# Patient Record
Sex: FEMALE | Race: WHITE | Employment: OTHER | URBAN - METROPOLITAN AREA
[De-identification: names, ages, dates, MRNs, and addresses within clinical notes are randomized per-mention and may not be internally consistent; named-entity substitution may affect disease eponyms.]

---

## 2017-02-18 ENCOUNTER — APPOINTMENT (OUTPATIENT)
Dept: GENERAL RADIOLOGY | Age: 67
DRG: 377 | End: 2017-02-18
Attending: EMERGENCY MEDICINE
Payer: MEDICARE

## 2017-02-18 ENCOUNTER — HOSPITAL ENCOUNTER (INPATIENT)
Age: 67
LOS: 11 days | Discharge: HOME HEALTH | DRG: 377 | End: 2017-03-01
Attending: EMERGENCY MEDICINE | Admitting: HOSPITALIST
Payer: MEDICARE

## 2017-02-18 DIAGNOSIS — R04.2 HEMOPTYSIS: ICD-10-CM

## 2017-02-18 DIAGNOSIS — K25.4 GASTROINTESTINAL HEMORRHAGE ASSOCIATED WITH GASTRIC ULCER: Primary | ICD-10-CM

## 2017-02-18 PROBLEM — K92.2 GI (GASTROINTESTINAL BLEED): Status: ACTIVE | Noted: 2017-02-18

## 2017-02-18 PROBLEM — Z79.01 CHRONIC ANTICOAGULATION: Status: ACTIVE | Noted: 2017-02-18

## 2017-02-18 PROBLEM — K06.8 GINGIVAL BLEEDING: Status: ACTIVE | Noted: 2017-02-18

## 2017-02-18 LAB
ALBUMIN SERPL BCP-MCNC: 2.8 G/DL (ref 3.4–5)
ALBUMIN/GLOB SERPL: 0.8 {RATIO} (ref 0.8–1.7)
ALP SERPL-CCNC: 131 U/L (ref 45–117)
ALT SERPL-CCNC: 33 U/L (ref 13–56)
ANION GAP BLD CALC-SCNC: 10 MMOL/L (ref 3–18)
APPEARANCE UR: CLEAR
AST SERPL W P-5'-P-CCNC: 32 U/L (ref 15–37)
BACTERIA URNS QL MICRO: ABNORMAL /HPF
BASOPHILS # BLD AUTO: 0 K/UL (ref 0–0.06)
BASOPHILS # BLD: 0 % (ref 0–2)
BILIRUB SERPL-MCNC: 0.2 MG/DL (ref 0.2–1)
BILIRUB UR QL: NEGATIVE
BUN SERPL-MCNC: 26 MG/DL (ref 7–18)
BUN/CREAT SERPL: 51 (ref 12–20)
CALCIUM SERPL-MCNC: 8.2 MG/DL (ref 8.5–10.1)
CHLORIDE SERPL-SCNC: 95 MMOL/L (ref 100–108)
CO2 SERPL-SCNC: 27 MMOL/L (ref 21–32)
COLOR UR: YELLOW
CREAT SERPL-MCNC: 0.51 MG/DL (ref 0.6–1.3)
DIFFERENTIAL METHOD BLD: ABNORMAL
EOSINOPHIL # BLD: 0.1 K/UL (ref 0–0.4)
EOSINOPHIL NFR BLD: 1 % (ref 0–5)
EPITH CASTS URNS QL MICRO: ABNORMAL /LPF (ref 0–5)
ERYTHROCYTE [DISTWIDTH] IN BLOOD BY AUTOMATED COUNT: 17.3 % (ref 11.6–14.5)
GLOBULIN SER CALC-MCNC: 3.6 G/DL (ref 2–4)
GLUCOSE SERPL-MCNC: 128 MG/DL (ref 74–99)
GLUCOSE UR STRIP.AUTO-MCNC: NEGATIVE MG/DL
HCT VFR BLD AUTO: 27.5 % (ref 35–45)
HGB BLD-MCNC: 8.6 G/DL (ref 12–16)
HGB UR QL STRIP: NEGATIVE
INR PPP: 0.9 (ref 0.8–1.2)
KETONES UR QL STRIP.AUTO: NEGATIVE MG/DL
LACTATE SERPL-SCNC: 2.5 MMOL/L (ref 0.4–2)
LEUKOCYTE ESTERASE UR QL STRIP.AUTO: ABNORMAL
LIPASE SERPL-CCNC: 119 U/L (ref 73–393)
LYMPHOCYTES # BLD AUTO: 15 % (ref 21–52)
LYMPHOCYTES # BLD: 1.8 K/UL (ref 0.9–3.6)
MAGNESIUM SERPL-MCNC: 2 MG/DL (ref 1.8–2.4)
MCH RBC QN AUTO: 25 PG (ref 24–34)
MCHC RBC AUTO-ENTMCNC: 31.3 G/DL (ref 31–37)
MCV RBC AUTO: 79.9 FL (ref 74–97)
MONOCYTES # BLD: 0.6 K/UL (ref 0.05–1.2)
MONOCYTES NFR BLD AUTO: 5 % (ref 3–10)
NEUTS SEG # BLD: 9.8 K/UL (ref 1.8–8)
NEUTS SEG NFR BLD AUTO: 79 % (ref 40–73)
NITRITE UR QL STRIP.AUTO: POSITIVE
PH UR STRIP: 6.5 [PH] (ref 5–8)
PLATELET # BLD AUTO: 454 K/UL (ref 135–420)
PMV BLD AUTO: 9.4 FL (ref 9.2–11.8)
POTASSIUM SERPL-SCNC: 5.6 MMOL/L (ref 3.5–5.5)
PROT SERPL-MCNC: 6.4 G/DL (ref 6.4–8.2)
PROT UR STRIP-MCNC: NEGATIVE MG/DL
PROTHROMBIN TIME: 12.3 SEC (ref 11.5–15.2)
RBC # BLD AUTO: 3.44 M/UL (ref 4.2–5.3)
RBC #/AREA URNS HPF: NEGATIVE /HPF (ref 0–5)
SODIUM SERPL-SCNC: 132 MMOL/L (ref 136–145)
SP GR UR REFRACTOMETRY: 1.02 (ref 1–1.03)
UROBILINOGEN UR QL STRIP.AUTO: 0.2 EU/DL (ref 0.2–1)
WBC # BLD AUTO: 12.4 K/UL (ref 4.6–13.2)
WBC URNS QL MICRO: ABNORMAL /HPF (ref 0–5)

## 2017-02-18 PROCEDURE — 83735 ASSAY OF MAGNESIUM: CPT | Performed by: EMERGENCY MEDICINE

## 2017-02-18 PROCEDURE — 81001 URINALYSIS AUTO W/SCOPE: CPT | Performed by: EMERGENCY MEDICINE

## 2017-02-18 PROCEDURE — 36415 COLL VENOUS BLD VENIPUNCTURE: CPT | Performed by: HOSPITALIST

## 2017-02-18 PROCEDURE — 86920 COMPATIBILITY TEST SPIN: CPT | Performed by: EMERGENCY MEDICINE

## 2017-02-18 PROCEDURE — 74011000258 HC RX REV CODE- 258: Performed by: EMERGENCY MEDICINE

## 2017-02-18 PROCEDURE — 83605 ASSAY OF LACTIC ACID: CPT | Performed by: EMERGENCY MEDICINE

## 2017-02-18 PROCEDURE — 86900 BLOOD TYPING SEROLOGIC ABO: CPT | Performed by: EMERGENCY MEDICINE

## 2017-02-18 PROCEDURE — 85018 HEMOGLOBIN: CPT | Performed by: HOSPITALIST

## 2017-02-18 PROCEDURE — 74011250636 HC RX REV CODE- 250/636: Performed by: EMERGENCY MEDICINE

## 2017-02-18 PROCEDURE — 80053 COMPREHEN METABOLIC PANEL: CPT | Performed by: EMERGENCY MEDICINE

## 2017-02-18 PROCEDURE — 65660000000 HC RM CCU STEPDOWN

## 2017-02-18 PROCEDURE — 83690 ASSAY OF LIPASE: CPT | Performed by: EMERGENCY MEDICINE

## 2017-02-18 PROCEDURE — 87077 CULTURE AEROBIC IDENTIFY: CPT | Performed by: HOSPITALIST

## 2017-02-18 PROCEDURE — C9113 INJ PANTOPRAZOLE SODIUM, VIA: HCPCS | Performed by: EMERGENCY MEDICINE

## 2017-02-18 PROCEDURE — 87186 SC STD MICRODIL/AGAR DIL: CPT | Performed by: HOSPITALIST

## 2017-02-18 PROCEDURE — 85025 COMPLETE CBC W/AUTO DIFF WBC: CPT | Performed by: EMERGENCY MEDICINE

## 2017-02-18 PROCEDURE — 87086 URINE CULTURE/COLONY COUNT: CPT | Performed by: HOSPITALIST

## 2017-02-18 PROCEDURE — 96361 HYDRATE IV INFUSION ADD-ON: CPT

## 2017-02-18 PROCEDURE — 99285 EMERGENCY DEPT VISIT HI MDM: CPT

## 2017-02-18 PROCEDURE — 85610 PROTHROMBIN TIME: CPT | Performed by: EMERGENCY MEDICINE

## 2017-02-18 PROCEDURE — 83605 ASSAY OF LACTIC ACID: CPT | Performed by: HOSPITALIST

## 2017-02-18 PROCEDURE — 96374 THER/PROPH/DIAG INJ IV PUSH: CPT

## 2017-02-18 PROCEDURE — 87184 SC STD DISK METHOD PER PLATE: CPT | Performed by: HOSPITALIST

## 2017-02-18 PROCEDURE — 71010 XR CHEST PORT: CPT

## 2017-02-18 RX ORDER — PANTOPRAZOLE SODIUM 40 MG/10ML
80 INJECTION, POWDER, LYOPHILIZED, FOR SOLUTION INTRAVENOUS
Status: COMPLETED | OUTPATIENT
Start: 2017-02-18 | End: 2017-02-18

## 2017-02-18 RX ORDER — SODIUM CHLORIDE 0.9 % (FLUSH) 0.9 %
5-10 SYRINGE (ML) INJECTION AS NEEDED
Status: DISCONTINUED | OUTPATIENT
Start: 2017-02-18 | End: 2017-03-01 | Stop reason: HOSPADM

## 2017-02-18 RX ORDER — SODIUM CHLORIDE 9 MG/ML
250 INJECTION, SOLUTION INTRAVENOUS AS NEEDED
Status: DISCONTINUED | OUTPATIENT
Start: 2017-02-18 | End: 2017-02-18

## 2017-02-18 RX ORDER — SODIUM CHLORIDE 0.9 % (FLUSH) 0.9 %
5-10 SYRINGE (ML) INJECTION EVERY 8 HOURS
Status: DISCONTINUED | OUTPATIENT
Start: 2017-02-18 | End: 2017-03-01 | Stop reason: HOSPADM

## 2017-02-18 RX ADMIN — SODIUM CHLORIDE 1000 ML: 900 INJECTION, SOLUTION INTRAVENOUS at 19:26

## 2017-02-18 RX ADMIN — SODIUM CHLORIDE 8 MG/HR: 900 INJECTION INTRAVENOUS at 23:35

## 2017-02-18 RX ADMIN — PANTOPRAZOLE SODIUM 80 MG: 40 INJECTION, POWDER, FOR SOLUTION INTRAVENOUS at 21:48

## 2017-02-18 RX ADMIN — Medication 10 ML: at 23:36

## 2017-02-18 NOTE — ED TRIAGE NOTES
Pt transported from via EMS. Pt stated she started having bloody stool and vomiting blood. Pt stated she is taking coumadin and lovenox.

## 2017-02-18 NOTE — IP AVS SNAPSHOT
[address] [address] 
[phone] Patient: Peri Thapa MRN: [id number] WSL:[birth year] You are allergic to the following Allergen Reactions Demerol (Meperidine) Unknown (comments) Seafood (Shellfish Containing Products) Unknown (comments) Recent Documentation Height  
  
  
  
  
  
 1.676 m Unresulted Labs Order Current Status ALDOSTERONE In process Emergency Contacts Name Discharge Info Relation Home Work Mobile Tulio Rose DISCHARGE CAREGIVER [3] Spouse [3] [phone] About your hospitalization You were admitted on:  February 18, 2017 You last received care in the:  [address] [place] [address] You were discharged on:  March 1, 2017 Unit phone number:  [phone] Why you were hospitalized Your primary diagnosis was:  Gi (Gastrointestinal Bleed) Your diagnoses also included:  Gingival Bleeding, Chronic Anticoagulation, Epistaxis, Uti (Urinary Tract Infection) Providers Seen During Your Hospitalizations Provider Role Specialty Primary office phone Cristal Santos MD Attending Provider Emergency Medicine [phone] Janneth Landis MD Attending Provider [facility] [phone] Your Primary Care Physician (PCP) Primary Care Physician Office Phone Office Fax Richie Roy [phone] Follow-up Information Follow up With Details Comments Contact Info Rika Brock MD On 3/8/2017 Follow up appointment scheduled for March 8, 2017 at 3:15 p.m. [facility] [address] [address] 
[phone] [facility] [phone] Jana Beckett MD On 3/14/2017 Follow up appointment scheduled for March 14, 2017 at 9:45 a.m. with JACKI Abdullahi (first appointment available). 1 [facility] [id number] [address] [address] 
[phone] Current Discharge Medication List  
  
START taking these medications Dose & Instructions Dispensing Information Comments Morning Noon Evening Bedtime  
 doxycycline 100 mg capsule Commonly known as:  Catrina Span Your next dose is: Today, Tomorrow Other:  _________ Dose:  100 mg Take 1 Cap by mouth two (2) times a day for 5 days. Quantity:  10 Cap Refills:  0  
     
   
   
   
  
 fluticasone 50 mcg/actuation nasal spray Commonly known as:  Neville Merles Your next dose is: Today, Tomorrow Other:  _________ Dose:  2 Spray 2 Sprays by Both Nostrils route daily. Quantity:  1 Bottle Refills:  1  
     
   
   
   
  
 lubiPROStone 8 mcg capsule Commonly known as:  Aundra Castleford Your next dose is: Today, Tomorrow Other:  _________ Dose:  8 mcg Take 1 Cap by mouth two (2) times daily (with meals). Quantity:  60 Cap Refills:  0  
     
   
   
   
  
 polyethylene glycol 17 gram packet Commonly known as:  Tonna Dieudonne Your next dose is: Today, Tomorrow Other:  _________ Dose:  17 g Take 1 Packet by mouth two (2) times a day. Quantity:  60 Packet Refills:  0 CONTINUE these medications which have CHANGED Dose & Instructions Dispensing Information Comments Morning Noon Evening Bedtime DULoxetine 30 mg capsule Commonly known as:  CYMBALTA What changed:  how much to take Your next dose is: Today, Tomorrow Other:  _________ Dose:  60 mg Take 2 Caps by mouth two (2) times a day. Quantity:  60 Cap Refills:  1  
     
   
   
   
  
 levothyroxine 100 mcg tablet Commonly known as:  SYNTHROID What changed:  how much to take Your next dose is: Today, Tomorrow Other:  _________ Dose:  125 mcg Take 1.5 Tabs by mouth Daily (before breakfast). Quantity:  30 Tab Refills:  0  
     
   
   
   
  
 potassium chloride SR 10 mEq tablet Commonly known as:  KLOR-CON 10 What changed:  how much to take Your next dose is: Today, Tomorrow Other:  _________ Dose:  20 mEq Take 2 Tabs by mouth two (2) times a day. Quantity:  10 Tab Refills:  0 CONTINUE these medications which have NOT CHANGED Dose & Instructions Dispensing Information Comments Morning Noon Evening Bedtime Elías Klein 860-96-30 mg-mg-million Tab Your next dose is: Today, Tomorrow Other:  _________ Dose:  85994 mg Take 25,000 mg by mouth two (2) times a day. Indications: Takes at 1200 and 2000 Refills:  0  
     
   
   
   
  
 CARAFATE 100 mg/mL suspension Generic drug:  sucralfate Your next dose is: Today, Tomorrow Other:  _________ Dose:  1 g Take 1 g by mouth Before breakfast, lunch, dinner and at bedtime. Refills:  0 CLARITIN-D 24 HOUR  mg per tablet Generic drug:  loratadine-pseudoephedrine Your next dose is: Today, Tomorrow Other:  _________ Dose:  1 Tab Take 1 Tab by mouth daily. Refills:  0  
     
   
   
   
  
 codeine-butalbital-acetaminophen-caffeine -56-30 mg per capsule Commonly known as:  FIORICET WITH CODEINE Your next dose is: Today, Tomorrow Other:  _________ Dose:  1 Cap Take 1 Cap by mouth every four (4) hours as needed for Headache. Refills:  0  
     
   
   
   
  
 cyclobenzaprine 10 mg tablet Commonly known as:  FLEXERIL Your next dose is: Today, Tomorrow Other:  _________ Dose:  10 mg Take 10 mg by mouth nightly. Indications: Takes at 2000 Refills:  0  
     
   
   
   
  
 ferrous sulfate 324 mg (65 mg iron) tablet Your next dose is: Today, Tomorrow Other:  _________  Dose:  324 mg  
 Take 324 mg by mouth Daily (before breakfast). Indications: 65 mg iron Refills:  0  
     
   
   
   
  
 gabapentin 300 mg capsule Commonly known as:  NEURONTIN Your next dose is: Today, Tomorrow Other:  _________ Dose:  900 mg Take 900 mg by mouth three (3) times daily. Refills:  0 Glucosamine &Chondroit-MV-Min3 450-889-71-0.5 mg Tab Your next dose is: Today, Tomorrow Other:  _________ Dose:  1500 mg Take 1,500 mg by mouth three (3) times daily. Refills:  0  
     
   
   
   
  
 lacosamide 200 mg Tab tablet Commonly known as:  VIMPAT Your next dose is: Today, Tomorrow Other:  _________ Dose:  100 mg Take 100 mg by mouth two (2) times a day. Indications: PARTIAL EPILEPSY TREATMENT ADJUNCT Refills:  0  
     
   
   
   
  
 magnesium oxide 500 mg Tab Your next dose is: Today, Tomorrow Other:  _________ Dose:  500 mg Take 500 mg by mouth daily (with lunch). Refills:  0  
     
   
   
   
  
 morphine IR 15 mg tablet Commonly known as:  MS IR Your next dose is: Today, Tomorrow Other:  _________ Dose:  15 mg Take 15 mg by mouth every eight (8) hours as needed for Pain. Refills:  0 MYRBETRIQ 50 mg ER tablet Generic drug:  mirabegron ER Your next dose is: Today, Tomorrow Other:  _________ Dose:  50 mg Take 50 mg by mouth nightly. Takes at [phone] Refills:  0  
     
   
   
   
  
 MIGUEL PO Your next dose is: Today, Tomorrow Other:  _________ Dose:  625 mg Take 625 mg by mouth two (2) times a day. Indications: Takes at 1200 and 2000 Refills:  0  
     
   
   
   
  
 OTHER(NON-FORMULARY) Your next dose is: Today, Tomorrow Other:  _________ Dose:  1 Cap Take 1 Cap by mouth daily (with lunch). Indications: Bariatric Multi Formula Refills:  0  
     
   
   
   
  
 predniSONE 5 mg tablet Commonly known as:  Rachel Greek Your next dose is: Today, Tomorrow Other:  _________ Dose:  5 mg Take 5 mg by mouth daily. Refills:  0 PROTONIX 20 mg tablet Generic drug:  pantoprazole Your next dose is: Today, Tomorrow Other:  _________ Dose:  20 mg Take 20 mg by mouth two (2) times a day. Refills:  0  
     
   
   
   
  
 * REQUIP 0.25 mg tablet Generic drug:  rOPINIRole Your next dose is: Today, Tomorrow Other:  _________ Dose:  0.25 mg Take 0.25 mg by mouth daily. Takes at [employer] Refills:  0  
     
   
   
   
  
 * REQUIP 0.25 mg tablet Generic drug:  rOPINIRole Your next dose is: Today, Tomorrow Other:  _________ Dose:  0.75 mg Take 0.75 mg by mouth nightly. Takes at [phone] Refills:  0  
     
   
   
   
  
 VITAMIN C 500 mg tablet Generic drug:  ascorbic acid (vitamin C) Your next dose is: Today, Tomorrow Other:  _________ Dose:  500 mg Take 500 mg by mouth daily. Refills:  0 ZyPREXA 7.5 mg tablet Generic drug:  OLANZapine Your next dose is: Today, Tomorrow Other:  _________ Dose:  7.5 mg Take 7.5 mg by mouth nightly. Refills:  0  
     
   
   
   
  
 * Notice: This list has 2 medication(s) that are the same as other medications prescribed for you. Read the directions carefully, and ask your doctor or other care provider to review them with you. STOP taking these medications   
 warfarin 3 mg tablet Commonly known as:  COUMADIN Where to Get Your Medications Information on where to get these meds will be given to you by the nurse or doctor. ! Ask your nurse or doctor about these medications  
  doxycycline 100 mg capsule  
 fluticasone 50 mcg/actuation nasal spray lubiPROStone 8 mcg capsule  
 polyethylene glycol 17 gram packet Discharge Instructions DISCHARGE SUMMARY from Nurse The following personal items are in your possession at time of discharge:  
 
 
 
 
PATIENT INSTRUCTIONS: 
 
After general anesthesia or intravenous sedation, for 24 hours or while taking prescription Narcotics: · Limit your activities · Do not drive and operate hazardous machinery · Do not make important personal or business decisions · Do  not drink alcoholic beverages · If you have not urinated within 8 hours after discharge, please contact your surgeon on call. Report the following to your surgeon: 
· Excessive pain, swelling, redness or odor of or around the surgical area · Temperature over 100.5 · Nausea and vomiting lasting longer than 4 hours or if unable to take medications · Any signs of decreased circulation or nerve impairment to extremity: change in color, persistent  numbness, tingling, coldness or increase pain · Any questions What to do at Home: *  Please give a list of your current medications to your Primary Care Provider. *  Please update this list whenever your medications are discontinued, doses are 
    changed, or new medications (including over-the-counter products) are added. *  Please carry medication information at all times in case of emergency situations. These are general instructions for a healthy lifestyle: No smoking/ No tobacco products/ Avoid exposure to second hand smoke Surgeon General's Warning:  Quitting smoking now greatly reduces serious risk to your health. Obesity, smoking, and sedentary lifestyle greatly increases your risk for illness A healthy diet, regular physical exercise & weight monitoring are important for maintaining a healthy lifestyle You may be retaining fluid if you have a history of heart failure or if you experience any of the following symptoms:  Weight gain of 3 pounds or more overnight or 5 pounds in a week, increased swelling in our hands or feet or shortness of breath while lying flat in bed. Please call your doctor as soon as you notice any of these symptoms; do not wait until your next office visit. Recognize signs and symptoms of STROKE: 
 
F-face looks uneven A-arms unable to move or move unevenly S-speech slurred or non-existent T-time-call 911 as soon as signs and symptoms begin-DO NOT go Back to bed or wait to see if you get better-TIME IS BRAIN. Warning Signs of HEART ATTACK Call 911 if you have these symptoms: 
? Chest discomfort. Most heart attacks involve discomfort in the center of the chest that lasts more than a few minutes, or that goes away and comes back. It can feel like uncomfortable pressure, squeezing, fullness, or pain. ? Discomfort in other areas of the upper body. Symptoms can include pain or discomfort in one or both arms, the back, neck, jaw, or stomach. ? Shortness of breath with or without chest discomfort. ? Other signs may include breaking out in a cold sweat, nausea, or lightheadedness. Don't wait more than five minutes to call [address]! Fast action can save your life. Calling 911 is almost always the fastest way to get lifesaving treatment. Emergency Medical Services staff can begin treatment when they arrive  up to an hour sooner than if someone gets to the hospital by car. The discharge information has been reviewed with the patient. The patient verbalized understanding. Discharge medications reviewed with the patient and appropriate educational materials and side effects teaching were provided. Patient armband removed and shredded Discharge Orders None Introducing \[facility]\"" & HEALTH SERVICES!    
 Salvador Betts introduces [employer] patient portal. Now you can access parts of your medical record, email your doctor's office, and request medication refills online. 1. In your internet browser, go to [email or web address] 2. Click on the First Time User? Click Here link in the Sign In box. You will see the New Member Sign Up page. 3. Enter your Photoblog Access Code exactly as it appears below. You will not need to use this code after youve completed the sign-up process. If you do not sign up before the expiration date, you must request a new code. · Photoblog Access Code: [email or web address] Expires: 5/19/2017  6:54 PM 
 
4. Enter the last four digits of your Social Security Number (xxxx) and Date of Birth (mm/dd/yyyy) as indicated and click Submit. You will be taken to the next sign-up page. 5. Create a Photoblog ID. This will be your Photoblog login ID and cannot be changed, so think of one that is secure and easy to remember. 6. Create a Photoblog password. You can change your password at any time. 7. Enter your Password Reset Question and Answer. This can be used at a later time if you forget your password. 8. Enter your e-mail address. You will receive e-mail notification when new information is available in [address] 9. Click Sign Up. You can now view and download portions of your medical record. 10. Click the Download Summary menu link to download a portable copy of your medical information. If you have questions, please visit the Frequently Asked Questions section of the Photoblog website. Remember, Photoblog is NOT to be used for urgent needs. For medical emergencies, dial 911. Now available from your iPhone and Android! General Information Please provide this summary of care documentation to your next provider. Patient Signature:  ____________________________________________________________ Date:  ____________________________________________________________  
  
Kaur Sindi  Provider Signature: ____________________________________________________________ Date:  ____________________________________________________________

## 2017-02-18 NOTE — IP AVS SNAPSHOT
Current Discharge Medication List  
  
Take these medications at their scheduled times Dose & Instructions Dispensing Information Comments Morning Noon Evening Bedtime Xiomara Caro 529-39-36 mg-mg-million Tab Your next dose is: Today, Tomorrow Other:  ____________ Dose:  56694 mg Take 25,000 mg by mouth two (2) times a day. Indications: Takes at 1200 and 2000 Refills:  0  
     
   
   
   
  
 CARAFATE 100 mg/mL suspension Generic drug:  sucralfate Your next dose is: Today, Tomorrow Other:  ____________ Dose:  1 g Take 1 g by mouth Before breakfast, lunch, dinner and at bedtime. Refills:  0 CLARITIN-D 24 HOUR  mg per tablet Generic drug:  loratadine-pseudoephedrine Your next dose is: Today, Tomorrow Other:  ____________ Dose:  1 Tab Take 1 Tab by mouth daily. Refills:  0  
     
   
   
   
  
 cyclobenzaprine 10 mg tablet Commonly known as:  FLEXERIL Your next dose is: Today, Tomorrow Other:  ____________ Dose:  10 mg Take 10 mg by mouth nightly. Indications: Takes at 2000 Refills:  0  
     
   
   
   
  
 doxycycline 100 mg capsule Commonly known as:  Lella Mike Your next dose is: Today, Tomorrow Other:  ____________ Dose:  100 mg Take 1 Cap by mouth two (2) times a day for 5 days. Quantity:  10 Cap Refills:  0 DULoxetine 30 mg capsule Commonly known as:  CYMBALTA Your next dose is: Today, Tomorrow Other:  ____________ Dose:  60 mg Take 2 Caps by mouth two (2) times a day. Quantity:  60 Cap Refills:  1  
     
   
   
   
  
 ferrous sulfate 324 mg (65 mg iron) tablet Your next dose is: Today, Tomorrow Other:  ____________ Dose:  324 mg Take 324 mg by mouth Daily (before breakfast). Indications: 65 mg iron Refills:  0 fluticasone 50 mcg/actuation nasal spray Commonly known as:  Wendy Guild Your next dose is: Today, Tomorrow Other:  ____________ Dose:  2 Spray 2 Sprays by Both Nostrils route daily. Quantity:  1 Bottle Refills:  1  
     
   
   
   
  
 gabapentin 300 mg capsule Commonly known as:  NEURONTIN Your next dose is: Today, Tomorrow Other:  ____________ Dose:  900 mg Take 900 mg by mouth three (3) times daily. Refills:  0 Glucosamine &Chondroit-MV-Min3 363-718-83-0.5 mg Tab Your next dose is: Today, Tomorrow Other:  ____________ Dose:  1500 mg Take 1,500 mg by mouth three (3) times daily. Refills:  0  
     
   
   
   
  
 lacosamide 200 mg Tab tablet Commonly known as:  VIMPAT Your next dose is: Today, Tomorrow Other:  ____________ Dose:  100 mg Take 100 mg by mouth two (2) times a day. Indications: PARTIAL EPILEPSY TREATMENT ADJUNCT Refills:  0  
     
   
   
   
  
 levothyroxine 100 mcg tablet Commonly known as:  SYNTHROID Your next dose is: Today, Tomorrow Other:  ____________ Dose:  125 mcg Take 1.5 Tabs by mouth Daily (before breakfast). Quantity:  30 Tab Refills:  0  
     
   
   
   
  
 lubiPROStone 8 mcg capsule Commonly known as:  Winda Roes Your next dose is: Today, Tomorrow Other:  ____________ Dose:  8 mcg Take 1 Cap by mouth two (2) times daily (with meals). Quantity:  60 Cap Refills:  0  
     
   
   
   
  
 magnesium oxide 500 mg Tab Your next dose is: Today, Tomorrow Other:  ____________ Dose:  500 mg Take 500 mg by mouth daily (with lunch). Refills:  0 MYRBETRIQ 50 mg ER tablet Generic drug:  mirabegron ER Your next dose is: Today, Tomorrow Other:  ____________  Dose:  50 mg  
 Take 50 mg by mouth nightly. Takes at [phone] Refills:  0  
     
   
   
   
  
 MIGUEL PO Your next dose is: Today, Tomorrow Other:  ____________ Dose:  625 mg Take 625 mg by mouth two (2) times a day. Indications: Takes at 1200 and 2000 Refills:  0  
     
   
   
   
  
 OTHER(NON-FORMULARY) Your next dose is: Today, Tomorrow Other:  ____________ Dose:  1 Cap Take 1 Cap by mouth daily (with lunch). Indications: Bariatric Multi Formula Refills:  0  
     
   
   
   
  
 polyethylene glycol 17 gram packet Commonly known as:  Alexsandra Clos Your next dose is: Today, Tomorrow Other:  ____________ Dose:  17 g Take 1 Packet by mouth two (2) times a day. Quantity:  60 Packet Refills:  0  
     
   
   
   
  
 potassium chloride SR 10 mEq tablet Commonly known as:  KLOR-CON 10 Your next dose is: Today, Tomorrow Other:  ____________ Dose:  20 mEq Take 2 Tabs by mouth two (2) times a day. Quantity:  10 Tab Refills:  0  
     
   
   
   
  
 predniSONE 5 mg tablet Commonly known as:  Joel Robles Your next dose is: Today, Tomorrow Other:  ____________ Dose:  5 mg Take 5 mg by mouth daily. Refills:  0 PROTONIX 20 mg tablet Generic drug:  pantoprazole Your next dose is: Today, Tomorrow Other:  ____________ Dose:  20 mg Take 20 mg by mouth two (2) times a day. Refills:  0  
     
   
   
   
  
 * REQUIP 0.25 mg tablet Generic drug:  rOPINIRole Your next dose is: Today, Tomorrow Other:  ____________ Dose:  0.25 mg Take 0.25 mg by mouth daily. Takes at [employer] Refills:  0  
     
   
   
   
  
 * REQUIP 0.25 mg tablet Generic drug:  rOPINIRole Your next dose is: Today, Tomorrow Other:  ____________ Dose:  0.75 mg Take 0.75 mg by mouth nightly. Takes at [phone] Refills:  0 VITAMIN C 500 mg tablet Generic drug:  ascorbic acid (vitamin C) Your next dose is: Today, Tomorrow Other:  ____________ Dose:  500 mg Take 500 mg by mouth daily. Refills:  0 ZyPREXA 7.5 mg tablet Generic drug:  OLANZapine Your next dose is: Today, Tomorrow Other:  ____________ Dose:  7.5 mg Take 7.5 mg by mouth nightly. Refills:  0  
     
   
   
   
  
 * Notice: This list has 2 medication(s) that are the same as other medications prescribed for you. Read the directions carefully, and ask your doctor or other care provider to review them with you. Take these medications as needed Dose & Instructions Dispensing Information Comments Morning Noon Evening Bedtime  
 codeine-butalbital-acetaminophen-caffeine -51-30 mg per capsule Commonly known as:  FIORICET WITH CODEINE Your next dose is: Today, Tomorrow Other:  ____________ Dose:  1 Cap Take 1 Cap by mouth every four (4) hours as needed for Headache. Refills:  0  
     
   
   
   
  
 morphine IR 15 mg tablet Commonly known as:  MS IR Your next dose is: Today, Tomorrow Other:  ____________ Dose:  15 mg Take 15 mg by mouth every eight (8) hours as needed for Pain. Refills:  0 Where to Get Your Medications Information about where to get these medications is not yet available ! Ask your nurse or doctor about these medications  
  doxycycline 100 mg capsule  
 fluticasone 50 mcg/actuation nasal spray  
 lubiPROStone 8 mcg capsule  
 polyethylene glycol 17 gram packet

## 2017-02-19 ENCOUNTER — APPOINTMENT (OUTPATIENT)
Dept: GENERAL RADIOLOGY | Age: 67
DRG: 377 | End: 2017-02-19
Attending: HOSPITALIST
Payer: MEDICARE

## 2017-02-19 PROBLEM — R04.0 EPISTAXIS: Status: ACTIVE | Noted: 2017-02-19

## 2017-02-19 LAB
ANION GAP BLD CALC-SCNC: 7 MMOL/L (ref 3–18)
BUN SERPL-MCNC: 33 MG/DL (ref 7–18)
BUN/CREAT SERPL: 106 (ref 12–20)
CALCIUM SERPL-MCNC: 7.4 MG/DL (ref 8.5–10.1)
CHLORIDE SERPL-SCNC: 102 MMOL/L (ref 100–108)
CO2 SERPL-SCNC: 27 MMOL/L (ref 21–32)
CREAT SERPL-MCNC: 0.31 MG/DL (ref 0.6–1.3)
ERYTHROCYTE [DISTWIDTH] IN BLOOD BY AUTOMATED COUNT: 17.1 % (ref 11.6–14.5)
GLUCOSE SERPL-MCNC: 101 MG/DL (ref 74–99)
HCT VFR BLD AUTO: 20 % (ref 35–45)
HCT VFR BLD AUTO: 23.3 % (ref 35–45)
HCT VFR BLD AUTO: 26.3 % (ref 35–45)
HEMOCCULT STL QL: POSITIVE
HGB BLD-MCNC: 6.3 G/DL (ref 12–16)
HGB BLD-MCNC: 7.3 G/DL (ref 12–16)
HGB BLD-MCNC: 8.8 G/DL (ref 12–16)
LACTATE SERPL-SCNC: 1.3 MMOL/L (ref 0.4–2)
MCH RBC QN AUTO: 24.9 PG (ref 24–34)
MCHC RBC AUTO-ENTMCNC: 31.5 G/DL (ref 31–37)
MCV RBC AUTO: 79.1 FL (ref 74–97)
PLATELET # BLD AUTO: 327 K/UL (ref 135–420)
PMV BLD AUTO: 9.5 FL (ref 9.2–11.8)
POTASSIUM SERPL-SCNC: 4.6 MMOL/L (ref 3.5–5.5)
RBC # BLD AUTO: 2.53 M/UL (ref 4.2–5.3)
SODIUM SERPL-SCNC: 136 MMOL/L (ref 136–145)
WBC # BLD AUTO: 10.5 K/UL (ref 4.6–13.2)

## 2017-02-19 PROCEDURE — 87040 BLOOD CULTURE FOR BACTERIA: CPT | Performed by: HOSPITALIST

## 2017-02-19 PROCEDURE — 65660000000 HC RM CCU STEPDOWN

## 2017-02-19 PROCEDURE — 74011250636 HC RX REV CODE- 250/636: Performed by: HOSPITALIST

## 2017-02-19 PROCEDURE — 36415 COLL VENOUS BLD VENIPUNCTURE: CPT | Performed by: HOSPITALIST

## 2017-02-19 PROCEDURE — 74011636637 HC RX REV CODE- 636/637: Performed by: INTERNAL MEDICINE

## 2017-02-19 PROCEDURE — 74000 XR ABD (KUB): CPT

## 2017-02-19 PROCEDURE — C9113 INJ PANTOPRAZOLE SODIUM, VIA: HCPCS | Performed by: HOSPITALIST

## 2017-02-19 PROCEDURE — 74011250637 HC RX REV CODE- 250/637: Performed by: INTERNAL MEDICINE

## 2017-02-19 PROCEDURE — 74011636637 HC RX REV CODE- 636/637: Performed by: HOSPITALIST

## 2017-02-19 PROCEDURE — 74011250637 HC RX REV CODE- 250/637: Performed by: HOSPITALIST

## 2017-02-19 PROCEDURE — C9113 INJ PANTOPRAZOLE SODIUM, VIA: HCPCS | Performed by: EMERGENCY MEDICINE

## 2017-02-19 PROCEDURE — 80048 BASIC METABOLIC PNL TOTAL CA: CPT | Performed by: HOSPITALIST

## 2017-02-19 PROCEDURE — 74011000258 HC RX REV CODE- 258: Performed by: EMERGENCY MEDICINE

## 2017-02-19 PROCEDURE — 82272 OCCULT BLD FECES 1-3 TESTS: CPT | Performed by: HOSPITALIST

## 2017-02-19 PROCEDURE — 30233N1 TRANSFUSION OF NONAUTOLOGOUS RED BLOOD CELLS INTO PERIPHERAL VEIN, PERCUTANEOUS APPROACH: ICD-10-PCS | Performed by: HOSPITALIST

## 2017-02-19 PROCEDURE — 74011250636 HC RX REV CODE- 250/636: Performed by: EMERGENCY MEDICINE

## 2017-02-19 PROCEDURE — P9016 RBC LEUKOCYTES REDUCED: HCPCS | Performed by: EMERGENCY MEDICINE

## 2017-02-19 PROCEDURE — 85027 COMPLETE CBC AUTOMATED: CPT | Performed by: HOSPITALIST

## 2017-02-19 PROCEDURE — 74011000258 HC RX REV CODE- 258: Performed by: HOSPITALIST

## 2017-02-19 PROCEDURE — 74011000250 HC RX REV CODE- 250: Performed by: HOSPITALIST

## 2017-02-19 PROCEDURE — 36430 TRANSFUSION BLD/BLD COMPNT: CPT

## 2017-02-19 RX ORDER — LACOSAMIDE 100 MG/1
100 TABLET ORAL 2 TIMES DAILY
Status: DISCONTINUED | OUTPATIENT
Start: 2017-02-19 | End: 2017-03-01 | Stop reason: HOSPADM

## 2017-02-19 RX ORDER — EPINEPHRINE 0.1 MG/ML
1 INJECTION INTRACARDIAC; INTRAVENOUS
Status: CANCELLED | OUTPATIENT
Start: 2017-02-19 | End: 2017-02-19

## 2017-02-19 RX ORDER — LORATADINE AND PSEUDOEPHEDRINE 10; 240 MG/1; MG/1
1 TABLET, EXTENDED RELEASE ORAL DAILY
COMMUNITY

## 2017-02-19 RX ORDER — OLANZAPINE 2.5 MG/1
7.5 TABLET ORAL
Status: DISCONTINUED | OUTPATIENT
Start: 2017-02-19 | End: 2017-03-01 | Stop reason: HOSPADM

## 2017-02-19 RX ORDER — FENTANYL CITRATE 50 UG/ML
100 INJECTION, SOLUTION INTRAMUSCULAR; INTRAVENOUS
Status: CANCELLED | OUTPATIENT
Start: 2017-02-19 | End: 2017-02-19

## 2017-02-19 RX ORDER — MORPHINE SULFATE 15 MG/1
15 TABLET ORAL
Status: DISCONTINUED | OUTPATIENT
Start: 2017-02-19 | End: 2017-03-01 | Stop reason: HOSPADM

## 2017-02-19 RX ORDER — ROPINIROLE 0.25 MG/1
0.75 TABLET, FILM COATED ORAL
Status: DISCONTINUED | OUTPATIENT
Start: 2017-02-19 | End: 2017-03-01 | Stop reason: HOSPADM

## 2017-02-19 RX ORDER — FACIAL-BODY WIPES
10 EACH TOPICAL DAILY PRN
Status: DISCONTINUED | OUTPATIENT
Start: 2017-02-19 | End: 2017-03-01 | Stop reason: HOSPADM

## 2017-02-19 RX ORDER — SUCRALFATE 1 G/10ML
1 SUSPENSION ORAL
COMMUNITY
End: 2017-08-27

## 2017-02-19 RX ORDER — POLYETHYLENE GLYCOL 3350 17 G/17G
17 POWDER, FOR SOLUTION ORAL 2 TIMES DAILY
Status: DISCONTINUED | OUTPATIENT
Start: 2017-02-19 | End: 2017-03-01 | Stop reason: HOSPADM

## 2017-02-19 RX ORDER — GABAPENTIN 300 MG/1
900 CAPSULE ORAL 3 TIMES DAILY
Status: DISCONTINUED | OUTPATIENT
Start: 2017-02-19 | End: 2017-03-01 | Stop reason: HOSPADM

## 2017-02-19 RX ORDER — LANOLIN ALCOHOL/MO/W.PET/CERES
1 CREAM (GRAM) TOPICAL
Status: DISCONTINUED | OUTPATIENT
Start: 2017-02-19 | End: 2017-03-01 | Stop reason: HOSPADM

## 2017-02-19 RX ORDER — SODIUM CHLORIDE 9 MG/ML
50 INJECTION, SOLUTION INTRAVENOUS CONTINUOUS
Status: DISCONTINUED | OUTPATIENT
Start: 2017-02-19 | End: 2017-02-22

## 2017-02-19 RX ORDER — MORPHINE SULFATE 15 MG/1
15 TABLET ORAL
COMMUNITY
End: 2017-08-30

## 2017-02-19 RX ORDER — DULOXETIN HYDROCHLORIDE 30 MG/1
30 CAPSULE, DELAYED RELEASE ORAL 2 TIMES DAILY
Status: DISCONTINUED | OUTPATIENT
Start: 2017-02-19 | End: 2017-03-01 | Stop reason: HOSPADM

## 2017-02-19 RX ORDER — BACLOFEN 20 MG
500 TABLET ORAL
COMMUNITY

## 2017-02-19 RX ORDER — LEVOTHYROXINE SODIUM 75 UG/1
75 TABLET ORAL
Status: DISCONTINUED | OUTPATIENT
Start: 2017-02-19 | End: 2017-03-01 | Stop reason: HOSPADM

## 2017-02-19 RX ORDER — PREDNISONE 5 MG/1
5 TABLET ORAL DAILY
Status: DISCONTINUED | OUTPATIENT
Start: 2017-02-19 | End: 2017-03-01 | Stop reason: HOSPADM

## 2017-02-19 RX ORDER — DEXTROMETHORPHAN/PSEUDOEPHED 2.5-7.5/.8
1.2 DROPS ORAL
Status: CANCELLED | OUTPATIENT
Start: 2017-02-19

## 2017-02-19 RX ORDER — CYCLOBENZAPRINE HCL 10 MG
10 TABLET ORAL
COMMUNITY
End: 2017-08-27

## 2017-02-19 RX ORDER — CYCLOBENZAPRINE HCL 10 MG
10 TABLET ORAL
Status: DISCONTINUED | OUTPATIENT
Start: 2017-02-19 | End: 2017-03-01 | Stop reason: HOSPADM

## 2017-02-19 RX ORDER — SODIUM CHLORIDE 9 MG/ML
100 INJECTION, SOLUTION INTRAVENOUS CONTINUOUS
Status: CANCELLED | OUTPATIENT
Start: 2017-02-19 | End: 2017-02-19

## 2017-02-19 RX ORDER — FERROUS SULFATE 324(65)MG
324 TABLET, DELAYED RELEASE (ENTERIC COATED) ORAL
COMMUNITY
End: 2017-08-27

## 2017-02-19 RX ORDER — GABAPENTIN 300 MG/1
300 CAPSULE ORAL 3 TIMES DAILY
COMMUNITY

## 2017-02-19 RX ORDER — SODIUM CHLORIDE 0.9 % (FLUSH) 0.9 %
5-10 SYRINGE (ML) INJECTION AS NEEDED
Status: DISCONTINUED | OUTPATIENT
Start: 2017-02-19 | End: 2017-03-01 | Stop reason: HOSPADM

## 2017-02-19 RX ORDER — SODIUM CHLORIDE 9 MG/ML
250 INJECTION, SOLUTION INTRAVENOUS ONCE
Status: COMPLETED | OUTPATIENT
Start: 2017-02-19 | End: 2017-02-19

## 2017-02-19 RX ORDER — FLUMAZENIL 0.1 MG/ML
0.2 INJECTION INTRAVENOUS
Status: CANCELLED | OUTPATIENT
Start: 2017-02-19 | End: 2017-02-19

## 2017-02-19 RX ORDER — ROPINIROLE 0.25 MG/1
0.25 TABLET, FILM COATED ORAL DAILY
Status: DISCONTINUED | OUTPATIENT
Start: 2017-02-19 | End: 2017-03-01 | Stop reason: HOSPADM

## 2017-02-19 RX ORDER — PANTOPRAZOLE SODIUM 20 MG/1
40 TABLET, DELAYED RELEASE ORAL 2 TIMES DAILY
COMMUNITY
End: 2017-11-22

## 2017-02-19 RX ORDER — PREDNISONE 5 MG/1
5 TABLET ORAL DAILY
COMMUNITY

## 2017-02-19 RX ORDER — ATROPINE SULFATE 0.1 MG/ML
0.5 INJECTION INTRAVENOUS
Status: CANCELLED | OUTPATIENT
Start: 2017-02-19 | End: 2017-02-19

## 2017-02-19 RX ORDER — LUBIPROSTONE 8 UG/1
24 CAPSULE, GELATIN COATED ORAL 2 TIMES DAILY WITH MEALS
Status: DISCONTINUED | OUTPATIENT
Start: 2017-02-19 | End: 2017-02-21

## 2017-02-19 RX ORDER — MIDAZOLAM HYDROCHLORIDE 1 MG/ML
.5-5 INJECTION, SOLUTION INTRAMUSCULAR; INTRAVENOUS
Status: CANCELLED | OUTPATIENT
Start: 2017-02-19 | End: 2017-02-19

## 2017-02-19 RX ORDER — LANOLIN ALCOHOL/MO/W.PET/CERES
324 CREAM (GRAM) TOPICAL
Status: DISCONTINUED | OUTPATIENT
Start: 2017-02-19 | End: 2017-02-19 | Stop reason: SDUPTHER

## 2017-02-19 RX ORDER — SODIUM CHLORIDE 9 MG/ML
250 INJECTION, SOLUTION INTRAVENOUS AS NEEDED
Status: DISCONTINUED | OUTPATIENT
Start: 2017-02-19 | End: 2017-03-01 | Stop reason: HOSPADM

## 2017-02-19 RX ORDER — SODIUM CHLORIDE 0.9 % (FLUSH) 0.9 %
5-10 SYRINGE (ML) INJECTION EVERY 8 HOURS
Status: DISCONTINUED | OUTPATIENT
Start: 2017-02-19 | End: 2017-02-19 | Stop reason: SDUPTHER

## 2017-02-19 RX ORDER — BUTALBITAL, ACETAMINOPHEN, CAFFEINE AND CODEINE PHOSPHATE 50; 325; 40; 30 MG/1; MG/1; MG/1; MG/1
1 CAPSULE ORAL
COMMUNITY
End: 2017-08-30

## 2017-02-19 RX ORDER — ROPINIROLE 0.25 MG/1
0.25 TABLET, FILM COATED ORAL DAILY
COMMUNITY
End: 2017-08-27

## 2017-02-19 RX ORDER — SODIUM CHLORIDE 9 MG/ML
500 INJECTION, SOLUTION INTRAVENOUS ONCE
Status: COMPLETED | OUTPATIENT
Start: 2017-02-19 | End: 2017-02-25

## 2017-02-19 RX ORDER — ROPINIROLE 0.25 MG/1
0.75 TABLET, FILM COATED ORAL
COMMUNITY

## 2017-02-19 RX ORDER — SODIUM CHLORIDE 9 MG/ML
1000 INJECTION, SOLUTION INTRAVENOUS CONTINUOUS
Status: CANCELLED | OUTPATIENT
Start: 2017-02-19

## 2017-02-19 RX ORDER — NALOXONE HYDROCHLORIDE 0.4 MG/ML
0.4 INJECTION, SOLUTION INTRAMUSCULAR; INTRAVENOUS; SUBCUTANEOUS
Status: CANCELLED | OUTPATIENT
Start: 2017-02-19 | End: 2017-02-19

## 2017-02-19 RX ORDER — ASCORBIC ACID 500 MG
500 TABLET ORAL DAILY
COMMUNITY

## 2017-02-19 RX ORDER — SUCRALFATE 1 G/10ML
1 SUSPENSION ORAL
Status: DISCONTINUED | OUTPATIENT
Start: 2017-02-19 | End: 2017-03-01 | Stop reason: HOSPADM

## 2017-02-19 RX ADMIN — CYCLOBENZAPRINE HYDROCHLORIDE 10 MG: 10 TABLET, FILM COATED ORAL at 21:43

## 2017-02-19 RX ADMIN — LACOSAMIDE 100 MG: 100 TABLET, FILM COATED ORAL at 09:48

## 2017-02-19 RX ADMIN — SUCRALFATE 1 G: 1 SUSPENSION ORAL at 13:40

## 2017-02-19 RX ADMIN — DULOXETINE 30 MG: 30 CAPSULE, DELAYED RELEASE ORAL at 09:47

## 2017-02-19 RX ADMIN — MIRABEGRON 50 MG: 25 TABLET, FILM COATED, EXTENDED RELEASE ORAL at 21:44

## 2017-02-19 RX ADMIN — SODIUM CHLORIDE 250 ML: 900 INJECTION, SOLUTION INTRAVENOUS at 00:13

## 2017-02-19 RX ADMIN — MYCOPHENOLATE MOFETIL 900 MG: 500 TABLET ORAL at 15:50

## 2017-02-19 RX ADMIN — DULOXETINE 30 MG: 30 CAPSULE, DELAYED RELEASE ORAL at 21:44

## 2017-02-19 RX ADMIN — SODIUM CHLORIDE 40 MG: 9 INJECTION INTRAMUSCULAR; INTRAVENOUS; SUBCUTANEOUS at 21:47

## 2017-02-19 RX ADMIN — MYCOPHENOLATE MOFETIL 900 MG: 500 TABLET ORAL at 09:47

## 2017-02-19 RX ADMIN — SUCRALFATE 1 G: 1 SUSPENSION ORAL at 18:11

## 2017-02-19 RX ADMIN — MIRABEGRON 50 MG: 25 TABLET, FILM COATED, EXTENDED RELEASE ORAL at 03:17

## 2017-02-19 RX ADMIN — SUCRALFATE 1 G: 1 SUSPENSION ORAL at 06:54

## 2017-02-19 RX ADMIN — MORPHINE SULFATE 15 MG: 15 TABLET ORAL at 18:11

## 2017-02-19 RX ADMIN — OLANZAPINE 7.5 MG: 2.5 TABLET, FILM COATED ORAL at 03:17

## 2017-02-19 RX ADMIN — METHYLNALTREXONE BROMIDE 12 MG: 12 INJECTION, SOLUTION SUBCUTANEOUS at 18:11

## 2017-02-19 RX ADMIN — ROPINIROLE HYDROCHLORIDE 0.25 MG: 0.25 TABLET, FILM COATED ORAL at 09:47

## 2017-02-19 RX ADMIN — MYCOPHENOLATE MOFETIL 900 MG: 500 TABLET ORAL at 21:43

## 2017-02-19 RX ADMIN — FERROUS SULFATE TAB 325 MG (65 MG ELEMENTAL FE) 325 MG: 325 (65 FE) TAB at 15:50

## 2017-02-19 RX ADMIN — ROPINIROLE HYDROCHLORIDE 0.75 MG: 0.25 TABLET, FILM COATED ORAL at 21:43

## 2017-02-19 RX ADMIN — LACOSAMIDE 100 MG: 100 TABLET, FILM COATED ORAL at 21:44

## 2017-02-19 RX ADMIN — SODIUM CHLORIDE 100 ML/HR: 900 INJECTION, SOLUTION INTRAVENOUS at 02:19

## 2017-02-19 RX ADMIN — SODIUM CHLORIDE 8 MG/HR: 900 INJECTION INTRAVENOUS at 00:35

## 2017-02-19 RX ADMIN — POLYETHYLENE GLYCOL 3350 17 G: 17 POWDER, FOR SOLUTION ORAL at 21:44

## 2017-02-19 RX ADMIN — SODIUM CHLORIDE 500 ML: 900 INJECTION, SOLUTION INTRAVENOUS at 03:55

## 2017-02-19 RX ADMIN — CEFTRIAXONE 1 G: 1 INJECTION, POWDER, FOR SOLUTION INTRAMUSCULAR; INTRAVENOUS at 03:19

## 2017-02-19 RX ADMIN — LEVOTHYROXINE SODIUM 75 MCG: 75 TABLET ORAL at 06:54

## 2017-02-19 RX ADMIN — Medication 10 ML: at 06:01

## 2017-02-19 RX ADMIN — Medication 10 ML: at 21:55

## 2017-02-19 RX ADMIN — PREDNISONE 5 MG: 5 TABLET ORAL at 09:48

## 2017-02-19 RX ADMIN — ROPINIROLE HYDROCHLORIDE 0.75 MG: 0.25 TABLET, FILM COATED ORAL at 03:16

## 2017-02-19 RX ADMIN — SUCRALFATE 1 G: 1 SUSPENSION ORAL at 07:57

## 2017-02-19 RX ADMIN — LACOSAMIDE 100 MG: 100 TABLET, FILM COATED ORAL at 03:17

## 2017-02-19 RX ADMIN — SODIUM CHLORIDE 40 MG: 9 INJECTION INTRAMUSCULAR; INTRAVENOUS; SUBCUTANEOUS at 09:47

## 2017-02-19 RX ADMIN — CYCLOBENZAPRINE HYDROCHLORIDE 10 MG: 10 TABLET, FILM COATED ORAL at 03:17

## 2017-02-19 RX ADMIN — SUCRALFATE 1 G: 1 SUSPENSION ORAL at 23:09

## 2017-02-19 RX ADMIN — SODIUM CHLORIDE 8 MG/HR: 900 INJECTION INTRAVENOUS at 06:17

## 2017-02-19 RX ADMIN — LUBIPROSTONE 24 MCG: 8 CAPSULE, GELATIN COATED ORAL at 18:11

## 2017-02-19 RX ADMIN — BISACODYL 10 MG: 10 SUPPOSITORY RECTAL at 03:16

## 2017-02-19 RX ADMIN — Medication 10 ML: at 15:51

## 2017-02-19 RX ADMIN — OLANZAPINE 7.5 MG: 2.5 TABLET, FILM COATED ORAL at 21:43

## 2017-02-19 NOTE — ROUTINE PROCESS
0720: {Bedside and Verbal shift change report given to Miguelina Tapia RN (oncoming nurse) by Mayte Barcenas RN   (offgoing nurse). Report included the following information SBAR, Kardex, Intake/Output, MAR, Recent Results and Cardiac Rhythm sinus tach.

## 2017-02-19 NOTE — PROGRESS NOTES
0010:  Discussed patient's continued nose bleed with Dr. Tanner Jean, as well as patient's blood pressures, and patient's statement that she felt like she may be about to have another \"episode\" referring to when she came in to the ER with diaphoresis, hypotension, and tachycardia. Ordered to give 250cc NS bolus and continue to monitor. 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 46:  Called and spoke with Dr. Tanner Jean regarding patient. Discussed sepsis flag based on vitals, WBC, and source of infection in UTI. Orders received for blood cultures. Patient requesting resumption of vimpat for seizures and a suppository as she feels like she needs to have a BM but can't. Orders received to resume vimpat and to give suppository. Also reviewed drop in H/H from 8.6/27.5 to 7.3/23. 3. Labs already ordered for this AM, and will transfuse if patient drops below 7 Hgb.    0315:  Lab called and notified of need for blood cultures and AM labs. Lab to draw. 0340:  Blood cultures drawn by lab. Unable to obtain enough blood for AM labs. Per phlebotomist, will be back later to draw labs. 0340:  Patient diaphoretic. Nose has resumed bleeding, with slow trickle. Patient declining to sit HOB up, so unsure how much blood may be draining down back of throat. BP checked due to diaphoresis, 66/40 and 81/33 on recheck. Lab previously at bedside for [facility] and Am labs including H/H, however, unable to obtain enough blood via peripheral stick for AM labs. 6020:  Reviewed above concerns with Dr. Tanner Jean. Ordered for 500cc bolus. Also discussed abdominal pain. Ordered for KUB in AM.  No nasal packing at this time. 0430: Additional IV attempted by PCT as patient with only one IV and protonix drip. Unsuccessful. 6144:  Rounded with Dr. Tanner Jean. Noted H/H 6.3/20.0. Ordered to transfuse 2 units PRBCs.   Also noted patient only with one IV and ordered for protonix drip, unable to be given with blood as both should have dedicated line. Ordered to discontinue protonix drip and give 40mg protonix bolus BID. Alla Gilbert RN notified of new orders.

## 2017-02-19 NOTE — ROUTINE PROCESS
2230: TRANSFER - IN REPORT:    Verbal report received from Jenniffer Warner RN  on Deshawn Singh  being received from Emergency Department (unit) for routine progression of care      Report consisted of patients Situation, Background, Assessment and   Recommendations(SBAR). Information from the following report(s) SBAR, Kardex, ED Summary, Intake/Output, MAR, Recent Results and Cardiac Rhythm sinus tach was reviewed with the receiving nurse. Opportunity for questions and clarification was provided.       Assessment completed upon patients arrival to unit and care a

## 2017-02-19 NOTE — ED PROVIDER NOTES
HPI Comments: 7:02 PM   Trish Guzman is a 78 y.o. female h/o anemia presenting to the ED via EMS C/O hemoptysis, onset 12 hours ago after her Lovenox shot. Pt states this occurred before last month and [facility] Symptoms include blood in the stool and worsened abdominal pain. Pt has not seen and ENT doctor. Pt had her Coumadin cut in half after the first onset, but did not take her dose today. Pt also takes Lovenox. Pt is on both medications chronically due to blood clots. Pt's last transfusion was 2 months ago. Last BM was this afternoon. 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 Pt denies fever and any other Sx or complaints. Written by Collette Berg, ED Scribe, as dictated by Ewa Ashby MD.      Patient is a 78 y.o. female presenting with hemoptysis. The history is provided by the patient. No  was used. Hemoptysis    This is a recurrent problem. The current episode started 12 to 24 hours ago (12 hours ago). There has been no fever. Associated symptoms include abdominal pain. Pertinent negatives include no chills, no fever, no diarrhea, no headaches, no arthralgias, no myalgias, no cough and no headaches.         Past Medical History:   Diagnosis Date    Anemia NEC      history of blood transfusion    Angina     Anxiety      panic attacks    Arthritis     Bipolar 1 disorder (HCC)     Chronic back pain     Depression     Diabetes mellitus     Elevated diaphragm 5/4/2012    Fatigue      along with weakness    GERD (gastroesophageal reflux disease)     Headache     Hypertension     Other ill-defined conditions swallowing issues, decubitus    Psychiatric disorder     Seizures ([facility] [address]        Past Surgical History:   Procedure Laterality Date    Hx cholecystectomy  11/6/84    Hx appendectomy  11/6/84    Hx tubal ligation  1991    Hx back surgery  11/24/98     severe back problems    Endoscopy, colon, diagnostic       over 20 years ago    Endoscopy, colon, diagnostic  9/24/09    Hx gastric bypass  1995     Patricio    Hx coronary stent placement       ivc filter         Family History:   Problem Relation Age of Onset    Heart Disease Mother     Arthritis-rheumatoid Mother     Stroke Father     Hypertension Father     Arthritis-rheumatoid Father        Social History     Social History    Marital status:      Spouse name: N/A    Number of children: N/A    Years of education: N/A     Occupational History    Not on file. Social History Main Topics    Smoking status: Never Smoker    Smokeless tobacco: Not on file    Alcohol use No    Drug use: No    Sexual activity: Not on file     Other Topics Concern    Not on file     Social History Narrative    No narrative on file         ALLERGIES: Demerol [meperidine] and Seafood [shellfish containing products]    Review of Systems   Constitutional: Negative for appetite change, chills, fever and unexpected weight change. HENT: Negative for congestion, sore throat and trouble swallowing. Eyes: Negative for pain and visual disturbance. Respiratory: Positive for hemoptysis. Negative for cough and shortness of breath. Cardiovascular: Negative for chest pain and leg swelling. Gastrointestinal: Positive for abdominal pain and blood in stool. Negative for diarrhea, nausea and vomiting.        + Hemoptysis   Endocrine: Negative for polyuria. Genitourinary: Negative for difficulty urinating, dysuria and urgency. Musculoskeletal: Negative for arthralgias, myalgias and neck stiffness. Skin: Negative for rash. Allergic/Immunologic: Negative for immunocompromised state. Neurological: Negative for dizziness, syncope, weakness and headaches. Hematological: Does not bruise/bleed easily. Psychiatric/Behavioral: Negative for decreased concentration. All other systems reviewed and are negative. There were no vitals filed for this visit.          Physical Exam   Nursing note and vitals reviewed. GEN:  Chronically ill appearing; Alert and Oriented; Appears well hydrated. SKIN:  Cool and moist, no rashes. No petechia. Good skin turgor. Pale. HEAD:  Normocephalic and Atraumatic;    ENT: Oral mucosa moist, pharynx clear; Evidence of red blood in the area of soft palate and lounge. No active bleeding. No nasal bleeding. NECK:  Supple. Trachea is Midline; No adenopathy. HEART:  Regular rate and rhythm No murmur. No rubs. LUNGS:  Clear bilaterally; Normal respiratory effort,  ABD:  Normoactive bowel sounds. Soft, tender diffusely but chronic is nature. No organomegaly. No hernias. Flat. BACK: No obvious bony spinal defects or changes; Non tender paraspinous muscles  : Normal inspection; no rash; NO CVAT. Minimal stool in the vault brown and heme positive. EXT:  No obvious soft tissue tenderness or sites of bony tenderness; Joints- good ROM  NEURO: CN- intact; No focal motor deficits; Cerebellar function- intact; DTR's - 2+. Pt is able to move all 4 extremities but weak. MDM  Number of Diagnoses or Management Options  Gastrointestinal hemorrhage associated with gastric ulcer:   Hemoptysis:   Diagnosis management comments: INITIAL CLINICAL IMPRESSION and PLANS:  The patient presents with the primary complaint(s) of: hemoptysis. The presentation, to include historical aspects and clinical findings appear to be consistent with the DX of excessive Coumadin. However, other possible DX's to consider as primary, associated with, or exacerbated by include:    1.  pulmonary hemorrhage   2.   GI bleed   3.  symptomatic anemia     Considering the above, my initial management plan to evaluate and therapeutic interventions include: Obtain Lab Studies,, Obtain Radiologic studies,, Initiate Medications and or IV Fluids, and Obtain additional historical data from other sources  As well as those noted in the orders:    Onur Verma MD        Amount and/or Complexity of Data Reviewed  Clinical lab tests: ordered and reviewed  Tests in the radiology section of CPT®: ordered and reviewed (CXR)  Review and summarize past medical records: yes  Discuss the patient with other providers: yes Andre Vernon MD (Internal Medicine))  Independent visualization of images, tracings, or specimens: yes (CXR)    Critical Care  Total time providing critical care: 30-74 minutes (35 minutes)    ED Course     PROGRESS NOTE:   7:45 PM  Pt has been re-examined by Sivan Stokes MD. Immediately following exam, pt became diaphoretic and dizzy with a decrease in BP. Symptoms suggest a vagal response to the rectal exam. IV fluids were initiated. Labs drawn. Consideration to be given to immediate blood transfusion. Written by SARITA Su, as dictated by Sivan Stokes MD.     PROGRESS NOTE:   8:54 PM  Pt has been re-examined by Sivan Stokes MD. Pt is feeling better. She reports a h/o having recurrent similar spells of becoming pale and diaphoretic and hypotensive. This has been evaluated several time without conclusion as to the etiology. She currently is fine and BP is much improved. She has a known h/o GI bleeding the last of which was December 2016. An endoscopy confirmed ulcer was found at her gastric bypass site. She had been doing fine until today when she noticed the melanotic stool. Written by SARITA Su, as dictated by Sivan Stokes MD.    CONSULT NOTE:   9:58 PM  Sivan Stokes MD spoke with Andre Vernon MD   Specialty: Internal Medicine   Discussed pt's hx, disposition, and available diagnostic and imaging results over the telephone. Reviewed care plans. Consulting physician agrees with plans as outlined. Agrees to admit pt to telemetry. Written by SARITA Su, as dictated by Siavn Stokes MD.     Procedures    PROCEDURE NOTE - RECTAL EXAM:   10:33 PM  Performed by: Sivan Stokes MD  Rectal exam performed.  Minimal stool in the vault. Brown stool was collected. Stool was Hemoccult tested, and found to be heme Positive. The procedure took 1-15 minutes, and pt tolerated well. Written by Franki Christiansen ED Scribe, as dictated by Lashaun Taylor MD.     ED CLINICAL SUMMARY - ADMIT   9:59 PM   ED CLINICAL COURSE:       Intervention)s) while in ED:  ACTIONS / APPROACH: Based on the presenting RECURRENT history of oral bleeding and GI bleeding My initial focus was to Determine the cause and extent of the problem and Initiate Treatment as Appropriate . Details of actions taken are noted below. SPECIFICS REGARDING APPROACH:     1. DIAGNOSTIC RESULTS:    PULSE OXIMETRY NOTE:  9:55 PM   Pulse-ox is 100% on room air  Interpretation: Normal  Intervention: None   Written by Franki Christiansen ED Scribe, as dictated by Lashaun Taylor MD    CARDIAC MONITOR NOTE:  9:55 PM   Cardiac Rhythm: Sinus rhythm   Rate: 98 bpm  Intervention: None   Written by Franki Christiansen ED Scribe, as dictated by Lashaun Taylor MD.      9:55 PM  RADIOLOGY FINDINGS  Chest X-ray shows elevation of right diaphragm, chronic changes. Pending review by Radiologist  Recorded by Franki Christiansen ED Scribe, as dictated by Lashaun Taylor MD     XR CHEST PORT    (Results Pending)         Labs Reviewed   CBC WITH AUTOMATED DIFF - Abnormal; Notable for the following:        Result Value    RBC 3.44 (*)     HGB 8.6 (*)     HCT 27.5 (*)     RDW 17.3 (*)     PLATELET 705 (*)     NEUTROPHILS 79 (*)     LYMPHOCYTES 15 (*)     ABS. NEUTROPHILS 9.8 (*)     All other components within normal limits   METABOLIC PANEL, COMPREHENSIVE - Abnormal; Notable for the following:     Sodium 132 (*)     Potassium 5.6 (*)     Chloride 95 (*)     Glucose 128 (*)     BUN 26 (*)     Creatinine 0.51 (*)     BUN/Creatinine ratio 51 (*)     Calcium 8.2 (*)     Alk.  phosphatase 131 (*)     Albumin 2.8 (*)     All other components within normal limits   URINALYSIS W/ RFLX MICROSCOPIC - Abnormal; Notable for the following:     Nitrites POSITIVE (*)     Leukocyte Esterase MODERATE (*)     All other components within normal limits   LACTIC ACID, PLASMA - Abnormal; Notable for the following:     Lactic acid 2.5 (*)     All other components within normal limits   URINE MICROSCOPIC ONLY - Abnormal; Notable for the following:     Bacteria 3+ (*)     All other components within normal limits   MAGNESIUM   PROTHROMBIN TIME + INR   LIPASE   TYPE & CROSSMATCH        Recent Results (from the past 12 hour(s))   CBC WITH AUTOMATED DIFF    Collection Time: 02/18/17  7:19 PM   Result Value Ref Range    WBC 12.4 4.6 - 13.2 K/uL    RBC 3.44 (L) 4.20 - 5.30 M/uL    HGB 8.6 (L) 12.0 - 16.0 g/dL    HCT 27.5 (L) 35.0 - 45.0 %    MCV 79.9 74.0 - 97.0 FL    MCH 25.0 24.0 - 34.0 PG    MCHC 31.3 31.0 - 37.0 g/dL    RDW 17.3 (H) 11.6 - 14.5 %    PLATELET 452 (H) 383 - 420 K/uL    MPV 9.4 9.2 - 11.8 FL    NEUTROPHILS 79 (H) 40 - 73 %    LYMPHOCYTES 15 (L) 21 - 52 %    MONOCYTES 5 3 - 10 %    EOSINOPHILS 1 0 - 5 %    BASOPHILS 0 0 - 2 %    ABS. NEUTROPHILS 9.8 (H) 1.8 - 8.0 K/UL    ABS. LYMPHOCYTES 1.8 0.9 - 3.6 K/UL    ABS. MONOCYTES 0.6 0.05 - 1.2 K/UL    ABS. EOSINOPHILS 0.1 0.0 - 0.4 K/UL    ABS. BASOPHILS 0.0 0.0 - 0.06 K/UL    DF AUTOMATED     METABOLIC PANEL, COMPREHENSIVE    Collection Time: 02/18/17  7:19 PM   Result Value Ref Range    Sodium 132 (L) 136 - 145 mmol/L    Potassium 5.6 (H) 3.5 - 5.5 mmol/L    Chloride 95 (L) 100 - 108 mmol/L    CO2 27 21 - 32 mmol/L    Anion gap 10 3.0 - 18 mmol/L    Glucose 128 (H) 74 - 99 mg/dL    BUN 26 (H) 7.0 - 18 MG/DL    Creatinine 0.51 (L) 0.6 - 1.3 MG/DL    BUN/Creatinine ratio 51 (H) 12 - 20      GFR est AA >60 >60 ml/min/1.73m2    GFR est non-AA >60 >60 ml/min/1.73m2    Calcium 8.2 (L) 8.5 - 10.1 MG/DL    Bilirubin, total 0.2 0.2 - 1.0 MG/DL    ALT (SGPT) 33 13 - 56 U/L    AST (SGOT) 32 15 - 37 U/L    Alk.  phosphatase 131 (H) 45 - 117 U/L    Protein, total 6.4 6.4 - 8.2 g/dL Albumin 2.8 (L) 3.4 - 5.0 g/dL    Globulin 3.6 2.0 - 4.0 g/dL    A-G Ratio 0.8 0.8 - 1.7     MAGNESIUM    Collection Time: 02/18/17  7:19 PM   Result Value Ref Range    Magnesium 2.0 1.8 - 2.4 mg/dL   PROTHROMBIN TIME + INR    Collection Time: 02/18/17  7:19 PM   Result Value Ref Range    Prothrombin time 12.3 11.5 - 15.2 sec    INR 0.9 0.8 - 1.2     URINALYSIS W/ RFLX MICROSCOPIC    Collection Time: 02/18/17  7:19 PM   Result Value Ref Range    Color YELLOW      Appearance CLEAR      Specific gravity 1.017 1.005 - 1.030      pH (UA) 6.5 5.0 - 8.0      Protein NEGATIVE  NEG mg/dL    Glucose NEGATIVE  NEG mg/dL    Ketone NEGATIVE  NEG mg/dL    Bilirubin NEGATIVE  NEG      Blood NEGATIVE  NEG      Urobilinogen 0.2 0.2 - 1.0 EU/dL    Nitrites POSITIVE (A) NEG      Leukocyte Esterase MODERATE (A) NEG     LIPASE    Collection Time: 02/18/17  7:19 PM   Result Value Ref Range    Lipase 119 73 - 393 U/L   LACTIC ACID, PLASMA    Collection Time: 02/18/17  7:19 PM   Result Value Ref Range    Lactic acid 2.5 (HH) 0.4 - 2.0 MMOL/L   URINE MICROSCOPIC ONLY    Collection Time: 02/18/17  7:19 PM   Result Value Ref Range    WBC 11 to 20 0 - 5 /hpf    RBC NEGATIVE  0 - 5 /hpf    Epithelial cells 1+ 0 - 5 /lpf    Bacteria 3+ (A) NEG /hpf   TYPE & CROSSMATCH    Collection Time: 02/18/17  7:30 PM   Result Value Ref Range    Crossmatch Expiration 02/21/2017     ABO/Rh(D) A POSITIVE     Antibody screen NEG     CALLED TO:       2 UNITS READY NOTIFIED [address] [facility] ED AT 2052 ON 02/18    Unit number [id number]     Blood component type RC LR AS1     Unit division 00     Status of unit ALLOCATED     Crossmatch result Compatible     Unit number [id number]     Blood component type RC LR AS1     Unit division 00     Status of unit ALLOCATED     Crossmatch result Compatible        2.  MEDICATIONS GIVEN:   Medications   sodium chloride (NS) flush 5-10 mL (not administered)   sodium chloride (NS) flush 5-10 mL (not administered)   0.9% sodium chloride infusion 250 mL (not administered)   pantoprazole (PROTONIX) 40 mg in 0.9% sodium chloride (MBP/ADV) 50 mL MBP (not administered)   sodium chloride 0.9 % bolus infusion 1,000 mL (0 mL IntraVENous Stopped 2/18/17 2152)   pantoprazole (PROTONIX) injection 80 mg (80 mg IntraVENous Given 2/18/17 2148)           Response to Intervention(s):   IMPROVED       Unanticipated Developments: NONE     ED COURSE - General Comment:  During the ED course I had re-evaluated the patient, answered their and /or their family's questions regarding my clinical impression, the patient's condition and plans for therapeutic interventions. The patient's ED course was uneventful and remained stable throughout. CLINICAL IMPRESSION AND DISCUSSION:     I reviewed our electronic medical record system for any past medical records that were available that may contribute to the patients current condition, the nursing notes and vital signs from today's visit. Based on the clinical presentation, findings and results of diagnostic studies, as well as developments while in the ED,  I suspect the following: For the presentation as noted above    The most likely cause or diagnosis is: GI bleed while on anticoagulants. DISCUSSION REGARDING DX:    At this time there is no clinical evidence to support other pertinent diagnostic considerations such as:  N/A    Finally, other diagnoses  during this visit are noted below in Clinical Impression. CONCERNS / CONSIDERATIONS / JUSTIFICATION: pt, who is on anticoagulants, will need to be monitored with serial CBC to determine the extent of the GI bleed. DISPOSITION DECISION:     ADMIT:  Based the my repeated evaluations to assess the patient's clinical response, the existence of underlying and / or new clinical conditions,and / or specific logistic considerations, I feel that hospitalization is warranted to continue ongoing monitoring and patient care.   I have reviewed this information and my rationale to the patient and/or their family. The patient expresses agreement and understanding for the need to be admitted and of the admission diagnosis. Consultation will be made now with the inpatient physician for hospitalization. Patient's condition upon admission from the ED: CONDITION STABILIZED    CONDITIONS ON ADMISSION:  Deep Vein Thrombosis is not present at the time of admission. Thrombosis is not present at the time of admission. Urinary Tract Infection is not present at the time of admission. Pneumonia is not present at the time of admission. MRSA is not present at the time of admission. Wound infection is not present at the time of admission. Pressure Ulcer is not present at the time of admission. Small sacral deque is present at the time of admission. CLINICAL IMPRESSION  1. Gastrointestinal hemorrhage associated with gastric ulcer    2. Hemoptysis        PLAN:  1. ADMIT TO:  Telemetry         Maribel Marie MD      ATTESTATIONS:  This note is prepared by Enzo Beyer, acting as Scribe for Maribel Marie MD.    Maribel Marie MD: The scribe's documentation has been prepared under my direction and personally reviewed by me in its entirety. I confirm that the note above accurately reflects all work, treatment, procedures, and medical decision making performed by me. 10:40 PM  I have spent 35 minutes of critical care time involved in lab review, consultations with specialist, family decision-making, and documentation. During this entire length of time I was immediately available to the patient. Critical Care: The reason for providing this level of medical care for this critically ill patient was due a critical illness that impaired one or more vital organ systems such that there was a high probability of imminent or life threatening deterioration in the patients condition.  This care involved high complexity decision making to assess, manipulate, and support vital system functions, to treat this degreee vital organ system failure and to prevent further life threatening deterioration of the patients condition.

## 2017-02-19 NOTE — PROGRESS NOTES
Assumed care of pt. Pt is alert no sign of distress. Call light within reach. Bed in lowest position. Pt receiving blood tolerating transfusion well.

## 2017-02-19 NOTE — H&P
History & Physical    Patient: Satish Romo MRN: [id number]  CSN: [id number]    YOB: 1950  Age: 78 y.o. Sex: female      DOA: 2/18/2017  Primary Care Provider:  Hanh Liu MD      Assessment/Plan     Patient Active Problem List   Diagnosis Code    Bipolar 1 disorder ([facility] F31.9    Decubitus ulcer of sacral area L89.159    Anemia D64.9    Severe protein-calorie malnutrition ([facility] E43    [facility] G40.909    Depression F32.9    Hypotension, unspecified I95.9    Personal history of DVT (deep vein thrombosis) Z86.718    Hypomagnesemia E83.42    Unspecified constipation K59.00    Gingival bleeding K06.8    GI (gastrointestinal bleed) K92.2    Chronic anticoagulation Z79.01       Admit to telemetry    GI bleed vs hemoptysis - Clear liquid. consult to GI, start on protonix. Stop any anticoagulation/antiplatelets. Monitor H/H.    Epistaxis - pressure, if does not stop then nasal packing. Anemia - secondary to acute blood loss, monitor H/h and transfuse as needed. UTI - in a patient with chronic indwelling catheter. on ceftriaxone, follow urine cultures. Decubitus ulcer sacral region stage 4 - local wound care. Seizure disorder - on vimpat. History of DVT, s/p IVC filter, on chronic anticoagulation, on coumadin and lovenox. Now on hold. Chronic pain - continue home meds. debility    [facility]            CC: hemoptysis       HPI:     Satish Romo is a 78 y.o. female who has past history of DVT, IVC filter, seizures, anemia, presents to ER with concerns of hemoptysis. Patient reports that she has been coughing blood since this morning. She is on anticoagulation and is on lovenox with bridge to coumadin. She also noticed blood from her nose. She had her lovenox yesterday. She did not took her coumadin yesterday. She had this problem before and was suppose to see ENT but has not seen yet.  She had BM yesterday, denies dark stools. She has history of blood transfusion in the past. She was admitted to the [facility] last month for abdominal pain, SBO. In ER her H/H 8.6/27.5 admission is requested for further management. Past Medical History   Diagnosis Date    Anemia NEC      history of blood transfusion    Angina     Anxiety      panic attacks    Arthritis     Bipolar 1 disorder (HCC)     Chronic back pain     Depression     Diabetes mellitus     Elevated diaphragm 5/4/2012    Fatigue      along with weakness    GERD (gastroesophageal reflux disease)     Headache     Hypertension     Other ill-defined conditions swallowing issues, decubitus    Psychiatric disorder     Seizures ([facility]        Past Surgical History   Procedure Laterality Date    Hx cholecystectomy  11/6/84    Hx appendectomy  11/6/84    Hx tubal ligation  1991    Hx back surgery  11/24/98     severe back problems    Endoscopy, colon, diagnostic       over 20 years ago    Endoscopy, colon, diagnostic  9/24/09    Hx gastric bypass  1995     Patricio    Hx coronary stent placement       ivc filter       Family History   Problem Relation Age of Onset    Heart Disease Mother     Arthritis-rheumatoid Mother     Stroke Father     Hypertension Father     Arthritis-rheumatoid Father        Social History     Social History    Marital status:      Spouse name: N/A    Number of children: N/A    Years of education: N/A     Social History Main Topics    Smoking status: Never Smoker    Smokeless tobacco: Not on file    Alcohol use No    Drug use: No    Sexual activity: Not on file     Other Topics Concern    Not on file     Social History Narrative    No narrative on file       Prior to Admission medications    Medication Sig Start Date End Date Taking? Authorizing Provider   loratadine-pseudoephedrine (CLARITIN-D 24 HOUR)  mg per tablet Take 1 Tab by mouth daily.    Yes Historical Provider pantoprazole (PROTONIX) 20 mg tablet Take 20 mg by mouth two (2) times a day. Yes Historical Provider   Glucosamine &Chondroit-MV-Min3 902-070-46-0.5 mg tab Take 1,500 mg by mouth three (3) times daily. Yes Historical Provider   MORINDA CITRIFOLIA FRUIT (MIGUEL PO) Take 625 mg by mouth two (2) times a day. Indications: Takes at 1200 and 2000   Yes Historical Provider   ferrous sulfate 324 mg (65 mg iron) tablet Take 324 mg by mouth Daily (before breakfast). Indications: 65 mg iron   Yes Historical Provider   Milderarden Martinez 862-14-16 mg-mg-million tab Take 25,000 mg by mouth two (2) times a day. Indications: Takes at 1200 and 2000   Yes Historical Provider   OTHER,NON-FORMULARY, Take 1 Cap by mouth daily (with lunch). Indications: Bariatric Multi Formula   Yes Historical Provider   magnesium oxide 500 mg tab Take 500 mg by mouth daily (with lunch). Yes Historical Provider   ascorbic acid, vitamin C, (VITAMIN C) 500 mg tablet Take 500 mg by mouth daily. Yes Historical Provider   cyclobenzaprine (FLEXERIL) 10 mg tablet Take 10 mg by mouth nightly. Indications: Takes at 2000   Yes Historical Provider   rOPINIRole (REQUIP) 0.25 mg tablet Take 0.25 mg by mouth daily. Takes at 2000   Yes Historical Provider   mirabegron ER (MYRBETRIQ) 50 mg ER tablet Take 50 mg by mouth nightly. Takes at 2300   Yes Historical Provider   rOPINIRole (REQUIP) 0.25 mg tablet Take 0.75 mg by mouth nightly. Takes at 2300   Yes Historical Provider   sucralfate (CARAFATE) 100 mg/mL suspension Take 1 g by mouth Before breakfast, lunch, dinner and at bedtime. Yes Historical Provider   predniSONE (DELTASONE) 5 mg tablet Take 5 mg by mouth daily. Yes Historical Provider   codeine-butalbital-acetaminophen-caffeine (FIORICET WITH CODEINE) -29-30 mg per capsule Take 1 Cap by mouth every four (4) hours as needed for Headache. Yes Historical Provider   gabapentin (NEURONTIN) 300 mg capsule Take 900 mg by mouth three (3) times daily. Yes Historical Provider   morphine IR (MS IR) 15 mg tablet Take 15 mg by mouth every eight (8) hours as needed for Pain. Yes Historical Provider   levothyroxine (SYNTHROID) 100 mcg tablet Take 1.5 Tabs by mouth Daily (before breakfast). Patient taking differently: Take 75 mcg by mouth Daily (before breakfast). 9/20/12  Yes Juvenal Robbins MD   DULoxetine (CYMBALTA) 30 mg capsule Take 2 Caps by mouth two (2) times a day. Patient taking differently: Take 30 mg by mouth two (2) times a day. 7/13/12  Yes Juvenal Robbins MD   potassium chloride SR (KLOR-CON 10) 10 mEq tablet Take 2 Tabs by mouth two (2) times a day. Patient taking differently: Take 10 mEq by mouth two (2) times a day. 6/11/12  Yes Juvenal Robbins MD   lacosamide (VIMPAT) 200 mg Tab tablet Take 100 mg by mouth two (2) times a day. Indications: PARTIAL EPILEPSY TREATMENT ADJUNCT   Yes Historical Provider   warfarin (COUMADIN) 3 mg tablet Take 0.5 Tabs by mouth daily. 6/11/12   Juvenal Robbins MD   OLANZapine (ZYPREXA) 7.5 mg tablet Take 7.5 mg by mouth nightly. Historical Provider       Allergies   Allergen Reactions    Demerol [Meperidine] Unknown (comments)    Seafood [Shellfish Containing Products] Unknown (comments)       Review of Systems  Gen: No fever, chills, malaise, weight loss/gain. Heent: No headache, rhinorrhea, ear pain, hearing loss, sinus pain, neck pain/stiffness, sore throat. Heart: No chest pain, palpitations, VIRK, pnd, or orthopnea. Resp: No cough, wheezing and shortness of breath. See above  GI: No nausea, vomiting, diarrhea, constipation, melena or hematochezia. : No urinary obstruction, dysuria or hematuria. Derm: see above. Musc/skeletal: no bone or joint complains. Vasc: see above. Endo: No heat/cold intolerance, no polyuria,polydipsia or polyphagia. Neuro: No unilateral weakness, numbness, tingling. No seizures. Heme: No easy bruising or bleeding.           Physical Exam:     Physical Exam:  Visit Vitals  BP 98/46    Pulse 100    Temp 98.1 °F (36.7 °C)    Resp 13    Ht 5' 6\" (1.676 m)    Wt 66.7 kg (147 lb)    SpO2 99%    BMI 23.73 kg/m2      O2 Device: Room air    Temp (24hrs), Av °F (36.7 °C), Min:97.9 °F (36.6 °C), Max:98.1 °F (36.7 °C)    1901 -  0700  In: 10 [I.V.:10]  Out: 850 [Urine:850]        General:  Awake, cooperative, no distress. Head:  Normocephalic, without obvious abnormality, atraumatic. Eyes:  Conjunctivae/corneas clear, sclera anicteric, PERRL, EOMs intact. Nose: Nares, bleeding from left nostril. Throat: Lips, mucosa, and tongue normal.    Neck: Supple, symmetrical, trachea midline, no adenopathy. Lungs:   Clear to auscultation bilaterally. Heart:  Regular rate and rhythm, S1, S2 normal, no murmur, click, rub or gallop. Abdomen: Soft, non-tender. Bowel sounds normal. No masses,  No organomegaly. Extremities: Extremities normal, atraumatic, no cyanosis or edema. Capillary refill normal.   Pulses: 2+ and symmetric all extremities. Skin: Skin color pink/pale/mottled/flushed, turgor normal. No rashes or lesions   Neurologic: CNII-XII intact.         Labs Reviewed:    CMP:   Lab Results   Component Value Date/Time     (L) 2017 07:19 PM    K 5.6 (H) 2017 07:19 PM    CL 95 (L) 2017 07:19 PM    CO2 27 2017 07:19 PM    AGAP 10 2017 07:19 PM     (H) 2017 07:19 PM    BUN 26 (H) 2017 07:19 PM    CREA 0.51 (L) 2017 07:19 PM    GFRAA >60 2017 07:19 PM    GFRNA >60 2017 07:19 PM    CA 8.2 (L) 2017 07:19 PM    MG 2.0 2017 07:19 PM    ALB 2.8 (L) 2017 07:19 PM    TP 6.4 2017 07:19 PM    GLOB 3.6 2017 07:19 PM    AGRAT 0.8 2017 07:19 PM    SGOT 32 2017 07:19 PM    ALT 33 2017 07:19 PM     CBC:   Lab Results   Component Value Date/Time    WBC 12.4 2017 07:19 PM    HGB 7.3 (L) 2017 11:50 PM    HCT 23.3 (L) 2017 11:50 PM    PLT 454 (H) 02/18/2017 07:19 PM     All Cardiac Markers in the last 24 hours: No results found for: CPK, CKMMB, CKMB, RCK3, CKMBT, CKNDX, CKND1, PAOLA, TROPT, TROIQ, GIBSON, TROPT, TNIPOC, BNP, BNPP      Procedures/imaging: see electronic medical records for all procedures/Xrays and details which were not copied into this note but were reviewed prior to creation of Plan        CC: Razia Andrea MD

## 2017-02-20 LAB
ANION GAP BLD CALC-SCNC: 8 MMOL/L (ref 3–18)
BUN SERPL-MCNC: 15 MG/DL (ref 7–18)
BUN/CREAT SERPL: 50 (ref 12–20)
CALCIUM SERPL-MCNC: 7.7 MG/DL (ref 8.5–10.1)
CHLORIDE SERPL-SCNC: 104 MMOL/L (ref 100–108)
CO2 SERPL-SCNC: 24 MMOL/L (ref 21–32)
CREAT SERPL-MCNC: 0.3 MG/DL (ref 0.6–1.3)
ERYTHROCYTE [DISTWIDTH] IN BLOOD BY AUTOMATED COUNT: 16.2 % (ref 11.6–14.5)
GLUCOSE SERPL-MCNC: 104 MG/DL (ref 74–99)
HCT VFR BLD AUTO: 24.8 % (ref 35–45)
HGB BLD-MCNC: 8.2 G/DL (ref 12–16)
MCH RBC QN AUTO: 26.9 PG (ref 24–34)
MCHC RBC AUTO-ENTMCNC: 33.1 G/DL (ref 31–37)
MCV RBC AUTO: 81.3 FL (ref 74–97)
PLATELET # BLD AUTO: 255 K/UL (ref 135–420)
PMV BLD AUTO: 9.1 FL (ref 9.2–11.8)
POTASSIUM SERPL-SCNC: 4.1 MMOL/L (ref 3.5–5.5)
RBC # BLD AUTO: 3.05 M/UL (ref 4.2–5.3)
SODIUM SERPL-SCNC: 136 MMOL/L (ref 136–145)
WBC # BLD AUTO: 8.4 K/UL (ref 4.6–13.2)

## 2017-02-20 PROCEDURE — 80048 BASIC METABOLIC PNL TOTAL CA: CPT | Performed by: HOSPITALIST

## 2017-02-20 PROCEDURE — 74011636637 HC RX REV CODE- 636/637: Performed by: HOSPITALIST

## 2017-02-20 PROCEDURE — 65660000000 HC RM CCU STEPDOWN

## 2017-02-20 PROCEDURE — 74011000258 HC RX REV CODE- 258: Performed by: HOSPITALIST

## 2017-02-20 PROCEDURE — 74011250637 HC RX REV CODE- 250/637: Performed by: INTERNAL MEDICINE

## 2017-02-20 PROCEDURE — C9113 INJ PANTOPRAZOLE SODIUM, VIA: HCPCS | Performed by: HOSPITALIST

## 2017-02-20 PROCEDURE — 85027 COMPLETE CBC AUTOMATED: CPT | Performed by: HOSPITALIST

## 2017-02-20 PROCEDURE — 74011250637 HC RX REV CODE- 250/637: Performed by: HOSPITALIST

## 2017-02-20 PROCEDURE — 36415 COLL VENOUS BLD VENIPUNCTURE: CPT | Performed by: HOSPITALIST

## 2017-02-20 PROCEDURE — 74011000250 HC RX REV CODE- 250: Performed by: INTERNAL MEDICINE

## 2017-02-20 PROCEDURE — 74011250636 HC RX REV CODE- 250/636: Performed by: HOSPITALIST

## 2017-02-20 PROCEDURE — 74011000250 HC RX REV CODE- 250: Performed by: HOSPITALIST

## 2017-02-20 RX ORDER — ALPRAZOLAM 0.5 MG/1
0.5 TABLET ORAL
Status: DISCONTINUED | OUTPATIENT
Start: 2017-02-20 | End: 2017-03-01 | Stop reason: HOSPADM

## 2017-02-20 RX ADMIN — SODIUM CHLORIDE 100 ML/HR: 900 INJECTION, SOLUTION INTRAVENOUS at 02:04

## 2017-02-20 RX ADMIN — MORPHINE SULFATE 15 MG: 15 TABLET ORAL at 03:20

## 2017-02-20 RX ADMIN — SODIUM CHLORIDE 40 MG: 9 INJECTION INTRAMUSCULAR; INTRAVENOUS; SUBCUTANEOUS at 09:01

## 2017-02-20 RX ADMIN — ROPINIROLE HYDROCHLORIDE 0.75 MG: 0.25 TABLET, FILM COATED ORAL at 21:09

## 2017-02-20 RX ADMIN — MIRABEGRON 50 MG: 25 TABLET, FILM COATED, EXTENDED RELEASE ORAL at 21:20

## 2017-02-20 RX ADMIN — LACOSAMIDE 100 MG: 100 TABLET, FILM COATED ORAL at 21:10

## 2017-02-20 RX ADMIN — SUCRALFATE 1 G: 1 SUSPENSION ORAL at 12:28

## 2017-02-20 RX ADMIN — POLYETHYLENE GLYCOL 3350, SODIUM CHLORIDE, POTASSIUM CHLORIDE, SODIUM BICARBONATE, AND SODIUM SULFATE 4000 ML: 240; 5.84; 2.98; 6.72; 22.72 POWDER, FOR SOLUTION ORAL at 16:11

## 2017-02-20 RX ADMIN — MYCOPHENOLATE MOFETIL 900 MG: 500 TABLET ORAL at 18:24

## 2017-02-20 RX ADMIN — MYCOPHENOLATE MOFETIL 900 MG: 500 TABLET ORAL at 21:11

## 2017-02-20 RX ADMIN — PREDNISONE 5 MG: 5 TABLET ORAL at 09:01

## 2017-02-20 RX ADMIN — FERROUS SULFATE TAB 325 MG (65 MG ELEMENTAL FE) 325 MG: 325 (65 FE) TAB at 12:27

## 2017-02-20 RX ADMIN — DULOXETINE 30 MG: 30 CAPSULE, DELAYED RELEASE ORAL at 09:01

## 2017-02-20 RX ADMIN — DULOXETINE 30 MG: 30 CAPSULE, DELAYED RELEASE ORAL at 21:10

## 2017-02-20 RX ADMIN — Medication 10 ML: at 16:13

## 2017-02-20 RX ADMIN — LACOSAMIDE 100 MG: 100 TABLET, FILM COATED ORAL at 09:01

## 2017-02-20 RX ADMIN — ALPRAZOLAM 0.5 MG: 0.5 TABLET ORAL at 19:43

## 2017-02-20 RX ADMIN — ALPRAZOLAM 0.5 MG: 0.5 TABLET ORAL at 12:27

## 2017-02-20 RX ADMIN — MYCOPHENOLATE MOFETIL 900 MG: 500 TABLET ORAL at 09:00

## 2017-02-20 RX ADMIN — Medication 10 ML: at 21:12

## 2017-02-20 RX ADMIN — SUCRALFATE 1 G: 1 SUSPENSION ORAL at 08:15

## 2017-02-20 RX ADMIN — SUCRALFATE 1 G: 1 SUSPENSION ORAL at 18:24

## 2017-02-20 RX ADMIN — LEVOTHYROXINE SODIUM 75 MCG: 75 TABLET ORAL at 06:56

## 2017-02-20 RX ADMIN — LUBIPROSTONE 24 MCG: 8 CAPSULE, GELATIN COATED ORAL at 09:00

## 2017-02-20 RX ADMIN — MORPHINE SULFATE 15 MG: 15 TABLET ORAL at 19:43

## 2017-02-20 RX ADMIN — LUBIPROSTONE 24 MCG: 8 CAPSULE, GELATIN COATED ORAL at 18:24

## 2017-02-20 RX ADMIN — ROPINIROLE HYDROCHLORIDE 0.25 MG: 0.25 TABLET, FILM COATED ORAL at 09:01

## 2017-02-20 RX ADMIN — OLANZAPINE 7.5 MG: 2.5 TABLET, FILM COATED ORAL at 21:10

## 2017-02-20 RX ADMIN — Medication 10 ML: at 06:57

## 2017-02-20 RX ADMIN — SODIUM CHLORIDE 40 MG: 9 INJECTION INTRAMUSCULAR; INTRAVENOUS; SUBCUTANEOUS at 21:07

## 2017-02-20 RX ADMIN — POLYETHYLENE GLYCOL 3350 17 G: 17 POWDER, FOR SOLUTION ORAL at 09:00

## 2017-02-20 RX ADMIN — CEFTRIAXONE 1 G: 1 INJECTION, POWDER, FOR SOLUTION INTRAMUSCULAR; INTRAVENOUS at 02:04

## 2017-02-20 RX ADMIN — SUCRALFATE 1 G: 1 SUSPENSION ORAL at 23:31

## 2017-02-20 RX ADMIN — CYCLOBENZAPRINE HYDROCHLORIDE 10 MG: 10 TABLET, FILM COATED ORAL at 21:10

## 2017-02-20 NOTE — PROGRESS NOTES
conducted an initial consultation and Spiritual Assessment for Trish Guzman, who is a 78 y.o.,female. Patients Primary Language is: [place] Delikatesser. According to the patients EMR Caodaism Affiliation is: Temple. The reason the Patient came to the hospital is:   Patient Active Problem List    Diagnosis Date Noted    Epistaxis 02/19/2017    Gingival bleeding 02/18/2017    GI (gastrointestinal bleed) 02/18/2017    Chronic anticoagulation 02/18/2017    Hypomagnesemia 09/12/2012    Unspecified constipation 09/12/2012    Personal history of DVT (deep vein thrombosis) 07/07/2012    Hypotension, unspecified 06/26/2012    Seizure disorder ([facility] 06/24/2012    Depression 06/24/2012    UTI (urinary tract infection) 06/01/2012    Severe protein-calorie malnutrition ([facility] 06/01/2012    Anemia 05/04/2012    Bipolar 1 disorder ([facility] 05/03/2012    Decubitus ulcer of sacral area 05/03/2012        The  provided the following Interventions:  Initiated a relationship of care and support. Explored issues of nevaeh, belief, spirituality and Jehovah's witness/ritual needs while hospitalized. Listened empathically. Provided chaplaincy education. Provided information about Spiritual Care Services. Offered prayer and assurance of continued prayers on patient's behalf. Chart reviewed. The following outcomes were achieved:  Patient shared limited information about both their medical narrative and spiritual journey/beliefs. Patient processed feeling about current hospitalization. Patient expressed gratitude for the 's visit. Assessment:  Patient does not have any Jehovah's witness/cultural needs that will affect patients preferences in health care. Patient did not indicate any spiritual or Jehovah's witness issues which require Spiritual Care Services interventions at this time. Plan:  Chaplains will continue to follow and will provide pastoral care on an as needed/requested basis.    recommends bedside caregivers page  on duty if patient shows signs of acute spiritual or emotional distress. INDIRA Blake. [employer]  [phone]

## 2017-02-20 NOTE — ROUTINE PROCESS
Bedside shift change report given to [address] [place] [address] (oncoming nurse) by Sam Waggoner RN (offgoing nurse). Report included the following information SBAR, Kardex, Intake/Output, MAR and Med Rec Status.

## 2017-02-20 NOTE — PROGRESS NOTES
Patient was visited by AVIVA. Volunteer followed up with patient and/or family and reported no needs to this . Chaplains will continue to follow and will provide pastoral care as needed or requested. [address], M.Div.   Board Certified   [phone] - Office

## 2017-02-20 NOTE — PROGRESS NOTES
0800 Assumed pt care. Pt in bed alert and oriented. On the phone regarding spouse situation. 1100 Interdisciplinary team rounds were held 2/20/2017 with the following team members:Care Management, Nursing, James Frias RN and JOSÉ LUIS Farmer and Physician Dr Shane Harmon and the patient. Plan of care discussed. See clinical pathway and/or care plan for interventions and desired outcomes. 1227 Xanax adm for anxiety as per MAR    1330 Pt resting well and much calmer. 1610 Golytely initiated and education given about and pt voiced understanding. 1943 Pt called and requested pain med and anxiety med.  Xanax and as per [facility] and Morphine 15 mg for generalized pain 10/10.  2000 pt left in bed resting

## 2017-02-20 NOTE — ROUTINE PROCESS
1910: Bedside and Verbal shift change report received from Radha Patel RN (offgoing nurse) given to Sherren Mor, RN (oncoming nurse). Report included the following information SBAR, Kardex, Intake/Output, MAR, Recent Results and Cardiac Rhythm NSR.

## 2017-02-20 NOTE — PROGRESS NOTES
Patient seen by wound care, sacral area stage II noted, measures 1 x 0.7 x 0.1, bacitracin and a mepilex border used for dressing, patient was turned and repositioned, wound care will continue to monitor during this hospitalization.

## 2017-02-20 NOTE — PROGRESS NOTES
Hospitalist Progress Note    Patient: Kevan Celaya MRN: [id number]  CSN: [id number]    YOB: 1950  Age: 78 y.o. Sex: female    DOA: 2/18/2017 LOS:  LOS: 2 days          Chief Complaint: Ac blood loss anemia        Assessment/Plan     GI bleed vs hemoptysis - suspect nose bleed primarily-Clear liquid. consult to GI, start on protonix. Holding blood thinners-daily INR     Epistaxis - now resolved     Anemia - secondary to acute blood loss, s/p transfusion, appears stable     UTI - in a patient with chronic indwelling catheter. on ceftriaxone, follow urine cultures. Pending thus far     Decubitus ulcer sacral region stage 4 - local wound care. Consult wound care     Seizure disorder - on vimpat. 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 Now on hold due to bleeding     Chronic pain - continue home meds.  Anxiety-resume xanax PRN     Debility as above     Recent admission to [facility] for SBO, abdominal pain    meds reviewed  Daily INR  Follow for further bleeding  Resume coumadin in next 24-48 hrs if no bleeding noted  Follow urine cx results  Keep on amitiza       Patient Active Problem List   Diagnosis Code    Bipolar 1 disorder ([facility] F31.9    Decubitus ulcer of sacral area L89.159    Anemia D64.9    UTI (urinary tract infection) N39.0    Severe protein-calorie malnutrition (HCC) E43    Seizure disorder (HCC) G40.909    Depression F32.9    Hypotension, unspecified I95.9    Personal history of DVT (deep vein thrombosis) Z86.718    Hypomagnesemia E83.42    Unspecified constipation K59.00    Gingival bleeding K06.8    GI (gastrointestinal bleed) K92.2    Chronic anticoagulation Z79.01    Epistaxis R04.0       Subjective:    i need something for my nerves, I need xanax    Review of systems:  No further bleeding  Constitutional: denies fevers, chills, myalgias  Respiratory: denies SOB, cough  Cardiovascular: denies chest pain, palpitations  Gastrointestinal: denies nausea, vomiting, diarrhea      Vital signs/Intake and Output:  Visit Vitals    /59 (BP 1 Location: Left arm, BP Patient Position: At rest)    Pulse 86    Temp 98.3 °F (36.8 °C)    Resp 16    Ht 5' 6\" (1.676 m)    Wt 66.7 kg (147 lb)    SpO2 98%    BMI 23.73 kg/m2     Current Shift:  02/20 0701 - 02/20 1900  In: -   Out: 2350 [Urine:2350]  Last three shifts:  02/18 1901 - 02/20 0700  In: 3915.2 [P.O.:1520;  I.V.:2395.2]  Out: 2750 [Urine:2750]    Exam:    General: debilitated WF alert, NAD, OX3  CVS:Regular rate and rhythm, no M/R/G, S1/S2 heard, no thrill  Lungs:Clear to auscultation bilaterally, no wheezes, rhonchi, or rales  Abdomen: Soft, Nontender, No distention, Normal Bowel sounds, No hepatomegaly  Extremities: No C/C/E, pulses palpable 2+  Skin:normal texture and turgor, no rashes, no lesions  Neuro:grossly normal , follows commands  Psych:appropriate  Gotti catheter                Labs: Results:       Chemistry Recent Labs      02/20/17 0304 02/19/17 0730 02/18/17 1919   GLU  104*  101*  128*   NA  136  136  132*   K  4.1  4.6  5.6*   CL  104  102  95*   CO2  24  27  27   BUN  15  33*  26*   CREA  0.30*  0.31*  0.51*   CA  7.7*  7.4*  8.2*   AGAP  8  7  10   BUCR  50*  106*  51*   AP   --    --   131*   TP   --    --   6.4   ALB   --    --   2.8*   GLOB   --    --   3.6   AGRAT   --    --   0.8      CBC w/Diff Recent Labs      02/20/17   0304 02/19/17 2010 02/19/17   0730 02/18/17 1919   WBC  8.4   --   10.5   --   12.4   RBC  3.05*   --   2.53*   --   3.44*   HGB  8.2*  8.8*  6.3*   < >  8.6*   HCT  24.8*  26.3*  20.0*   < >  27.5*   PLT  255   --   327   --   454*   GRANS   --    --    --    --   79*   LYMPH   --    --    --    --   15*   EOS   --    --    --    --   1    < > = values in this interval not displayed. Cardiac Enzymes No results for input(s): CPK, CKND1, PAOLA in the last 72 hours. No lab exists for component: CKRMB, TROIP   Coagulation Recent Labs      02/18/17 1919   PTP  12.3   INR  0.9       Lipid Panel No results found for: CHOL, CHOLPOCT, CHOLX, CHLST, CHOLV, [id number], HDL, LDL, NLDLCT, DLDL, LDLC, DLDLP, [id number], VLDLC, VLDL, TGL, TGLX, TRIGL, [id number], TRIGP, TGLPOCT, [id number], CHHD, CHHDX   BNP No results for input(s): BNPP in the last 72 hours.    Liver Enzymes Recent Labs      02/18/17 1919   TP  6.4   ALB  2.8*   AP  131*   SGOT  32      Thyroid Studies Lab Results   Component Value Date/Time    TSH 12.81 09/13/2012 04:00 AM        Procedures/imaging: see electronic medical records for all procedures/Xrays and details which were not copied into this note but were reviewed prior to creation of Danny Lincoln MD

## 2017-02-20 NOTE — PROGRESS NOTES
0700:Shift summary: Patient had an uneventful shift,  slight nose bleeding noted, resolved. No evidence of hypotension. Patient left resting in bed no acute distress,bed in lowest position, call light with in reach.

## 2017-02-20 NOTE — INTERDISCIPLINARY ROUNDS
IDR/SLIDR Summary          Patient: Itz Delvalle MRN: [id number]    Age: 78 y.o. YOB: 1950 Room/Bed: [facility]   Admit Diagnosis: GI (gastrointestinal bleed)  Gingival bleeding  Chronic anticoagulation  Principal Diagnosis: GI (gastrointestinal bleed)   Goals: colonoscopy for tomorrow, monitor H&Hs   Readmission: NO  Quality Measure: Not applicable  VTE Prophylaxis: Mechanical  Refuses   Influenza Vaccine screening completed? YES  Mobility needs: No   Nutrition plan:No  Consults:Case Management    Financial concerns:No  Escalated to CM? NO  RRAT Score: 17   Testing due for pt today? NO  LOS: 2 days Expected length of stay 0 days  Discharge plan: Home    PCP: Annabella Chan MD  Transportation needs: No    Days before discharge:two or more days before discharge   Discharge disposition: Home  Present for rounds:  Coby Harrington RN, JOSÉ LUIS Hess, ANGY, Litzy Mcmullen CM and Dr. Liberty Mallory    Signed:     Eleazar Tarango RN  2/20/2017  11:51 AM

## 2017-02-20 NOTE — CONSULTS
[address] [place] [address] Rd    Name:  Garland Salinas  MR#:  [id number]  :  1950  Account #:  [de-identified]  Date of Adm:  2017  Date of Consultation:  2017      HISTORY: This is a 71-year-old female who was admitted here  yesterday because of hematochezia and rectal bleeding. This patient is  already on Coumadin and was trying to be bridged with Lovenox. She  presented with hematochezia and rectal bleeding. She claimed that the  hematochezia started yesterday morning and she will cough large  amount of fresh blood. She also has some rectal bleeding that was  hard to characterize. She started to have epistaxis from 10 o'clock until  4 o'clock this morning where she passed large amount of blood. She  did have some black stools and some fresh blood mixed with her  stools. According to the nurse, her stools were brown this morning with  some fresh and darker blood. The patient claimed that she is  chronically constipated because she is on narcotic medications. When  she arrived, her hemoglobin was 8.6 and then dropped to 6.3, BUN 33. The patient claimed that she had a history of bleeding anastomotic  ulcer post-gastric bypass that she had in . She was hospitalized in  [facility] from  to 2016 for upper GI bleed where  endoscopy showed that she has anastomotic ulcer. She was put on  Carafate 4 times a day and Protonix. She was admitted a second time  in [place] from the  through  because  of abdominal pain that was caused by severe constipation. She was  put on Amitiza and at that time, her Protonix was increased to 40 mg  twice a day. The patient also has been taking the iron sulfate. She had  apparently a negative colonoscopy at one of the doctor's office in   that was negative. She has no family history of any kind of cancer. Her  parents  relatively younger from smoking related illness.     The patient has been on Coumadin for many years. 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 She has an inferior vena cava filter. She also has seizure  disorder. 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 After this she woke up, but has been completely confined to bed  and bladder catheter. She has bipolar disorder, chronic GERD, severe headache severe  back pain. She has elevated left diaphragmatic hypothyroidism. She  has appendectomy, cholecystectomy, gastric bypass surgery in 1995,  back surgery. She denies having diabetes or coronary artery disease. She is chronically constipated. SOCIAL HISTORY: She is living with her . She never had any  pregnancies or deliveries. She has been taken care of at home by her  . She is completely bed bound. ALLERGIES: SHE IS ALLERGIC TO:  1. DEMEROL. 2. SEAFOOD. MEDICATIONS:  1. Claritin. 2. Protonix 20 mg b.i.d.  3. Glucosamine. 4. Ferrous sulfate 325 mg.  5. Cranberry extract. 6. Magnesium oxide 500 mg once a day. 7. Multivitamins. 8. Flexeril 10 mg at night because of restless leg and leg cramps. 9. Requip 0.25.  10. Myrbetriq 50 mg at night. 11. Requip equal every 0.25 mg. 12. Carafate 1 gram q.i.d. 13. Prednisone 5 mg daily. 14. Fioricet as needed for headache. 15. Neurontin 300 mg t.i.d.  16. Morphine, MS 50 mg tablets every 8 hours. 17. Synthroid 100 mcg daily. 18. Cymbalta 30 mg 2 tablets b.i.d.  19. Potassium chloride 20 mEq b.i.d.  20. Lacosamide 200 mg tablets twice a day. 21. Coumadin 3 mg half tablet daily. 22. Zyprexa 7.5 mg at bedtime. She denies having chest pain, shortness of breath. She did not have a  recent cold, but did have that dry cough. She does not drink or smoke. She has intermittent UTI and she has an indwelling Gotti catheter  since her coma in 2009. PHYSICAL EXAMINATION  GENERAL: We have a 60-year-old  female who appears to  be in no distress.  She is resting comfortably. VITAL SIGNS: Weight 147 pounds. Temperature 98.6, pulse 98, blood  pressure 102/66, breathing 16, saturation 100%. SKIN: Normal. No evidence of any rash. HEENT: Eyes are remarkable for pale conjunctivae. The sclerae are  anicteric. The pupils are equal and reactive to light. Oropharyngeal  cavity, she has dry and pale mucous membrane. She has her own  teeth. NECK: Supple. No palpable mass, no enlarged thyroids. LUNGS: Clear to auscultation. CARDIAC: Rhythm is regular. S1, S2, but there is an S3 at the left  parasternal border and also there is a 2/6 systolic ejection murmur at  the left parasternal border, not at the aortic notch. ABDOMEN: Not distended, soft, nontender. No mass or  organomegaly. There are multiple vertical surgical scar with significant  scarring. EXTREMITIES: Weak. She has deformity of her right foot more than  the left with some contraction. She does move all her extremities, but  weak in the lower one. She has a Gotti catheter draining clear urine. NEUROLOGICAL: She is alert and oriented. LABORATORY DATA/BLOOD TESTS: We have a hemoglobin  presently of 6.3, normal MCV and MCH. WBC 10.5, platelets 712. She  has 79% neutrophils. Electrolytes normal, BUN 33, creatinine 0.31,  they were 26 and 0.51 yesterday. Normal LFT and lipase. Lactic acid  was 2.5 and today 1.3. Urinalysis showed positive for nitrite and  moderate leukocyte esterase and 11-20 WBCs. A lot bacteria. INR was  0.9. Abdominal KUB showed normal gas pattern. CT scan of the abdomen  and pelvis in September 2012 showed constipation. Inferior vena cava  filter. Degenerative changes L4-L5 and L5-S1. Previous  cholecystectomy, bypass surgery and large amount of stool throughout  the colon. CONCLUSION: This is a 79-year-old female who is on Coumadin  because of factor V iatrogenous mutation. She has been also on  Lovenox for some kind of transition to catch up with the  low Coumadin.  She presented with multiple bleeding in the form of  hematochezia, epistaxis and also rectal bleeding. From my side, I can  take care of the rectal bleeding by doing a colonoscopy. She already  had a negative one 5 years ago, but the patient is also chronically  constipated and we need to have an excellent bowel preparation in  order to do this. Because of her significant past medical history with seizure disorder  and chronic pain management where she has been on narcotic  medication for years, I need to do her under MAC sedation with the  help of the anesthesia. Her hemoglobin dropped to 6.3, this of course is multifactorial. The  black stool that she has or even rectal bleeding could be also coming  from swallowed blood from her nose or from her lungs. Her chest x-ray  done yesterday did not show any abnormality, she may need a  bronchoscopy for that. From my side, I told her that we will do it under MAC sedation and I  told her about the risks involved which include, but not limited to  missing a lesion, reaction to medication, rectal bleeding after any kind  of procedure or even perforation and in this case she may require to  have even surgery. Her other health problems, she has a bleeding, anastomotic ulcer. She  is already on a high-dose PPI and Carafate. There is a small possibility  that she still has a residual ulcer. If there was nothing in the colon, then  I may elect to do an EGD, but I have the feeling that part of her  bleeding is coming mostly from the epistaxis that she swallowed. She has chronic severe headache, chronic back pain, depression,  anxiety, bipolar disorder. She has been completely bed confined for  the past 8 years after a prolonged coma from grand mal seizure. Further note will follow. MD ANITRA Covarrubias / Roberto Stock  D:  02/19/2017   13:56  T:  02/19/2017   17:02  Job #:  [id number]    Dr. Shabnam Travis

## 2017-02-21 ENCOUNTER — ANESTHESIA (OUTPATIENT)
Dept: ENDOSCOPY | Age: 67
DRG: 377 | End: 2017-02-21
Payer: MEDICARE

## 2017-02-21 ENCOUNTER — ANESTHESIA EVENT (OUTPATIENT)
Dept: ENDOSCOPY | Age: 67
DRG: 377 | End: 2017-02-21
Payer: MEDICARE

## 2017-02-21 LAB
ABO + RH BLD: NORMAL
ANION GAP BLD CALC-SCNC: 5 MMOL/L (ref 3–18)
BLD PROD TYP BPU: NORMAL
BLD PROD TYP BPU: NORMAL
BLOOD GROUP ANTIBODIES SERPL: NORMAL
BPU ID: NORMAL
BPU ID: NORMAL
BUN SERPL-MCNC: 5 MG/DL (ref 7–18)
BUN/CREAT SERPL: 13 (ref 12–20)
CALCIUM SERPL-MCNC: 7.6 MG/DL (ref 8.5–10.1)
CALLED TO:,BCALL1: NORMAL
CHLORIDE SERPL-SCNC: 105 MMOL/L (ref 100–108)
CO2 SERPL-SCNC: 30 MMOL/L (ref 21–32)
CREAT SERPL-MCNC: 0.38 MG/DL (ref 0.6–1.3)
CROSSMATCH RESULT,%XM: NORMAL
CROSSMATCH RESULT,%XM: NORMAL
ERYTHROCYTE [DISTWIDTH] IN BLOOD BY AUTOMATED COUNT: 16.7 % (ref 11.6–14.5)
GLUCOSE SERPL-MCNC: 98 MG/DL (ref 74–99)
HCT VFR BLD AUTO: 23.3 % (ref 35–45)
HGB BLD-MCNC: 7.7 G/DL (ref 12–16)
INR PPP: 1 (ref 0.8–1.2)
MCH RBC QN AUTO: 27.3 PG (ref 24–34)
MCHC RBC AUTO-ENTMCNC: 33 G/DL (ref 31–37)
MCV RBC AUTO: 82.6 FL (ref 74–97)
PLATELET # BLD AUTO: 267 K/UL (ref 135–420)
PMV BLD AUTO: 9.1 FL (ref 9.2–11.8)
POTASSIUM SERPL-SCNC: 3.7 MMOL/L (ref 3.5–5.5)
PROTHROMBIN TIME: 13.2 SEC (ref 11.5–15.2)
RBC # BLD AUTO: 2.82 M/UL (ref 4.2–5.3)
SODIUM SERPL-SCNC: 140 MMOL/L (ref 136–145)
SPECIMEN EXP DATE BLD: NORMAL
STATUS OF UNIT,%ST: NORMAL
STATUS OF UNIT,%ST: NORMAL
UNIT DIVISION, %UDIV: 0
UNIT DIVISION, %UDIV: 0
WBC # BLD AUTO: 6.3 K/UL (ref 4.6–13.2)

## 2017-02-21 PROCEDURE — 74011250637 HC RX REV CODE- 250/637: Performed by: FAMILY MEDICINE

## 2017-02-21 PROCEDURE — C9113 INJ PANTOPRAZOLE SODIUM, VIA: HCPCS | Performed by: HOSPITALIST

## 2017-02-21 PROCEDURE — 0DJD8ZZ INSPECTION OF LOWER INTESTINAL TRACT, VIA NATURAL OR ARTIFICIAL OPENING ENDOSCOPIC: ICD-10-PCS | Performed by: INTERNAL MEDICINE

## 2017-02-21 PROCEDURE — 0DJ08ZZ INSPECTION OF UPPER INTESTINAL TRACT, VIA NATURAL OR ARTIFICIAL OPENING ENDOSCOPIC: ICD-10-PCS | Performed by: INTERNAL MEDICINE

## 2017-02-21 PROCEDURE — 77030020256 HC SOL INJ NACL 0.9%  500ML: Performed by: INTERNAL MEDICINE

## 2017-02-21 PROCEDURE — 74011250637 HC RX REV CODE- 250/637: Performed by: HOSPITALIST

## 2017-02-21 PROCEDURE — 85027 COMPLETE CBC AUTOMATED: CPT | Performed by: HOSPITALIST

## 2017-02-21 PROCEDURE — 65660000000 HC RM CCU STEPDOWN

## 2017-02-21 PROCEDURE — 74011250636 HC RX REV CODE- 250/636: Performed by: HOSPITALIST

## 2017-02-21 PROCEDURE — 74011250637 HC RX REV CODE- 250/637: Performed by: INTERNAL MEDICINE

## 2017-02-21 PROCEDURE — 74011000258 HC RX REV CODE- 258: Performed by: HOSPITALIST

## 2017-02-21 PROCEDURE — 80048 BASIC METABOLIC PNL TOTAL CA: CPT | Performed by: HOSPITALIST

## 2017-02-21 PROCEDURE — 74011636637 HC RX REV CODE- 636/637: Performed by: HOSPITALIST

## 2017-02-21 PROCEDURE — 36415 COLL VENOUS BLD VENIPUNCTURE: CPT | Performed by: HOSPITALIST

## 2017-02-21 PROCEDURE — 76060000032 HC ANESTHESIA 0.5 TO 1 HR: Performed by: INTERNAL MEDICINE

## 2017-02-21 PROCEDURE — 74011000250 HC RX REV CODE- 250: Performed by: INTERNAL MEDICINE

## 2017-02-21 PROCEDURE — 74011250636 HC RX REV CODE- 250/636

## 2017-02-21 PROCEDURE — 74011000250 HC RX REV CODE- 250: Performed by: HOSPITALIST

## 2017-02-21 PROCEDURE — 76040000007: Performed by: INTERNAL MEDICINE

## 2017-02-21 PROCEDURE — 77030010545

## 2017-02-21 PROCEDURE — 85610 PROTHROMBIN TIME: CPT | Performed by: HOSPITALIST

## 2017-02-21 RX ORDER — ATROPINE SULFATE 0.1 MG/ML
0.5 INJECTION INTRAVENOUS
Status: CANCELLED | OUTPATIENT
Start: 2017-02-21 | End: 2017-02-21

## 2017-02-21 RX ORDER — BUTALBITAL, ACETAMINOPHEN AND CAFFEINE 50; 325; 40 MG/1; MG/1; MG/1
1 TABLET ORAL
Status: DISCONTINUED | OUTPATIENT
Start: 2017-02-21 | End: 2017-03-01 | Stop reason: HOSPADM

## 2017-02-21 RX ORDER — SODIUM CHLORIDE 9 MG/ML
100 INJECTION, SOLUTION INTRAVENOUS CONTINUOUS
Status: CANCELLED | OUTPATIENT
Start: 2017-02-21 | End: 2017-02-21

## 2017-02-21 RX ORDER — DEXTROMETHORPHAN/PSEUDOEPHED 2.5-7.5/.8
1.2 DROPS ORAL
Status: CANCELLED | OUTPATIENT
Start: 2017-02-21

## 2017-02-21 RX ORDER — CYCLOSPORINE 0.5 MG/ML
1 EMULSION OPHTHALMIC EVERY 12 HOURS
Status: DISCONTINUED | OUTPATIENT
Start: 2017-02-21 | End: 2017-03-01 | Stop reason: HOSPADM

## 2017-02-21 RX ORDER — MIDAZOLAM HYDROCHLORIDE 1 MG/ML
INJECTION, SOLUTION INTRAMUSCULAR; INTRAVENOUS AS NEEDED
Status: DISCONTINUED | OUTPATIENT
Start: 2017-02-21 | End: 2017-02-21 | Stop reason: HOSPADM

## 2017-02-21 RX ORDER — FLUMAZENIL 0.1 MG/ML
0.2 INJECTION INTRAVENOUS
Status: CANCELLED | OUTPATIENT
Start: 2017-02-21 | End: 2017-02-21

## 2017-02-21 RX ORDER — MIDAZOLAM HYDROCHLORIDE 1 MG/ML
.5-5 INJECTION, SOLUTION INTRAMUSCULAR; INTRAVENOUS
Status: CANCELLED | OUTPATIENT
Start: 2017-02-21 | End: 2017-02-21

## 2017-02-21 RX ORDER — EPINEPHRINE 0.1 MG/ML
1 INJECTION INTRACARDIAC; INTRAVENOUS
Status: CANCELLED | OUTPATIENT
Start: 2017-02-21 | End: 2017-02-21

## 2017-02-21 RX ORDER — PROPOFOL 10 MG/ML
INJECTION, EMULSION INTRAVENOUS AS NEEDED
Status: DISCONTINUED | OUTPATIENT
Start: 2017-02-21 | End: 2017-02-21 | Stop reason: HOSPADM

## 2017-02-21 RX ORDER — FENTANYL CITRATE 50 UG/ML
100 INJECTION, SOLUTION INTRAMUSCULAR; INTRAVENOUS
Status: CANCELLED | OUTPATIENT
Start: 2017-02-21 | End: 2017-02-21

## 2017-02-21 RX ORDER — GUAIFENESIN 100 MG/5ML
100 SOLUTION ORAL
Status: DISCONTINUED | OUTPATIENT
Start: 2017-02-21 | End: 2017-03-01 | Stop reason: HOSPADM

## 2017-02-21 RX ORDER — LIDOCAINE HYDROCHLORIDE 20 MG/ML
INJECTION, SOLUTION EPIDURAL; INFILTRATION; INTRACAUDAL; PERINEURAL AS NEEDED
Status: DISCONTINUED | OUTPATIENT
Start: 2017-02-21 | End: 2017-02-21 | Stop reason: HOSPADM

## 2017-02-21 RX ORDER — NALOXONE HYDROCHLORIDE 0.4 MG/ML
0.4 INJECTION, SOLUTION INTRAMUSCULAR; INTRAVENOUS; SUBCUTANEOUS
Status: CANCELLED | OUTPATIENT
Start: 2017-02-21 | End: 2017-02-21

## 2017-02-21 RX ADMIN — LEVOTHYROXINE SODIUM 75 MCG: 75 TABLET ORAL at 07:22

## 2017-02-21 RX ADMIN — Medication 10 ML: at 17:49

## 2017-02-21 RX ADMIN — Medication 10 ML: at 22:57

## 2017-02-21 RX ADMIN — LUBIPROSTONE 24 MCG: 8 CAPSULE, GELATIN COATED ORAL at 10:40

## 2017-02-21 RX ADMIN — CEFTRIAXONE 1 G: 1 INJECTION, POWDER, FOR SOLUTION INTRAMUSCULAR; INTRAVENOUS at 01:02

## 2017-02-21 RX ADMIN — SUCRALFATE 1 G: 1 SUSPENSION ORAL at 10:40

## 2017-02-21 RX ADMIN — PROPOFOL 20 MG: 10 INJECTION, EMULSION INTRAVENOUS at 15:21

## 2017-02-21 RX ADMIN — SUCRALFATE 1 G: 1 SUSPENSION ORAL at 17:48

## 2017-02-21 RX ADMIN — Medication 10 ML: at 07:23

## 2017-02-21 RX ADMIN — SODIUM CHLORIDE 50 ML/HR: 900 INJECTION, SOLUTION INTRAVENOUS at 10:37

## 2017-02-21 RX ADMIN — ROPINIROLE HYDROCHLORIDE 0.25 MG: 0.25 TABLET, FILM COATED ORAL at 10:40

## 2017-02-21 RX ADMIN — MYCOPHENOLATE MOFETIL 900 MG: 500 TABLET ORAL at 17:48

## 2017-02-21 RX ADMIN — PROPOFOL 20 MG: 10 INJECTION, EMULSION INTRAVENOUS at 15:29

## 2017-02-21 RX ADMIN — DULOXETINE 30 MG: 30 CAPSULE, DELAYED RELEASE ORAL at 20:42

## 2017-02-21 RX ADMIN — PROPOFOL 20 MG: 10 INJECTION, EMULSION INTRAVENOUS at 15:09

## 2017-02-21 RX ADMIN — CYCLOSPORINE 1 DROP: 0.5 EMULSION OPHTHALMIC at 20:43

## 2017-02-21 RX ADMIN — BUTALBITAL, ACETAMINOPHEN AND CAFFEINE 1 TABLET: 50; 325; 40 TABLET ORAL at 17:51

## 2017-02-21 RX ADMIN — PROPOFOL 30 MG: 10 INJECTION, EMULSION INTRAVENOUS at 15:40

## 2017-02-21 RX ADMIN — GUAIFENESIN 100 MG: 100 SOLUTION ORAL at 22:50

## 2017-02-21 RX ADMIN — PROPOFOL 20 MG: 10 INJECTION, EMULSION INTRAVENOUS at 15:38

## 2017-02-21 RX ADMIN — PROPOFOL 20 MG: 10 INJECTION, EMULSION INTRAVENOUS at 15:17

## 2017-02-21 RX ADMIN — MYCOPHENOLATE MOFETIL 900 MG: 500 TABLET ORAL at 10:40

## 2017-02-21 RX ADMIN — SODIUM CHLORIDE 40 MG: 9 INJECTION INTRAMUSCULAR; INTRAVENOUS; SUBCUTANEOUS at 22:48

## 2017-02-21 RX ADMIN — PROPOFOL 20 MG: 10 INJECTION, EMULSION INTRAVENOUS at 15:13

## 2017-02-21 RX ADMIN — LIDOCAINE HYDROCHLORIDE 20 MG: 20 INJECTION, SOLUTION EPIDURAL; INFILTRATION; INTRACAUDAL; PERINEURAL at 15:09

## 2017-02-21 RX ADMIN — LACOSAMIDE 100 MG: 100 TABLET, FILM COATED ORAL at 20:42

## 2017-02-21 RX ADMIN — OLANZAPINE 7.5 MG: 2.5 TABLET, FILM COATED ORAL at 22:50

## 2017-02-21 RX ADMIN — MYCOPHENOLATE MOFETIL 900 MG: 500 TABLET ORAL at 22:50

## 2017-02-21 RX ADMIN — SODIUM CHLORIDE 40 MG: 9 INJECTION INTRAMUSCULAR; INTRAVENOUS; SUBCUTANEOUS at 10:39

## 2017-02-21 RX ADMIN — LACOSAMIDE 100 MG: 100 TABLET, FILM COATED ORAL at 10:40

## 2017-02-21 RX ADMIN — CYCLOBENZAPRINE HYDROCHLORIDE 10 MG: 10 TABLET, FILM COATED ORAL at 22:50

## 2017-02-21 RX ADMIN — BUTALBITAL, ACETAMINOPHEN AND CAFFEINE 1 TABLET: 50; 325; 40 TABLET ORAL at 22:54

## 2017-02-21 RX ADMIN — MIRABEGRON 50 MG: 25 TABLET, FILM COATED, EXTENDED RELEASE ORAL at 22:56

## 2017-02-21 RX ADMIN — POLYETHYLENE GLYCOL 3350, SODIUM CHLORIDE, POTASSIUM CHLORIDE, SODIUM BICARBONATE, AND SODIUM SULFATE 4000 ML: 240; 5.84; 2.98; 6.72; 22.72 POWDER, FOR SOLUTION ORAL at 18:17

## 2017-02-21 RX ADMIN — PROPOFOL 20 MG: 10 INJECTION, EMULSION INTRAVENOUS at 15:32

## 2017-02-21 RX ADMIN — PROPOFOL 20 MG: 10 INJECTION, EMULSION INTRAVENOUS at 15:25

## 2017-02-21 RX ADMIN — MIDAZOLAM HYDROCHLORIDE 2 MG: 1 INJECTION, SOLUTION INTRAMUSCULAR; INTRAVENOUS at 15:02

## 2017-02-21 RX ADMIN — PROPOFOL 20 MG: 10 INJECTION, EMULSION INTRAVENOUS at 15:35

## 2017-02-21 RX ADMIN — MORPHINE SULFATE 15 MG: 15 TABLET ORAL at 07:22

## 2017-02-21 RX ADMIN — MORPHINE SULFATE 15 MG: 15 TABLET ORAL at 20:42

## 2017-02-21 RX ADMIN — DULOXETINE 30 MG: 30 CAPSULE, DELAYED RELEASE ORAL at 10:40

## 2017-02-21 RX ADMIN — PREDNISONE 5 MG: 5 TABLET ORAL at 10:40

## 2017-02-21 RX ADMIN — ROPINIROLE HYDROCHLORIDE 0.75 MG: 0.25 TABLET, FILM COATED ORAL at 22:50

## 2017-02-21 RX ADMIN — CYCLOSPORINE 1 DROP: 0.5 EMULSION OPHTHALMIC at 13:38

## 2017-02-21 NOTE — PROCEDURES
[facility], located within [facility]  Colonoscopy Procedure Report  _______________________________________________________  Patient: London Ford                                         Attending Physician: Sammy Monk MD    Patient ID: [id number]                                      Referring Physician: Chris Stevenson MD    Exam Date: February 21, 2017 _______________________________________________________      Introduction: A  78 y.o. female patient, presents for inpatient Colonoscopy    Indications: 77 yo female sp gastric bypass 12 years ago bed bound x 2009 with chronic severe constipation presented with multifocal bleeding in the form of rectal bleeding, melena also hemoptysis, epistaxis with acute blood loss anemia to 6.2 BUN 33 while on therapeutic dose of Coumadin. Consent: The benefits, risks, and alternatives to the procedure were discussed and informed consent was obtained from the patient. Preparation: EKG, pulse, pulse oximetry and blood pressure were monitored throughout the procedure. ASA Classification: Class 2 - . The heart is an S1-S2 and regular heart rate and rhythm. Lungs are clear to auscultation and percussion. Abdomen is soft, nondistended, and nontender long mid abdominal surgical scar. Mental Status: awake, alert, and oriented to person, place, and time    Medications:  · Fentanyl 100 mcg IV before procedure. · Versed 4 mg IV throughout the procedure. Rectal Exam: Normal Rectal Exam. No Blood. Pathology Specimens: No specimens removed. Procedure: The colonoscope was passed with great difficulty through the anus under direct visualization and advanced to the hepatic flexure? ? The patient required positioning on the back and counter pressure to aid in the passage of the scope. The scope was withdrawn and the mucosa was carefully examined. The quality of the preparation was poor. The views were poor. The patient's toleration of the procedure was excellent.  After a total time of 30 minutes with vigorous cleaning and suctioning there was still huge and endless amountt of liquid dark stools throughout wally dilated and redundant colon, I decided to quit give another bowel prep and reatempt tomorrow but also with an EGD to check on the anastomotic ulcer. Findings:  Very long dilated and redundant colon full of liquid dark stools  Rectum:   Normal  Sigmoid:   huge amountt of liquid dark stools throughout wally dilated and redundant colon,  Descending Colon:   Normal  Transverse Colon:   Normal  Ascending Colon:   Not seen  Cecum:   Not seen  Terminal Ileum:   Not seen      Unplanned Events: There were no unplanned events. Estimated Blood Loss: None  Impressions:  Incomplete colonoscopy to the hepatic flexure? huge and endless amountt of liquid dark stools throughout wally dilated and redundant megacolon, I decided to quit give another bowel prep and reatempt tomorrow . Normal Mucosa. No blood, diverticula or polyps found. Complications: None; patient tolerated the procedure well. Recommendations:  · Return to room. · Resume clear liquid diet. · Colonoscopy and EGD with more bowel prep.     Procedure Codes:    · COLONOSCOPY [[id number] (Type: Custom)]    Endoscope Information:  Model Number(s)    [id number]         Assistant: None      Signed By: Rios Addison MD Date: February 21, 2017

## 2017-02-21 NOTE — PROGRESS NOTES
Hospitalist Progress Note    Patient: Matthew Moeller MRN: [id number]  CSN: [id number]    YOB: 1950  Age: 78 y.o. Sex: female    DOA: 2/18/2017 LOS:  LOS: 3 days          Chief Complaint:    Anemia, GI bleed      Assessment/Plan     GI bleed -Clear liquid. Off coumadin for now-colonoscopy today      Epistaxis - now resolved      Anemia - secondary to acute blood loss, s/p transfusion, appears stable-Hgb slightly down this am      UTI - in a patient with chronic indwelling catheter. on ceftriaxone, follow urine cultures. Pending thus far      Decubitus ulcer sacral region stage 4 - local wound care. Consult wound care      Seizure disorder - on vimpat. Chronic opioid induced constipation-resolved for now, hold amitiza     Hx of \"coma\" X 4 years for seizures, possible other illness-she has been immobile since awakening per her report      History of DVT, s/p IVC filter, on chronic anticoagulation, on coumadin and lovenox. Now on hold due to bleeding      Chronic pain - continue home meds.  Anxiety-resume xanax PRN      Debility as above      Recent admission to [facility] for SBO, abdominal pain    D/c planning  Change león  Treat UTI  Await culture reports on sensitivities  Colonoscopy today  Rounded with pt and on group IDR    Patient Active Problem List   Diagnosis Code    Bipolar 1 disorder ([facility] [place] [address] F31.9    Decubitus ulcer of sacral area L89.159    Anemia D64.9    UTI (urinary tract infection) N39.0    Severe protein-calorie malnutrition ([facility] [place] [address] E43    Seizure disorder ([facility] [place] [address] G40.909    Depression F32.9    Hypotension, unspecified I95.9    Personal history of DVT (deep vein thrombosis) Z86.718    Hypomagnesemia E83.42    Unspecified constipation K59.00    Gingival bleeding K06.8    GI (gastrointestinal bleed) K92.2    Chronic anticoagulation Z79.01    Epistaxis R04.0       Subjective:    Prepped for colonoscopy  No further bleeding    Review of systems:    Constitutional: denies fevers, chills, myalgias  Respiratory: denies SOB, cough  Cardiovascular: denies chest pain, palpitations  Gastrointestinal: denies nausea, vomiting, diarrhea, c/o lower abd pain, tenderness      Vital signs/Intake and Output:  Visit Vitals    /48 (BP 1 Location: Right arm, BP Patient Position: At rest)    Pulse 81    Temp 97.9 °F (36.6 °C)    Resp 18    Ht 5' 6\" (1.676 m)    Wt 66.7 kg (147 lb)    SpO2 100%    BMI 23.73 kg/m2     Current Shift:     Last three shifts:  02/19 1901 - 02/21 0700  In: 5405.8 [P.O.:3720;  I.V.:1685.8]  Out: 9600 [Urine:9600]    Exam:    General: Well developed, alert, NAD, OX3  CVS:Regular rate and rhythm, no M/R/G, S1/S2 heard, no thrill  Lungs:Clear to auscultation bilaterally, no wheezes, rhonchi, or rales  Abdomen: Soft, Nontender, No distention, Normal Bowel sounds, No hepatomegaly  Extremities: No C/C/E, pulses palpable 2+  Neuro:grossly normal , follows commands  Psych:appropriate                Labs: Results:       Chemistry Recent Labs      02/21/17 0402 02/20/17 0304 02/19/17 0730 02/18/17 1919   GLU  98  104*  101*  128*   NA  140  136  136  132*   K  3.7  4.1  4.6  5.6*   CL  105  104  102  95*   CO2  30  24  27  27   BUN  5*  15  33*  26*   CREA  0.38*  0.30*  0.31*  0.51*   CA  7.6*  7.7*  7.4*  8.2*   AGAP  5  8  7  10   BUCR  13  50*  106*  51*   AP   --    --    --   131*   TP   --    --    --   6.4   ALB   --    --    --   2.8*   GLOB   --    --    --   3.6   AGRAT   --    --    --   0.8      CBC w/Diff Recent Labs      02/21/17 0402 02/20/17   0304 02/19/17 2010 02/19/17 0730 02/18/17 1919   WBC  6.3  8.4   --   10.5   --   12.4   RBC  2.82*  3.05*   --   2.53*   --   3.44*   HGB  7.7*  8.2*  8.8*  6.3*   < >  8.6*   HCT  23.3*  24.8*  26.3*  20.0*   < >  27.5*   PLT  267  255   --   327   --   454*   GRANS   --    --    --    --    --   79*   LYMPH   --    --    --    --    --   15*   EOS   --    --    --    --    --   1 < > = values in this interval not displayed. Cardiac Enzymes No results for input(s): CPK, CKND1, PAOLA in the last 72 hours. No lab exists for component: CKRMB, TROIP   Coagulation Recent Labs      02/21/17   0402  02/18/17 1919   PTP  13.2  12.3   INR  1.0  0.9       Lipid Panel No results found for: CHOL, CHOLPOCT, CHOLX, CHLST, CHOLV, [id number], HDL, LDL, NLDLCT, DLDL, LDLC, DLDLP, 140813, VLDLC, VLDL, TGL, TGLX, TRIGL, [id number], TRIGP, TGLPOCT, [id number], CHHD, CHHDX   BNP No results for input(s): BNPP in the last 72 hours.    Liver Enzymes Recent Labs      02/18/17 1919   TP  6.4   ALB  2.8*   AP  131*   SGOT  32      Thyroid Studies Lab Results   Component Value Date/Time    TSH 12.81 09/13/2012 04:00 AM        Procedures/imaging: see electronic medical records for all procedures/Xrays and details which were not copied into this note but were reviewed prior to creation of Camron Laurent MD

## 2017-02-21 NOTE — PROGRESS NOTES
0800 Pt alert and oriented. Pt has been passing loose BM from Golytely throughout night shift and still passing clear bms    1100 Interdisciplinary team rounds were held 2/21/2017 with the following team members:Care Management, Nursing Kim Brown RN and JOSÉ LUIS Garcia Child and Physician Dr Lefty Christiansen, and the patient. Plan of care discussed. See clinical pathway and/or care plan for interventions and desired outcomes. Consent witnessed for colonoscopy and no questions asked. 1345 Pre op check list done, pt has been passing clear bms. Kept NPO and awaiting . 26 pt escorted to pre op via bed  For Colonoscopy via monitor in stable condition. 1640 Back to unit via bed, procedure was not completed due to incomplete clearance. 1830 Shift summary: Pt had an incomplete colonoscopy. Will be back tomorrow for EGD. Continue on clear liquid and to be NPO tonight. Pt restarted on Golytely this pm in preparation again. No acute distress.

## 2017-02-21 NOTE — ROUTINE PROCESS
1925-Bedside and verbal report received from offgoing nurse Roslyn Russell . Patient resting in bed with HOB elevated, watching tv, no signs of distress. Call bell within reach. Whiteboard updated, bed locked and in lowest position. Alarm parameters reviewed and opportunities for questions provided. Education provided on how to call for assistance, patient demonstrated and verbalized understanding. Pt is on room air and NS @50mls/hr. Consent for procedure not obtained by Roslyn Russell who verbalized she will obtain consent in AM.     2055- Shift assessment performed and documented. Pt reports chronic pain 10/10, given pain medication @1943, Pt appears to be in no distress and is talkative. Pt asking when she can receive next dose of pain medication. Pt able to take PO medications with no difficulties. Pt refused miralax due to taking gilbert at this time. Pt able to drink 2L of gilbert with no difficulties. 2218- Incontinence care performed and león care wipes used. Pt talkative, resting quietly in bed and watching tv.     0050- Reassessment performed and documented. Pt resting soundly in bed. FLACC=0. No other needs at this time. 0221- Pt resting quietly in bed, no obvious signs of discomfort. 8358- Reassessment performed and documented. Pt resting quietly in bed, pain at a tolerable level. León bag leaking, changed bag - intact and draining. 3438- Incontinence care performed. Pt requesting morphine and xanax. Spoke to Dr Robert Leo- okayed morphine but held xanax due to pending procedure.

## 2017-02-21 NOTE — PERIOP NOTES
Patient taken into the PACU area and cleaned up. Patient león care also done. Report called to [facility] RN on telemetry, Versed 2 mg and Propofol 210 mg given per CRNA. VSS.  Patient transported back to her room via bed

## 2017-02-21 NOTE — ROUTINE PROCESS
Bedside and Verbal shift change report given to Lili Guerra (oncoming nurse) by Enzo Tolbert (offgoing nurse). Report included the following information SBAR, Kardex, OR Summary, Procedure Summary, Intake/Output, MAR, Accordion, Recent Results and Med Rec Status.

## 2017-02-21 NOTE — ANESTHESIA PREPROCEDURE EVALUATION
Anesthetic History   No history of anesthetic complications            Review of Systems / Medical History  Patient summary reviewed, nursing notes reviewed and pertinent labs reviewed    Pulmonary  Within defined limits                 Neuro/Psych     seizures: well controlled    Psychiatric history  Pertinent negatives: No TIA and CVA   Cardiovascular                Pertinent negatives: No hypertension, past MI, angina and CHF  Exercise tolerance: <4 METS     GI/Hepatic/Renal     GERD: well controlled      PUD  Pertinent negatives: No hepatitis, liver disease and renal disease  Comments: GI bleed Endo/Other        Arthritis and anemia  Pertinent negatives: No diabetes, hypothyroidism, hyperthyroidism and obesity   Other Findings              Physical Exam    Airway  Mallampati: III  TM Distance: 4 - 6 cm  Neck ROM: decreased range of motion   Mouth opening: Normal     Cardiovascular  Regular rate and rhythm,  S1 and S2 normal,  no murmur, click, rub, or gallop             Dental      Comments: Filling missing in lower left molar   Pulmonary  Breath sounds clear to auscultation               Abdominal  GI exam deferred       Other Findings            Anesthetic Plan    ASA: 3  Anesthesia type: MAC          Induction: Intravenous  Anesthetic plan and risks discussed with: Patient

## 2017-02-21 NOTE — INTERDISCIPLINARY ROUNDS
IDR/SLIDR Summary          Patient: Nithin Gregg MRN: [id number]    Age: 78 y.o. YOB: 1950 Room/Bed: [facility]/   Admit Diagnosis: GI (gastrointestinal bleed)  Gingival bleeding  Chronic anticoagulation  RECTAL BLEEDING  Principal Diagnosis: GI (gastrointestinal bleed)   Goals: colonoscopy for tomorrow, monitor H&Hs   Readmission: NO  Quality Measure: Not applicable  VTE Prophylaxis: Mechanical  Refuses   Influenza Vaccine screening completed? YES  Mobility needs: No   Nutrition plan:No  Consults:Case Management    Financial concerns:No  Escalated to CM? NO  RRAT Score: 17   Testing due for pt today? NO  LOS: 3 days Expected length of stay 0 days  Discharge plan: Home    PCP: Spencer Oh MD  Transportation needs: No    Days before discharge:two or more days before discharge   Discharge disposition: Home  Present for rounds:  Jc Rivers RN, JOSÉ LUIS Urias RN, Owen Iverson CM and Dr. Andrei Medeiros    Signed:     Adolfo Negrete RN  2/21/2017  11:51 AM

## 2017-02-21 NOTE — ROUTINE PROCESS
Bedside and Verbal shift change report given to Rod Lozada  (oncoming nurse) by Kiran Pryor RN  (offgoing nurse). Report included the following information SBAR, Kardex, Intake/Output, Recent Results, Med Rec Status, Cardiac Rhythm SR and Alarm Parameters .

## 2017-02-21 NOTE — ROUTINE PROCESS
TRANSFER - OUT REPORT:    Verbal report given to AUSTIN Chandler RN(name) on Kishore Garcia  being transferred to Pre op (unit) for ordered procedure       Report consisted of patients Situation, Background, Assessment and   Recommendations(SBAR). Information from the following report(s) SBAR, Kardex, Procedure Summary, Intake/Output, MAR, Recent Results and Med Rec Status was reviewed with the receiving nurse. Lines:   Peripheral IV 02/18/17 Left Antecubital (Active)   Site Assessment Clean, dry, & intact 2/21/2017 10:27 AM   Phlebitis Assessment 0 2/21/2017 10:27 AM   Infiltration Assessment 0 2/21/2017 10:27 AM   Dressing Status Clean, dry, & intact 2/21/2017 10:27 AM   Dressing Type Tape;Transparent 2/21/2017 10:27 AM   Hub Color/Line Status Blue; Infusing 2/21/2017 10:27 AM   Action Taken Open ports on tubing capped 2/21/2017  4:50 AM   Alcohol Cap Used Yes 2/21/2017 10:27 AM        Opportunity for questions and clarification was provided.       Patient transported with:   Monitor

## 2017-02-22 ENCOUNTER — ANESTHESIA EVENT (OUTPATIENT)
Dept: ENDOSCOPY | Age: 67
DRG: 377 | End: 2017-02-22
Payer: MEDICARE

## 2017-02-22 ENCOUNTER — ANESTHESIA (OUTPATIENT)
Dept: ENDOSCOPY | Age: 67
DRG: 377 | End: 2017-02-22
Payer: MEDICARE

## 2017-02-22 LAB
ANION GAP BLD CALC-SCNC: 10 MMOL/L (ref 3–18)
BUN SERPL-MCNC: 2 MG/DL (ref 7–18)
BUN/CREAT SERPL: 5 (ref 12–20)
CALCIUM SERPL-MCNC: 7.8 MG/DL (ref 8.5–10.1)
CHLORIDE SERPL-SCNC: 102 MMOL/L (ref 100–108)
CO2 SERPL-SCNC: 27 MMOL/L (ref 21–32)
CREAT SERPL-MCNC: 0.37 MG/DL (ref 0.6–1.3)
ERYTHROCYTE [DISTWIDTH] IN BLOOD BY AUTOMATED COUNT: 17 % (ref 11.6–14.5)
GLUCOSE BLD STRIP.AUTO-MCNC: 103 MG/DL (ref 70–110)
GLUCOSE BLD STRIP.AUTO-MCNC: 98 MG/DL (ref 70–110)
GLUCOSE SERPL-MCNC: 94 MG/DL (ref 74–99)
HCT VFR BLD AUTO: 25.5 % (ref 35–45)
HGB BLD-MCNC: 8.3 G/DL (ref 12–16)
INR PPP: 1 (ref 0.8–1.2)
MCH RBC QN AUTO: 27.2 PG (ref 24–34)
MCHC RBC AUTO-ENTMCNC: 32.5 G/DL (ref 31–37)
MCV RBC AUTO: 83.6 FL (ref 74–97)
PLATELET # BLD AUTO: 288 K/UL (ref 135–420)
PMV BLD AUTO: 9.1 FL (ref 9.2–11.8)
POTASSIUM SERPL-SCNC: 3.4 MMOL/L (ref 3.5–5.5)
PROTHROMBIN TIME: 13.1 SEC (ref 11.5–15.2)
RBC # BLD AUTO: 3.05 M/UL (ref 4.2–5.3)
SODIUM SERPL-SCNC: 139 MMOL/L (ref 136–145)
WBC # BLD AUTO: 6.4 K/UL (ref 4.6–13.2)

## 2017-02-22 PROCEDURE — 36415 COLL VENOUS BLD VENIPUNCTURE: CPT | Performed by: HOSPITALIST

## 2017-02-22 PROCEDURE — 74011250637 HC RX REV CODE- 250/637: Performed by: FAMILY MEDICINE

## 2017-02-22 PROCEDURE — 84300 ASSAY OF URINE SODIUM: CPT | Performed by: HOSPITALIST

## 2017-02-22 PROCEDURE — 74011250636 HC RX REV CODE- 250/636

## 2017-02-22 PROCEDURE — 74011250636 HC RX REV CODE- 250/636: Performed by: HOSPITALIST

## 2017-02-22 PROCEDURE — 85610 PROTHROMBIN TIME: CPT | Performed by: HOSPITALIST

## 2017-02-22 PROCEDURE — 80048 BASIC METABOLIC PNL TOTAL CA: CPT | Performed by: HOSPITALIST

## 2017-02-22 PROCEDURE — 74011250637 HC RX REV CODE- 250/637: Performed by: HOSPITALIST

## 2017-02-22 PROCEDURE — 65270000029 HC RM PRIVATE

## 2017-02-22 PROCEDURE — 74011000250 HC RX REV CODE- 250: Performed by: INTERNAL MEDICINE

## 2017-02-22 PROCEDURE — 74011636637 HC RX REV CODE- 636/637: Performed by: HOSPITALIST

## 2017-02-22 PROCEDURE — 74011250636 HC RX REV CODE- 250/636: Performed by: ANESTHESIOLOGY

## 2017-02-22 PROCEDURE — 77030011640 HC PAD GRND REM COVD -A: Performed by: INTERNAL MEDICINE

## 2017-02-22 PROCEDURE — C9113 INJ PANTOPRAZOLE SODIUM, VIA: HCPCS | Performed by: HOSPITALIST

## 2017-02-22 PROCEDURE — 74011000250 HC RX REV CODE- 250: Performed by: HOSPITALIST

## 2017-02-22 PROCEDURE — 76040000009: Performed by: INTERNAL MEDICINE

## 2017-02-22 PROCEDURE — 82962 GLUCOSE BLOOD TEST: CPT

## 2017-02-22 PROCEDURE — 74011250637 HC RX REV CODE- 250/637: Performed by: INTERNAL MEDICINE

## 2017-02-22 PROCEDURE — 74011250636 HC RX REV CODE- 250/636: Performed by: FAMILY MEDICINE

## 2017-02-22 PROCEDURE — 76060000034 HC ANESTHESIA 1.5 TO 2 HR: Performed by: INTERNAL MEDICINE

## 2017-02-22 PROCEDURE — 85027 COMPLETE CBC AUTOMATED: CPT | Performed by: HOSPITALIST

## 2017-02-22 PROCEDURE — 77030020256 HC SOL INJ NACL 0.9%  500ML: Performed by: INTERNAL MEDICINE

## 2017-02-22 PROCEDURE — 74011000258 HC RX REV CODE- 258: Performed by: HOSPITALIST

## 2017-02-22 RX ORDER — FLUTICASONE PROPIONATE 50 MCG
2 SPRAY, SUSPENSION (ML) NASAL DAILY
Status: DISCONTINUED | OUTPATIENT
Start: 2017-02-23 | End: 2017-03-01 | Stop reason: HOSPADM

## 2017-02-22 RX ORDER — MIDAZOLAM HYDROCHLORIDE 1 MG/ML
INJECTION, SOLUTION INTRAMUSCULAR; INTRAVENOUS AS NEEDED
Status: DISCONTINUED | OUTPATIENT
Start: 2017-02-22 | End: 2017-02-22 | Stop reason: HOSPADM

## 2017-02-22 RX ORDER — PROPOFOL 10 MG/ML
INJECTION, EMULSION INTRAVENOUS AS NEEDED
Status: DISCONTINUED | OUTPATIENT
Start: 2017-02-22 | End: 2017-02-22 | Stop reason: HOSPADM

## 2017-02-22 RX ORDER — SODIUM CHLORIDE 0.9 % (FLUSH) 0.9 %
5-10 SYRINGE (ML) INJECTION AS NEEDED
Status: CANCELLED | OUTPATIENT
Start: 2017-02-22

## 2017-02-22 RX ORDER — POTASSIUM CHLORIDE 7.45 MG/ML
10 INJECTION INTRAVENOUS ONCE
Status: COMPLETED | OUTPATIENT
Start: 2017-02-22 | End: 2017-02-25

## 2017-02-22 RX ORDER — FENTANYL CITRATE 50 UG/ML
INJECTION, SOLUTION INTRAMUSCULAR; INTRAVENOUS AS NEEDED
Status: DISCONTINUED | OUTPATIENT
Start: 2017-02-22 | End: 2017-02-22 | Stop reason: HOSPADM

## 2017-02-22 RX ORDER — SODIUM CHLORIDE AND POTASSIUM CHLORIDE .9; .15 G/100ML; G/100ML
SOLUTION INTRAVENOUS CONTINUOUS
Status: DISCONTINUED | OUTPATIENT
Start: 2017-02-22 | End: 2017-02-23

## 2017-02-22 RX ORDER — DEXTROSE 50 % IN WATER (D50W) INTRAVENOUS SYRINGE
25-50 AS NEEDED
Status: CANCELLED | OUTPATIENT
Start: 2017-02-22

## 2017-02-22 RX ORDER — SODIUM CHLORIDE, SODIUM LACTATE, POTASSIUM CHLORIDE, CALCIUM CHLORIDE 600; 310; 30; 20 MG/100ML; MG/100ML; MG/100ML; MG/100ML
INJECTION, SOLUTION INTRAVENOUS
Status: DISCONTINUED | OUTPATIENT
Start: 2017-02-22 | End: 2017-02-22 | Stop reason: HOSPADM

## 2017-02-22 RX ORDER — POLYETHYLENE GLYCOL 3350 17 G/17G
51 POWDER, FOR SOLUTION ORAL ONCE
Status: COMPLETED | OUTPATIENT
Start: 2017-02-22 | End: 2017-02-22

## 2017-02-22 RX ORDER — POTASSIUM CHLORIDE 7.45 MG/ML
10 INJECTION INTRAVENOUS
Status: COMPLETED | OUTPATIENT
Start: 2017-02-22 | End: 2017-02-25

## 2017-02-22 RX ORDER — FLUMAZENIL 0.1 MG/ML
0.2 INJECTION INTRAVENOUS
Status: CANCELLED | OUTPATIENT
Start: 2017-02-22

## 2017-02-22 RX ORDER — FENTANYL CITRATE 50 UG/ML
50 INJECTION, SOLUTION INTRAMUSCULAR; INTRAVENOUS AS NEEDED
Status: CANCELLED | OUTPATIENT
Start: 2017-02-22 | End: 2017-02-26

## 2017-02-22 RX ORDER — MAGNESIUM SULFATE 100 %
4 CRYSTALS MISCELLANEOUS AS NEEDED
Status: CANCELLED | OUTPATIENT
Start: 2017-02-22

## 2017-02-22 RX ORDER — NALOXONE HYDROCHLORIDE 0.4 MG/ML
0.1 INJECTION, SOLUTION INTRAMUSCULAR; INTRAVENOUS; SUBCUTANEOUS AS NEEDED
Status: CANCELLED | OUTPATIENT
Start: 2017-02-22

## 2017-02-22 RX ORDER — POLYETHYLENE GLYCOL 3350 17 G/17G
51 POWDER, FOR SOLUTION ORAL DAILY
Status: DISCONTINUED | OUTPATIENT
Start: 2017-02-23 | End: 2017-02-22 | Stop reason: DRUGHIGH

## 2017-02-22 RX ORDER — SODIUM CHLORIDE 9 MG/ML
1000 INJECTION, SOLUTION INTRAVENOUS CONTINUOUS
Status: DISCONTINUED | OUTPATIENT
Start: 2017-02-22 | End: 2017-02-28

## 2017-02-22 RX ORDER — HYDROMORPHONE HYDROCHLORIDE 2 MG/ML
0.5 INJECTION, SOLUTION INTRAMUSCULAR; INTRAVENOUS; SUBCUTANEOUS
Status: CANCELLED | OUTPATIENT
Start: 2017-02-22

## 2017-02-22 RX ADMIN — DULOXETINE 30 MG: 30 CAPSULE, DELAYED RELEASE ORAL at 21:00

## 2017-02-22 RX ADMIN — DULOXETINE 30 MG: 30 CAPSULE, DELAYED RELEASE ORAL at 11:18

## 2017-02-22 RX ADMIN — CYCLOSPORINE 1 DROP: 0.5 EMULSION OPHTHALMIC at 23:20

## 2017-02-22 RX ADMIN — SODIUM CHLORIDE 50 ML/HR: 900 INJECTION, SOLUTION INTRAVENOUS at 01:01

## 2017-02-22 RX ADMIN — LACOSAMIDE 100 MG: 100 TABLET, FILM COATED ORAL at 21:00

## 2017-02-22 RX ADMIN — SODIUM CHLORIDE 1000 ML: 900 INJECTION, SOLUTION INTRAVENOUS at 14:59

## 2017-02-22 RX ADMIN — POLYETHYLENE GLYCOL 3350 51 G: 17 POWDER, FOR SOLUTION ORAL at 13:00

## 2017-02-22 RX ADMIN — SODIUM CHLORIDE 40 MG: 9 INJECTION INTRAMUSCULAR; INTRAVENOUS; SUBCUTANEOUS at 23:19

## 2017-02-22 RX ADMIN — MYCOPHENOLATE MOFETIL 900 MG: 500 TABLET ORAL at 22:00

## 2017-02-22 RX ADMIN — POLYETHYLENE GLYCOL 3350 17 G: 17 POWDER, FOR SOLUTION ORAL at 21:00

## 2017-02-22 RX ADMIN — ROPINIROLE HYDROCHLORIDE 0.75 MG: 0.25 TABLET, FILM COATED ORAL at 23:47

## 2017-02-22 RX ADMIN — SODIUM CHLORIDE, SODIUM LACTATE, POTASSIUM CHLORIDE, CALCIUM CHLORIDE: 600; 310; 30; 20 INJECTION, SOLUTION INTRAVENOUS at 15:17

## 2017-02-22 RX ADMIN — POTASSIUM CHLORIDE 10 MEQ: 10 INJECTION, SOLUTION INTRAVENOUS at 11:18

## 2017-02-22 RX ADMIN — SUCRALFATE 1 G: 1 SUSPENSION ORAL at 13:00

## 2017-02-22 RX ADMIN — SODIUM CHLORIDE, SODIUM LACTATE, POTASSIUM CHLORIDE, CALCIUM CHLORIDE: 600; 310; 30; 20 INJECTION, SOLUTION INTRAVENOUS at 16:27

## 2017-02-22 RX ADMIN — ROPINIROLE HYDROCHLORIDE 0.25 MG: 0.25 TABLET, FILM COATED ORAL at 11:18

## 2017-02-22 RX ADMIN — PROPOFOL 10 MG: 10 INJECTION, EMULSION INTRAVENOUS at 15:36

## 2017-02-22 RX ADMIN — PROPOFOL 20 MG: 10 INJECTION, EMULSION INTRAVENOUS at 16:00

## 2017-02-22 RX ADMIN — PROPOFOL 10 MG: 10 INJECTION, EMULSION INTRAVENOUS at 15:39

## 2017-02-22 RX ADMIN — SUCRALFATE 1 G: 1 SUSPENSION ORAL at 06:21

## 2017-02-22 RX ADMIN — CYCLOBENZAPRINE HYDROCHLORIDE 10 MG: 10 TABLET, FILM COATED ORAL at 22:00

## 2017-02-22 RX ADMIN — MYCOPHENOLATE MOFETIL 900 MG: 500 TABLET ORAL at 11:18

## 2017-02-22 RX ADMIN — PROPOFOL 20 MG: 10 INJECTION, EMULSION INTRAVENOUS at 15:49

## 2017-02-22 RX ADMIN — SUCRALFATE 1 G: 1 SUSPENSION ORAL at 22:00

## 2017-02-22 RX ADMIN — SODIUM CHLORIDE AND POTASSIUM CHLORIDE 100 ML/HR: 9; 1.49 INJECTION, SOLUTION INTRAVENOUS at 11:41

## 2017-02-22 RX ADMIN — PROPOFOL 50 MG: 10 INJECTION, EMULSION INTRAVENOUS at 16:06

## 2017-02-22 RX ADMIN — PROPOFOL 30 MG: 10 INJECTION, EMULSION INTRAVENOUS at 16:19

## 2017-02-22 RX ADMIN — MYCOPHENOLATE MOFETIL 900 MG: 500 TABLET ORAL at 17:50

## 2017-02-22 RX ADMIN — FENTANYL CITRATE 50 MCG: 50 INJECTION, SOLUTION INTRAMUSCULAR; INTRAVENOUS at 15:16

## 2017-02-22 RX ADMIN — CEFTRIAXONE 1 G: 1 INJECTION, POWDER, FOR SOLUTION INTRAMUSCULAR; INTRAVENOUS at 01:01

## 2017-02-22 RX ADMIN — PROPOFOL 20 MG: 10 INJECTION, EMULSION INTRAVENOUS at 15:54

## 2017-02-22 RX ADMIN — PROPOFOL 10 MG: 10 INJECTION, EMULSION INTRAVENOUS at 15:23

## 2017-02-22 RX ADMIN — SODIUM CHLORIDE 40 MG: 9 INJECTION INTRAMUSCULAR; INTRAVENOUS; SUBCUTANEOUS at 11:18

## 2017-02-22 RX ADMIN — POTASSIUM CHLORIDE 10 MEQ: 10 INJECTION, SOLUTION INTRAVENOUS at 06:20

## 2017-02-22 RX ADMIN — PROPOFOL 20 MG: 10 INJECTION, EMULSION INTRAVENOUS at 16:11

## 2017-02-22 RX ADMIN — PREDNISONE 5 MG: 5 TABLET ORAL at 11:18

## 2017-02-22 RX ADMIN — Medication 10 ML: at 23:20

## 2017-02-22 RX ADMIN — FERROUS SULFATE TAB 325 MG (65 MG ELEMENTAL FE) 325 MG: 325 (65 FE) TAB at 11:18

## 2017-02-22 RX ADMIN — PROPOFOL 30 MG: 10 INJECTION, EMULSION INTRAVENOUS at 16:15

## 2017-02-22 RX ADMIN — PROPOFOL 20 MG: 10 INJECTION, EMULSION INTRAVENOUS at 15:43

## 2017-02-22 RX ADMIN — MORPHINE SULFATE 15 MG: 15 TABLET ORAL at 07:45

## 2017-02-22 RX ADMIN — BUTALBITAL, ACETAMINOPHEN AND CAFFEINE 1 TABLET: 50; 325; 40 TABLET ORAL at 21:09

## 2017-02-22 RX ADMIN — PROPOFOL 10 MG: 10 INJECTION, EMULSION INTRAVENOUS at 15:28

## 2017-02-22 RX ADMIN — PROPOFOL 20 MG: 10 INJECTION, EMULSION INTRAVENOUS at 16:03

## 2017-02-22 RX ADMIN — Medication 10 ML: at 14:00

## 2017-02-22 RX ADMIN — SUCRALFATE 1 G: 1 SUSPENSION ORAL at 18:38

## 2017-02-22 RX ADMIN — OLANZAPINE 7.5 MG: 2.5 TABLET, FILM COATED ORAL at 22:00

## 2017-02-22 RX ADMIN — PROPOFOL 20 MG: 10 INJECTION, EMULSION INTRAVENOUS at 15:57

## 2017-02-22 RX ADMIN — PROPOFOL 10 MG: 10 INJECTION, EMULSION INTRAVENOUS at 15:32

## 2017-02-22 RX ADMIN — LACOSAMIDE 100 MG: 100 TABLET, FILM COATED ORAL at 11:18

## 2017-02-22 RX ADMIN — Medication 10 ML: at 06:21

## 2017-02-22 RX ADMIN — CYCLOSPORINE 1 DROP: 0.5 EMULSION OPHTHALMIC at 18:37

## 2017-02-22 RX ADMIN — MIRABEGRON 50 MG: 25 TABLET, FILM COATED, EXTENDED RELEASE ORAL at 23:47

## 2017-02-22 RX ADMIN — LEVOTHYROXINE SODIUM 75 MCG: 75 TABLET ORAL at 07:44

## 2017-02-22 RX ADMIN — FENTANYL CITRATE 50 MCG: 50 INJECTION, SOLUTION INTRAMUSCULAR; INTRAVENOUS at 15:19

## 2017-02-22 RX ADMIN — SUCRALFATE 1 G: 1 SUSPENSION ORAL at 01:00

## 2017-02-22 RX ADMIN — MIDAZOLAM HYDROCHLORIDE 2 MG: 1 INJECTION, SOLUTION INTRAMUSCULAR; INTRAVENOUS at 15:21

## 2017-02-22 RX ADMIN — GUAIFENESIN 100 MG: 100 SOLUTION ORAL at 07:45

## 2017-02-22 NOTE — ROUTINE PROCESS
Bedside shift change report given to Nina Bright (oncoming nurse) by Justyn Durbin (offgoing nurse). Report included the following information SBAR, Kardex, MAR, Accordion, Recent Results, Med Rec Status and Cardiac Rhythm ST/NSR.

## 2017-02-22 NOTE — PROGRESS NOTES
Attempted OT evaluation, however pt off floor for procedures. OT to follow up with patient tomorrow.     Rafael Landon MS OTR/L

## 2017-02-22 NOTE — PROGRESS NOTES
Order for hospital bed for home use received faxed information to [employer],which may take a few days,patient will need 02 test as mentioned in cm previous note for home 02 if needed.

## 2017-02-22 NOTE — PROCEDURES
[facility]  Colonoscopy Procedure Report  _______________________________________________________  Patient: Oliver Cunha                                         Attending Physician: Fabiana Garcia MD    Patient ID: [id number]                                      Referring Physician: Zain Grady MD    Exam Date: February 22, 2017 _______________________________________________________      Introduction: A  78 y.o. female patient, presents for inpatient Colonoscopy after getting her second bowel prep because of poor bowel prep yesterday    Indications: acute on chronic iron deficiency anemia down to 6.3 with rectal bleeding and melena    Consent: The benefits, risks, and alternatives to the procedure were discussed and informed consent was obtained from the patient. Preparation: EKG, pulse, pulse oximetry and blood pressure were monitored throughout the procedure. ASA Classification: Class 2 - . The heart is an S1-S2 and regular heart rate and rhythm. Lungs are clear to auscultation and percussion. Abdomen is soft, nondistended, and nontender. Mental Status: awake, alert, and oriented to person, place, and time    Medications:  · MAC sedation per anesthesia. Rectal Exam: Normal Rectal Exam. No Blood. Pathology Specimens: No specimens removed. Procedure: The colonoscope was passed with great difficulty through the anus under direct visualization and advanced to the cecum and 15 cm inside the terminal ileum. The patient required positioning on the back and external counter pressure to aid in the passage of the scope. The scope was withdrawn and the mucosa was carefully examined. The quality of the preparation was good. The views were good. The patient's toleration of the procedure was excellent. Total time is 45 minutes.     Findings:    Rectum:   Small internal hemorrhoids  Sigmoid:   Very long large and redudant  Descending Colon:   Very long large and redudant  Transverse Colon: Very long and redudant  Ascending Colon:   Not seen  Cecum:   Not seen  Terminal Ileum:   Not seen    Unplanned Events: There were no unplanned events. Estimated Blood Loss: None  Impressions:    Very long dilated and redundant colon full of mostly clear liquid stools. Unable to reach beyond the hepatic flexure. Normal Mucosa. No blood diverticula or polyps found. Complications: None; patient tolerated the procedure well. Recommendations:  · Discharge home when standard parameters are met. · Resume a high fiber diet. · Colonoscopy recommendation in 5 years?  But for now she would need virtual colonoscopy to examine the remaining of her colon    Procedure Codes:    · COLONOSCOPY [EBW1013 (Type: Custom)]    Endoscope Information:  Model Number(s)    [id number]     Assistant: None    Signed By: Juve Ramos MD Date: February 22, 2017

## 2017-02-22 NOTE — PROGRESS NOTES
Hospitalist Progress Note    Patient: Trish Guzman MRN: [id number]  CSN: [id number]    YOB: 1950  Age: 78 y.o. Sex: female    DOA: 2/18/2017 LOS:  LOS: 4 days          Chief Complaint:    GI bleeding      Assessment/Plan     GI bleed -repeating colonoscopy today due to poor prep and tortuous colon  Epistaxis - now resolved  Depression/bipolar  Anemia - secondary to acute blood loss, s/p transfusion, appears stable  UTI - in a patient with chronic indwelling catheter. on ceftriaxone, 4 organisms, primarily sens to rocephin, I suspect mostly colonizers as having no clinical sx including fevers, leukocytosis  Decubitus ulcer sacral region stage 4 - local wound care. Consult wound care  Seizure disorder - on vimpat. 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  Now on hold due to bleeding  Chronic pain and anxiety-intermittent diaphoretic events where vitals are stable-has reji to [facility] and [employer] [facility] for this and was told there is not a diagnosis   bipolar dosorder, consider psychogenic polydipsia-limit fluids PO-can check urine Na and aldosterone, but her lytes look for the most part ok    Repeat colonioscopy  K with IVF as slightly low this am  Continue rocephin  Gotti change when stools are not soiling everywhere  Add flonase spray for complaints of sinus congestion and nose running         Patient Active Problem List   Diagnosis Code    Bipolar 1 disorder ([address] F31.9    Decubitus ulcer of sacral area L89.159    Anemia D64.9    UTI (urinary tract infection) N39.0    Severe protein-calorie malnutrition ([address] E43    Seizure disorder ([address] G40.909    Depression F32.9    Hypotension, unspecified I95.9    Personal history of DVT (deep vein thrombosis) Z86.718    Hypomagnesemia E83.42    Unspecified constipation K59.00    Gingival bleeding K06.8    GI (gastrointestinal bleed) K92.2    Chronic anticoagulation Z79.01    Epistaxis R04.0       Subjective:  Having abd cramps  Doing more  Prep  Drinks a lot of water per nursing      Review of systems:    Constitutional: denies fevers, chills, myalgias  Respiratory: denies SOB, cough  Cardiovascular: denies chest pain, palpitations  Gastrointestinal: denies nausea, vomiting      Vital signs/Intake and Output:  Visit Vitals    /56    Pulse 73    Temp 97.6 °F (36.4 °C)    Resp 18    Ht 5' 6\" (1.676 m)    Wt 68.7 kg (151 lb 7.3 oz)    SpO2 98%    BMI 24.45 kg/m2     Current Shift:  02/22 0701 - 02/22 1900  In: 0   Out: 700 [Urine:700]  Last three shifts:  02/20 1901 - 02/22 0700  In: 6662.5 [P.O.:5400; I.V.:1262.5]  Out: 26393 [Urine:72262]    Exam:    General: anxious WF, alert, NAD, OX3  Head/Neck: NCAT, supple, No masses, No lymphadenopathy  CVS:Regular rate and rhythm, no M/R/G, S1/S2 heard, no thrill  Lungs:Clear to auscultation bilaterally, no wheezes, rhonchi, or rales  Abdomen: Soft, Nontender, No distention, Normal Bowel sounds, No hepatomegaly  Extremities: No C/C/E, pulses palpable 2+  Skin:normal texture and turgor, no rashes, no lesions  Neuro:does not move legs  Psych:appropriate                Labs: Results:       Chemistry Recent Labs      02/22/17   0258  02/21/17   0402  02/20/17   0304   GLU  94  98  104*   NA  139  140  136   K  3.4*  3.7  4.1   CL  102  105  104   CO2  27  30  24   BUN  2*  5*  15   CREA  0.37*  0.38*  0.30*   CA  7.8*  7.6*  7.7*   AGAP  10  5  8   BUCR  5*  13  50*      CBC w/Diff Recent Labs      02/22/17   0258  02/21/17   0402  02/20/17   0304   WBC  6.4  6.3  8.4   RBC  3.05*  2.82*  3.05*   HGB  8.3*  7.7*  8.2*   HCT  25.5*  23.3*  24.8*   PLT  288  267  255      Cardiac Enzymes No results for input(s): CPK, CKND1, PAOLA in the last 72 hours.     No lab exists for component: CKRMB, TROIP   Coagulation Recent Labs      02/22/17   0258  02/21/17   0402   PTP  13.1  13.2   INR  1.0  1.0       Lipid Panel No results found for: CHOL, CHOLPOCT, CHOLX, CHLST, CHOLV, [id number], HDL, LDL, NLDLCT, DLDL, LDLC, DLDLP, 895156, VLDLC, VLDL, TGL, TGLX, TRIGL, [id number], TRIGP, TGLPOCT, [id number], CHHD, CHHDX   BNP No results for input(s): BNPP in the last 72 hours. Liver Enzymes No results for input(s): TP, ALB, TBIL, AP, SGOT, GPT in the last 72 hours.     No lab exists for component: DBIL   Thyroid Studies Lab Results   Component Value Date/Time    TSH 12.81 09/13/2012 04:00 AM        Procedures/imaging: see electronic medical records for all procedures/Xrays and details which were not copied into this note but were reviewed prior to creation of Saurav Tong MD

## 2017-02-22 NOTE — ANESTHESIA PREPROCEDURE EVALUATION
Anesthetic History   No history of anesthetic complications            Review of Systems / Medical History  Patient summary reviewed, nursing notes reviewed and pertinent labs reviewed    Pulmonary  Within defined limits                 Neuro/Psych     seizures: well controlled    Psychiatric history     Cardiovascular    Hypertension: well controlled              Exercise tolerance: <4 METS     GI/Hepatic/Renal     GERD           Endo/Other      Hyperthyroidism: well controlled  Arthritis     Other Findings              Physical Exam    Airway  Mallampati: II  TM Distance: 4 - 6 cm  Neck ROM: normal range of motion   Mouth opening: Normal     Cardiovascular    Rhythm: regular  Rate: normal         Dental  No notable dental hx       Pulmonary  Breath sounds clear to auscultation               Abdominal  GI exam deferred       Other Findings            Anesthetic Plan    ASA: 3  Anesthesia type: MAC            Anesthetic plan and risks discussed with: Patient

## 2017-02-22 NOTE — INTERDISCIPLINARY ROUNDS
The Interdisciplinary Team attempted to meeting with the patient in her room to discuss her plan of care but she declined.

## 2017-02-22 NOTE — ROUTINE PROCESS
1930 Bedside and Verbal shift change report given to [facility] (oncoming nurse) by Kaelyn Ceron RN  (offgoing nurse). Report included the following information SBAR, Kardex, Cardiac Rhythm Sr and Alarm Parameters .

## 2017-02-22 NOTE — PROGRESS NOTES
[address]  care of pt from Clarisse Mac RN. Pt resting quietly in bed , no signs of distress, call bell within reach. 1040 Rounded with Dr.Innes- campos to hold león change d/t frequency, and loose stool. [address] with ELOINA Shankar RN and per Yo Zarate, pt is to receive 3 packets of Miralax in addition to 3 cups of water, prior to the EGD and colonoscopy. 1315 1320 - Pt drank all of Miralax    1416 Transport here for pt, to go to OR, gave report to Elizabeth Ibarra RN in [place] [address]. REPORT:    Verbal report given to Harry Hogan RN(name) on Oliver Nail  being transferred to (unit) for routine progression of care       Report consisted of patients Situation, Background, Assessment and   Recommendations(SBAR). Information from the following report(s) SBAR, Kardex, ED Summary, Procedure Summary, Intake/Output, MAR, Accordion, Recent Results and Med Rec Status was reviewed with the receiving nurse. Lines:   Peripheral IV 02/21/17 Right Forearm (Active)   Site Assessment Clean, dry, & intact 2/22/2017  9:30 AM   Phlebitis Assessment 0 2/22/2017  9:30 AM   Infiltration Assessment 0 2/22/2017  9:30 AM   Dressing Status Clean, dry, & intact 2/22/2017  9:30 AM   Dressing Type Tape;Transparent 2/22/2017  9:30 AM   Hub Color/Line Status Blue; Infusing 2/22/2017  9:30 AM   Action Taken Open ports on tubing capped 2/22/2017  9:30 AM   Alcohol Cap Used Yes 2/22/2017  9:30 AM        Opportunity for questions and clarification was provided.       Patient transported with:   Registered Nurse     2011 Spoke with Ms. Jennifer Dominguez, RN let her know that CT wants her NPO starting now for test tomorrow

## 2017-02-22 NOTE — PROCEDURES
[facility]  Esophagogastroduodenoscopy Procedure Report  _______________________________________________________  Patient: Letty Gan                                         Attending Physician: Lillian Meadows MD    Patient ID: [id number]                                      Referring Physician: Bib Harrington MD    Exam Date: February 22, 2017 _______________________________________________________      Introduction: A  78 y.o. female patient, presents for inpatient Esophagogastroduodenoscopy Procedure. Indication: history of bleeding anastomotic ulcer 2 months ago put on high dose PPI and Carafate. She has been on Coumadin for DVT Presents with hemoptysis, epistaxis, hematuria and dark stools and acute on chronic blood loss anemia at 6.3    : Lillian Meadows MD    Sedation:    topical Hurricaine spray, MAC Propofol     Procedure Details:  After infomed consent was obtained for the procedure, with all risks and benefits of procedure explained the patient was taken to the endoscopy suite and placed in the left lateral decubitus position. Following sequential administration of sedation as per above, the endoscope was inserted into the mouth and advanced under direct vision to proximal jejunum and to the second more distal jejun-jejunal anastomosis. A careful inspection was made as the gastroscope was withdrawn, findings and interventions are described below. Findings:   HYPOPHARYNX AND LARYNX: Normal  Esophagus: Normal proximal, middle and distal esophagus. 2 cm Hiatal hernia. Diaphragmatic pinch located at 40 cm. Stomach: No food or liquid retention. Sp gastric bypass. Gastro jejunal anastomosis located at 45 cm.  Jejunum: No afferent loop, Just after the anastomosis into the efferent loop there is a deep 12 mm crater without stigmata. The distal jejun-jejunal anastomosis reached.     Therapies:    none    Specimen:   none      Complications:   None    EBL: None  IMPRESSION: Large 12 mm post anastomotic deep non bleeding ulcer without any stigmata. S/P gastric bypass          Recommendations: -Continue acid suppression  with a proton pump inhibitor. , -Follow clinical symptoms and laboratory studies for evidence of rebleeding. , -bariatric diet, -No NSAIDS, -consult with Dr Thi Lindquist  Assistant: none    Scott Alegre MD  2/22/2017  3:11 PM

## 2017-02-22 NOTE — ANESTHESIA POSTPROCEDURE EVALUATION
Post-Anesthesia Evaluation & Assessment    Visit Vitals    /56 (BP 1 Location: Right arm, BP Patient Position: At rest)    Pulse 77    Temp 36.7 °C (98.1 °F)    Resp 18    Ht 5' 6\" (1.676 m)    Wt 68.7 kg (151 lb 7.3 oz)    SpO2 97%    BMI 24.45 kg/m2       Nausea/Vomiting: no nausea    Post-operative hydration adequate. Pain score (VAS): 2    Mental status & Level of consciousness: alert and oriented x 3    Neurological status: moves all extremities, sensation grossly intact    Pulmonary status: airway patent, no supplemental oxygen required    Complications related to anesthesia: none    Patient has met all discharge requirements.     Additional comments:        Daksha Pierson MD

## 2017-02-22 NOTE — INTERDISCIPLINARY ROUNDS
IDR/SLIDR Summary          Patient: Tamica George MRN: [id number]    Age: 78 y.o. YOB: 1950 Room/Bed: [facility]   Admit Diagnosis: GI (gastrointestinal bleed)  Gingival bleeding  Chronic anticoagulation  RECTAL BLEEDING  NA  Principal Diagnosis: GI (gastrointestinal bleed)   Goals: colonoscopy for tomorrow, monitor H&Hs   Readmission: NO  Quality Measure: Not applicable  VTE Prophylaxis: Mechanical  Refuses   Influenza Vaccine screening completed? YES  Mobility needs: No   Nutrition plan:No  Consults:Case Management    Financial concerns:No  Escalated to CM? NO  RRAT Score: 17   Testing due for pt today? NO  LOS: 4 days Expected length of stay 0 days  Discharge plan: Home    PCP: Razia Andrea MD  Transportation needs: No    Days before discharge:two or more days before discharge   Discharge disposition: Home  Present for rounds:  Jessi Chaudhary RN, JOSÉ LUIS Hernández RN, Amy Mares CM and Dr. Aby Royal    Signed:     Bret Rock RN  2/22/2017  11:51 AM

## 2017-02-22 NOTE — ROUTINE PROCESS
1925-Bedside and verbal report received from offgoing nurse Teodoro Peters . Patient resting in bed with HOB elevated, watching tv, no signs of distress. Call bell within reach. Whiteboard updated, bed locked and in lowest position. Alarm parameters reviewed and opportunities for questions provided. Education provided on how to call for assistance, patient demonstrated and verbalized understanding. Pt is on room air and NS @50mls/hr. Pt remains on contact precautions.      2015- Shift assessment performed and documented. Pt reports chronic pain 10/10. Pt appears to be in no distress and is talkative. Pt asking when she can receive next dose of pain medication. Pt able to take PO medications with no difficulties. Pt able to drink 2L of gilbert with no difficulties. Incontinence care performed and documented. Pt requesting cough medicine for cough. Paged Dr Gricelda Freeman to obtain orders for robitussin. 2254- Incontinence care performed and documented. 3036- Reassessment performed and documented. Pt resting quietly in bed. Pt requesting nasal spray. Pt educated that nasal sprays could cause irritation and contribute to reoccurrence of epistaxis. 0934- Incontinence care performed and documented. 0345- Incontinence care performed and documented. Pt requesting multiple water refills from PCT, Pt told that will closely monitor further water consumption for the rest of night. 46- Reassessment performed and documented. Pt resting quietly in bed, pain at a tolerable level. Pt is clean and dry at this time. 5731- Paged Dr Gricelda Freeman to obtain orders for potassium replacement- see MAR.     4698- Have not changed león catheter overnight (as passed on in report) due to multiple episodes of fecal incontinence. 1545- Pt complaining of pain, gave pain meds- see MAR. Pt given the start of rest of golytely. Pt tolerating well. Pt currently clean and dry.

## 2017-02-22 NOTE — PROGRESS NOTES
PT    PT orders received and chart reviewed. Attempted PT evaluation at this time. Pt is off the floor.     Ana Lieberman PT, DPT

## 2017-02-23 ENCOUNTER — APPOINTMENT (OUTPATIENT)
Dept: CT IMAGING | Age: 67
DRG: 377 | End: 2017-02-23
Attending: INTERNAL MEDICINE
Payer: MEDICARE

## 2017-02-23 LAB
ANION GAP BLD CALC-SCNC: 6 MMOL/L (ref 3–18)
BACTERIA SPEC CULT: ABNORMAL
BUN SERPL-MCNC: 1 MG/DL (ref 7–18)
BUN/CREAT SERPL: 3 (ref 12–20)
CALCIUM SERPL-MCNC: 7.8 MG/DL (ref 8.5–10.1)
CHLORIDE SERPL-SCNC: 105 MMOL/L (ref 100–108)
CO2 SERPL-SCNC: 30 MMOL/L (ref 21–32)
CREAT SERPL-MCNC: 0.36 MG/DL (ref 0.6–1.3)
ERYTHROCYTE [DISTWIDTH] IN BLOOD BY AUTOMATED COUNT: 17.8 % (ref 11.6–14.5)
GLUCOSE SERPL-MCNC: 109 MG/DL (ref 74–99)
HCT VFR BLD AUTO: 25.7 % (ref 35–45)
HGB BLD-MCNC: 8.5 G/DL (ref 12–16)
INR PPP: 1 (ref 0.8–1.2)
MCH RBC QN AUTO: 28 PG (ref 24–34)
MCHC RBC AUTO-ENTMCNC: 33.1 G/DL (ref 31–37)
MCV RBC AUTO: 84.5 FL (ref 74–97)
PLATELET # BLD AUTO: 274 K/UL (ref 135–420)
PMV BLD AUTO: 8.7 FL (ref 9.2–11.8)
POTASSIUM SERPL-SCNC: 3.1 MMOL/L (ref 3.5–5.5)
PROTHROMBIN TIME: 12.6 SEC (ref 11.5–15.2)
RBC # BLD AUTO: 3.04 M/UL (ref 4.2–5.3)
SERVICE CMNT-IMP: ABNORMAL
SODIUM SERPL-SCNC: 141 MMOL/L (ref 136–145)
SODIUM UR-SCNC: 187 MMOL/L (ref 20–110)
TSH SERPL DL<=0.05 MIU/L-ACNC: 2.73 UIU/ML (ref 0.36–3.74)
WBC # BLD AUTO: 6 K/UL (ref 4.6–13.2)

## 2017-02-23 PROCEDURE — 74011000250 HC RX REV CODE- 250: Performed by: HOSPITALIST

## 2017-02-23 PROCEDURE — 74011250637 HC RX REV CODE- 250/637: Performed by: HOSPITALIST

## 2017-02-23 PROCEDURE — 97530 THERAPEUTIC ACTIVITIES: CPT

## 2017-02-23 PROCEDURE — 74011000250 HC RX REV CODE- 250: Performed by: INTERNAL MEDICINE

## 2017-02-23 PROCEDURE — 74011000258 HC RX REV CODE- 258: Performed by: HOSPITALIST

## 2017-02-23 PROCEDURE — 80048 BASIC METABOLIC PNL TOTAL CA: CPT | Performed by: HOSPITALIST

## 2017-02-23 PROCEDURE — 97166 OT EVAL MOD COMPLEX 45 MIN: CPT

## 2017-02-23 PROCEDURE — C9113 INJ PANTOPRAZOLE SODIUM, VIA: HCPCS | Performed by: HOSPITALIST

## 2017-02-23 PROCEDURE — 36415 COLL VENOUS BLD VENIPUNCTURE: CPT | Performed by: HOSPITALIST

## 2017-02-23 PROCEDURE — 85610 PROTHROMBIN TIME: CPT | Performed by: HOSPITALIST

## 2017-02-23 PROCEDURE — 65270000029 HC RM PRIVATE

## 2017-02-23 PROCEDURE — 74011250637 HC RX REV CODE- 250/637: Performed by: INTERNAL MEDICINE

## 2017-02-23 PROCEDURE — 82088 ASSAY OF ALDOSTERONE: CPT | Performed by: HOSPITALIST

## 2017-02-23 PROCEDURE — 74011250636 HC RX REV CODE- 250/636: Performed by: HOSPITALIST

## 2017-02-23 PROCEDURE — 74011636637 HC RX REV CODE- 636/637: Performed by: HOSPITALIST

## 2017-02-23 PROCEDURE — 84443 ASSAY THYROID STIM HORMONE: CPT | Performed by: HOSPITALIST

## 2017-02-23 PROCEDURE — 97162 PT EVAL MOD COMPLEX 30 MIN: CPT

## 2017-02-23 PROCEDURE — 97161 PT EVAL LOW COMPLEX 20 MIN: CPT

## 2017-02-23 PROCEDURE — 85027 COMPLETE CBC AUTOMATED: CPT | Performed by: HOSPITALIST

## 2017-02-23 PROCEDURE — 77030011256 HC DRSG MEPILEX <16IN NO BORD MOLN -A

## 2017-02-23 RX ORDER — POTASSIUM CHLORIDE 20 MEQ/1
40 TABLET, EXTENDED RELEASE ORAL
Status: COMPLETED | OUTPATIENT
Start: 2017-02-23 | End: 2017-02-23

## 2017-02-23 RX ORDER — BISACODYL 5 MG
15 TABLET, DELAYED RELEASE (ENTERIC COATED) ORAL
Status: COMPLETED | OUTPATIENT
Start: 2017-02-23 | End: 2017-02-23

## 2017-02-23 RX ORDER — POTASSIUM CHLORIDE AND SODIUM CHLORIDE 900; 300 MG/100ML; MG/100ML
INJECTION, SOLUTION INTRAVENOUS CONTINUOUS
Status: DISCONTINUED | OUTPATIENT
Start: 2017-02-23 | End: 2017-02-28

## 2017-02-23 RX ADMIN — POLYETHYLENE GLYCOL 3350 17 G: 17 POWDER, FOR SOLUTION ORAL at 09:48

## 2017-02-23 RX ADMIN — BUTALBITAL, ACETAMINOPHEN AND CAFFEINE 1 TABLET: 50; 325; 40 TABLET ORAL at 18:11

## 2017-02-23 RX ADMIN — DULOXETINE 30 MG: 30 CAPSULE, DELAYED RELEASE ORAL at 21:39

## 2017-02-23 RX ADMIN — CYCLOBENZAPRINE HYDROCHLORIDE 10 MG: 10 TABLET, FILM COATED ORAL at 22:16

## 2017-02-23 RX ADMIN — ROPINIROLE HYDROCHLORIDE 0.75 MG: 0.25 TABLET, FILM COATED ORAL at 21:38

## 2017-02-23 RX ADMIN — CEFTRIAXONE 1 G: 1 INJECTION, POWDER, FOR SOLUTION INTRAMUSCULAR; INTRAVENOUS at 01:11

## 2017-02-23 RX ADMIN — POLYETHYLENE GLYCOL 3350 17 G: 17 POWDER, FOR SOLUTION ORAL at 22:12

## 2017-02-23 RX ADMIN — LACOSAMIDE 100 MG: 100 TABLET, FILM COATED ORAL at 21:38

## 2017-02-23 RX ADMIN — POTASSIUM CHLORIDE 40 MEQ: 20 TABLET, EXTENDED RELEASE ORAL at 09:47

## 2017-02-23 RX ADMIN — PREDNISONE 5 MG: 5 TABLET ORAL at 09:47

## 2017-02-23 RX ADMIN — MEROPENEM 1 G: 1 INJECTION, POWDER, FOR SOLUTION INTRAVENOUS at 16:51

## 2017-02-23 RX ADMIN — ROPINIROLE HYDROCHLORIDE 0.25 MG: 0.25 TABLET, FILM COATED ORAL at 09:47

## 2017-02-23 RX ADMIN — CYCLOSPORINE 1 DROP: 0.5 EMULSION OPHTHALMIC at 22:12

## 2017-02-23 RX ADMIN — MORPHINE SULFATE 15 MG: 15 TABLET ORAL at 23:23

## 2017-02-23 RX ADMIN — OLANZAPINE 7.5 MG: 2.5 TABLET, FILM COATED ORAL at 21:39

## 2017-02-23 RX ADMIN — LACOSAMIDE 100 MG: 100 TABLET, FILM COATED ORAL at 09:47

## 2017-02-23 RX ADMIN — SODIUM CHLORIDE 40 MG: 9 INJECTION INTRAMUSCULAR; INTRAVENOUS; SUBCUTANEOUS at 09:47

## 2017-02-23 RX ADMIN — MIRABEGRON 50 MG: 25 TABLET, FILM COATED, EXTENDED RELEASE ORAL at 22:12

## 2017-02-23 RX ADMIN — MYCOPHENOLATE MOFETIL 900 MG: 500 TABLET ORAL at 16:51

## 2017-02-23 RX ADMIN — SUCRALFATE 1 G: 1 SUSPENSION ORAL at 11:16

## 2017-02-23 RX ADMIN — CYCLOSPORINE 1 DROP: 0.5 EMULSION OPHTHALMIC at 09:00

## 2017-02-23 RX ADMIN — MORPHINE SULFATE 15 MG: 15 TABLET ORAL at 09:47

## 2017-02-23 RX ADMIN — MORPHINE SULFATE 15 MG: 15 TABLET ORAL at 16:51

## 2017-02-23 RX ADMIN — BUTALBITAL, ACETAMINOPHEN AND CAFFEINE 1 TABLET: 50; 325; 40 TABLET ORAL at 11:23

## 2017-02-23 RX ADMIN — SODIUM CHLORIDE AND POTASSIUM CHLORIDE: 9; 2.98 INJECTION, SOLUTION INTRAVENOUS at 13:00

## 2017-02-23 RX ADMIN — MYCOPHENOLATE MOFETIL 900 MG: 500 TABLET ORAL at 09:47

## 2017-02-23 RX ADMIN — MYCOPHENOLATE MOFETIL 900 MG: 500 TABLET ORAL at 21:39

## 2017-02-23 RX ADMIN — FERROUS SULFATE TAB 325 MG (65 MG ELEMENTAL FE) 325 MG: 325 (65 FE) TAB at 11:15

## 2017-02-23 RX ADMIN — SUCRALFATE 1 G: 1 SUSPENSION ORAL at 16:51

## 2017-02-23 RX ADMIN — POLYETHYLENE GLYCOL 3350, SODIUM CHLORIDE, POTASSIUM CHLORIDE, SODIUM BICARBONATE, AND SODIUM SULFATE 4000 ML: 240; 5.84; 2.98; 6.72; 22.72 POWDER, FOR SOLUTION ORAL at 11:16

## 2017-02-23 RX ADMIN — SUCRALFATE 1 G: 1 SUSPENSION ORAL at 21:39

## 2017-02-23 RX ADMIN — DULOXETINE 30 MG: 30 CAPSULE, DELAYED RELEASE ORAL at 09:47

## 2017-02-23 RX ADMIN — BISACODYL 15 MG: 5 TABLET, COATED ORAL at 11:15

## 2017-02-23 NOTE — WOUND CARE
Pt seen by wound care. Chanelle dressing applied to sacral pressure injury. No other pressure injuries noted at this time. Wound care will continue to follow pt per protocol.

## 2017-02-23 NOTE — PROGRESS NOTES
Discussed case with nurse and Dr. Sabrina Rogel during bedside rounds; pt not ready for discharge today. CM Dept. Seeking SNF bed for pt.

## 2017-02-23 NOTE — PROGRESS NOTES
Shift Summary :  Slept for periods between requests. Medications helped her sleep. No stool reported during shift. Gotti draining and complains of being wet. Also complained of increase right hip and leg pain but did not want to use  SCD's. NPO for virtual CT Scan . Sacral area covered by mepiplex dressing.

## 2017-02-23 NOTE — ROUTINE PROCESS
TRANSFER - IN REPORT:    Verbal report received from Niya Todd RN(name) on Korina Gallegos  being received from TELE(unit) for routine progression of care      Report consisted of patients Situation, Background, Assessment and   Recommendations(SBAR). Information from the following report(s) SBAR, Kardex, Intake/Output and MAR was reviewed with the receiving nurse. Opportunity for questions and clarification was provided. Assessment completed upon patients arrival to unit and care assumed.

## 2017-02-23 NOTE — PROGRESS NOTES
PT    PT orders received and chart reviewed. Attempted PT evaluation at this time but pt was transported off the floor during interview process. Pt stated she received HHPT and HHOT 2 days a week for each discipline. Pt was motivated to participate with acute care PT. Will attempt PT eval at later time if pt's schedule allows.     Benjamin Lieberman PT, DPT

## 2017-02-23 NOTE — PROGRESS NOTES
OT orders received and chart reviewed. Attempted OT evaluation at this time but pt was transported off the floor during interview process. Pt stated she received HHPT and HHOT 2 days a week for each discipline. Pt was motivated to participate with acute care therapy.  Will attempt OT eval at later time if pt's schedule allows.     Tadeo Mallory MS OTR/L

## 2017-02-23 NOTE — ROUTINE PROCESS
Bedside and Verbal shift change report given to JOSÉ LUIS Salinas RN  (oncoming nurse) by  DORY Giles RN  (offgoing nurse). Report included the following information SBAR, Kardex, Recent Results and Med Rec Status.

## 2017-02-23 NOTE — PROGRESS NOTES
Problem: Mobility Impaired (Adult and Pediatric)  Goal: *Acute Goals and Plan of Care (Insert Text)  Physical Therapy Goals  Initiated 2/23/2017 and to be accomplished within 7 day(s)  1. Patient will move from supine to sit and sit to supine in bed with supervision/set-up. 2. Patient will transfer from bed to chair and chair to bed with maximal assistance using the least restrictive device. Outcome: Progressing Towards Goal  PHYSICAL THERAPY EVALUATION     Patient: Siddhartha Kaminski (76 y.o. female)  Date: 2/23/2017  Primary Diagnosis: GI (gastrointestinal bleed)  Gingival bleeding  Chronic anticoagulation  RECTAL BLEEDING  NA  Procedure(s) (LRB):  EGD (N/A)  COLONOSCOPY (N/A) 1 Day Post-Op   Precautions:  Fall      ASSESSMENT :  Based on the objective data described below, Patient present to PT with functional mobility impairments with regard to bed mobility, transfers, gait, stair negotiation, balance, weakness, and overall tolerance for activity. Per pt she has been bedridden for 7+ years utilizing a vanessa lift/Max A for bed-chair transfers. Pt indicated she has been receiving HHPT and [address] [place] [address] which is working on bed mobility, and transfers from bed-chair. At this time pt presents with decreased strength, coordination, sensation and tone of B LEs with IT of L LE, ER of R LE, and PF of ankles which pt attributed to positioning in bed during her \"coma\". Pt demonstrated WNL strength and AROM of UEs. Pt was able to complete rolling with supervision level assist utilizing bedrails, and able to transfer from supine-sit with Gabriela. Pt required [address] for sit-supine transfer with LE management required. Pt tolerated sitting up at the EOB x 2 trials for ~3 minutes on initial trial and ~4 minutes on second trial; pt's dizziness limiting her ability to sit up for longer period of time. Pt did require the use of her UEs for support while seated.   Recommend rehab vs. Continuation of HHPT at time of discharge; pt is very motivated to participate with therapy services to gain as much independence as possible to be able to transfer to a  in order to make it to her medical appointments. Patient will benefit from skilled intervention to address the above impairments. Patients rehabilitation potential is considered to be Fair  Factors which may influence rehabilitation potential include:   [ ]         None noted  [ ]         Mental ability/status  [X]         Medical condition  [ ]         Home/family situation and support systems  [ ]         Safety awareness  [ ]         Pain tolerance/management  [ ]         Other:        PLAN :  Recommendations and Planned Interventions:  [X]           Bed Mobility Training             [X]    Neuromuscular Re-Education  [X]           Transfer Training                   [ ]    Orthotic/Prosthetic Training  [X]           Gait Training                          [ ]    Modalities  [X]           Therapeutic Exercises          [ ]    Edema Management/Control  [X]           Therapeutic Activities            [X]    Patient and Family Training/Education  [ ]           Other (comment):     Frequency/Duration: Patient will be followed by physical therapy daily to address goals. Discharge Recommendations: Rehab vs. HHPT   Further Equipment Recommendations for Discharge: hospital bed       SUBJECTIVE:   Patient stated I am so excited you came to get me moving.       OBJECTIVE DATA SUMMARY:       Past Medical History:   Diagnosis Date    Anemia NEC       history of blood transfusion    Anxiety       panic attacks    Arthritis      Bipolar 1 disorder (HCC)      Chronic back pain      Depression      Elevated diaphragm 5/4/2012    Fatigue       along with weakness    GERD (gastroesophageal reflux disease)      Headache(784.0)      Hypertension       pt denies    Other ill-defined conditions(799.89) swallowing issues, decubitus    Psychiatric disorder      Seizures (HCC)       Past Surgical History:   Procedure Laterality Date    COLONOSCOPY N/A 2/21/2017     COLONOSCOPY [facility] ANESTHESIA, PATIENT IS IN ROOM 358 performed by Scott Alegre MD at [address] N/A 2/22/2017     COLONOSCOPY performed by Scott Alegre MD at [address], DIAGNOSTIC         over 20 years ago    ENDOSCOPY, COLON, DIAGNOSTIC   9/24/09    HX APPENDECTOMY   11/6/84    HX BACK SURGERY   11/24/98     severe back problems    HX CHOLECYSTECTOMY   11/6/84    HX CORONARY STENT PLACEMENT         ivc filter    HX GASTRIC BYPASS   1995     Patricio    HX TUBAL LIGATION   1991     Barriers to Learning/Limitations: yes;  physical  Compensate with: visual, verbal, tactile, kinesthetic cues/model  Prior Level of Function/Home Situation: Pt was receiving HHPT and HHOT during the week. She was able to sit up at the EOB for 5 minutes. Pt required Max A/total assist for bed-chair transfers. Home Situation  Home Environment: Private residence  One/Two Story Residence: One story  Living Alone: No  Support Systems: Spouse/Significant Other/Partner  Patient Expects to be Discharged to[de-identified] Private residence  Current DME Used/Available at Home: None  Critical Behavior:  Neurologic State: Alert; Appropriate for age  Orientation Level: Appropriate for age;Oriented X4  Cognition: Appropriate decision making; Appropriate for age attention/concentration; Appropriate safety awareness; Follows commands   Psychosocial  Patient Behaviors: Calm; Cooperative; Talkative   Strength:    Strength: Generally decreased, functional (B LEs grossly decreased UEs generally decreased)   Tone & Sensation:   Tone: Abnormal   Sensation: Impaired   Range Of Motion:  AROM: Generally decreased, functional (B LEs grossly decreased UEs generally decreased)   PROM: Generally decreased, functional (B LEs grossly decreased UEs generally decreased)   Functional Mobility:  Bed Mobility:  Rolling: Supervision  Supine to Sit: Minimum assistance; Additional time  Sit to Supine: Moderate assistance; Additional time  Scooting: Maximum assistance  Balance:   Sitting: Impaired  Sitting - Static: Poor (constant support)  Sitting - Dynamic: Poor (constant support)  Pain:  Pain Scale 1: Numeric (0 - 10)  Pain Intensity 1: 0   Activity Tolerance:   Fair  Please refer to the flowsheet for vital signs taken during this treatment. After treatment:   [ ]         Patient left in no apparent distress sitting up in chair  [X]         Patient left in no apparent distress in bed  [X]         Call bell left within reach  [X]         Nursing notified  [ ]         Caregiver present  [ ]         Bed alarm activated      COMMUNICATION/EDUCATION:   [X]         Fall prevention education was provided and the patient/caregiver indicated understanding. [X]         Patient/family have participated as able in goal setting and plan of care. [X]         Patient/family agree to work toward stated goals and plan of care. [ ]         Patient understands intent and goals of therapy, but is neutral about his/her participation. [ ]         Patient is unable to participate in goal setting and plan of care. Thank you for this referral.  Jayson Westbrook DPT      Time Calculation: 17 mins    Eval Complexity: History: HIGH Complexity :3+ comorbidities / personal factors will impact the outcome/ POC Exam:MEDIUM Complexity : 3 Standardized tests and measures addressing body structure, function, activity limitation and / or participation in recreation  Presentation: MEDIUM Complexity : Evolving with changing characteristics  Clinical Decision Making:Medium Complexity Pt nonambulatory, only could complete bed mobility.  Overall Complexity:MEDIUM

## 2017-02-23 NOTE — CDMP QUERY
On current problem list in 2/23  progress notes-    Severe protein-calorie malnutrition ([address] E43    Can you please clarify if     =>Pt currently being treated for severe protein-calorie malnutrition   =>Degree of protein-calorie malnutrition has changed, please document current degree  =>Protein-calorie malnutrition resolved  =>Other Explanation of clinical findings  =>Unable to Determine (no explanation of clinical findings)    Please clarify and document your clinical opinion in the progress notes and discharge summary    Thank you,    Bobbetta Merlin, RN  [address] [place] [address], [address] [place] [address]

## 2017-02-23 NOTE — PROGRESS NOTES
Hospitalist Progress Note    Patient: Matthew Moeller MRN: [id number]  CSN: [id number]    YOB: 1950  Age: 78 y.o. Sex: female    DOA: 2/18/2017 LOS:  LOS: 5 days          Chief Complaint:      GI bleed    Assessment/Plan     GI bleed -repeating her third prep due to backed up stool and try at virtual colonoscopy this am unsuccessful, hopefully ca get done tomorrow-colonoscopy was not able to be completed due to tortuopus colon-bariatrics consult for hx gastric bypass and ulcer  Epistaxis - now resolved  Depression/bipolar  Anemia - secondary to acute blood loss, s/p transfusion, appears stable  UTI - in a patient with chronic indwelling catheter. 4 organisms, likely most colonization-change to merrem for pseudomonas coverage-stop IV abx on d/c   Decubitus ulcer sacral region stage 4 - local wound care. Consult wound care  Seizure disorder - on vimpat. 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  Now on hold due to bleeding-resume once clear by GI  Chronic pain and anxiety-intermittent diaphoretic events where vitals are stable-has reji to [address] and [employer] for this and was told there is not a diagnosis   bipolar dosorder, consider psychogenic polydipsia-limit fluids PO-can check urine Na and aldosterone, but her lytes look for the most part ok    repelte K PO and with IVF  Bowel prep again  Maintenance IVF  Discussed plan with pateint    Patient Active Problem List   Diagnosis Code    Bipolar 1 disorder ([facility] F31.9    Decubitus ulcer of sacral area L89.159    Anemia D64.9    UTI (urinary tract infection) N39.0    Severe protein-calorie malnutrition ([address] E43    Seizure disorder ([facility] G40.909    Depression F32.9    Hypotension, unspecified I95.9    Personal history of DVT (deep vein thrombosis) Z86.718    Hypomagnesemia E83.42    Unspecified constipation K59.00    Gingival bleeding K06.8    GI (gastrointestinal bleed) K92.2    Chronic anticoagulation Z79.01    Epistaxis R04.0       Subjective:    Good spirits  Joking with staff  No acute changes  Virtual colon was unsuccessful-still had stool in colon    Review of systems:    Constitutional: denies fevers, chills, myalgias  Respiratory: denies SOB, cough  Cardiovascular: denies chest pain, palpitations  Gastrointestinal: denies nausea, vomiting, diarrhea      Vital signs/Intake and Output:  Visit Vitals    /52    Pulse 83    Temp 97.4 °F (36.3 °C)    Resp 16    Ht 5' 6\" (1.676 m)    Wt 68.7 kg (151 lb 7.3 oz)    SpO2 99%    BMI 24.45 kg/m2     Current Shift:     Last three shifts:  02/21 1901 - 02/23 0700  In: 5135.4 [P.O.:3400;  I.V.:1735.4]  Out: 91702 [[facility]:[id number]]    Exam:    General: Well developed, alert, NAD, OX3  CVS:Regular rate and rhythm, no M/R/G, S1/S2 heard, no thrill  Lungs:Clear to auscultation bilaterally, no wheezes, rhonchi, or rales  Abdomen: Soft, Nontender, No distention, Normal Bowel sounds, No hepatomegaly  Extremities: No C/C/E, pulses palpable 2+  Skin:normal texture and turgor, no rashes, no lesions  Neuro:grossly normal , follows commands  Psych:appropriate                Labs: Results:       Chemistry Recent Labs      02/23/17 0218 02/22/17   0258  02/21/17   0402   GLU  109*  94  98   NA  141  139  140   K  3.1*  3.4*  3.7   CL  105  102  105   CO2  30  27  30   BUN  1*  2*  5*   CREA  0.36*  0.37*  0.38*   CA  7.8*  7.8*  7.6*   AGAP  6  10  5   BUCR  3*  5*  13      CBC w/Diff Recent Labs      02/23/17 0218 02/22/17   0258  02/21/17   0402   WBC  6.0  6.4  6.3   RBC  3.04*  3.05*  2.82*   HGB  8.5*  8.3*  7.7*   HCT  25.7*  25.5*  23.3*   PLT  274  288  267      Cardiac Enzymes No results for input(s): CPK, CKND1, PAOLA in the last 72 hours.    No lab exists for component: CKRMB, TROIP   Coagulation Recent Labs      02/23/17   0218  02/22/17   0258   PTP  12.6  13.1   INR  1.0  1.0       Lipid Panel No results found for: CHOL, CHOLPOCT, CHOLX, CHLST, CHOLV, [id number], HDL, LDL, NLDLCT, DLDL, LDLC, DLDLP, 762315, VLDLC, VLDL, TGL, TGLX, TRIGL, [id number], TRIGP, TGLPOCT, [id number], CHHD, CHHDX   BNP No results for input(s): BNPP in the last 72 hours. Liver Enzymes No results for input(s): TP, ALB, TBIL, AP, SGOT, GPT in the last 72 hours.     No lab exists for component: DBIL   Thyroid Studies Lab Results   Component Value Date/Time    TSH 2.73 02/23/2017 02:18 AM        Procedures/imaging: see electronic medical records for all procedures/Xrays and details which were not copied into this note but were reviewed prior to creation of Quyen Abdul MD

## 2017-02-23 NOTE — ROUTINE PROCESS
Bedside and Verbal shift change report given to DORY Giles RN (oncoming nurse) by Ira Duke. Rosalina Merrill RN (offgoing nurse). Report included the following information SBAR, Kardex, Intake/Output and MAR.    Visit Vitals    /58 (BP 1 Location: Left arm, BP Patient Position: At rest)    Pulse 93    Temp 98.2 °F (36.8 °C)    Resp 16    Ht 5' 6\" (1.676 m)    Wt 68.7 kg (151 lb 7.3 oz)    SpO2 100%    BMI 24.45 kg/m2   \

## 2017-02-23 NOTE — ROUTINE PROCESS
Received patient from tele staff. Patient was resting in the bed no distress was noted at this time. Patient was informed on how to use the call system. No needs were voiced at this time.

## 2017-02-23 NOTE — PROGRESS NOTES
Problem: Self Care Deficits Care Plan (Adult)  Goal: *Acute Goals and Plan of Care (Insert Text)  Occupational Therapy Goals  Initiated 2/23/2017 within 7 day(s). 1. Patient will perform grooming with supervision/set-up 2. Patient will perform upper body dressing with supervision/set-up. 3. Patient will perform functional activity while seated EOB for 3-5 minutes with supervision/set-up. 4. Patient will participate in upper extremity therapeutic exercise/activities with supervision/set-up for 10 minutes. 5. Patient will utilize energy conservation techniques during functional activities with verbal cues. Outcome: Progressing Towards Goal  OCCUPATIONAL THERAPY EVALUATION     Patient: Raul Napoles (12 y.o. female)  Date: 2/23/2017  Primary Diagnosis: GI (gastrointestinal bleed)  Gingival bleeding  Chronic anticoagulation  RECTAL BLEEDING  NA  Procedure(s) (LRB):  EGD (N/A)  COLONOSCOPY (N/A) 1 Day Post-Op   Precautions:   Fall      ASSESSMENT :  Based on the objective data described below, the patient presents with decreased functional strength, decreased functional balance, decreased overall activity tolerance limiting independence with ADLs. Pt supine in bed upon entering, agreeable to therapy. Pt required min A to perform supine to sit transfer. With UE support, pt able to maintain sitting balance with SBA. Pt with c/o dizziness upon sitting EOB. Pt returned to supine in bed with mod A. Pt very motivated to gain independence and participate in therapy. Pt would benefit from continued OT services to improve pt's ability to participate in ADL tasks/transfers. Education: Role of OT in acute care, plan of care     Patient will benefit from skilled intervention to address the above impairments.   Patients rehabilitation potential is considered to be Good  Factors which may influence rehabilitation potential include:   [ ]             None noted  [ ]             Mental ability/status  [X]             Medical condition  [ ]             Home/family situation and support systems  [ ]             Safety awareness  [ ]             Pain tolerance/management  [ ]             Other:        PLAN :  Recommendations and Planned Interventions:  [X]               Self Care Training                  [X]        Therapeutic Activities  [X]               Functional Mobility Training    [ ]        Cognitive Retraining  [X]               Therapeutic Exercises           [X]        Endurance Activities  [X]               Balance Training                   [X]        Neuromuscular Re-Education  [ ]               Visual/Perceptual Training     [X]   Home Safety Training  [X]               Patient Education                 [X]        Family Training/Education  [ ]               Other (comment):     Frequency/Duration: Patient will be followed by occupational therapy 3-5 times a week to address goals.   Discharge Recommendations: Rehab vs Home Health  Further Equipment Recommendations for Discharge: TBD       SUBJECTIVE:   Patient stated .      OBJECTIVE DATA SUMMARY:       Past Medical History:   Diagnosis Date    Anemia NEC       history of blood transfusion    Anxiety       panic attacks    Arthritis      B[facility] 1 disorder ([facility]      Chronic back pain      Depression      Elevated diaphragm 5/4/2012    Fatigue       along with weakness    GERD (gastroesophageal reflux disease)      Headache(784.0)      Hypertension       pt denies    Other ill-defined conditions(799.89) swallowing issues, decubitus    Psychiatric disorder      Seizures ([facility]       Past Surgical History:   Procedure Laterality Date    COLONOSCOPY N/A 2/21/2017     COLONOSCOPY [facility] ANESTHESIA, PATIENT IS IN ROOM 358 performed by Scott Alegre MD at [address] COLONOSCOPY N/A 2/22/2017     COLONOSCOPY performed by Scott Alegre MD [address]         over 20 years ago    ENDOSCOPY, COLON, DIAGNOSTIC 9/24/09    HX APPENDECTOMY   11/6/84    HX BACK SURGERY   11/24/98     severe back problems    HX CHOLECYSTECTOMY   11/6/84    HX CORONARY STENT PLACEMENT         ivc filter    HX GASTRIC BYPASS   1995     Patricio    HX TUBAL LIGATION   1991     Barriers to Learning/Limitations: yes;  physical  Compensate with: visual, verbal, tactile, kinesthetic cues/model  GCODES (GO)n/a  Prior Level of Function/Home Situation: Pt mostly bed bound, however was receiving home health PT/OT services prior. Pt reports completing supine to sit transfers with OT, transferring to wheelchair with vanessa/total A with PT. Pt receives assist with ADLs as needed   Home Situation  Home Environment: Private residence  One/Two Story Residence: One story  Living Alone: No  Support Systems: Spouse/Significant Other/Partner, Home care staff  Patient Expects to be Discharged to[de-identified] Rehabilitation facility  Current DME Used/Available at Home: [address] bed     Cognitive/Behavioral Status:  Neurologic State: Alert  Orientation Level: Oriented X4  Cognition: Follows commands  Safety/Judgement: Fall prevention        Coordination:  Coordination: Generally decreased, functional (B LEs grossly decreased UEs generally decreased)      Balance:  Sitting: Impaired  Sitting - Static: Poor (constant support)  Sitting - Dynamic: Poor (constant support)  Strength:  Strength: Generally decreased, functional (B LEs grossly decreased UEs generally decreased)     Tone & Sensation:  Tone: Abnormal  Sensation: Impaired     Range of Motion:  AROM: Generally decreased, functional (B LEs grossly decreased UEs generally decreased)  PROM: Generally decreased, functional (B LEs grossly decreased UEs generally decreased)     Functional Mobility and Transfers for ADLs:  Bed Mobility:  Rolling: Supervision  Supine to Sit: Minimum assistance; Additional time  Sit to Supine: Moderate assistance; Additional time  Scooting: Maximum assistance     ADL Intervention:  Cognitive Retraining  Safety/Judgement: Fall prevention     Pain:  Pre-treatment: 0  Post-treatment: 0  Activity Tolerance:   fair  Please refer to the flowsheet for vital signs taken during this treatment. After treatment:   [ ] Patient left in no apparent distress sitting up in chair  [X] Patient left in no apparent distress in bed  [X] Call bell left within reach  [ ] Nursing notified  [ ] Caregiver present  [ ] Bed alarm activated      COMMUNICATION/EDUCATION:   [ ] Home safety education was provided and the patient/caregiver indicated understanding. [X] Patient/family have participated as able in goal setting and plan of care. [X] Patient/family agree to work toward stated goals and plan of care. [ ] Patient understands intent and goals of therapy, but is neutral about his/her participation. [ ] Patient is unable to participate in goal setting and plan of care.      Thank you for this referral.  Cate Tavera MS OTR/L  Time Calculation: 27 mins

## 2017-02-24 ENCOUNTER — APPOINTMENT (OUTPATIENT)
Dept: CT IMAGING | Age: 67
DRG: 377 | End: 2017-02-24
Attending: INTERNAL MEDICINE
Payer: MEDICARE

## 2017-02-24 ENCOUNTER — APPOINTMENT (OUTPATIENT)
Dept: GENERAL RADIOLOGY | Age: 67
DRG: 377 | End: 2017-02-24
Attending: INTERNAL MEDICINE
Payer: MEDICARE

## 2017-02-24 LAB
ANION GAP BLD CALC-SCNC: 8 MMOL/L (ref 3–18)
BUN SERPL-MCNC: 2 MG/DL (ref 7–18)
BUN/CREAT SERPL: 5 (ref 12–20)
CALCIUM SERPL-MCNC: 7.8 MG/DL (ref 8.5–10.1)
CHLORIDE SERPL-SCNC: 105 MMOL/L (ref 100–108)
CO2 SERPL-SCNC: 29 MMOL/L (ref 21–32)
CREAT SERPL-MCNC: 0.41 MG/DL (ref 0.6–1.3)
GLUCOSE SERPL-MCNC: 91 MG/DL (ref 74–99)
INR PPP: 1.1 (ref 0.8–1.2)
POTASSIUM SERPL-SCNC: 4.1 MMOL/L (ref 3.5–5.5)
PROTHROMBIN TIME: 13.9 SEC (ref 11.5–15.2)
SODIUM SERPL-SCNC: 142 MMOL/L (ref 136–145)

## 2017-02-24 PROCEDURE — C9113 INJ PANTOPRAZOLE SODIUM, VIA: HCPCS | Performed by: HOSPITALIST

## 2017-02-24 PROCEDURE — 77030011256 HC DRSG MEPILEX <16IN NO BORD MOLN -A

## 2017-02-24 PROCEDURE — 74011000250 HC RX REV CODE- 250: Performed by: HOSPITALIST

## 2017-02-24 PROCEDURE — 74020 XR ABD (AP AND ERECT OR DECUB): CPT

## 2017-02-24 PROCEDURE — 74011636637 HC RX REV CODE- 636/637: Performed by: HOSPITALIST

## 2017-02-24 PROCEDURE — 80048 BASIC METABOLIC PNL TOTAL CA: CPT | Performed by: INTERNAL MEDICINE

## 2017-02-24 PROCEDURE — 65270000029 HC RM PRIVATE

## 2017-02-24 PROCEDURE — 97530 THERAPEUTIC ACTIVITIES: CPT

## 2017-02-24 PROCEDURE — 74011250637 HC RX REV CODE- 250/637: Performed by: HOSPITALIST

## 2017-02-24 PROCEDURE — 85610 PROTHROMBIN TIME: CPT | Performed by: HOSPITALIST

## 2017-02-24 PROCEDURE — 74011250637 HC RX REV CODE- 250/637: Performed by: INTERNAL MEDICINE

## 2017-02-24 PROCEDURE — 36415 COLL VENOUS BLD VENIPUNCTURE: CPT | Performed by: HOSPITALIST

## 2017-02-24 PROCEDURE — 74011250636 HC RX REV CODE- 250/636: Performed by: HOSPITALIST

## 2017-02-24 PROCEDURE — 74011000258 HC RX REV CODE- 258: Performed by: HOSPITALIST

## 2017-02-24 PROCEDURE — 97535 SELF CARE MNGMENT TRAINING: CPT

## 2017-02-24 PROCEDURE — L4396 STATIC OR DYNAMI AFO PRE CST: HCPCS

## 2017-02-24 RX ADMIN — MYCOPHENOLATE MOFETIL 900 MG: 500 TABLET ORAL at 21:40

## 2017-02-24 RX ADMIN — MEROPENEM 1 G: 1 INJECTION, POWDER, FOR SOLUTION INTRAVENOUS at 19:24

## 2017-02-24 RX ADMIN — POLYETHYLENE GLYCOL 3350 17 G: 17 POWDER, FOR SOLUTION ORAL at 21:36

## 2017-02-24 RX ADMIN — MORPHINE SULFATE 15 MG: 15 TABLET ORAL at 05:44

## 2017-02-24 RX ADMIN — FLUTICASONE PROPIONATE 2 SPRAY: 50 SPRAY, METERED NASAL at 13:02

## 2017-02-24 RX ADMIN — FERROUS SULFATE TAB 325 MG (65 MG ELEMENTAL FE) 325 MG: 325 (65 FE) TAB at 12:36

## 2017-02-24 RX ADMIN — DULOXETINE 30 MG: 30 CAPSULE, DELAYED RELEASE ORAL at 21:38

## 2017-02-24 RX ADMIN — POLYETHYLENE GLYCOL 3350 17 G: 17 POWDER, FOR SOLUTION ORAL at 12:36

## 2017-02-24 RX ADMIN — OLANZAPINE 7.5 MG: 2.5 TABLET, FILM COATED ORAL at 21:40

## 2017-02-24 RX ADMIN — ROPINIROLE HYDROCHLORIDE 0.25 MG: 0.25 TABLET, FILM COATED ORAL at 13:03

## 2017-02-24 RX ADMIN — SODIUM CHLORIDE 40 MG: 9 INJECTION INTRAMUSCULAR; INTRAVENOUS; SUBCUTANEOUS at 12:37

## 2017-02-24 RX ADMIN — CYCLOBENZAPRINE HYDROCHLORIDE 10 MG: 10 TABLET, FILM COATED ORAL at 21:38

## 2017-02-24 RX ADMIN — MYCOPHENOLATE MOFETIL 900 MG: 500 TABLET ORAL at 12:37

## 2017-02-24 RX ADMIN — Medication 10 ML: at 13:03

## 2017-02-24 RX ADMIN — DULOXETINE 30 MG: 30 CAPSULE, DELAYED RELEASE ORAL at 12:36

## 2017-02-24 RX ADMIN — BUTALBITAL, ACETAMINOPHEN AND CAFFEINE 1 TABLET: 50; 325; 40 TABLET ORAL at 06:49

## 2017-02-24 RX ADMIN — SUCRALFATE 1 G: 1 SUSPENSION ORAL at 12:36

## 2017-02-24 RX ADMIN — MEROPENEM 1 G: 1 INJECTION, POWDER, FOR SOLUTION INTRAVENOUS at 00:15

## 2017-02-24 RX ADMIN — PREDNISONE 5 MG: 5 TABLET ORAL at 12:37

## 2017-02-24 RX ADMIN — ROPINIROLE HYDROCHLORIDE 0.75 MG: 0.25 TABLET, FILM COATED ORAL at 21:41

## 2017-02-24 RX ADMIN — MIRABEGRON 50 MG: 25 TABLET, FILM COATED, EXTENDED RELEASE ORAL at 21:53

## 2017-02-24 RX ADMIN — LACOSAMIDE 100 MG: 100 TABLET, FILM COATED ORAL at 21:37

## 2017-02-24 RX ADMIN — SODIUM CHLORIDE AND POTASSIUM CHLORIDE: 9; 2.98 INJECTION, SOLUTION INTRAVENOUS at 06:50

## 2017-02-24 RX ADMIN — CYCLOSPORINE 1 DROP: 0.5 EMULSION OPHTHALMIC at 13:02

## 2017-02-24 RX ADMIN — SUCRALFATE 1 G: 1 SUSPENSION ORAL at 21:44

## 2017-02-24 RX ADMIN — MORPHINE SULFATE 15 MG: 15 TABLET ORAL at 21:39

## 2017-02-24 RX ADMIN — LEVOTHYROXINE SODIUM 75 MCG: 75 TABLET ORAL at 06:50

## 2017-02-24 RX ADMIN — BUTALBITAL, ACETAMINOPHEN AND CAFFEINE 1 TABLET: 50; 325; 40 TABLET ORAL at 00:15

## 2017-02-24 RX ADMIN — LACOSAMIDE 100 MG: 100 TABLET, FILM COATED ORAL at 12:36

## 2017-02-24 RX ADMIN — BUTALBITAL, ACETAMINOPHEN AND CAFFEINE 1 TABLET: 50; 325; 40 TABLET ORAL at 21:39

## 2017-02-24 NOTE — PROGRESS NOTES
..    Patient in CT suite for attempt at [place] [address], unfortunately there was a moderate amount of stool noted on the patient, which is indicative retained stool and incomplete colon prep. Therefore, the procedure was not perfomred today. Recommend re-prepping with Tagetol stool tagging agent and reschduling exam, which can be performed on an outpatient basis. I discussed this with the patients, Hospitalist, Dr. Dena Stone on 02/24/17 and placed a call to Dr. Jennifer Ziegler.     Melisa Kelly MD  Vascular & Interventional Radiology  [address] [place] [address],[facility]  2/24/2017

## 2017-02-24 NOTE — PROGRESS NOTES
Hospitalist Progress Note    Patient: Chon Gibson MRN: [id number]  CSN: [id number]    YOB: 1950  Age: 78 y.o. Sex: female    DOA: 2/18/2017 LOS:  LOS: 6 days          Chief Complaint:  GI bleeding          Assessment/Plan       Patient Active Problem List   Diagnosis Code    Bipolar 1 disorder ([facility] F31.9    Decubitus ulcer of sacral area L89.159    Anemia D64.9    UTI (urinary tract infection) N39.0    Severe protein-calorie malnutrition ([facility] E43    Seizure disorder ([facility] G40.909    Depression F32.9    Hypotension, unspecified I95.9    Personal history of DVT (deep vein thrombosis) Z86.718    Hypomagnesemia E83.42    Unspecified constipation K59.00    Gingival bleeding K06.8    GI (gastrointestinal bleed) K92.2    Chronic anticoagulation Z79.01    Epistaxis R04.0       GI bleed -persistent backed up stool on virtual colonoscopy this am unsuccessful, hopefully ca get done on Monday-colonoscopy was not able to be completed due to tortuopus colon-bariatrics consult for hx gastric bypass and ulcer. Radiology will not re-accomplish over the weekend. Patient insists that testing be done as an inpatient. Clear liquids until Monday to assure adequate prep. Epistaxis - now resolved; holding anticoagulants  Depression/bipolar  Anemia - secondary to acute blood loss, s/p transfusion, appears stable  UTI - in a patient with chronic indwelling catheter. 4 organisms, likely most colonization-continue merrem for pseudomonas coverage-stop IV abx on d/c   Decubitus ulcer sacral region stage 4 - local wound care. Consult wound care  Seizure disorder - on vimpat. 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  Now on hold due to bleeding-resume once clear by GI  Chronic pain and anxiety-intermittent diaphoretic events where vitals are stable-has reji to [facility] for this and was told there is not a diagnosis   bipolar dosorder, consider psychogenic polydipsia-limit fluids PO     Check repeat BMP this AM to follow-up lytes; replete prn  Clears until Sunday; bowel prep again beginning on Sunday night  Maintenance IVF  Discussed plan with pateint      Subjective:    No events over night. Patient doing well this AM.  No new complaints. Review of systems:    Constitutional: denies fevers, chills, myalgias  Respiratory: denies SOB, cough  Cardiovascular: denies chest pain, palpitations  Gastrointestinal: denies nausea, vomiting, diarrhea      Vital signs/Intake and Output:  Visit Vitals    /57 (BP 1 Location: Right arm, BP Patient Position: At rest)    Pulse 83    Temp 98.2 °F (36.8 °C)    Resp 20    Ht 5' 6\" (1.676 m)    Wt 69.3 kg (152 lb 12.8 oz)    SpO2 96%    BMI 24.66 kg/m2     Current Shift:     Last three shifts:  02/22 1901 - 02/24 0700  In: 1455.4 [P.O.:720; I.V.:735.4]  Out: 7120 [Urine:7120]    Exam:    General: Well developed, alert, NAD, OX3  Head/Neck: NCAT, supple, No masses, No lymphadenopathy  CVS:Regular rate and rhythm, no M/R/G, S1/S2 heard, no thrill  Lungs:Clear to auscultation bilaterally, no wheezes, rhonchi, or rales  Abdomen: Soft, Nontender, No distention, Normal Bowel sounds, No hepatomegaly  Extremities: Functional paraplegia.   No C/C/E, pulses palpable 2+  Skin:normal texture and turgor, no rashes, no lesions  Neuro:known hemiparesis otherwise grossly normal , follows commands  Psych:appropriate                Labs: Results:       Chemistry Recent Labs      02/23/17   0218  02/22/17   0258   GLU  109*  94   NA  141  139   K  3.1*  3.4*   CL  105  102   CO2  30  27   BUN  1*  2*   CREA  0.36*  0.37*   CA  7.8*  7.8*   AGAP  6  10   BUCR  3*  5*      CBC w/Diff Recent Labs      02/23/17   0218  02/22/17 0258   WBC  6.0  6.4   RBC  3.04*  3.05*   HGB  8.5*  8.3*   HCT  25.7*  25.5*   PLT  274  288      Cardiac Enzymes No results for input(s): CPK, CKND1, PAOLA in the last 72 hours. No lab exists for component: CKRMB, TROIP   Coagulation Recent Labs      02/24/17   0223  02/23/17   0218   PTP  13.9  12.6   INR  1.1  1.0       Lipid Panel No results found for: CHOL, CHOLPOCT, CHOLX, CHLST, CHOLV, [id number], HDL, LDL, NLDLCT, DLDL, LDLC, DLDLP, [id number], VLDLC, VLDL, TGL, TGLX, TRIGL, [id number], TRIGP, TGLPOCT, [id number], CHHD, CHHDX   BNP No results for input(s): BNPP in the last 72 hours. Liver Enzymes No results for input(s): TP, ALB, TBIL, AP, SGOT, GPT in the last 72 hours.     No lab exists for component: DBIL   Thyroid Studies Lab Results   Component Value Date/Time    TSH 2.73 02/23/2017 02:18 AM        Procedures/imaging: see electronic medical records for all procedures/Xrays and details which were not copied into this note but were reviewed prior to creation of Ap Cormier MD

## 2017-02-24 NOTE — PROGRESS NOTES
Shift summary: pt pleasant and cooperative with care. Pt rec'd pain medication multiple times this shift, semi effective as patient has chronic pain. Bowel prep started and pt is tolerating well. Pt tolerating clear liquid diet. Turned and repositioned throughout shift. Pt's family visited this evening.

## 2017-02-24 NOTE — PROGRESS NOTES
*  Continue all medications at the same doses.  Contact your usual pharmacy if you need refills.     *  Colonoscopy next due 2021      VIAGRA (SILDENAFIL):     *  Trial of Sildenafil (Viagra) for help with erections.      *  Brand name Viagra is Sildenafil that comes in a 25, 50, or 100 mg size tablet that is manufactured and marketed by the drug company [employer].    *  Sildenafil is also available as a generic medication that comes in a 20 or 40 mg tablet.  It is not technically called Viagra, but is the same medication.      *  Visit the Viagra web site (http://www.[employer]/) for more information and also for possible discount cards.      *  Viagra and generic Sildenafil are potentially available at much cheaper prices from some [facility], which can be very important if you are paying for the cost of this medication.  One example is www.[employer]    *  Depending on which version of Sildenafil you have, Take a 20 mg, 40 mg or 50 mg dose (1/2 of 100 mg tablet) 30-60 minutes before sexual intercourse.  If the first time lower dose does not work, then retry the same dose the next time.  If that dose dose not work, then try 100 mg dose.    The medication rarely works the very first time you try it since you are likely to be nervous about the medication itself, but there has to be a first time.      *  Beware of possible side effects including dizziness, headaches, colosr vision, upset stomach, nausea, passing out, problems with urination and sustained erections.  Never take the medication with nitroglycerin containing medications.  If you experience any severe side effects stop the medication and do not take it again until you speak with our clinic.     *  There may be an increased rate of side effects from Sildenafil/Viagra when alcohol is used too close to viagra    *  I will prescribe either Sildenafil or Viagra as long as it helps improve erectile dysfunction and does not cause side effects.   2300: Patient refused to be turned or have the head of the bed elevated no more than 20-25 degrees    0015: Patient experienced periodic nose bleeds treated with cool wash clothes and pressure, slight decrease in bleeding noted. 0030: Patient refuses to have head of bed elevated and continues to have constant nose bleed, advised to keep constant pressure and do not peek to see if the bleeding has stopped. 0405: Patients nose has stopped bleeding. Patient laying in bed asleep, no acute distress, call light with in reach, and bed in lowest position. 1740: Shift summary: Patient experienced periodic nose bleeds treated cool wash cloth and pressure, slight decrease in bleeding noted. With constant pressure bleeding ceased. Blood pressures low but stable over night, see Chicho Hoang RN progress note. Left resting in bed no acute distress, call light with in reach. "Contact me after you finish the first prescription.  If it works well with no major side effects, then I will give you further prescriptions.  If Sildenafil does not work as desired, then we may consider another similar medication (Levitra or Cialis)    *  Contact me after you use the first prescripton and let me know if it works and that there are no side effects.   If it works and there are no side effects, then I will continue it.  Also let me know what form of Sildenafil you would like to continue and where you would like to get the prescriptions.          5 GOALS TO PREVENT VASCULAR DISEASE:     1.  Aggressive blood pressure control, under 130/80 ideally.  Using medications if needed.    Your blood pressure is under good control    BP Readings from Last 4 Encounters:   02/24/17 118/72   06/09/16 140/76   02/23/16 134/80   02/03/16 (!) 154/91       2.  Aggressive LDL cholesterol (\"bad cholesterol\") lowering as indicated.    Your goal is an LDL under 130 for sure, preferably under 100.  (If you have diabetes or previous vascular disease, the the LDL goals would be under 100 for sure, preferably under 70.)    New guidelines identify four high-risk groups who could benefit from statins:   *people with pre-existing heart disease, such as those who have had a heart attack;   *people ages 40 to 75 who have diabetes of any type  *patients ages 40 to 75 with at least a 7.5% risk of developing cardiovascular disease over the next decade, according to a formula described in the guidelines  *patients with the sort of super-high cholesterol that sometimes runs in families, as evidenced by an LDL of 190 milligrams per deciliter or higher    Your cholesterol levels are well controlled.    Recent Labs   Lab Test  02/23/15   0831  01/22/13   1035   CHOL  184  208*   HDL  66  78   LDL  105  116   TRIG  65  70   CHOLHDLRATIO  2.8  2.7       3.  Aggressive diabetic prevention, screening and/or management.      You do not have " "diabetes as of the most recent blood tests.     4.  No smoking    5.  Consider taking low dose aspirin (81 mg) tablet once per day over the age of 50, every day unless there is a specific reason that you cannot take aspirin (such as side effect, allergy, or you are on another \"blood thinner\").        --Based on your current cardiac risk factors, you should take Aspirin 81 mg once per day if you are over 50 years of age.           Preventive Health Recommendations  Male Ages 50 - 64    Yearly exam:             See your health care provider every year in order to  o   Review health changes.   o   Discuss preventive care.    o   Review your medicines if your doctor has prescribed any.     Have a cholesterol test every 5 years, or more frequently if you are at risk for high cholesterol/heart disease.     Have a diabetes test (fasting glucose) every three years. If you are at risk for diabetes, you should have this test more often.     Have a colonoscopy at age 50, or have a yearly FIT test (stool test). These exams will check for colon cancer.      Talk with your health care provider about whether or not a prostate cancer screening test (PSA) is right for you.    You should be tested each year for STDs (sexually transmitted diseases), if you re at risk.     Shots: Get a flu shot each year. Get a tetanus shot every 10 years.     Nutrition:    Eat at least 5 servings of fruits and vegetables daily.     Eat whole-grain bread, whole-wheat pasta and brown rice instead of white grains and rice.     Talk to your provider about Calcium and Vitamin D.        --Good Grains:  Oats, brown rice, Quinoa (these do not raise the blood sugar as much)     --Bad grains:  Anything made from wheat or white rice     (because these raise the blood sugars significantly, and the possible gluten issue from wheat for some people).      --Proteins:  Aim for \"lean proteins\" including chicken, fish, seafood, pork, turkey, and eggs (in moderation); " Eat red meat only occasionally          Lifestyle    Exercise for at least 150 minutes a week (30 minutes a day, 5 days a week). This will help you control your weight and prevent disease.     Limit alcohol to one drink per day.     No smoking.     Wear sunscreen to prevent skin cancer.     See your dentist every six months for an exam and cleaning.     See your eye doctor every 1 to 2 years.

## 2017-02-24 NOTE — PROGRESS NOTES
INITIAL NUTRITION ASSESSMENT     RECOMMENDATIONS/PLAN:   - Add Ensure Clear TID for protein source while on clear liquid diet  - After procedures advance diet to Regular and encourage adequate intake of protein for wound healing, fiber for chronic constipation  - May need softer consistency, consider SLP evaluation as pt has required dental soft textures in the past    REASON FOR ASSESSMENT:   [x] LOS    NUTRITION ASSESSMENT:   Client History: 78 yrs old Female admitted with GI bleed, acute blood loss anemia, UTI with chronic indwelling catheter, stage IV pressure ulcer, chronic opioid induced constipation. On & off full liquid and clear liquid diet since adm, had colonoscopy however not successful due to poor bowel prep and redundant/tortuous colon. Plan is for pt to remain on clear liquids and begin bowel prep Sunday for another colonoscopy. Will add Ensure Clear TID with meals.      PMHx: HTN, GERD, DM, seizures, james-en-y gastric bypass (1995), chronic back pain, anxiety, bipolar disorder   Cultural/[place] Food Preferences: None Identified    FOOD/NUTRITION HISTORY  Diet History: poor nutrition intake PTA, noted h/o poor nutrition in 2012 at [address] [place]:  [] NKFA     [x] Yes (seafood)     NUTRITION INTAKE   Diet Order:  Clear Liquids      Average PO Intake:        % Diet Eaten   02/24/17 1308 50 %   02/23/17 1441 100 %   02/22/17 0930 100 %   02/21/17 1905 75 %   02/20/17 1826 100 %   Pertinent Medications:  [x] Reviewed; NS/KCl, fioricet, cymbalta, FeSO4, neurontin, synthroid, abx, morphine, zyprexa, miralax, ppi, prednisone, carafate  Insulin:  [] SSI  [] Pre-meal   []  Basal   [] Drip  [x] None  Pt expected to meet estimated nutrient needs through next review:          []  Yes     [x] No; clear liquid diet is nutritionally inadequate ANTHROPOMETRICS  Height: 5' 6\" (167.6 cm)       Weight: 69.3 kg (152 lb 12.8 oz)    BMI: 24.7 kg/m^2  -  normal weight (18.5%-24.9% BMI)        Weight change: lost 20# x1 year per MST                                  Comparison to Reference Standards:  IBW: 130 lbs      %IBW: 118 %      AdjBW: N/A    NUTRITION-FOCUSED PHYSICAL ASSESSMENT  Skin: stage IV sacral pressure ulcer. Generalized 3+ edema. GI: last BM 2/24- watery. +GI bleed, no N/V    BIOCHEMICAL DATA & MEDICAL TESTS  Pertinent Labs:  [x] Reviewed; Hgb 8.5, Hct 25.7     NUTRITION PRESCRIPTION  Calories: 5545-6341 kcal/day based on 30-33 kcal/kg  Protein:  g/day based on 1.2-1.5 g/kg  CHO: 259 g/day based on 50% of total energy  Fluid: 2750-4834 ml/day based on 1 kcal/ml      NUTRITION DIAGNOSES:   1- At risk of inadequate oral intake related to GI bleed as evidenced by clear liquid diet is calorically inadequate    2- Increased protein needs related to wound healing as evidenced by stage IV pressure ulcer present    NUTRITION INTERVENTIONS:   INTERVENTIONS:        GOALS:  1. Ensure Clear TID, diet advancement 1. >50% PO intake of supplements, diet advanced beyond clears by next review 1-3 days     LEARNING NEEDS (Diet, Supplementation, Food/Nutrient-Drug Interaction):   [x] None Identified  [] Education provided/documented (refer to Education section of EMR)  [] Identified and patient:  [] Declined     [] Was not appropriate/indicated  NUTRITION MONITORING /EVALUATION:   Follow PO intake  Monitor wt  Monitor for additional supplement needs    [] Participated in Interdisciplinary Rounds  [x] [address] [place] [address] Reviewed/Documented  [x] Discharge Nutrition Plan: high prot high fiber    NUTRITION RISK:     [x]  At risk                     []  Not currently at risk     Will follow-up per policy.   Williams Cartagena RD  PAGER:  [phone]

## 2017-02-24 NOTE — PROGRESS NOTES
Problem: Mobility Impaired (Adult and Pediatric)  Goal: *Acute Goals and Plan of Care (Insert Text)  Physical Therapy Goals  Initiated 2/23/2017 and to be accomplished within 7 day(s)  1. Patient will move from supine to sit and sit to supine in bed with supervision/set-up. 2. Patient will transfer from bed to chair and chair to bed with maximal assistance using the least restrictive device.    Outcome: Not Progressing Towards Goal  Pt not seen by PT due to:     [ ]  Nausea/vomiting  [ ]  Eating  [ ]  Pain  [ ]  Pt lethargic  [X]  Off Unit for virtual colonoscopy     Conor Jensen PT, DPT

## 2017-02-24 NOTE — ROUTINE PROCESS
Bedside shift change report given to DORY Ann RN (oncoming nurse) by FAUSTO Tristan RN (offgoing nurse). Report included the following information SBAR, Kardex and MAR.

## 2017-02-24 NOTE — PROGRESS NOTES
Reviewed chart and discussed case with Dr. Margarita Madrigal, who stated pt was not ready for discharge at this time. CM met with this pt who stated she would only consider discharge plan to SNF if she was accepted at [facility], Pending sale to [facility] or [facility], and CM informed her she has been declined for all three. Pt then agreed for CM to send her case for review to [facility] in [place], and they are currently reviewing her case. CM informed pt that [facility] has accepted her and she shouted that she would not go there. Pt stated if [facility] does not take her, she would prefer to go home with [facility], as she has used them in the past. Cm notified CMS, and updates have been sent to [facility]. Pt resides with her 76yo . Pt's PCP listed as Guy Silva. Pt has Medicare A and B, and [employer].

## 2017-02-24 NOTE — ROUTINE PROCESS
Bedside shift change report given to Bairon Benz RN (oncoming nurse) by Lidia Sandhu RN   (offgoing nurse). Report included the following information SBAR, Kardex, Procedure Summary, Intake/Output, MAR and Recent Results.

## 2017-02-24 NOTE — PROGRESS NOTES
Problem: Mobility Impaired (Adult and Pediatric)  Goal: *Acute Goals and Plan of Care (Insert Text)  Physical Therapy Goals  Initiated 2/23/2017 and to be accomplished within 7 day(s)  1. Patient will move from supine to sit and sit to supine in bed with supervision/set-up. 2. Patient will transfer from bed to chair and chair to bed with maximal assistance using the least restrictive device. Outcome: Progressing Towards Goal  PHYSICAL THERAPY TREATMENT     Patient: Jean Mckeon (29 y.o. female)  Date: 2/24/2017  Diagnosis: GI (gastrointestinal bleed)  Gingival bleeding  Chronic anticoagulation  RECTAL BLEEDING  NA GI (gastrointestinal bleed)  Procedure(s) (LRB):  EGD (N/A)  COLONOSCOPY (N/A) 2 Days Post-Op  Precautions: Fall   Chart, physical therapy assessment, plan of care and goals were reviewed. ASSESSMENT:  Pt having very loose BM upon entry into room and nursing assisted with pt pericare and linen changes. Pt was able to complete bed mobility with rolling x 10 reps with supervision level assist.  Left pt in bed with all needs met, heels elevated and family in the room. Recommend rehab vs. HHPT at time of discharge. Progression toward goals:  [ ]      Improving appropriately and progressing toward goals  [X]      Improving slowly and progressing toward goals  [ ]      Not making progress toward goals and plan of care will be adjusted       PLAN:  Patient continues to benefit from skilled intervention to address the above impairments. Continue treatment per established plan of care. Discharge Recommendations:  Rehab and Home Health  Further Equipment Recommendations for Discharge:  TBD       SUBJECTIVE:   Patient stated I just can't stop going now.       OBJECTIVE DATA SUMMARY:   Critical Behavior:  Neurologic State: Alert, Appropriate for age  Orientation Level: Appropriate for age, Oriented X4  Cognition: Appropriate decision making, Appropriate for age attention/concentration, Appropriate safety awareness, Follows commands  Safety/Judgement: Awareness of environment, Fall prevention, Good awareness of safety precautions, Insight into deficits  Functional Mobility Training:  Bed Mobility:  Rolling: Supervision  Pain:  Pain Scale 1: Numeric (0 - 10)  Pain Intensity 1: 0  Pain Location 1: Head;Buttocks; Abdomen   Pain Intervention(s) 1: Medication (see MAR)  Activity Tolerance:   Fair  Please refer to the flowsheet for vital signs taken during this treatment.   After treatment:   [ ] Patient left in no apparent distress sitting up in chair  [X] Patient left in no apparent distress in bed  [X] Call bell left within reach  [X] Nursing notified  [X] Caregiver present  [ ] Bed alarm activated     Karon Mohs Rosier PT, DPT      Time Calculation: 47 mins

## 2017-02-24 NOTE — PROGRESS NOTES
Problem: Self Care Deficits Care Plan (Adult)  Goal: *Acute Goals and Plan of Care (Insert Text)  Occupational Therapy Goals  Initiated 2/23/2017 within 7 day(s). 1. Patient will perform grooming with supervision/set-up 2. Patient will perform upper body dressing with supervision/set-up. 3. Patient will perform functional activity while seated EOB for 3-5 minutes with supervision/set-up. 4. Patient will participate in upper extremity therapeutic exercise/activities with supervision/set-up for 10 minutes. 5. Patient will utilize energy conservation techniques during functional activities with verbal cues. Outcome: Progressing Towards Goal  OCCUPATIONAL THERAPY TREATMENT     Patient: Satish Romo (89 y.o. female)  Date: 2/24/2017  Diagnosis: GI (gastrointestinal bleed)  Gingival bleeding  Chronic anticoagulation  RECTAL BLEEDING  NA GI (gastrointestinal bleed)  Procedure(s) (LRB):  EGD (N/A)  COLONOSCOPY (N/A) 2 Days Post-Op  Precautions: Fall  Chart, occupational therapy assessment, plan of care, and goals were reviewed. ASSESSMENT:  Pt supine in bed upon entering, stating she was soiled. Pt continues to be motivated to participate in therapy. Pt demonstrated ability to perform anterior jhony-care with supervision while supine. Pt able to perform rolling in bed with supervision. Pt required total A for bowel hygiene. Pt donned gown with set up assistance. Pt left supine in bed with needs in reach. Progression toward goals:  [ ]          Improving appropriately and progressing toward goals  [X]          Improving slowly and progressing toward goals  [ ]          Not making progress toward goals and plan of care will be adjusted       PLAN:  Patient continues to benefit from skilled intervention to address the above impairments. Continue treatment per established plan of care.   Discharge Recommendations:  Rehab vs home health  Further Equipment Recommendations for Discharge:  TBD by next level of care SUBJECTIVE:   Patient stated I need to be cleaned up first.      OBJECTIVE DATA SUMMARY:      G CODES:      Cognitive/Behavioral Status:  Neurologic State: Alert, Appropriate for age  Orientation Level: Appropriate for age, Oriented X4  Cognition: Appropriate decision making, Appropriate for age attention/concentration, Appropriate safety awareness, Follows commands  Safety/Judgement: Awareness of environment, Fall prevention, Good awareness of safety precautions, Insight into deficits  Functional Mobility and Transfers for ADLs:              Bed Mobility:  Rolling: Supervision  ADL Intervention:  Upper Body [address] Gown: Supervision/ set-up     Toileting  Bladder Hygiene: Supervision/set-up  Bowel Hygiene: Total assistance (dependent)     Cognitive Retraining  Safety/Judgement: Awareness of environment; Fall prevention;Good awareness of safety precautions; Insight into deficits     Pain:  Pre Treatment:  Post Treatment:  Pain Scale 1: Numeric (0 - 10)  Pain Intensity 1: 0  Pain Location 1: Head;Buttocks; Abdomen     Activity Tolerance:    good  Please refer to the flowsheet for vital signs taken during this treatment.   After treatment:   [ ]  Patient left in no apparent distress sitting up in chair  [X]  Patient left in no apparent distress in bed  [X]  Call bell left within reach  [X]  Nursing notified  [ ]  Caregiver present  [ ]  Bed alarm activated     Feng Gallo MS OTR/L  Time Calculation: 38 mins

## 2017-02-24 NOTE — PROGRESS NOTES
Shift assessment completed. Pt in bed with TV on. A/Ox4. Pain scale 10/10. Pt denies any SOB or difficulty breathing. Pt denies any chest pain or discomfort. Bowel sounds hypoactive. Palpable radial and pedal pulses. Pt denies any numbness or tingling to upper extremities. Tremors to hands.  Ana María in

## 2017-02-25 LAB
BACTERIA SPEC CULT: NORMAL
INR PPP: 1 (ref 0.8–1.2)
PROTHROMBIN TIME: 13.2 SEC (ref 11.5–15.2)
SERVICE CMNT-IMP: NORMAL

## 2017-02-25 PROCEDURE — 74011000250 HC RX REV CODE- 250: Performed by: HOSPITALIST

## 2017-02-25 PROCEDURE — 74011000258 HC RX REV CODE- 258: Performed by: HOSPITALIST

## 2017-02-25 PROCEDURE — 74011250636 HC RX REV CODE- 250/636: Performed by: HOSPITALIST

## 2017-02-25 PROCEDURE — 65270000029 HC RM PRIVATE

## 2017-02-25 PROCEDURE — 85610 PROTHROMBIN TIME: CPT | Performed by: HOSPITALIST

## 2017-02-25 PROCEDURE — 74011636637 HC RX REV CODE- 636/637: Performed by: HOSPITALIST

## 2017-02-25 PROCEDURE — 97530 THERAPEUTIC ACTIVITIES: CPT

## 2017-02-25 PROCEDURE — 74011250637 HC RX REV CODE- 250/637: Performed by: HOSPITALIST

## 2017-02-25 PROCEDURE — 74011250637 HC RX REV CODE- 250/637: Performed by: INTERNAL MEDICINE

## 2017-02-25 PROCEDURE — C9113 INJ PANTOPRAZOLE SODIUM, VIA: HCPCS | Performed by: HOSPITALIST

## 2017-02-25 PROCEDURE — 74011250637 HC RX REV CODE- 250/637: Performed by: FAMILY MEDICINE

## 2017-02-25 PROCEDURE — 36415 COLL VENOUS BLD VENIPUNCTURE: CPT | Performed by: HOSPITALIST

## 2017-02-25 RX ADMIN — MYCOPHENOLATE MOFETIL 900 MG: 500 TABLET ORAL at 17:24

## 2017-02-25 RX ADMIN — MEROPENEM 1 G: 1 INJECTION, POWDER, FOR SOLUTION INTRAVENOUS at 06:30

## 2017-02-25 RX ADMIN — ROPINIROLE HYDROCHLORIDE 0.75 MG: 0.25 TABLET, FILM COATED ORAL at 21:32

## 2017-02-25 RX ADMIN — SUCRALFATE 1 G: 1 SUSPENSION ORAL at 06:31

## 2017-02-25 RX ADMIN — FERROUS SULFATE TAB 325 MG (65 MG ELEMENTAL FE) 325 MG: 325 (65 FE) TAB at 12:47

## 2017-02-25 RX ADMIN — LACOSAMIDE 100 MG: 100 TABLET, FILM COATED ORAL at 21:32

## 2017-02-25 RX ADMIN — BUTALBITAL, ACETAMINOPHEN AND CAFFEINE 1 TABLET: 50; 325; 40 TABLET ORAL at 21:31

## 2017-02-25 RX ADMIN — SUCRALFATE 1 G: 1 SUSPENSION ORAL at 17:24

## 2017-02-25 RX ADMIN — LACOSAMIDE 100 MG: 100 TABLET, FILM COATED ORAL at 08:55

## 2017-02-25 RX ADMIN — MEROPENEM 1 G: 1 INJECTION, POWDER, FOR SOLUTION INTRAVENOUS at 18:52

## 2017-02-25 RX ADMIN — MYCOPHENOLATE MOFETIL 900 MG: 500 TABLET ORAL at 21:32

## 2017-02-25 RX ADMIN — LEVOTHYROXINE SODIUM 75 MCG: 75 TABLET ORAL at 06:31

## 2017-02-25 RX ADMIN — FLUTICASONE PROPIONATE 2 SPRAY: 50 SPRAY, METERED NASAL at 08:56

## 2017-02-25 RX ADMIN — OLANZAPINE 7.5 MG: 2.5 TABLET, FILM COATED ORAL at 21:31

## 2017-02-25 RX ADMIN — BUTALBITAL, ACETAMINOPHEN AND CAFFEINE 1 TABLET: 50; 325; 40 TABLET ORAL at 09:00

## 2017-02-25 RX ADMIN — POLYETHYLENE GLYCOL 3350 17 G: 17 POWDER, FOR SOLUTION ORAL at 08:53

## 2017-02-25 RX ADMIN — SODIUM CHLORIDE AND POTASSIUM CHLORIDE: 9; 2.98 INJECTION, SOLUTION INTRAVENOUS at 05:24

## 2017-02-25 RX ADMIN — MORPHINE SULFATE 15 MG: 15 TABLET ORAL at 21:31

## 2017-02-25 RX ADMIN — GUAIFENESIN 100 MG: 100 SOLUTION ORAL at 04:54

## 2017-02-25 RX ADMIN — PREDNISONE 5 MG: 5 TABLET ORAL at 08:55

## 2017-02-25 RX ADMIN — SODIUM CHLORIDE AND POTASSIUM CHLORIDE: 9; 2.98 INJECTION, SOLUTION INTRAVENOUS at 15:22

## 2017-02-25 RX ADMIN — DULOXETINE 30 MG: 30 CAPSULE, DELAYED RELEASE ORAL at 21:31

## 2017-02-25 RX ADMIN — MIRABEGRON 50 MG: 25 TABLET, FILM COATED, EXTENDED RELEASE ORAL at 21:53

## 2017-02-25 RX ADMIN — MYCOPHENOLATE MOFETIL 900 MG: 500 TABLET ORAL at 08:55

## 2017-02-25 RX ADMIN — CYCLOSPORINE 1 DROP: 0.5 EMULSION OPHTHALMIC at 21:52

## 2017-02-25 RX ADMIN — CYCLOBENZAPRINE HYDROCHLORIDE 10 MG: 10 TABLET, FILM COATED ORAL at 21:31

## 2017-02-25 RX ADMIN — SUCRALFATE 1 G: 1 SUSPENSION ORAL at 21:32

## 2017-02-25 RX ADMIN — MORPHINE SULFATE 15 MG: 15 TABLET ORAL at 15:26

## 2017-02-25 RX ADMIN — BUTALBITAL, ACETAMINOPHEN AND CAFFEINE 1 TABLET: 50; 325; 40 TABLET ORAL at 15:26

## 2017-02-25 RX ADMIN — MORPHINE SULFATE 15 MG: 15 TABLET ORAL at 09:00

## 2017-02-25 RX ADMIN — SODIUM CHLORIDE 40 MG: 9 INJECTION INTRAMUSCULAR; INTRAVENOUS; SUBCUTANEOUS at 08:55

## 2017-02-25 RX ADMIN — SUCRALFATE 1 G: 1 SUSPENSION ORAL at 12:47

## 2017-02-25 RX ADMIN — DULOXETINE 30 MG: 30 CAPSULE, DELAYED RELEASE ORAL at 08:54

## 2017-02-25 RX ADMIN — POLYETHYLENE GLYCOL 3350 17 G: 17 POWDER, FOR SOLUTION ORAL at 21:30

## 2017-02-25 RX ADMIN — ROPINIROLE HYDROCHLORIDE 0.25 MG: 0.25 TABLET, FILM COATED ORAL at 08:55

## 2017-02-25 NOTE — ROUTINE PROCESS
Bedside and Verbal shift change report given to Maria D Rojas (oncoming nurse) by Denae Beltran RN   (offgoing nurse). Report included the following information SBAR, Kardex, MAR and Recent Results.

## 2017-02-25 NOTE — PROGRESS NOTES
Shift summary: pt pleasant and cooperative with care. Pt had multiple watery green stools with some solid stool present this shift. No c/o of abdominal cramping. Pt tolerating full liquid diet well. Pt taking prn pain medication as needed for chronic pain.

## 2017-02-25 NOTE — PROGRESS NOTES
1222: Shift assessment complete, see doc flowsheet. Patient is alert and oriented x4. Patient is a paraplegic, does not walk but does have feeling bilaterally. Patients heels are soft and red, patients heels are floated in boots. Patient has a sacrum ulcer which is being treated and she reports it is getting better. Mepilex over sacrum. 1300: Patient having loose bowl movement green in color, liquid all over bed. Cleaned sheets, bed pad and patient. 1540: Patient had another bowel movement. Sheets and bed changed. 1900: Patient had a bowel movement, cleaned bed and patient. 1930: Shift summary: Patient had uneventful shift. Patient did not ambulate. Patient attempted to have a colonoscopy today but was not able to because of stool. Patient requested enema but did not have one because of loose stool all afternoon. No issues/concerns at this time.

## 2017-02-25 NOTE — PROGRESS NOTES
Shift Progress Note:  Assumed care of patient in bed awake and alert VSS uneventful night. Continues to stool liquid green contents. Remains on clear liquids, call bell within reqach.   Patient Vitals for the past 12 hrs:   Temp Pulse Resp BP SpO2   02/25/17 0437 97.4 °F (36.3 °C) 77 20 132/65 98 %   02/24/17 2357 97.2 °F (36.2 °C) 73 20 119/62 98 %   02/24/17 2052 98.7 °F (37.1 °C) 71 20 123/72 99 %

## 2017-02-25 NOTE — PROGRESS NOTES
Problem: Mobility Impaired (Adult and Pediatric)  Goal: *Acute Goals and Plan of Care (Insert Text)  Physical Therapy Goals  Initiated 2/23/2017 and to be accomplished within 7 day(s)  1. Patient will move from supine to sit and sit to supine in bed with supervision/set-up. 2. Patient will transfer from bed to chair and chair to bed with maximal assistance using the least restrictive device. Outcome: Progressing Towards Goal  PHYSICAL THERAPY TREATMENT     Patient: Frederick Yost (22 y.o. female)  Date: 2/25/2017  Diagnosis: GI (gastrointestinal bleed)  Gingival bleeding  Chronic anticoagulation  RECTAL BLEEDING  NA GI (gastrointestinal bleed)  Procedure(s) (LRB):  EGD (N/A)  COLONOSCOPY (N/A) 3 Days Post-Op  Precautions: Fall   Chart, physical therapy assessment, plan of care and goals were reviewed. ASSESSMENT:  Patient found supine in bed willing to work with PT. Pt sat EOB with min A and able to stay up for a total of 8 minutes . During sitting, pt loses support from left UE while this LPTA is staying SBA, pt quickly leaning to left while this LPTA grabs hold of pt. Pt's head just barely making contact with foot board. [facility] Areas [facility]) Pt returned to sitting and reports some slight headache but states \"don't worry about it. \" Pt then returned supine and requires max A x2 to scoot up in bed. Pt demonstrated her ability to roll with and encouraged to perform on own throughout day for pressure relief. Spoke with pt at length about equipment and potential goals for PT in the future. Pt reports having trouble finding equipment she can afford. Pt provided with information for [facility] which may be able to assist with equipment. Also reviewed slide board transfers, pt reports hearing about a [employer] which she is interested in using for transfers in the future. Once pt's sitting balance is increased she will be a good candidate for slide board transfers.  Provided pt access to this LPTA's cell phone to call her  as the land line in her room is currently not functioning correctly. Pt left supine with needs in reach and Prevlon pressure relief boots applied. Pt is very talkative and requires some VCs to stay on task. Education: therex, bed mobility, PT goals. Progression toward goals:  [ ]      Improving appropriately and progressing toward goals  [X]      Improving slowly and progressing toward goals  [ ]      Not making progress toward goals and plan of care will be adjusted       PLAN:  Patient continues to benefit from skilled intervention to address the above impairments. Continue treatment per established plan of care. Discharge Recommendations:  Rehab / SNF  Further Equipment Recommendations for Discharge:  Slide board       SUBJECTIVE:   Patient stated Nathaniel Byrne had a neighbor who helped me work my hands back to what I have now.       OBJECTIVE DATA SUMMARY:   Critical Behavior:  Neurologic State: Alert  Orientation Level: Oriented X4  Cognition: Appropriate decision making, Appropriate for age attention/concentration, Appropriate safety awareness, Follows commands  Safety/Judgement: Awareness of environment, Fall prevention, Good awareness of safety precautions, Insight into deficits  Functional Mobility Training:  Bed Mobility:  Rolling: Supervision  Supine to Sit: Minimum assistance; Additional time  Sit to Supine: Moderate assistance; Additional time  Scooting: Maximum assistance;Assist x2  Balance:  Sitting: Impaired  Sitting - Static: Poor (constant support)  Sitting - Dynamic: Poor (constant support)  Pain:  \"A little\" headache. Activity Tolerance:   Fair  Please refer to the flowsheet for vital signs taken during this treatment.   After treatment:   [ ] Patient left in no apparent distress sitting up in chair  [X] Patient left in no apparent distress in bed  [X] Call bell left within reach  [ ] Nursing notified  [ ] Caregiver present  [ ] Bed alarm activated      [place], PTA   Time Calculation: 69 mins

## 2017-02-25 NOTE — PROGRESS NOTES
Bedside and Verbal shift change report given to JOSÉ LUIS Lim RN (oncoming nurse) by Carlton Zavaleta RN (offgoing nurse). Report included the following information SBAR and Kardex.      Patient Vitals for the past 12 hrs:   Temp Pulse Resp BP SpO2   02/25/17 0822 98.7 °F (37.1 °C) 76 20 115/60 94 %   02/25/17 0437 97.4 °F (36.3 °C) 77 20 132/65 98 %   02/24/17 2357 97.2 °F (36.2 °C) 73 20 119/62 98 %

## 2017-02-25 NOTE — PROGRESS NOTES
Hospitalist Progress Note    Patient: Jose Hopper MRN: [id number]  CSN: [id number]    YOB: 1950  Age: 78 y.o. Sex: female    DOA: 2/18/2017 LOS:  LOS: 7 days          Chief Complaint:          Assessment/Plan     GI bleed. Anemia acute blood loss. UTI. Decub ulcer poa. Seizure disorder. Chronicopiod constipation. Hx DVT/filter. Chronic pain. Resting comfortably. Stable. Awaiting cscope and egd Monday.l    Wants to resume amitiza. Will ask GI if this is ok. Patient Active Problem List   Diagnosis Code    Bipolar 1 disorder ([facility] [address] F31.9    Decubitus ulcer of sacral area L89.159    Anemia D64.9    UTI (urinary tract infection) N39.0    Severe protein-calorie malnutrition ([facility] [address] E43    Seizure disorder ([facility] [address] G40.909    Depression F32.9    Hypotension, unspecified I95.9    Personal history of DVT (deep vein thrombosis) Z86.718    Hypomagnesemia E83.42    Unspecified constipation K59.00    Gingival bleeding K06.8    GI (gastrointestinal bleed) K92.2    Chronic anticoagulation Z79.01    Epistaxis R04.0       Subjective:    No complaints. Feeling well. Awaitign cscoep and egd Monday,. Review of systems:    Constitutional: denies fevers, chills, myalgias  Respiratory: denies SOB, cough  Cardiovascular: denies chest pain, palpitations  Gastrointestinal: denies nausea, vomiting, diarrhea      Vital signs/Intake and Output:  Visit Vitals    /60 (BP 1 Location: Right arm, BP Patient Position: At rest)    Pulse 76    Temp 98.7 °F (37.1 °C)    Resp 20    Ht 5' 6\" (1.676 m)    Wt 69.3 kg (152 lb 12.8 oz)    SpO2 94%    BMI 24.66 kg/m2     Current Shift:  02/25 0701 - 02/25 1900  In: 360 [P.O.:360]  Out: 500 [Urine:500]  Last three shifts:  02/23 1901 - 02/25 0700  In: 2148.3 [P.O.:950;  I.V.:1198.3]  Out: 5550 [Urine:5550]    Exam:    General: Well developed, alert, NAD, OX3  Head/Neck: NCAT, supple, No masses, No lymphadenopathy  CVS:Regular rate and rhythm, no M/R/G, S1/S2 heard, no thrill  Lungs:Clear to auscultation bilaterally, no wheezes, rhonchi, or rales  Abdomen: Soft,bs+                Labs: Results:       Chemistry Recent Labs      02/24/17 0223 02/23/17 0218   GLU  91  109*   NA  142  141   K  4.1  3.1*   CL  105  105   CO2  29  30   BUN  2*  1*   CREA  0.41*  0.36*   CA  7.8*  7.8*   AGAP  8  6   BUCR  5*  3*      CBC w/Diff Recent Labs      02/23/17 0218   WBC  6.0   RBC  3.04*   HGB  8.5*   HCT  25.7*   PLT  274      Cardiac Enzymes No results for input(s): CPK, CKND1, PAOLA in the last 72 hours. No lab exists for component: CKRMB, TROIP   Coagulation Recent Labs      02/25/17 0138 02/24/17 0223   PTP  13.2  13.9   INR  1.0  1.1       Lipid Panel No results found for: CHOL, CHOLPOCT, CHOLX, CHLST, CHOLV, [id number], HDL, LDL, NLDLCT, DLDL, LDLC, DLDLP, [id number], VLDLC, VLDL, TGL, TGLX, TRIGL, [id number], TRIGP, TGLPOCT, [id number], CHHD, CHHDX   BNP No results for input(s): BNPP in the last 72 hours. Liver Enzymes No results for input(s): TP, ALB, TBIL, AP, SGOT, GPT in the last 72 hours.     No lab exists for component: DBIL   Thyroid Studies Lab Results   Component Value Date/Time    TSH 2.73 02/23/2017 02:18 AM        Procedures/imaging: see electronic medical records for all procedures/Xrays and details which were not copied into this note but were reviewed prior to creation of Gaby Pena MD

## 2017-02-25 NOTE — ROUTINE PROCESS
Bedside and Verbal shift change report given to Hellen Krishnamurthy RN (oncoming nurse) by Solo Salomon RN (offgoing nurse). Report included the following information SBAR, Kardex, OR Summary, Procedure Summary, Intake/Output, MAR and Recent Results.

## 2017-02-25 NOTE — ROUTINE PROCESS
Bedside shift change report given to Suyapa Cox RN (oncoming nurse) by Tiana Velásquez RN   (offgoing nurse). Report included the following information SBAR, Kardex, Intake/Output, MAR and Recent Results.

## 2017-02-26 LAB
INR PPP: 1 (ref 0.8–1.2)
PROTHROMBIN TIME: 13.3 SEC (ref 11.5–15.2)

## 2017-02-26 PROCEDURE — 74011250637 HC RX REV CODE- 250/637: Performed by: FAMILY MEDICINE

## 2017-02-26 PROCEDURE — 85610 PROTHROMBIN TIME: CPT | Performed by: HOSPITALIST

## 2017-02-26 PROCEDURE — 97530 THERAPEUTIC ACTIVITIES: CPT

## 2017-02-26 PROCEDURE — 74011000258 HC RX REV CODE- 258: Performed by: HOSPITALIST

## 2017-02-26 PROCEDURE — 74011250636 HC RX REV CODE- 250/636: Performed by: HOSPITALIST

## 2017-02-26 PROCEDURE — 74011000250 HC RX REV CODE- 250: Performed by: HOSPITALIST

## 2017-02-26 PROCEDURE — C9113 INJ PANTOPRAZOLE SODIUM, VIA: HCPCS | Performed by: HOSPITALIST

## 2017-02-26 PROCEDURE — 74011250637 HC RX REV CODE- 250/637: Performed by: HOSPITALIST

## 2017-02-26 PROCEDURE — 36415 COLL VENOUS BLD VENIPUNCTURE: CPT | Performed by: HOSPITALIST

## 2017-02-26 PROCEDURE — 65270000029 HC RM PRIVATE

## 2017-02-26 PROCEDURE — 74011250637 HC RX REV CODE- 250/637: Performed by: INTERNAL MEDICINE

## 2017-02-26 PROCEDURE — 74011636637 HC RX REV CODE- 636/637: Performed by: HOSPITALIST

## 2017-02-26 RX ADMIN — MORPHINE SULFATE 15 MG: 15 TABLET ORAL at 09:35

## 2017-02-26 RX ADMIN — MORPHINE SULFATE 15 MG: 15 TABLET ORAL at 03:54

## 2017-02-26 RX ADMIN — DULOXETINE 30 MG: 30 CAPSULE, DELAYED RELEASE ORAL at 21:18

## 2017-02-26 RX ADMIN — CYCLOBENZAPRINE HYDROCHLORIDE 10 MG: 10 TABLET, FILM COATED ORAL at 21:17

## 2017-02-26 RX ADMIN — MEROPENEM 1 G: 1 INJECTION, POWDER, FOR SOLUTION INTRAVENOUS at 06:56

## 2017-02-26 RX ADMIN — GUAIFENESIN 100 MG: 100 SOLUTION ORAL at 02:06

## 2017-02-26 RX ADMIN — SUCRALFATE 1 G: 1 SUSPENSION ORAL at 21:19

## 2017-02-26 RX ADMIN — Medication 10 ML: at 17:42

## 2017-02-26 RX ADMIN — Medication 10 ML: at 21:20

## 2017-02-26 RX ADMIN — SODIUM CHLORIDE AND POTASSIUM CHLORIDE: 9; 2.98 INJECTION, SOLUTION INTRAVENOUS at 01:31

## 2017-02-26 RX ADMIN — MORPHINE SULFATE 15 MG: 15 TABLET ORAL at 17:40

## 2017-02-26 RX ADMIN — PREDNISONE 5 MG: 5 TABLET ORAL at 09:38

## 2017-02-26 RX ADMIN — POLYETHYLENE GLYCOL 3350 17 G: 17 POWDER, FOR SOLUTION ORAL at 21:23

## 2017-02-26 RX ADMIN — BUTALBITAL, ACETAMINOPHEN AND CAFFEINE 1 TABLET: 50; 325; 40 TABLET ORAL at 17:40

## 2017-02-26 RX ADMIN — CYCLOSPORINE 1 DROP: 0.5 EMULSION OPHTHALMIC at 22:00

## 2017-02-26 RX ADMIN — SODIUM CHLORIDE AND POTASSIUM CHLORIDE: 9; 2.98 INJECTION, SOLUTION INTRAVENOUS at 17:45

## 2017-02-26 RX ADMIN — MYCOPHENOLATE MOFETIL 900 MG: 500 TABLET ORAL at 17:40

## 2017-02-26 RX ADMIN — MYCOPHENOLATE MOFETIL 900 MG: 500 TABLET ORAL at 09:37

## 2017-02-26 RX ADMIN — SUCRALFATE 1 G: 1 SUSPENSION ORAL at 06:57

## 2017-02-26 RX ADMIN — DULOXETINE 30 MG: 30 CAPSULE, DELAYED RELEASE ORAL at 09:37

## 2017-02-26 RX ADMIN — SODIUM CHLORIDE 40 MG: 9 INJECTION INTRAMUSCULAR; INTRAVENOUS; SUBCUTANEOUS at 09:36

## 2017-02-26 RX ADMIN — ROPINIROLE HYDROCHLORIDE 0.75 MG: 0.25 TABLET, FILM COATED ORAL at 21:17

## 2017-02-26 RX ADMIN — BUTALBITAL, ACETAMINOPHEN AND CAFFEINE 1 TABLET: 50; 325; 40 TABLET ORAL at 09:36

## 2017-02-26 RX ADMIN — MIRABEGRON 50 MG: 25 TABLET, FILM COATED, EXTENDED RELEASE ORAL at 22:00

## 2017-02-26 RX ADMIN — CYCLOSPORINE 1 DROP: 0.5 EMULSION OPHTHALMIC at 09:37

## 2017-02-26 RX ADMIN — GUAIFENESIN 100 MG: 100 SOLUTION ORAL at 06:56

## 2017-02-26 RX ADMIN — LACOSAMIDE 100 MG: 100 TABLET, FILM COATED ORAL at 09:38

## 2017-02-26 RX ADMIN — MEROPENEM 1 G: 1 INJECTION, POWDER, FOR SOLUTION INTRAVENOUS at 19:56

## 2017-02-26 RX ADMIN — LEVOTHYROXINE SODIUM 75 MCG: 75 TABLET ORAL at 06:57

## 2017-02-26 RX ADMIN — GUAIFENESIN 100 MG: 100 SOLUTION ORAL at 21:19

## 2017-02-26 RX ADMIN — LACOSAMIDE 100 MG: 100 TABLET, FILM COATED ORAL at 21:18

## 2017-02-26 RX ADMIN — POLYETHYLENE GLYCOL 3350 17 G: 17 POWDER, FOR SOLUTION ORAL at 09:37

## 2017-02-26 RX ADMIN — FLUTICASONE PROPIONATE 2 SPRAY: 50 SPRAY, METERED NASAL at 09:37

## 2017-02-26 RX ADMIN — ROPINIROLE HYDROCHLORIDE 0.25 MG: 0.25 TABLET, FILM COATED ORAL at 09:37

## 2017-02-26 RX ADMIN — SUCRALFATE 1 G: 1 SUSPENSION ORAL at 17:40

## 2017-02-26 RX ADMIN — BUTALBITAL, ACETAMINOPHEN AND CAFFEINE 1 TABLET: 50; 325; 40 TABLET ORAL at 03:54

## 2017-02-26 RX ADMIN — GUAIFENESIN 100 MG: 100 SOLUTION ORAL at 17:52

## 2017-02-26 RX ADMIN — MYCOPHENOLATE MOFETIL 900 MG: 500 TABLET ORAL at 21:17

## 2017-02-26 RX ADMIN — OLANZAPINE 7.5 MG: 2.5 TABLET, FILM COATED ORAL at 21:17

## 2017-02-26 NOTE — ROUTINE PROCESS
Bedside and Verbal shift change report given to Comfort Brizuela RN (oncoming nurse) by Cailin Marmolejo RN (offgoing nurse). Report included the following information SBAR and Kardex.

## 2017-02-26 NOTE — PROGRESS NOTES
Problem: Mobility Impaired (Adult and Pediatric)  Goal: *Acute Goals and Plan of Care (Insert Text)  Physical Therapy Goals  Initiated 2/23/2017 and to be accomplished within 7 day(s)  1. Patient will move from supine to sit and sit to supine in bed with supervision/set-up. 2. Patient will transfer from bed to chair and chair to bed with maximal assistance using the least restrictive device. Outcome: Progressing Towards Goal  PHYSICAL THERAPY TREATMENT     Patient: Nick Malcolm (60 y.o. female)  Date: 2/26/2017  Diagnosis: GI (gastrointestinal bleed)  Gingival bleeding  Chronic anticoagulation  RECTAL BLEEDING  NA GI (gastrointestinal bleed)  Procedure(s) (LRB):  EGD (N/A)  COLONOSCOPY (N/A) 4 Days Post-Op  Precautions: Fall   Chart, physical therapy assessment, plan of care and goals were reviewed. ASSESSMENT:  Pt remains motivated to participate. No improvement with mobility at this time. However pt able to sit EOB 5 mins. Pt reporting increase dizziness, therefore assistance back to supine, and positioned for comfort. Cont POC. Progression toward goals:  [ ]      Improving appropriately and progressing toward goals  [X]      Improving slowly and progressing toward goals  [ ]      Not making progress toward goals and plan of care will be adjusted       PLAN:  Patient continues to benefit from skilled intervention to address the above impairments. Continue treatment per established plan of care.   Discharge Recommendations:  Rehab  Further Equipment Recommendations for Discharge:  rolling walker       SUBJECTIVE:   Patient stated  I'm on a liquid diet so I dont have much energy   OBJECTIVE DATA SUMMARY:   Critical Behavior:  Neurologic State: Alert  Orientation Level: Oriented X4  Cognition: Appropriate for age attention/concentration, Follows commands  Safety/Judgement: Awareness of environment, Fall prevention, Good awareness of safety precautions, Insight into deficits  Functional Mobility Training:  Bed Mobility:  Rolling: Supervision  Supine to Sit: Minimum assistance; Additional time  Sit to Supine: Moderate assistance; Additional time  Scooting: Maximum assistance;Assist x2  Balance:  Sitting: Impaired  Sitting - Static: Poor (constant support)  Sitting - Dynamic: Poor (constant support)  Standing:  (NT)     Pain:  Pain Scale 1: Visual  Pain Intensity 1: 10  Pain Location 1: Back;Head  Pain Description 1: Stabbing  Pain Intervention(s) 1: Medication (see MAR)  Activity Tolerance:   Fair      After treatment:   [ ] Patient left in no apparent distress sitting up in chair  [X] Patient left in no apparent distress in bed  [X] Call bell left within reach  [ ] Nursing notified  [ ] Caregiver present  [ ] Bed alarm activated      Stas Willoughby PTA   Time Calculation: 27 mins

## 2017-02-26 NOTE — PROGRESS NOTES
Shift summary: pt pleasant and cooperative with care. Spoke with Dr. Sharon Munoz about prep for virtual colonoscopy. He said to speak with radiology about prep. Family at bedside this afternoon. Pt rec'd prn pain medication as needed.

## 2017-02-26 NOTE — ROUTINE PROCESS
Bedside and Verbal shift change report given to Imelda Butt RN (oncoming nurse) by Robin Rogers RN (offgoing nurse). Report included the following information SBAR, Kardex, ED Summary, Procedure Summary, Intake/Output, MAR, Recent Results and Med Rec Status.

## 2017-02-26 NOTE — PROGRESS NOTES
0120-Resting quietly in bed, eyes closed, arouses easily. Stable. Mepilex in place to sacral area; intact, no drainage. Skin intact. Gotti catheter in place; patent, draining. Call light and personal items within reach. Assessment complete. 0206-Medicated per request.  Stable. 0351-No change from initial assessment. Stable. Resting quietly in bed. 0530-Patient moved from room 304 to 302, problem with telephone connection. Stable. 0730-Resting in bed. No complaints. Shift Summary:  Uneventful shift. Stable. Golytely not available on unit this morning to mix and put in frig to chill prior to administering today; day shift  Nurse is aware.

## 2017-02-26 NOTE — ROUTINE PROCESS
Bedside and Verbal shift change report given to Priscilla Santiago RN (oncoming nurse) by Imelda Butt RN (offgoing nurse). Report included the following information SBAR and Kardex.

## 2017-02-26 NOTE — ROUTINE PROCESS
Bedside shift change report given to Aubree Tello RN (oncoming nurse) by Bautista Ramirez RN   (offgoing nurse). Report included the following information SBAR, Kardex, I&O, recent results.

## 2017-02-26 NOTE — PROGRESS NOTES
Hospitalist Progress Note    Patient: Lisa Jones MRN: [id number]  CSN: [id number]    YOB: 1950  Age: 78 y.o. Sex: female    DOA: 2/18/2017 LOS:  LOS: 8 days          Chief Complaint: sore throat. Assessment/Plan       GI bleed. Anemia acute blood loss. UTI. Decub ulcer poa. Seizure disorder. Chronicopiod constipation. Hx DVT/filter. Chronic pain. Resting comfortably. Stable. Awaiting cscope and egd Monday. I do not see an order in for prep. Defer to GI on this matter. On clear diet. Worst case scenario she can get it same day as on clears for days. Wants to resume amitiza. Per GI.    URI. Follow. Usually viral.    DVT px . Patient Active Problem List   Diagnosis Code    Bipolar 1 disorder ([facility] [address] F31.9    Decubitus ulcer of sacral area L89.159    Anemia D64.9    UTI (urinary tract infection) N39.0    Severe protein-calorie malnutrition ([facility] [address] E43    Seizure disorder ([facility] [address] G40.909    Depression F32.9    Hypotension, unspecified I95.9    Personal history of DVT (deep vein thrombosis) Z86.718    Hypomagnesemia E83.42    Unspecified constipation K59.00    Gingival bleeding K06.8    GI (gastrointestinal bleed) K92.2    Chronic anticoagulation Z79.01    Epistaxis R04.0       Subjective:    Seen and examined. Feeling well. No cp or headache. Review of systems:    Constitutional: No fever  Respiratory: _ sore throat. Cardiovascular: denies chest pain, palpitations  Gastrointestinal: denies nausea, vomiting, diarrhea      Vital signs/Intake and Output:  Visit Vitals    /66 (BP 1 Location: Right arm, BP Patient Position: At rest)    Pulse 81    Temp 98.5 °F (36.9 °C)    Resp 16    Ht 5' 6\" (1.676 m)    Wt 69.3 kg (152 lb 12.8 oz)    SpO2 99%    BMI 24.66 kg/m2     Current Shift:     Last three shifts:  02/24 1901 - 02/26 0700  In: 3019.1 [P.O.:1260;  I.V.:1759.1]  Out: 8375 [Urine:8375]    Exam:    General: nad  Head/Neck:mmm  CVS:s1s2 rrr  Lungs Clear. No wheezing. Abdomen: Soft, bs+, old scar on abdomen. Extremities: No edema. Labs: Results:       Chemistry Recent Labs      02/24/17   0223   GLU  91   NA  142   K  4.1   CL  105   CO2  29   BUN  2*   CREA  0.41*   CA  7.8*   AGAP  8   BUCR  5*      CBC w/Diff No results for input(s): WBC, RBC, HGB, HCT, PLT, GRANS, LYMPH, EOS, HGBEXT, HCTEXT, PLTEXT in the last 72 hours. Cardiac Enzymes No results for input(s): CPK, CKND1, PAOLA in the last 72 hours. No lab exists for component: CKRMB, TROIP   Coagulation Recent Labs      02/26/17   0255  02/25/17   0138   PTP  13.3  13.2   INR  1.0  1.0       Lipid Panel No results found for: CHOL, CHOLPOCT, CHOLX, CHLST, CHOLV, [id number], HDL, LDL, NLDLCT, DLDL, LDLC, DLDLP, [id number], VLDLC, VLDL, TGL, TGLX, TRIGL, [id number], TRIGP, TGLPOCT, [id number], CHHD, CHHDX   BNP No results for input(s): BNPP in the last 72 hours. Liver Enzymes No results for input(s): TP, ALB, TBIL, AP, SGOT, GPT in the last 72 hours.     No lab exists for component: DBIL   Thyroid Studies Lab Results   Component Value Date/Time    TSH 2.73 02/23/2017 02:18 AM        Procedures/imaging: see electronic medical records for all procedures/Xrays and details which were not copied into this note but were reviewed prior to creation of Cristian Solorio MD

## 2017-02-27 LAB
ANION GAP BLD CALC-SCNC: 7 MMOL/L (ref 3–18)
BASOPHILS # BLD AUTO: 0 K/UL (ref 0–0.06)
BASOPHILS # BLD: 0 % (ref 0–2)
BUN SERPL-MCNC: 3 MG/DL (ref 7–18)
BUN/CREAT SERPL: 5 (ref 12–20)
CALCIUM SERPL-MCNC: 8.1 MG/DL (ref 8.5–10.1)
CHLORIDE SERPL-SCNC: 106 MMOL/L (ref 100–108)
CO2 SERPL-SCNC: 26 MMOL/L (ref 21–32)
CREAT SERPL-MCNC: 0.59 MG/DL (ref 0.6–1.3)
DIFFERENTIAL METHOD BLD: ABNORMAL
EOSINOPHIL # BLD: 0.4 K/UL (ref 0–0.4)
EOSINOPHIL NFR BLD: 6 % (ref 0–5)
ERYTHROCYTE [DISTWIDTH] IN BLOOD BY AUTOMATED COUNT: 17.4 % (ref 11.6–14.5)
GLUCOSE SERPL-MCNC: 90 MG/DL (ref 74–99)
HCT VFR BLD AUTO: 32.6 % (ref 35–45)
HGB BLD-MCNC: 10.1 G/DL (ref 12–16)
INR PPP: 1.1 (ref 0.8–1.2)
LYMPHOCYTES # BLD AUTO: 8 % (ref 21–52)
LYMPHOCYTES # BLD: 0.6 K/UL (ref 0.9–3.6)
MCH RBC QN AUTO: 26.9 PG (ref 24–34)
MCHC RBC AUTO-ENTMCNC: 31 G/DL (ref 31–37)
MCV RBC AUTO: 86.9 FL (ref 74–97)
MONOCYTES # BLD: 0.6 K/UL (ref 0.05–1.2)
MONOCYTES NFR BLD AUTO: 9 % (ref 3–10)
NEUTS SEG # BLD: 5.1 K/UL (ref 1.8–8)
NEUTS SEG NFR BLD AUTO: 77 % (ref 40–73)
PLATELET # BLD AUTO: 350 K/UL (ref 135–420)
PMV BLD AUTO: 8.6 FL (ref 9.2–11.8)
POTASSIUM SERPL-SCNC: 4.7 MMOL/L (ref 3.5–5.5)
PROTHROMBIN TIME: 13.6 SEC (ref 11.5–15.2)
RBC # BLD AUTO: 3.75 M/UL (ref 4.2–5.3)
SODIUM SERPL-SCNC: 139 MMOL/L (ref 136–145)
WBC # BLD AUTO: 6.7 K/UL (ref 4.6–13.2)

## 2017-02-27 PROCEDURE — 74011000255 HC RX REV CODE- 255: Performed by: FAMILY MEDICINE

## 2017-02-27 PROCEDURE — 74011250637 HC RX REV CODE- 250/637: Performed by: INTERNAL MEDICINE

## 2017-02-27 PROCEDURE — 74011250637 HC RX REV CODE- 250/637: Performed by: HOSPITALIST

## 2017-02-27 PROCEDURE — 85610 PROTHROMBIN TIME: CPT | Performed by: HOSPITALIST

## 2017-02-27 PROCEDURE — 74011000258 HC RX REV CODE- 258: Performed by: HOSPITALIST

## 2017-02-27 PROCEDURE — 74011250636 HC RX REV CODE- 250/636: Performed by: HOSPITALIST

## 2017-02-27 PROCEDURE — C9113 INJ PANTOPRAZOLE SODIUM, VIA: HCPCS | Performed by: HOSPITALIST

## 2017-02-27 PROCEDURE — 97530 THERAPEUTIC ACTIVITIES: CPT

## 2017-02-27 PROCEDURE — 65270000029 HC RM PRIVATE

## 2017-02-27 PROCEDURE — 74011250637 HC RX REV CODE- 250/637: Performed by: FAMILY MEDICINE

## 2017-02-27 PROCEDURE — 85025 COMPLETE CBC W/AUTO DIFF WBC: CPT | Performed by: HOSPITALIST

## 2017-02-27 PROCEDURE — 74011636637 HC RX REV CODE- 636/637: Performed by: HOSPITALIST

## 2017-02-27 PROCEDURE — 74011000250 HC RX REV CODE- 250: Performed by: HOSPITALIST

## 2017-02-27 PROCEDURE — 36415 COLL VENOUS BLD VENIPUNCTURE: CPT | Performed by: HOSPITALIST

## 2017-02-27 PROCEDURE — 97535 SELF CARE MNGMENT TRAINING: CPT

## 2017-02-27 PROCEDURE — 80048 BASIC METABOLIC PNL TOTAL CA: CPT | Performed by: HOSPITALIST

## 2017-02-27 RX ORDER — MAGNESIUM CITRATE
296 SOLUTION, ORAL ORAL
Status: COMPLETED | OUTPATIENT
Start: 2017-02-27 | End: 2017-02-27

## 2017-02-27 RX ORDER — PANTOPRAZOLE SODIUM 40 MG/1
40 TABLET, DELAYED RELEASE ORAL
Status: DISCONTINUED | OUTPATIENT
Start: 2017-02-28 | End: 2017-03-01 | Stop reason: HOSPADM

## 2017-02-27 RX ORDER — GUAIFENESIN 600 MG/1
600 TABLET, EXTENDED RELEASE ORAL EVERY 12 HOURS
Status: DISCONTINUED | OUTPATIENT
Start: 2017-02-27 | End: 2017-03-01 | Stop reason: HOSPADM

## 2017-02-27 RX ADMIN — GUAIFENESIN 600 MG: 600 TABLET, EXTENDED RELEASE ORAL at 15:41

## 2017-02-27 RX ADMIN — SODIUM CHLORIDE AND POTASSIUM CHLORIDE: 9; 2.98 INJECTION, SOLUTION INTRAVENOUS at 18:10

## 2017-02-27 RX ADMIN — LACOSAMIDE 100 MG: 100 TABLET, FILM COATED ORAL at 10:17

## 2017-02-27 RX ADMIN — SUCRALFATE 1 G: 1 SUSPENSION ORAL at 06:46

## 2017-02-27 RX ADMIN — LEVOTHYROXINE SODIUM 75 MCG: 75 TABLET ORAL at 06:46

## 2017-02-27 RX ADMIN — MEROPENEM 1 G: 1 INJECTION, POWDER, FOR SOLUTION INTRAVENOUS at 18:08

## 2017-02-27 RX ADMIN — GUAIFENESIN 100 MG: 100 SOLUTION ORAL at 18:07

## 2017-02-27 RX ADMIN — CYCLOBENZAPRINE HYDROCHLORIDE 10 MG: 10 TABLET, FILM COATED ORAL at 23:04

## 2017-02-27 RX ADMIN — BARIUM SULFATE 20 ML: 400 SUSPENSION ORAL at 04:48

## 2017-02-27 RX ADMIN — BARIUM SULFATE 20 ML: 400 SUSPENSION ORAL at 07:50

## 2017-02-27 RX ADMIN — Medication 10 ML: at 15:41

## 2017-02-27 RX ADMIN — SODIUM CHLORIDE 40 MG: 9 INJECTION INTRAMUSCULAR; INTRAVENOUS; SUBCUTANEOUS at 10:17

## 2017-02-27 RX ADMIN — MORPHINE SULFATE 15 MG: 15 TABLET ORAL at 07:50

## 2017-02-27 RX ADMIN — MORPHINE SULFATE 15 MG: 15 TABLET ORAL at 15:40

## 2017-02-27 RX ADMIN — MIRABEGRON 50 MG: 25 TABLET, FILM COATED, EXTENDED RELEASE ORAL at 23:04

## 2017-02-27 RX ADMIN — PREDNISONE 5 MG: 5 TABLET ORAL at 10:17

## 2017-02-27 RX ADMIN — SUCRALFATE 1 G: 1 SUSPENSION ORAL at 15:40

## 2017-02-27 RX ADMIN — MORPHINE SULFATE 15 MG: 15 TABLET ORAL at 21:20

## 2017-02-27 RX ADMIN — GUAIFENESIN 100 MG: 100 SOLUTION ORAL at 05:16

## 2017-02-27 RX ADMIN — Medication 10 ML: at 21:24

## 2017-02-27 RX ADMIN — DULOXETINE 30 MG: 30 CAPSULE, DELAYED RELEASE ORAL at 20:48

## 2017-02-27 RX ADMIN — BUTALBITAL, ACETAMINOPHEN AND CAFFEINE 1 TABLET: 50; 325; 40 TABLET ORAL at 21:20

## 2017-02-27 RX ADMIN — CITROMA MAGNESIUM CITRATE 296 ML: 1.75 LIQUID ORAL at 07:12

## 2017-02-27 RX ADMIN — MYCOPHENOLATE MOFETIL 900 MG: 500 TABLET ORAL at 10:17

## 2017-02-27 RX ADMIN — DULOXETINE 30 MG: 30 CAPSULE, DELAYED RELEASE ORAL at 10:17

## 2017-02-27 RX ADMIN — MYCOPHENOLATE MOFETIL 900 MG: 500 TABLET ORAL at 15:40

## 2017-02-27 RX ADMIN — ROPINIROLE HYDROCHLORIDE 0.75 MG: 0.25 TABLET, FILM COATED ORAL at 21:19

## 2017-02-27 RX ADMIN — BARIUM SULFATE 20 ML: 400 SUSPENSION ORAL at 00:34

## 2017-02-27 RX ADMIN — SUCRALFATE 1 G: 1 SUSPENSION ORAL at 13:13

## 2017-02-27 RX ADMIN — CYCLOSPORINE 1 DROP: 0.5 EMULSION OPHTHALMIC at 09:00

## 2017-02-27 RX ADMIN — SODIUM CHLORIDE AND POTASSIUM CHLORIDE: 9; 2.98 INJECTION, SOLUTION INTRAVENOUS at 04:49

## 2017-02-27 RX ADMIN — POLYETHYLENE GLYCOL 3350 17 G: 17 POWDER, FOR SOLUTION ORAL at 20:50

## 2017-02-27 RX ADMIN — MYCOPHENOLATE MOFETIL 900 MG: 500 TABLET ORAL at 21:20

## 2017-02-27 RX ADMIN — MAGESIUM CITRATE 296 ML: 1.75 LIQUID ORAL at 04:49

## 2017-02-27 RX ADMIN — SUCRALFATE 1 G: 1 SUSPENSION ORAL at 21:19

## 2017-02-27 RX ADMIN — BUTALBITAL, ACETAMINOPHEN AND CAFFEINE 1 TABLET: 50; 325; 40 TABLET ORAL at 07:50

## 2017-02-27 RX ADMIN — FLUTICASONE PROPIONATE 2 SPRAY: 50 SPRAY, METERED NASAL at 10:18

## 2017-02-27 RX ADMIN — MEROPENEM 1 G: 1 INJECTION, POWDER, FOR SOLUTION INTRAVENOUS at 06:46

## 2017-02-27 RX ADMIN — ROPINIROLE HYDROCHLORIDE 0.25 MG: 0.25 TABLET, FILM COATED ORAL at 10:16

## 2017-02-27 RX ADMIN — CYCLOSPORINE 1 DROP: 0.5 EMULSION OPHTHALMIC at 20:48

## 2017-02-27 RX ADMIN — BUTALBITAL, ACETAMINOPHEN AND CAFFEINE 1 TABLET: 50; 325; 40 TABLET ORAL at 15:40

## 2017-02-27 RX ADMIN — FERROUS SULFATE TAB 325 MG (65 MG ELEMENTAL FE) 325 MG: 325 (65 FE) TAB at 13:13

## 2017-02-27 RX ADMIN — LACOSAMIDE 100 MG: 100 TABLET, FILM COATED ORAL at 20:49

## 2017-02-27 RX ADMIN — OLANZAPINE 7.5 MG: 2.5 TABLET, FILM COATED ORAL at 21:19

## 2017-02-27 NOTE — PROGRESS NOTES
Shift Summary:  Uneventful shift. Patient aware of virtual colonoscopy today, and bowel prep started. Will resume last dose of barium and magnesium citrate by 0700. Patient has not had BM at this time. 0800 Patient required Fiorcet 1 tablet po, and Morphine IR 15 mg po overnight for generalized pain rating at 10/10.

## 2017-02-27 NOTE — PROGRESS NOTES
Problem: Mobility Impaired (Adult and Pediatric)  Goal: *Acute Goals and Plan of Care (Insert Text)  Physical Therapy Goals  Initiated 2/23/2017 and to be accomplished within 7 day(s)  1. Patient will move from supine to sit and sit to supine in bed with supervision/set-up. 2. Patient will transfer from bed to chair and chair to bed with maximal assistance using the least restrictive device. Outcome: Progressing Towards Goal  PHYSICAL THERAPY TREATMENT     Patient: Oliver Cunha (11 y.o. female)  Date: 2/27/2017  Diagnosis: GI (gastrointestinal bleed)  Gingival bleeding  Chronic anticoagulation  RECTAL BLEEDING  NA GI (gastrointestinal bleed)  Procedure(s) (LRB):  EGD (N/A)  COLONOSCOPY (N/A) 5 Days Post-Op  Precautions: Fall  Chart, physical therapy assessment, plan of care and goals were reviewed. ASSESSMENT:  Pt with increase weakness and fatigue today. Pt incontinent of stool. Rolling to R Supervision, Rolling to left Min A. Pt requires more assistance today with bed mobility supine<>sit EOB Max A x2. Total A needed to maintain static sitting. Pt with increase fatigue and reporting dizziness. Pt sitting EOB less than 5 mins. Assisted pt back to supine. Cont POC. Progression toward goals:  [ ]      Improving appropriately and progressing toward goals  [X]      Improving slowly and progressing toward goals  [ ]      Not making progress toward goals and plan of care will be adjusted       PLAN:  Patient continues to benefit from skilled intervention to address the above impairments. Continue treatment per established plan of care.   Discharge Recommendations:  Rehab  Further Equipment Recommendations for Discharge:  hospital bed       SUBJECTIVE:   Patient stated  Girls I just dont feel well today        OBJECTIVE DATA SUMMARY:   Critical Behavior:  Neurologic State: Alert, Appropriate for age  Orientation Level: Oriented X4  Cognition: Appropriate for age attention/concentration, Appropriate decision making, Appropriate safety awareness, Follows commands  Safety/Judgement: Awareness of environment, Fall prevention, Good awareness of safety precautions, Insight into deficits  Functional Mobility Training:  Bed Mobility:  Supine to Sit: Maximum assistance;Assist x2  Sit to Supine: Maximum assistance;Assist x2  Scooting: Maximum assistance;Assist x2  Balance:  Sitting: Impaired  Sitting - Static: Poor (constant support)  Sitting - Dynamic: Poor (constant support)                Pain:  Pain Scale 1: Numeric (0 - 10)  Pain Intensity 1: 2  Pain Location 1: Other (comment) (generalized)  Pain Orientation 1: Anterior;Left;Posterior;Right  Pain Description 1: Aching  Pain Intervention(s) 1: Medication (see MAR); Repositioned  Activity Tolerance:   Fair   After treatment:   [ ] Patient left in no apparent distress sitting up in chair  [X] Patient left in no apparent distress in bed  [X] Call bell left within reach  [ ] Nursing notified  [ ] Caregiver present  [ ] Bed alarm activated      Pernell Bain PTA   Time Calculation: 29 mins

## 2017-02-27 NOTE — PROGRESS NOTES
GI note    I was called by the nurse in the afternoon about CTC (Virtual colon prep) and I informed that this should be ordered by the radiologist. I was again called now to enter an order for virtual colon. I have entered the order for CTC (Virtual colon) in the chart but the prep will need to be ordered by the radiology dept - GI physicians are typically not familiar (I am certainly not) with CTC prep protocols as these are defined by the radiologists. It is not the same as a colonoscopy prep as different protocols are used by different radiology depts to tag the stool.     Sonia Felix MD

## 2017-02-27 NOTE — PROGRESS NOTES
Pt demanding to speak with hospitalist regarding bowel prep which she had been told (by  according to pt) she would be receiving early in the evening for a virtual colonoscopy that is supposed to be scheduled for tomorrow. However, there is no order for a bowel prep or virtual colonoscopy. Additionally,  [facility], Benny Núñez, she spoke with Alfredo Weiss about getting a bowel prep ordered for virtual colonoscopy but had referred her to talk with radiologist instead. A bowel prep (recommended by radiologist) was delivered to unit around 1630 by CT staff and she had explained that the prep should have been started eariler in the morning at 0800, 1200,  1800. Per Xiomara Johnson, it was  recommended to her that Dr. Young Pugh should further clarify what exactly needs to be done and he is expected to be on the unit tomorrow. Explained all this to pt but pt still demanded clarification now and had threatened to [facility] for \"incompetence\". Spoke with Neno Andrews RN (orthopedic ) who happens to be making her weekend round on the unit, and asked for her assistance  in resolving the issue at hand. Nursing supervisor, Pearl Petty was also notified by Teagan Wakefield for further assistance. Teagan Wakefield had lengthy conversation with pt and also spoke with CT staff. Alfredo Weiss was then notified to obtain order for virtual colonoscopy and bowel prep as requested by nursing supervisor. Alfredo Weiss only willing to give order for virtual colonoscopy because he is not familiar with type of bowel prep for this kind of colonoscopy and went on to explain that it should be the radiologist who should be ordering the prep. Explained this to nursing supervisor, Andrew Sneed, and she then obtained order for bowel prep from hospitalist on call Dr.Hussaine reilly.

## 2017-02-27 NOTE — ANESTHESIA POSTPROCEDURE EVALUATION
Post-Anesthesia Evaluation and Assessment    Cardiovascular Function/Vital Signs  Visit Vitals    /63    Pulse 85    Temp 36.6 °C (97.8 °F)    Resp 16    Ht 5' 6\" (1.676 m)    Wt 73.7 kg (162 lb 6.4 oz)    SpO2 99%    BMI 26.21 kg/m2       Patient is status post Procedure(s):  EGD  COLONOSCOPY. Nausea/Vomiting: Controlled. Postoperative hydration reviewed and adequate. Pain:  Pain Scale 1: Visual (02/27/17 0035)  Pain Intensity 1: 0 (02/27/17 0035)   Managed. Neurological Status: At baseline. Mental Status and Level of Consciousness: Arousable. Pulmonary Status:   O2 Device: Room air (02/27/17 0035)   Adequate oxygenation and airway patent. Complications related to anesthesia: None    Post-anesthesia assessment completed. No concerns. Patient has met all discharge requirements.     Signed By: Zachary Correa MD    February 27, 2017

## 2017-02-27 NOTE — ROUTINE PROCESS
Bedside and Verbal shift change report given to Jenna Cao (oncoming nurse) by Sylvia Thomason (offgoing nurse). Report included the following information SBAR, Kardex, Intake/Output and MAR.

## 2017-02-27 NOTE — PROGRESS NOTES
NUTRITION FOLLOW-UP    RECOMMENDATIONS/PLAN:   - After procedures advance diet to Regular and encourage adequate intake of protein for wound healing, fiber for chronic constipation  - Continue Ensure Clear TID with meals    NUTRITION ASSESSMENT:   Client Update: 78 yrs old Female admitted with GI bleed, acute blood loss anemia, UTI with chronic indwelling catheter, stage IV pressure ulcer, chronic opioid induced constipation. On & off full liquid and clear liquid diet since adm, had colonoscopy however not successful due to poor bowel prep and redundant/tortuous colon. Pt with colonoscopy prep again over the weekend for virtual colonoscopy scheduled today.     FOOD/NUTRITION INTAKE   Diet Order:  Full Liquid   Supplements: Ensure Clear TID   Food Allergies: seafood  Average PO Intake:       % Diet Eaten   02/25/17 1801 100 %   02/25/17 0900 100 %   02/24/17 1308 50 %   02/23/17 1441 100 %     Pertinent Medications:  [x] Reviewed; NS/KCl, fioricet, flexeril, cymbalta, FeSO4, neurontin, vimpat, synthroid, abx, morphine, zyprexa, miralax, prednisone, carafate  Insulin:  []SSI  []Pre-meal   []Basal    []Drip  [x]None  Cultural/Jewish Food Preferences: None Identified ANTHROPOMETRICS  Height: 5' 6\" (167.6 cm)       Weight: 73.7 kg (162 lb 6.4 oz)         BMI: 26.2 kg/m^2 overweight (25.0%-29.9% BMI)   Adm Weight: 153 lbs                Weight change: +9 lbs    NUTRITION-FOCUSED PHYSICAL ASSESSMENT  Skin: stage II to sacrum per flowsheets      GI: no N/V, last BM 2/27- loose, watery    BIOCHEMICAL DATA & MEDICAL TESTS  Pertinent Labs:  [x] Reviewed;       NUTRITION PRESCRIPTION  Calories: 1563-7633 kcal/day based on 30-33 kcal/kg  Protein:  g/day based on 1.2-1.5 g/kg  CHO: 259 g/day based on 50% of total energy  Fluid: 3397-0290 ml/day based on 1 kcal/ml      NUTRITION DIAGNOSES:   1- At risk of inadequate oral intake related to GI bleed as evidenced by clear liquid diet is calorically inadequate - resolved  2- Increased protein needs related to wound healing as evidenced by stage II pressure ulcer present - ongoing    NUTRITION INTERVENTIONS:   INTERVENTIONS:        GOALS:  1. Advance diet, ONS TID 1. >50% PO intake meals/supplements, +wound healing by next review 3-5 days     LEARNING NEEDS (Diet, Supplementation, Food/Nutrient-Drug Interaction):   [x] None Identified     [] Education provided & documented        Identified and patient:   [] Declined      [] Was not appropriate/indicated        NUTRITION MONITORING /EVALUATION:   Follow PO intake  Monitor wt  Monitor renal labs, electrolytes, fluid status  Monitor for additional supplement needs    Previous Recommendations Implemented: yes        Previous Goals Met:  yes -progressing      [] Participated in Interdisciplinary Rounds    [x] Interdisciplinary Care Plan Reviewed  [x] Discharge Nutrition Plan:  High protein high fiber    NUTRITION RISK:           [x] At risk                        [] Not currently at risk        Will follow-up per policy.   Bean Green RD  PAGER:  [phone]

## 2017-02-27 NOTE — PROGRESS NOTES
conducted a Follow up consultation and Spiritual Assessment for Chon Gibson, who is a 78 y.o.,female. The  provided the following Interventions:  Continued the relationship of care and support. Listened empathically. Provided  prayer for patient\"s pending tests and recovery. Chart reviewed. The following outcomes were achieved:  Patient expressed gratitude for pastoral care visit. Assessment:  Patient stated she has a very supportive family and friends. She stated she hopes to have the tests and treatment completed so she can get better quicker. There are no further spiritual or Holiness issues which require Spiritual Care Services interventions at this time. Plan:  Chaplains will continue to follow and will provide pastoral care on an as needed/requested basis.  recommends bedside caregivers page  on duty if patient shows signs of acute spiritual or emotional distress. Rev.  [facility]  [phone]

## 2017-02-27 NOTE — PROGRESS NOTES
Pt has been now been declined by [facility], so she has now been declined by all 4 of the facilities which she states are the only ones she would agree to go to (the others declining last week were, [facility], [facility] and [facility]). LEVON Dpt. Continues to send updates to the pt's preferred home health agency:  [facility].

## 2017-02-27 NOTE — PROGRESS NOTES
conducted an initial consultation and Spiritual Assessment for Letty Gan, who is a 78 y.o.,female. Patients Primary Language is: [place]. According to the patients EMR Pentecostal Affiliation is: Mormon. The reason the Patient came to the hospital is:   Patient Active Problem List    Diagnosis Date Noted    Epistaxis 02/19/2017    Gingival bleeding 02/18/2017    GI (gastrointestinal bleed) 02/18/2017    Chronic anticoagulation 02/18/2017    Hypomagnesemia 09/12/2012    Unspecified constipation 09/12/2012    Personal history of DVT (deep vein thrombosis) 07/07/2012    Hypotension, unspecified 06/26/2012    Seizure disorder ([facility] [address] 06/24/2012    Depression 06/24/2012    UTI (urinary tract infection) 06/01/2012    Severe protein-calorie malnutrition ([facility] [address] 06/01/2012    Anemia 05/04/2012    Bipolar 1 disorder ([facility] 05/03/2012    Decubitus ulcer of sacral area 05/03/2012        The  provided the following Interventions:  Initiated a relationship of care and support. Explored issues of nevaeh, belief, spirituality and Rastafarian/ritual needs while hospitalized. Listened empathically. Provided chaplaincy education. Provided information about Spiritual Care Services. Offered prayer and assurance of continued prayers on patient's behalf. Chart reviewed. The following outcomes were achieved:  Patient shared limited information about both their medical narrative and spiritual journey/beliefs. Patient processed feeling about current hospitalization. Patient expressed gratitude for the 's visit. Assessment:  Patient does not have any Rastafarian/cultural needs that will affect patients preferences in health care. Patient did not indicate any spiritual or Rastafarian issues which require Spiritual Care Services interventions at this time. Plan:  Chaplains will continue to follow and will provide pastoral care on an as needed/requested basis.    recommends bedside caregivers page  on duty if patient shows signs of acute spiritual or emotional distress. INDIRA Esquivel. [place]  [phone]

## 2017-02-27 NOTE — PROGRESS NOTES
Problem: Self Care Deficits Care Plan (Adult)  Goal: *Acute Goals and Plan of Care (Insert Text)  Occupational Therapy Goals  Initiated 2/23/2017 within 7 day(s). 1. Patient will perform grooming with supervision/set-up 2. Patient will perform upper body dressing with supervision/set-up. 3. Patient will perform functional activity while seated EOB for 3-5 minutes with supervision/set-up. 4. Patient will participate in upper extremity therapeutic exercise/activities with supervision/set-up for 10 minutes. 5. Patient will utilize energy conservation techniques during functional activities with verbal cues. Outcome: Progressing Towards Goal  OCCUPATIONAL THERAPY TREATMENT     Patient: Lisa Jones (72 y.o. female)  Date: 2/27/2017  Diagnosis: GI (gastrointestinal bleed)  Gingival bleeding  Chronic anticoagulation  RECTAL BLEEDING  NA GI (gastrointestinal bleed)  Procedure(s) (LRB):  EGD (N/A)  COLONOSCOPY (N/A) 5 Days Post-Op  Precautions: Fall  Chart, occupational therapy assessment, plan of care, and goals were reviewed. ASSESSMENT:  Pt seen with PTA to maximize pt safety. Pt soiled upon entering. Pt continues to be able to perform anterior jhony-care, however required max A with posterior jhony-care. Pt completed rolling to R side in bed with supervision. Pt required max Ax2 for all transfers and scooting to [facility] today. Pt only able to tolerate sitting EOB for ~5 minutes with total A. Pt with increased c/o dizziness and weakness today as compared to previous sessions. Pt left supine in bed with CNA present as pt with another episode of incontinence.   EDUCATION; importance of continued mobility and sitting upright while in bed  Progression toward goals:  [ ]          Improving appropriately and progressing toward goals  [X]          Improving slowly and progressing toward goals  [ ]          Not making progress toward goals and plan of care will be adjusted       PLAN:  Patient continues to benefit from skilled intervention to address the above impairments. Continue treatment per established plan of care. Discharge Recommendations:  Rehab  Further Equipment Recommendations for Discharge:  TBD by next level of care       SUBJECTIVE:   Patient stated If you had nothing to eat for 8 days you'd be dizzy too. Prisoners get better treatment than this.       OBJECTIVE DATA SUMMARY:      G CODES:       Cognitive/Behavioral Status:  Neurologic State: Alert, Appropriate for age  Orientation Level: Oriented X4  Cognition: Appropriate decision making, Appropriate for age attention/concentration, Appropriate safety awareness, Follows commands  Safety/Judgement: Awareness of environment, Fall prevention, Good awareness of safety precautions, Insight into deficits  Functional Mobility and Transfers for ADLs:              Bed Mobility:  Supine to Sit: Maximum assistance;Assist x2  Sit to Supine: Maximum assistance;Assist x2  Scooting: Maximum assistance;Assist x2         Balance:  Sitting: Impaired  Sitting - Static: Poor (constant support)  Sitting - Dynamic: Poor (constant support)  ADL Intervention:  Toileting  Bowel Hygiene: Maximum assistance     Pain:  Pre Treatment:  Post Treatment:  Pain Scale 1: Numeric (0 - 10)  Pain Intensity 1: 2  Pain Location 1: Other (comment) (generalized)  Pain Orientation 1: Anterior;Left;Posterior;Right  Pain Description 1: Aching     Activity Tolerance:    fair  Please refer to the flowsheet for vital signs taken during this treatment.   After treatment:   [ ]  Patient left in no apparent distress sitting up in chair  [X]  Patient left in no apparent distress in bed  [X]  Call bell left within reach  [ ]  Nursing notified  [X]  Caregiver present  [ ]  Bed alarm activated     Antonio Chacko MS OTR/L  Time Calculation: 33 mins

## 2017-02-27 NOTE — PROGRESS NOTES
Hospitalist Progress Note    Patient: Shannon Peterson MRN: [id number]  CSN: [id number]    YOB: 1950  Age: 78 y.o. Sex: female    DOA: 2/18/2017 LOS:  LOS: 9 days          Chief Complaint:    GI bleed      Assessment/Plan     GI bleed. Resolved, Hgb stable, s/p transfusion upon admission  Anemia acute blood loss. URI-likely viral as has been on IV abx and robitussin started as well as flonas-add mucinex and chloraseptic for throat  UTI. Complete 5 days merrem-repeat cx-may be mostly colonizers with chronic indwelling catheter  Decub ulcer poa. LWC  Seizure disorder. Stable on vimpat  Chronic opioid constipation. Resume amitiza after prep completed  Hx DVT /IVC filter. Chronic pain. Await results of virtual colonoscopy  GI is apparently not available now  Hopefully if her results do not show worrisome bleeding or mass, we can start her back on her home regimen and start discharge planning for 24-48 hours    Patient Active Problem List   Diagnosis Code    Bipolar 1 disorder ([facility] [address] [facility] F31.9    Decubitus ulcer of sacral area L89.159    Anemia D64.9    UTI (urinary tract infection) N39.0    Severe protein-calorie malnutrition ([facility] [address] [facility] E43    Seizure disorder ([facility] G40.909    Depression F32.9    Hypotension, unspecified I95.9    Personal history of DVT (deep vein thrombosis) Z86.718    Hypomagnesemia E83.42    Unspecified constipation K59.00    Gingival bleeding K06.8    GI (gastrointestinal bleed) K92.2    Chronic anticoagulation Z79.01    Epistaxis R04.0       Subjective:    Tired of this prep!   Denies abd pain  feling very weak  Denies CP or SOB  Has a dry cough, post nasal gtt, and sore throat    Review of systems:    Constitutional: denies fevers, chills, myalgias  Respiratory: denies SOB  Cardiovascular: denies chest pain, palpitations  Gastrointestinal: denies nausea, vomiting    Vital signs/Intake and Output:  Visit Vitals    /44    Pulse 80    Temp 98 °F (36.7 °C)    Resp 16  Ht 5' 6\" (1.676 m)    Wt 73.7 kg (162 lb 6.4 oz)    SpO2 100%    BMI 26.21 kg/m2     Current Shift:  02/27 0701 - 02/27 1900  In: -   Out: 850 [Urine:850]  Last three shifts:  02/25 1901 - 02/27 0700  In: 2424.7 [P.O.:480; I.V.:1410.7]  Out: 9025 [Urine:9025]    Exam:    General: Well developed, alert, NAD, OX3  CVS:Regular rate and rhythm, no M/R/G, S1/S2 heard, no thrill  Lungs:Clear to auscultation bilaterally, no wheezes, rhonchi, or rales  Abdomen: Soft, Nontender, No distention, Normal Bowel sounds, No hepatomegaly  Extremities: No C/C/E, pulses palpable 2+  Neuro:grossly normal , follows commands  Psych:appropriate                Labs: Results:       Chemistry Recent Labs      02/27/17   1028   GLU  90   NA  139   K  4.7   CL  106   CO2  26   BUN  3*   CREA  0.59*   CA  8.1*   AGAP  7   BUCR  5*      CBC w/Diff Recent Labs      02/27/17   1028   WBC  6.7   RBC  3.75*   HGB  10.1*   HCT  32.6*   PLT  350   GRANS  77*   LYMPH  8*   EOS  6*      Cardiac Enzymes No results for input(s): CPK, CKND1, PAOLA in the last 72 hours. No lab exists for component: CKRMB, TROIP   Coagulation Recent Labs      02/27/17   0241  02/26/17   0255   PTP  13.6  13.3   INR  1.1  1.0       Lipid Panel No results found for: CHOL, CHOLPOCT, CHOLX, CHLST, CHOLV, [id number], HDL, LDL, NLDLCT, DLDL, LDLC, DLDLP, 294888, VLDLC, VLDL, TGL, TGLX, TRIGL, [id number], TRIGP, TGLPOCT, [id number], CHHD, CHHDX   BNP No results for input(s): BNPP in the last 72 hours. Liver Enzymes No results for input(s): TP, ALB, TBIL, AP, SGOT, GPT in the last 72 hours.     No lab exists for component: DBIL   Thyroid Studies Lab Results   Component Value Date/Time    TSH 2.73 02/23/2017 02:18 AM        Procedures/imaging: see electronic medical records for all procedures/Xrays and details which were not copied into this note but were reviewed prior to creation of Himanshu Duval MD

## 2017-02-27 NOTE — ROUTINE PROCESS
Bedside and Verbal shift change report given to Ganga Johnson (oncoming nurse) by Breanna Flaherty (offgoing nurse). Report included the following information SBAR, Kardex and MAR.

## 2017-02-28 ENCOUNTER — APPOINTMENT (OUTPATIENT)
Dept: CT IMAGING | Age: 67
DRG: 377 | End: 2017-02-28
Attending: INTERNAL MEDICINE
Payer: MEDICARE

## 2017-02-28 LAB
ANION GAP BLD CALC-SCNC: 5 MMOL/L (ref 3–18)
BUN SERPL-MCNC: 2 MG/DL (ref 7–18)
BUN/CREAT SERPL: 4 (ref 12–20)
CALCIUM SERPL-MCNC: 7.8 MG/DL (ref 8.5–10.1)
CHLORIDE SERPL-SCNC: 103 MMOL/L (ref 100–108)
CO2 SERPL-SCNC: 30 MMOL/L (ref 21–32)
CREAT SERPL-MCNC: 0.47 MG/DL (ref 0.6–1.3)
ERYTHROCYTE [DISTWIDTH] IN BLOOD BY AUTOMATED COUNT: 17.7 % (ref 11.6–14.5)
GLUCOSE SERPL-MCNC: 84 MG/DL (ref 74–99)
HCT VFR BLD AUTO: 32.1 % (ref 35–45)
HGB BLD-MCNC: 9.9 G/DL (ref 12–16)
INR PPP: 1 (ref 0.8–1.2)
MCH RBC QN AUTO: 26.4 PG (ref 24–34)
MCHC RBC AUTO-ENTMCNC: 30.8 G/DL (ref 31–37)
MCV RBC AUTO: 85.6 FL (ref 74–97)
PLATELET # BLD AUTO: 384 K/UL (ref 135–420)
PMV BLD AUTO: 9.2 FL (ref 9.2–11.8)
POTASSIUM SERPL-SCNC: 5 MMOL/L (ref 3.5–5.5)
PROTHROMBIN TIME: 12.8 SEC (ref 11.5–15.2)
RBC # BLD AUTO: 3.75 M/UL (ref 4.2–5.3)
SODIUM SERPL-SCNC: 138 MMOL/L (ref 136–145)
WBC # BLD AUTO: 3.1 K/UL (ref 4.6–13.2)

## 2017-02-28 PROCEDURE — 97530 THERAPEUTIC ACTIVITIES: CPT

## 2017-02-28 PROCEDURE — 74261 CT COLONOGRAPHY DX: CPT

## 2017-02-28 PROCEDURE — 74011250637 HC RX REV CODE- 250/637: Performed by: FAMILY MEDICINE

## 2017-02-28 PROCEDURE — 74011250636 HC RX REV CODE- 250/636: Performed by: HOSPITALIST

## 2017-02-28 PROCEDURE — 65270000029 HC RM PRIVATE

## 2017-02-28 PROCEDURE — 85610 PROTHROMBIN TIME: CPT | Performed by: HOSPITALIST

## 2017-02-28 PROCEDURE — 74011250637 HC RX REV CODE- 250/637: Performed by: HOSPITALIST

## 2017-02-28 PROCEDURE — 77030034849

## 2017-02-28 PROCEDURE — 77030011256 HC DRSG MEPILEX <16IN NO BORD MOLN -A

## 2017-02-28 PROCEDURE — 74011000258 HC RX REV CODE- 258: Performed by: HOSPITALIST

## 2017-02-28 PROCEDURE — 74262 CT COLONOGRAPHY DX W/DYE: CPT

## 2017-02-28 PROCEDURE — 80048 BASIC METABOLIC PNL TOTAL CA: CPT | Performed by: HOSPITALIST

## 2017-02-28 PROCEDURE — 74011000250 HC RX REV CODE- 250: Performed by: HOSPITALIST

## 2017-02-28 PROCEDURE — 36415 COLL VENOUS BLD VENIPUNCTURE: CPT | Performed by: HOSPITALIST

## 2017-02-28 PROCEDURE — 74011636637 HC RX REV CODE- 636/637: Performed by: HOSPITALIST

## 2017-02-28 PROCEDURE — 74011250637 HC RX REV CODE- 250/637: Performed by: INTERNAL MEDICINE

## 2017-02-28 PROCEDURE — 85027 COMPLETE CBC AUTOMATED: CPT | Performed by: HOSPITALIST

## 2017-02-28 RX ORDER — LUBIPROSTONE 8 UG/1
8 CAPSULE, GELATIN COATED ORAL 2 TIMES DAILY WITH MEALS
Status: DISCONTINUED | OUTPATIENT
Start: 2017-02-28 | End: 2017-03-01 | Stop reason: HOSPADM

## 2017-02-28 RX ADMIN — MIRABEGRON 50 MG: 25 TABLET, FILM COATED, EXTENDED RELEASE ORAL at 22:12

## 2017-02-28 RX ADMIN — OLANZAPINE 7.5 MG: 2.5 TABLET, FILM COATED ORAL at 22:13

## 2017-02-28 RX ADMIN — PANTOPRAZOLE SODIUM 40 MG: 40 TABLET, DELAYED RELEASE ORAL at 07:20

## 2017-02-28 RX ADMIN — LEVOTHYROXINE SODIUM 75 MCG: 75 TABLET ORAL at 07:20

## 2017-02-28 RX ADMIN — MORPHINE SULFATE 15 MG: 15 TABLET ORAL at 04:30

## 2017-02-28 RX ADMIN — Medication 10 ML: at 07:25

## 2017-02-28 RX ADMIN — CYCLOBENZAPRINE HYDROCHLORIDE 10 MG: 10 TABLET, FILM COATED ORAL at 22:13

## 2017-02-28 RX ADMIN — MORPHINE SULFATE 15 MG: 15 TABLET ORAL at 14:55

## 2017-02-28 RX ADMIN — LACOSAMIDE 100 MG: 100 TABLET, FILM COATED ORAL at 20:37

## 2017-02-28 RX ADMIN — POLYETHYLENE GLYCOL 3350 17 G: 17 POWDER, FOR SOLUTION ORAL at 07:23

## 2017-02-28 RX ADMIN — ROPINIROLE HYDROCHLORIDE 0.25 MG: 0.25 TABLET, FILM COATED ORAL at 10:13

## 2017-02-28 RX ADMIN — MYCOPHENOLATE MOFETIL 900 MG: 500 TABLET ORAL at 10:13

## 2017-02-28 RX ADMIN — SODIUM CHLORIDE AND POTASSIUM CHLORIDE: 9; 2.98 INJECTION, SOLUTION INTRAVENOUS at 04:35

## 2017-02-28 RX ADMIN — LUBIPROSTONE 8 MCG: 8 CAPSULE, GELATIN COATED ORAL at 17:13

## 2017-02-28 RX ADMIN — CYCLOSPORINE 1 DROP: 0.5 EMULSION OPHTHALMIC at 09:00

## 2017-02-28 RX ADMIN — SUCRALFATE 1 G: 1 SUSPENSION ORAL at 07:22

## 2017-02-28 RX ADMIN — FERROUS SULFATE TAB 325 MG (65 MG ELEMENTAL FE) 325 MG: 325 (65 FE) TAB at 14:55

## 2017-02-28 RX ADMIN — FLUTICASONE PROPIONATE 2 SPRAY: 50 SPRAY, METERED NASAL at 10:15

## 2017-02-28 RX ADMIN — SUCRALFATE 1 G: 1 SUSPENSION ORAL at 22:14

## 2017-02-28 RX ADMIN — SUCRALFATE 1 G: 1 SUSPENSION ORAL at 17:13

## 2017-02-28 RX ADMIN — PREDNISONE 5 MG: 5 TABLET ORAL at 10:13

## 2017-02-28 RX ADMIN — ROPINIROLE HYDROCHLORIDE 0.75 MG: 0.25 TABLET, FILM COATED ORAL at 22:13

## 2017-02-28 RX ADMIN — MYCOPHENOLATE MOFETIL 900 MG: 500 TABLET ORAL at 17:13

## 2017-02-28 RX ADMIN — LACOSAMIDE 100 MG: 100 TABLET, FILM COATED ORAL at 10:12

## 2017-02-28 RX ADMIN — MEROPENEM 1 G: 1 INJECTION, POWDER, FOR SOLUTION INTRAVENOUS at 07:20

## 2017-02-28 RX ADMIN — GUAIFENESIN 100 MG: 100 SOLUTION ORAL at 04:33

## 2017-02-28 RX ADMIN — CYCLOSPORINE 1 DROP: 0.5 EMULSION OPHTHALMIC at 20:39

## 2017-02-28 RX ADMIN — MYCOPHENOLATE MOFETIL 900 MG: 500 TABLET ORAL at 22:13

## 2017-02-28 RX ADMIN — GUAIFENESIN 600 MG: 600 TABLET, EXTENDED RELEASE ORAL at 20:38

## 2017-02-28 RX ADMIN — LUBIPROSTONE 8 MCG: 8 CAPSULE, GELATIN COATED ORAL at 10:20

## 2017-02-28 RX ADMIN — DULOXETINE 30 MG: 30 CAPSULE, DELAYED RELEASE ORAL at 20:38

## 2017-02-28 RX ADMIN — POLYETHYLENE GLYCOL 3350 17 G: 17 POWDER, FOR SOLUTION ORAL at 20:38

## 2017-02-28 RX ADMIN — BUTALBITAL, ACETAMINOPHEN AND CAFFEINE 1 TABLET: 50; 325; 40 TABLET ORAL at 14:55

## 2017-02-28 RX ADMIN — BUTALBITAL, ACETAMINOPHEN AND CAFFEINE 1 TABLET: 50; 325; 40 TABLET ORAL at 20:38

## 2017-02-28 RX ADMIN — DULOXETINE 30 MG: 30 CAPSULE, DELAYED RELEASE ORAL at 10:13

## 2017-02-28 RX ADMIN — BUTALBITAL, ACETAMINOPHEN AND CAFFEINE 1 TABLET: 50; 325; 40 TABLET ORAL at 04:29

## 2017-02-28 RX ADMIN — GUAIFENESIN 600 MG: 600 TABLET, EXTENDED RELEASE ORAL at 10:12

## 2017-02-28 RX ADMIN — GUAIFENESIN 100 MG: 100 SOLUTION ORAL at 14:58

## 2017-02-28 RX ADMIN — SUCRALFATE 1 G: 1 SUSPENSION ORAL at 11:58

## 2017-02-28 NOTE — PROGRESS NOTES
46- Assumed care of patient at this time. Report received from Zeina Jesus RN. Patient in bed. Call bell left within reach. 6280- Patient taken down for virtual colonoscopy    0945- Patient returned, colonoscopy completed. 1015 - Patient in bed at this time. A/O x 4. Lungs clear, radial pulses present , pedal pulses present , abdomen soft and non-distended. Bowel sounds active, 24 G IV to right wrist  Intact, patent and infusing. No signs of phlebitis or infiltration noted. 22 G IV to LFA INT at this time. Skin warm and dry patient has decubitus ulcer to sacrum, patient being followed by wound care. Gotti catheter in place draining clear/yellow urine. Pain 2/10. Bed placed in lowest position, call bell within reach. 1500- Pain 7/10. PRN morphine 15 mg PO, fiorcet 50 mg and robitussin 100 mg administered at this time. Will monitor for effectiveness. 1745- Patient IV lost during bathing, MD notified, no IV access needed at this time. Shift Summary: Patient's pain well controlled this shift. IV lost during incontinent care, MD aware, patient has no orders for fluids at this time. CDI. Patient refuses SCD compression device. Patient has had 2 episodes of loose stool after colonoscopy.

## 2017-02-28 NOTE — PROGRESS NOTES
Reviewed chart and discussed case with Dr. Indy Trejo, who stated pt is not ready for discharge today, but anticipate possible discharge home with home health tomorrow. CM met with pt to review the discharge plan and she confirmed that she continues to prefer [facility] as the preferred provider she chose from [facility] list given to her. CM notified CMS who contacted Narcisa Billy at [facility] today to inform her of possible discharge tomorrow.

## 2017-02-28 NOTE — PROGRESS NOTES
Problem: Mobility Impaired (Adult and Pediatric)  Goal: *Acute Goals and Plan of Care (Insert Text)  Physical Therapy Goals  Initiated 2/23/2017 and to be accomplished within 7 day(s)  1. Patient will move from supine to sit and sit to supine in bed with supervision/set-up. 2. Patient will transfer from bed to chair and chair to bed with maximal assistance using the least restrictive device. Outcome: Progressing Towards Goal  PHYSICAL THERAPY TREATMENT     Patient: Marcelina Rockwell (31 y.o. female)  Date: 2/28/2017  Diagnosis: GI (gastrointestinal bleed)  Gingival bleeding  Chronic anticoagulation  RECTAL BLEEDING  NA GI (gastrointestinal bleed)  Procedure(s) (LRB):  EGD (N/A)  COLONOSCOPY (N/A) 6 Days Post-Op  Precautions: Fall   Chart, physical therapy assessment, plan of care and goals were reviewed. ASSESSMENT:  Pt agreeable to participate. No progress made at this time with bed mobility or static sitting balance. Total A needed pt maintain midline. Pt sitting EOB roughly 10 mins before reporting increase dizziness and requesting to return to bed. Cont POC. Progression toward goals:  [ ]      Improving appropriately and progressing toward goals  [X]      Improving slowly and progressing toward goals  [ ]      Not making progress toward goals and plan of care will be adjusted       PLAN:  Patient continues to benefit from skilled intervention to address the above impairments. Continue treatment per established plan of care.   Discharge Recommendations:  Rehab  Further Equipment Recommendations for Discharge:  hospital bed       SUBJECTIVE:   Patient stated  You are torturing me        OBJECTIVE DATA SUMMARY:   Critical Behavior:  Neurologic State: Alert  Orientation Level: Oriented X4  Cognition: Follows commands  Safety/Judgement: Awareness of environment, Fall prevention, Good awareness of safety precautions, Insight into deficits  Functional Mobility Training:  Bed Mobility:  Supine to Sit: Maximum assistance;Assist x2  Sit to Supine: Maximum assistance;Assist x2  Scooting: Maximum assistance;Assist x2  Balance:  Sitting: Impaired  Sitting - Static: Poor (constant support)  Sitting - Dynamic: Poor (constant support)     Pain:  Pain Scale 1: Numeric (0 - 10)  Pain Intensity 1: 10  Pain Location 1: Abdomen;Buttocks; Head  Pain Orientation 1: Anterior;Mid  Pain Description 1: Constant  Pain Intervention(s) 1: Medication (see MAR)  Activity Tolerance:   Fair      After treatment:   [ ] Patient left in no apparent distress sitting up in chair  [X] Patient left in no apparent distress in bed  [X] Call bell left within reach  [ ] Nursing notified  [ ] Caregiver present  [ ] Bed alarm activated      Jennifer Younger PTA   Time Calculation: 35 mins

## 2017-02-28 NOTE — PROGRESS NOTES
1100  Pt. Wanted to know if Virtual colonoscopy can be done while her stool not clear. Called CT department and said no they cant do it unless the stool is clear. Pt. Informed and was not happy about it and she feels that we are starving her to death. Shift Summary  Pt. Having watery stool but not clear for virtual colonoscopy. Pain managed by pain medication. Virtual colonoscopy can be done if pt. Have clear stool.

## 2017-02-28 NOTE — ROUTINE PROCESS
Bedside and Verbal shift change report given to LINH Connelly (oncoming nurse) by Swapnil Fay   (offgoing nurse). Report included the following information SBAR, Kardex, Procedure Summary, Intake/Output, MAR, Recent Results and Med Rec Status.

## 2017-02-28 NOTE — PROGRESS NOTES
Hospitalist Progress Note    Patient: Lisa Estimable MRN: [id number]  CSN: [id number]    YOB: 1950  Age: 78 y.o. Sex: female    DOA: 2/18/2017 LOS:  LOS: 10 days          Chief Complaint:    GI bleed      Assessment/Plan     GI bleed. Resolved, Hgb stable, s/p transfusion upon admission  Anemia acute blood loss. URI-likely viral as has been on IV abx and robitussin started as well as flonas-add mucinex and chloraseptic for throat  UTI. Complete 5 days merrem-repeat cx-may be mostly colonizers with chronic indwelling catheter  Decub ulcer poa. LWC  Moderate prot maranda malnutrition  Immobilized, chronic hip dislocations  Seizure disorder. Stable on vimpat  Chronic opioid constipation. Resume amitiza after prep completed  Hx DVT /IVC filter. Chronic pain.     Virtual colonoscopy is showing NO mass but still with stool-I think she has endured enough prepping here and this is third attempt at procedure so for now since bleeding resolved-plan d/c tomorrow, start reg diet today, and follow with GI as outpt  Resume amitiza  She prefers not to go back on coumadin as she has spent two months getting blood intermittently for bleeding-and as far as DVT she has a IVC filter in place  Risks of bleeding would remain high if she went back on coumadin    Patient Active Problem List   Diagnosis Code    Bipolar 1 disorder ([facility] [address] F31.9    Decubitus ulcer of sacral area L89.159    Anemia D64.9    UTI (urinary tract infection) N39.0    Severe protein-calorie malnutrition ([address] E43    Seizure disorder ([facility] [address] G40.909    Depression F32.9    Hypotension, unspecified I95.9    Personal history of DVT (deep vein thrombosis) Z86.718    Hypomagnesemia E83.42    Unspecified constipation K59.00    Gingival bleeding K06.8    GI (gastrointestinal bleed) K92.2    Chronic anticoagulation Z79.01    Epistaxis R04.0       Subjective:    Feeling better  Hopeful; to go home  hungry    Review of systems:    Constitutional: denies fevers, chills, myalgias  Respiratory: denies SOB, cough  Cardiovascular: denies chest pain, palpitations  Gastrointestinal: denies nausea, vomiting, diarrhea      Vital signs/Intake and Output:  Visit Vitals    /65    Pulse 86    Temp 98.5 °F (36.9 °C)    Resp 18    Ht 5' 6\" (1.676 m)    Wt 68.9 kg (151 lb 12.8 oz)    SpO2 100%    BMI 24.5 kg/m2     Current Shift:  02/28 0701 - 02/28 1900  In: 5718 [I.V.:1045]  Out: 7403 [Urine:1225]  Last three shifts:  02/26 1901 - 02/28 0700  In: 5010.7 [P.O.:240; I.V.:4236.7]  Out: 8600 [Urine:8600]    Exam:    General: Well developed, alert, NAD, OX3  Head/Neck: NCAT, supple, No masses, No lymphadenopathy  CVS:Regular rate and rhythm, no M/R/G, S1/S2 heard, no thrill  Lungs:Clear to auscultation bilaterally, no wheezes, rhonchi, or rales  Abdomen: Soft, Nontender, No distention, Normal Bowel sounds, No hepatomegaly  Extremities: No C/C/E, pulses palpable 2+  Neuro:grossly normal , follows commands  Psych:appropriate                Labs: Results:       Chemistry Recent Labs      02/28/17   0510  02/27/17   1028   GLU  84  90   NA  138  139   K  5.0  4.7   CL  103  106   CO2  30  26   BUN  2*  3*   CREA  0.47*  0.59*   CA  7.8*  8.1*   AGAP  5  7   BUCR  4*  5*      CBC w/Diff Recent Labs      02/28/17   0510  02/27/17   1028   WBC  3.1*  6.7   RBC  3.75*  3.75*   HGB  9.9*  10.1*   HCT  32.1*  32.6*   PLT  384  350   GRANS   --   77*   LYMPH   --   8*   EOS   --   6*      Cardiac Enzymes No results for input(s): CPK, CKND1, PAOLA in the last 72 hours. No lab exists for component: CKRMB, TROIP   Coagulation Recent Labs      02/28/17   0510  02/27/17   0241   PTP  12.8  13.6   INR  1.0  1.1       Lipid Panel No results found for: CHOL, CHOLPOCT, CHOLX, CHLST, CHOLV, [id number], HDL, LDL, NLDLCT, DLDL, LDLC, DLDLP, 893411, VLDLC, VLDL, TGL, TGLX, TRIGL, [id number], TRIGP, TGLPOCT, [id number], CHHD, CHHDX   BNP No results for input(s): BNPP in the last 72 hours. Liver Enzymes No results for input(s): TP, ALB, TBIL, AP, SGOT, GPT in the last 72 hours.     No lab exists for component: DBIL   Thyroid Studies Lab Results   Component Value Date/Time    TSH 2.73 02/23/2017 02:18 AM        Procedures/imaging: see electronic medical records for all procedures/Xrays and details which were not copied into this note but were reviewed prior to creation of Raffy Harrington MD

## 2017-02-28 NOTE — PROGRESS NOTES
2100 Incontinence care. BM with brown-green loose stool. M-wipes used on león catheter. Mepilex placed on healing sacral ulcer. SHIFT SUMMARY: No other events. Persistent dry cough. Does not tolerate HOB very high for swallowing. BM x2 during the night shift with brown-green loose stool. Fioricet and morphine given for pain q6h.

## 2017-02-28 NOTE — PROGRESS NOTES
Problem: Self Care Deficits Care Plan (Adult)  Goal: *Acute Goals and Plan of Care (Insert Text)  Occupational Therapy Goals  Initiated 2/23/2017 within 7 day(s). 1. Patient will perform grooming with supervision/set-up 2. Patient will perform upper body dressing with supervision/set-up. 3. Patient will perform functional activity while seated EOB for 3-5 minutes with supervision/set-up. 4. Patient will participate in upper extremity therapeutic exercise/activities with supervision/set-up for 10 minutes. 5. Patient will utilize energy conservation techniques during functional activities with verbal cues. Outcome: Progressing Towards Goal  OCCUPATIONAL THERAPY TREATMENT     Patient: Satish Romo (4 y.o. female)  Date: 2/28/2017  Diagnosis: GI (gastrointestinal bleed)  Gingival bleeding  Chronic anticoagulation  RECTAL BLEEDING  NA GI (gastrointestinal bleed)  Procedure(s) (LRB):  EGD (N/A)  COLONOSCOPY (N/A) 6 Days Post-Op  Precautions: Fall  Chart, occupational therapy assessment, plan of care, and goals were reviewed. ASSESSMENT:  Pt supine in bed upon entering, agreeable to therapy. Pt seen with PTA to maximize pt safety. Pt continues to require max Ax2 for supine<->sit transfers and scooting to [facility]. Pt with poor sitting balance requiring constant support. Pt with c/o dizziness while seated EOB. Pt requires constant support of UEs to assist with balance. Pt left supine in bed with needs in reach. EDUCATION: safety  Progression toward goals:  [ ]          Improving appropriately and progressing toward goals  [X]          Improving slowly and progressing toward goals  [ ]          Not making progress toward goals and plan of care will be adjusted       PLAN:  Patient continues to benefit from skilled intervention to address the above impairments. Continue treatment per established plan of care.   Discharge Recommendations:  Rehab vs Home Health  Further Equipment Recommendations for Discharge:  N/A SUBJECTIVE:   Patient stated .      OBJECTIVE DATA SUMMARY:      G CODES:       Cognitive/Behavioral Status:  Neurologic State: Alert  Orientation Level: Oriented X4  Cognition: Follows commands  Safety/Judgement: Awareness of environment, Fall prevention, Good awareness of safety precautions, Insight into deficits  Functional Mobility and Transfers for ADLs:              Bed Mobility:  Supine to Sit: Maximum assistance;Assist x2  Sit to Supine: Maximum assistance;Assist x2  Balance:  Sitting: Impaired  Sitting - Static: Poor (constant support)  ADL Intervention:  Lower Body Dressing Assistance  Dressing Assistance: Total assistance(dependent)  Socks: Total assistance (dependent)     Pain:  Pre Treatment:  Post Treatment:  Pain Scale 1: Numeric (0 - 10)  Pain Intensity 1: 10  Pain Location 1: Abdomen;Buttocks; Head  Pain Orientation 1: Anterior;Mid  Pain Description 1: Constant     Activity Tolerance:    fair  Please refer to the flowsheet for vital signs taken during this treatment.   After treatment:   [ ]  Patient left in no apparent distress sitting up in chair  [X]  Patient left in no apparent distress in bed  [X]  Call bell left within reach  [ ]  Nursing notified  [ ]  Caregiver present  [ ]  Bed alarm activated     Tadeo Mallory MS OTR/L  Time Calculation: 35 mins

## 2017-02-28 NOTE — WOUND CARE
Pt seen by wound care for follow up assessment of pressure ulcer to sacrum. Wound is improving, stage 2. Chanelle and mepilex border applied. Wound care will continue to follow pt per protocol.

## 2017-02-28 NOTE — ROUTINE PROCESS
Bedside shift change report given to FAUSTO Navas (oncoming nurse) by Jailene Byers RN  (offgoing nurse). Report included the following information SBAR, Kardex, Intake/Output and MAR.

## 2017-03-01 VITALS
DIASTOLIC BLOOD PRESSURE: 59 MMHG | BODY MASS INDEX: 26.65 KG/M2 | TEMPERATURE: 98 F | SYSTOLIC BLOOD PRESSURE: 119 MMHG | RESPIRATION RATE: 18 BRPM | HEIGHT: 66 IN | HEART RATE: 78 BPM | OXYGEN SATURATION: 97 % | WEIGHT: 165.8 LBS

## 2017-03-01 LAB
INR PPP: 1 (ref 0.8–1.2)
PROTHROMBIN TIME: 13 SEC (ref 11.5–15.2)

## 2017-03-01 PROCEDURE — 36415 COLL VENOUS BLD VENIPUNCTURE: CPT | Performed by: HOSPITALIST

## 2017-03-01 PROCEDURE — 74011250637 HC RX REV CODE- 250/637: Performed by: HOSPITALIST

## 2017-03-01 PROCEDURE — 74011250637 HC RX REV CODE- 250/637: Performed by: FAMILY MEDICINE

## 2017-03-01 PROCEDURE — 85610 PROTHROMBIN TIME: CPT | Performed by: HOSPITALIST

## 2017-03-01 PROCEDURE — 74011000250 HC RX REV CODE- 250: Performed by: HOSPITALIST

## 2017-03-01 PROCEDURE — 93005 ELECTROCARDIOGRAM TRACING: CPT

## 2017-03-01 PROCEDURE — 74011250637 HC RX REV CODE- 250/637: Performed by: INTERNAL MEDICINE

## 2017-03-01 PROCEDURE — 74011636637 HC RX REV CODE- 636/637: Performed by: HOSPITALIST

## 2017-03-01 RX ORDER — DOXYCYCLINE 100 MG/1
100 CAPSULE ORAL 2 TIMES DAILY
Qty: 10 CAP | Refills: 0 | Status: SHIPPED | OUTPATIENT
Start: 2017-03-01 | End: 2017-03-06

## 2017-03-01 RX ORDER — PANTOPRAZOLE SODIUM 40 MG/1
40 TABLET, DELAYED RELEASE ORAL
Qty: 30 TAB | Refills: 1 | Status: SHIPPED | OUTPATIENT
Start: 2017-03-01 | End: 2017-03-01

## 2017-03-01 RX ORDER — POLYETHYLENE GLYCOL 3350 17 G/17G
17 POWDER, FOR SOLUTION ORAL 2 TIMES DAILY
Qty: 60 PACKET | Refills: 0 | Status: SHIPPED | OUTPATIENT
Start: 2017-03-01 | End: 2017-08-27

## 2017-03-01 RX ORDER — FLUTICASONE PROPIONATE 50 MCG
2 SPRAY, SUSPENSION (ML) NASAL DAILY
Qty: 1 BOTTLE | Refills: 1 | Status: SHIPPED | OUTPATIENT
Start: 2017-03-01 | End: 2017-08-27

## 2017-03-01 RX ORDER — LUBIPROSTONE 8 UG/1
8 CAPSULE, GELATIN COATED ORAL 2 TIMES DAILY WITH MEALS
Qty: 60 CAP | Refills: 0 | Status: SHIPPED | OUTPATIENT
Start: 2017-03-01

## 2017-03-01 RX ADMIN — MYCOPHENOLATE MOFETIL 900 MG: 500 TABLET ORAL at 08:45

## 2017-03-01 RX ADMIN — DULOXETINE 30 MG: 30 CAPSULE, DELAYED RELEASE ORAL at 08:45

## 2017-03-01 RX ADMIN — CYCLOSPORINE 1 DROP: 0.5 EMULSION OPHTHALMIC at 08:46

## 2017-03-01 RX ADMIN — ROPINIROLE HYDROCHLORIDE 0.25 MG: 0.25 TABLET, FILM COATED ORAL at 08:45

## 2017-03-01 RX ADMIN — LEVOTHYROXINE SODIUM 75 MCG: 75 TABLET ORAL at 06:03

## 2017-03-01 RX ADMIN — PANTOPRAZOLE SODIUM 40 MG: 40 TABLET, DELAYED RELEASE ORAL at 06:03

## 2017-03-01 RX ADMIN — MORPHINE SULFATE 15 MG: 15 TABLET ORAL at 04:26

## 2017-03-01 RX ADMIN — LACOSAMIDE 100 MG: 100 TABLET, FILM COATED ORAL at 08:45

## 2017-03-01 RX ADMIN — PREDNISONE 5 MG: 5 TABLET ORAL at 08:45

## 2017-03-01 RX ADMIN — GUAIFENESIN 100 MG: 100 SOLUTION ORAL at 04:25

## 2017-03-01 RX ADMIN — BUTALBITAL, ACETAMINOPHEN AND CAFFEINE 1 TABLET: 50; 325; 40 TABLET ORAL at 04:26

## 2017-03-01 RX ADMIN — GUAIFENESIN 600 MG: 600 TABLET, EXTENDED RELEASE ORAL at 08:45

## 2017-03-01 RX ADMIN — LUBIPROSTONE 8 MCG: 8 CAPSULE, GELATIN COATED ORAL at 08:45

## 2017-03-01 RX ADMIN — SUCRALFATE 1 G: 1 SUSPENSION ORAL at 06:03

## 2017-03-01 RX ADMIN — POLYETHYLENE GLYCOL 3350 17 G: 17 POWDER, FOR SOLUTION ORAL at 08:46

## 2017-03-01 RX ADMIN — SUCRALFATE 1 G: 1 SUSPENSION ORAL at 13:50

## 2017-03-01 NOTE — ROUTINE PROCESS
Bedside and Verbal shift change report given to FAUSTO Olvera (oncoming nurse) by Jenna Núñez RN  (offgoing nurse). Report included the following information SBAR, Kardex, Intake/Output and MAR.

## 2017-03-01 NOTE — PROGRESS NOTES
10:46 PM Gotti catheter changed. Pt tolerated procedure. 6:00 AM Pt stated she was \"backflowing. \" Gotti catheter site wet. Gotti catheter balloon was emptied and refilled, then tubing pulled until taut. Allowed catheter to drain. Changed linens. SHIFT SUMMARY: No other events. Able to sleep. Morphine and fioricet given for pain. Dry cough persists. Robutessin given for cough. BM during this shift but frequency has decreased. Stool appears loose and green-yellow.

## 2017-03-01 NOTE — PROGRESS NOTES
Hospitalist Progress Note    Patient: Siddhartha Kaminski MRN: [id number]  CSN: [id number]    YOB: 1950  Age: 78 y.o. Sex: female    DOA: 2/18/2017 LOS:  LOS: 11 days          Called about chest pain per patient getting packed up for discharge  EKG shows unremarkable T wave changes, SR, PAC's    She states I have indigestion pain, when I examine her, she winces with light to moderate palpatio of lower chest, upperepigastrium    She states \" I need my carafate and a washcloth and I want to go home!!\"    Do not think she has cardiac pain, I suspect related to stress, GERD, anxiety.   Medicate with carafate-discussed with nurse, if sx releived can process d/c      Aleshia Horta MD

## 2017-03-01 NOTE — ROUTINE PROCESS
Bedside and Verbal shift change report given to Ann Grier RN by Becky Varma RN.  Report included the following information SBAR, Kardex, OR Summary, Intake/Output and MAR

## 2017-03-01 NOTE — DISCHARGE INSTRUCTIONS
DISCHARGE SUMMARY from Nurse    The following personal items are in your possession at time of discharge:           PATIENT INSTRUCTIONS:    After general anesthesia or intravenous sedation, for 24 hours or while taking prescription Narcotics:  · Limit your activities  · Do not drive and operate hazardous machinery  · Do not make important personal or business decisions  · Do  not drink alcoholic beverages  · If you have not urinated within 8 hours after discharge, please contact your surgeon on call. Report the following to your surgeon:  · Excessive pain, swelling, redness or odor of or around the surgical area  · Temperature over 100.5  · Nausea and vomiting lasting longer than 4 hours or if unable to take medications  · Any signs of decreased circulation or nerve impairment to extremity: change in color, persistent  numbness, tingling, coldness or increase pain  · Any questions        What to do at Home:    *  Please give a list of your current medications to your Primary Care Provider. *  Please update this list whenever your medications are discontinued, doses are      changed, or new medications (including over-the-counter products) are added. *  Please carry medication information at all times in case of emergency situations. These are general instructions for a healthy lifestyle:    No smoking/ No tobacco products/ Avoid exposure to second hand smoke    Surgeon General's Warning:  Quitting smoking now greatly reduces serious risk to your health.     Obesity, smoking, and sedentary lifestyle greatly increases your risk for illness    A healthy diet, regular physical exercise & weight monitoring are important for maintaining a healthy lifestyle    You may be retaining fluid if you have a history of heart failure or if you experience any of the following symptoms:  Weight gain of 3 pounds or more overnight or 5 pounds in a week, increased swelling in our hands or feet or shortness of breath while lying flat in bed. Please call your doctor as soon as you notice any of these symptoms; do not wait until your next office visit. Recognize signs and symptoms of STROKE:    F-face looks uneven    A-arms unable to move or move unevenly    S-speech slurred or non-existent    T-time-call 911 as soon as signs and symptoms begin-DO NOT go       Back to bed or wait to see if you get better-TIME IS BRAIN. Warning Signs of HEART ATTACK     Call 911 if you have these symptoms:   Chest discomfort. Most heart attacks involve discomfort in the center of the chest that lasts more than a few minutes, or that goes away and comes back. It can feel like uncomfortable pressure, squeezing, fullness, or pain.  Discomfort in other areas of the upper body. Symptoms can include pain or discomfort in one or both arms, the back, neck, jaw, or stomach.  Shortness of breath with or without chest discomfort.  Other signs may include breaking out in a cold sweat, nausea, or lightheadedness. Don't wait more than five minutes to call 911 - MINUTES MATTER! Fast action can save your life. Calling 911 is almost always the fastest way to get lifesaving treatment. Emergency Medical Services staff can begin treatment when they arrive -- up to an hour sooner than if someone gets to the hospital by car. The discharge information has been reviewed with the patient. The patient verbalized understanding. Discharge medications reviewed with the patient and appropriate educational materials and side effects teaching were provided.     Patient armband removed and shredded

## 2017-03-01 NOTE — ROUTINE PROCESS
Dual AVS reviewed with Rogelio Szymanski RN. All medications reviewed individually with patient. Opportunities for questions and concerns provided. Patient discharged via wheelchair to car. Patients armband appropriately discarded.

## 2017-03-01 NOTE — PROGRESS NOTES
Reviewed chart and discussed case with Dr. Liberty Mallory, who stated pt is ready for discharge today. CM informed pt of stretcher transport time and CMS has called Blane Clemente at [facility] to notify her of pt's planned discharge today. Care Management Interventions  PCP Verified by CM:  Yes  Transition of Care Consult (CM Consult): Discharge Planning, [address]: No  Reason Outside [place]: Patient already serviced by other home care/hospice agency  MyChart Signup: No  Discharge Durable Medical Equipment: No  Physical Therapy Consult: Yes  Occupational Therapy Consult: Yes  Speech Therapy Consult: No  Current Support Network: Lives with Spouse  Plan discussed with Pt/Family/Caregiver: Yes  Freedom of Choice Offered: Yes ([facility], [facility])  Discharge Location  Discharge Placement: Home with home health

## 2017-03-01 NOTE — PROGRESS NOTES
0825 Assessment completed and documented. Patient has sacrum wound that is covered with mepilex and clean dry and intact. 1328 Patient complaining of chest pain and pressure. 1330 Dr Phil Aaron notified that patient is complaining of chest pain and pressure. Dr. Phil Aaron gave verbal orders with read back for patient to have STAT EKG. Orders put in. Dr Phil Aaron notified that EKG was completed. Dr. Phil Aaron read results and stated that patient was clear to be discharged.

## 2017-03-02 LAB — ALDOST SERPL-MCNC: <1 NG/DL (ref 0–30)

## 2017-03-02 NOTE — DISCHARGE SUMMARY
Sven Rae 58 SUMMARY    Name:  Natividad Escobar  MR#:  [id number]  :  1950  Account #:  [de-identified]  Date of Adm:  2017  Date of Discharge:  2017      DIAGNOSES AT DISCHARGE:  1. Gastrointestinal bleed, unclear source. 2. Acute blood loss anemia. 3. Upper respiratory infection. 4. Urinary tract infection secondary to indwelling chronic Gotti  catheter. 5. Decubitus ulceration present on admission. 6. Moderate protein-calorie malnutrition. 7. Chronic hip dislocation and immobilization. 8. Seizure disorder. 9. Chronic opioid constipation. 10. History of deep venous thrombosis with inferior vena cava filter in  place. 11. Chronic pain. HOSPITAL CONSULTANTS: Dr. Cally Vasquez, Gastroenterology. HOSPITAL SUMMARY: This is a 60-year-old lady who is chronically  debilitated and bed bound. She has a history of seizure disorder. She  apparently was in a coma about 5 years ago, spent some time on a  ventilator and was immobilized after that whole event. She was seen  here in the hospital for epistaxis and GI bleeding. She had melenic  stool, epistaxis, when she came in, evidence for acute blood loss,  required transfusions of blood. She has been on Coumadin for history  of DVT. She has a filter in place. She never had bleeding like this  before. She had been told, maybe that her black stools related to the  iron that she had recently been started on, but when she became very  pale and weaker than normal and continued to have a nosebleed as  well, then, she was rushed to the emergency room. So, she was seen  in consultation by Gastroenterology. She had upper endoscopy. At that  point, there was no clear evidence for what could be bleeding per that  evaluation and subsequently was set up to have a colonoscopy. Well,  there was notable time trying to get her prepped appropriately for this.   The lady had notable stool impaction, multiple preps were performed. Two attempts at colonoscopy were performed and Dr. Nehemiah Islas could  not reach the full colon. There was a redundant megacolon. Large  amount of liquid dark stool, so she was subsequently set up for a  virtual colonoscopy at which a number of attempts were made and  finally she completed one on 02/28/2017. This showed no evidence for  colonic polyp or mural mass with limitations in the findings and she still  had stool in the right colon reducing the sensitivity for polyp detection. However, there were no findings of a structural apical core type right  colonic lesion and other extra colonic finding showed chronic right  basilar volume loss with atelectasis, prior cholecystectomy and chronic  right hip dislocation and chronic left hip fracture dislocation. So, here in the hospital she completed that yesterday. She was then  transitioned back to a regular diet, which she has tolerated well. No  nausea, vomiting, diarrhea. In the interim, she has been treated for a  urinary tract infection, likely due to her chronic catheter. It  was polymicrobial. There was evidence for E coli, pseudomonas,  Enterobacter and Enterococcus. Likely these were colonizers, but we  completed a full course of antibiotics and her last treatment was with  meropenem. There has been no evidence for leukocytosis or fever,  and certainly no abdominal pain or sepsis symptoms related to a  urinary tract infection. Her blood culture from the 19th is no growth for  6 days. Catheter was changed out last night, anticipating discharge  today. She has a decubitus ulcer on her sacrum, which is being tended  to and will be treated at home with wound care as well. Her most recent hemoglobin and hematocrit are 9.9 and 32.1. Her  platelet count of 390. White count 3.1. Her transfusions of blood were  completed on the 18th and she received 2 units of packed red blood  cells. Her Coumadin was stopped.  Her most recent INR is 1.0, She has  notable concern about going back on Coumadin. I advised her to  weigh the risks and benefits. I think considering the fact that we have  not found a clear source for why she bled it would be concerning to me  for her to go back on Coumadin. She does have an IVC filter in place,  so this would give her some assurance that she would be at lower risk  for a pulmonary embolism. She is inclined to not go back on Coumadin  at all. I asked her to confirm and review that plan of care with her  primary doctor as well, so she will. In the interim she does not want to  start the Coumadin again because of the bleeding, which I would agree  for the short future at least.    Today's vital signs stable. Blood pressure 119/59, pulse 78,  temperature 98, respiratory rate 18, SaO2 97%. Her lungs are  clear. Cardiac exam within normal limits. Her complaint today is a  productive cough and a sore throat. As I noted, she has been on  intravenous antibiotics for the last week. We started symptomatic care  with Mucinex, Chloraseptic spray and a decongestant nasal spray. Her  abdomen is benign. Cardiac exam is regular rate and rhythm. No  murmur, rub, or gallop. Lower extremities, chronic stasis edema. Gotti  catheter is in place draining clear yellow urine. My plan for her is to discharge her to home to resume home health  care, to follow up with Dr. Brianna Bell in a week in his office to discuss  any further plans for colonoscopy or further prepping. I do not see any  acute reason to do that as an inpatient currently. Her H and H has  been stable. there has been no further bleeding and she has been  through multiple procedures at this point in time. MEDICATIONS:  1. She can resume her Vitamin C and Cranberry that she is at home. 2. Doxycycline 100 mg twice daily for 5 days for upper respiratory  infection. 3. Flonase 2 sprays by nostrils daily as needed. 4. Amitiza 8 mcg twice daily. 5. MiraLax 17 g p.o. twice daily.   6. Resume her Cymbalta. 7. Levothyroxine. 8. Potassium. 9. Protonix that she is on at home. 10. Her Carafate. 11. Claritin-D. 12. Fioricet as needed for headaches. 13. Flexeril as needed for muscle spasms. 14. Ferrous sulfate. 15. Gabapentin. 16. Glucosamine. 16. Vimpat for seizures. 18. Magnesium oxide. 19. Morphine IR as needed for pain. 20. Myrbetriq. 21. Prednisone. 22. Requip. 23. Vitamin C.  24. As well as Zyprexa that she was on at home prior to admission with  no changes. So with that said, I do not see any acute reason otherwise to keep her  hospitalized. She is clinically stable. She is anticipating discharge  today, resume home health at home. TIME SPENT: Forty-five minutes spent on discharge time.         MD TAMMIE Morley / Veronica.Malcolm  D:  03/01/2017   10:33  T:  03/02/2017   06:20  Job #:  [id number]

## 2017-03-06 LAB
ATRIAL RATE: 82 BPM
CALCULATED P AXIS, ECG09: 69 DEGREES
CALCULATED R AXIS, ECG10: -31 DEGREES
CALCULATED T AXIS, ECG11: 77 DEGREES
DIAGNOSIS, 93000: NORMAL
P-R INTERVAL, ECG05: 166 MS
Q-T INTERVAL, ECG07: 402 MS
QRS DURATION, ECG06: 122 MS
QTC CALCULATION (BEZET), ECG08: 469 MS
VENTRICULAR RATE, ECG03: 82 BPM

## 2017-07-21 ENCOUNTER — HOSPITAL ENCOUNTER (INPATIENT)
Age: 67
LOS: 2 days | Discharge: HOME | DRG: 101 | End: 2017-07-23
Attending: EMERGENCY MEDICINE | Admitting: INTERNAL MEDICINE
Payer: MEDICARE

## 2017-07-21 ENCOUNTER — APPOINTMENT (OUTPATIENT)
Dept: CT IMAGING | Age: 67
DRG: 101 | End: 2017-07-21
Attending: PHYSICIAN ASSISTANT
Payer: MEDICARE

## 2017-07-21 DIAGNOSIS — R56.9 SEIZURE (HCC): Primary | ICD-10-CM

## 2017-07-21 DIAGNOSIS — R47.01: ICD-10-CM

## 2017-07-21 PROBLEM — E87.1 HYPONATREMIA: Status: ACTIVE | Noted: 2017-07-21

## 2017-07-21 PROBLEM — E86.0 MILD DEHYDRATION: Status: ACTIVE | Noted: 2017-07-21

## 2017-07-21 LAB
ALBUMIN SERPL BCP-MCNC: 3.1 G/DL (ref 3.4–5)
ALBUMIN/GLOB SERPL: 0.9 {RATIO} (ref 0.8–1.7)
ALP SERPL-CCNC: 122 U/L (ref 45–117)
ALT SERPL-CCNC: 44 U/L (ref 13–56)
ANION GAP BLD CALC-SCNC: 9 MMOL/L (ref 3–18)
APPEARANCE UR: CLEAR
APTT PPP: 28.4 SEC (ref 23–36.4)
AST SERPL W P-5'-P-CCNC: 27 U/L (ref 15–37)
BACTERIA URNS QL MICRO: ABNORMAL /HPF
BASOPHILS # BLD AUTO: 0 K/UL (ref 0–0.06)
BASOPHILS # BLD: 0 % (ref 0–2)
BILIRUB SERPL-MCNC: 0.3 MG/DL (ref 0.2–1)
BILIRUB UR QL: NEGATIVE
BUN SERPL-MCNC: 8 MG/DL (ref 7–18)
BUN/CREAT SERPL: 17 (ref 12–20)
CALCIUM SERPL-MCNC: 7.8 MG/DL (ref 8.5–10.1)
CHLORIDE SERPL-SCNC: 92 MMOL/L (ref 100–108)
CO2 SERPL-SCNC: 29 MMOL/L (ref 21–32)
COLOR UR: YELLOW
CREAT SERPL-MCNC: 0.48 MG/DL (ref 0.6–1.3)
DIFFERENTIAL METHOD BLD: ABNORMAL
EOSINOPHIL # BLD: 0.3 K/UL (ref 0–0.4)
EOSINOPHIL NFR BLD: 4 % (ref 0–5)
EPITH CASTS URNS QL MICRO: ABNORMAL /LPF (ref 0–5)
ERYTHROCYTE [DISTWIDTH] IN BLOOD BY AUTOMATED COUNT: 17.9 % (ref 11.6–14.5)
GLOBULIN SER CALC-MCNC: 3.5 G/DL (ref 2–4)
GLUCOSE SERPL-MCNC: 101 MG/DL (ref 74–99)
GLUCOSE UR STRIP.AUTO-MCNC: NEGATIVE MG/DL
HCT VFR BLD AUTO: 37.5 % (ref 35–45)
HGB BLD-MCNC: 13.1 G/DL (ref 12–16)
HGB UR QL STRIP: ABNORMAL
HYALINE CASTS URNS QL MICRO: ABNORMAL /LPF (ref 0–2)
INR PPP: 0.9 (ref 0.8–1.2)
KETONES UR QL STRIP.AUTO: NEGATIVE MG/DL
LEUKOCYTE ESTERASE UR QL STRIP.AUTO: NEGATIVE
LYMPHOCYTES # BLD AUTO: 24 % (ref 21–52)
LYMPHOCYTES # BLD: 1.6 K/UL (ref 0.9–3.6)
MCH RBC QN AUTO: 29.2 PG (ref 24–34)
MCHC RBC AUTO-ENTMCNC: 34.9 G/DL (ref 31–37)
MCV RBC AUTO: 83.7 FL (ref 74–97)
MONOCYTES # BLD: 0.6 K/UL (ref 0.05–1.2)
MONOCYTES NFR BLD AUTO: 10 % (ref 3–10)
NEUTS SEG # BLD: 4 K/UL (ref 1.8–8)
NEUTS SEG NFR BLD AUTO: 62 % (ref 40–73)
NITRITE UR QL STRIP.AUTO: NEGATIVE
PH UR STRIP: 7 [PH] (ref 5–8)
PLATELET # BLD AUTO: 222 K/UL (ref 135–420)
PMV BLD AUTO: 8.4 FL (ref 9.2–11.8)
POTASSIUM SERPL-SCNC: 3.5 MMOL/L (ref 3.5–5.5)
PROT SERPL-MCNC: 6.6 G/DL (ref 6.4–8.2)
PROT UR STRIP-MCNC: ABNORMAL MG/DL
PROTHROMBIN TIME: 12.3 SEC (ref 11.5–15.2)
RBC # BLD AUTO: 4.48 M/UL (ref 4.2–5.3)
RBC #/AREA URNS HPF: ABNORMAL /HPF (ref 0–5)
SODIUM SERPL-SCNC: 130 MMOL/L (ref 136–145)
SP GR UR REFRACTOMETRY: 1.01 (ref 1–1.03)
UROBILINOGEN UR QL STRIP.AUTO: 0.2 EU/DL (ref 0.2–1)
WBC # BLD AUTO: 6.5 K/UL (ref 4.6–13.2)
WBC URNS QL MICRO: ABNORMAL /HPF (ref 0–5)

## 2017-07-21 PROCEDURE — 74011250637 HC RX REV CODE- 250/637: Performed by: INTERNAL MEDICINE

## 2017-07-21 PROCEDURE — 99284 EMERGENCY DEPT VISIT MOD MDM: CPT

## 2017-07-21 PROCEDURE — 99218 HC RM OBSERVATION: CPT

## 2017-07-21 PROCEDURE — 87086 URINE CULTURE/COLONY COUNT: CPT | Performed by: PHYSICIAN ASSISTANT

## 2017-07-21 PROCEDURE — 85730 THROMBOPLASTIN TIME PARTIAL: CPT | Performed by: PHYSICIAN ASSISTANT

## 2017-07-21 PROCEDURE — 96375 TX/PRO/DX INJ NEW DRUG ADDON: CPT

## 2017-07-21 PROCEDURE — 81001 URINALYSIS AUTO W/SCOPE: CPT | Performed by: PHYSICIAN ASSISTANT

## 2017-07-21 PROCEDURE — 74011250636 HC RX REV CODE- 250/636: Performed by: PHYSICIAN ASSISTANT

## 2017-07-21 PROCEDURE — 80053 COMPREHEN METABOLIC PANEL: CPT | Performed by: PHYSICIAN ASSISTANT

## 2017-07-21 PROCEDURE — 85025 COMPLETE CBC W/AUTO DIFF WBC: CPT | Performed by: PHYSICIAN ASSISTANT

## 2017-07-21 PROCEDURE — 74011250636 HC RX REV CODE- 250/636: Performed by: INTERNAL MEDICINE

## 2017-07-21 PROCEDURE — 96374 THER/PROPH/DIAG INJ IV PUSH: CPT

## 2017-07-21 PROCEDURE — 84443 ASSAY THYROID STIM HORMONE: CPT | Performed by: INTERNAL MEDICINE

## 2017-07-21 PROCEDURE — C9254 INJECTION, LACOSAMIDE: HCPCS | Performed by: PHYSICIAN ASSISTANT

## 2017-07-21 PROCEDURE — 85610 PROTHROMBIN TIME: CPT | Performed by: PHYSICIAN ASSISTANT

## 2017-07-21 PROCEDURE — 70450 CT HEAD/BRAIN W/O DYE: CPT

## 2017-07-21 PROCEDURE — 65660000000 HC RM CCU STEPDOWN

## 2017-07-21 RX ORDER — ACETAMINOPHEN 325 MG/1
650 TABLET ORAL
Status: DISCONTINUED | OUTPATIENT
Start: 2017-07-21 | End: 2017-07-23 | Stop reason: HOSPADM

## 2017-07-21 RX ORDER — PANTOPRAZOLE SODIUM 40 MG/1
40 TABLET, DELAYED RELEASE ORAL 2 TIMES DAILY
Status: DISCONTINUED | OUTPATIENT
Start: 2017-07-21 | End: 2017-07-23 | Stop reason: HOSPADM

## 2017-07-21 RX ORDER — FLUTICASONE PROPIONATE 50 MCG
2 SPRAY, SUSPENSION (ML) NASAL DAILY
Status: DISCONTINUED | OUTPATIENT
Start: 2017-07-22 | End: 2017-07-23 | Stop reason: HOSPADM

## 2017-07-21 RX ORDER — BUTALBITAL, ACETAMINOPHEN, CAFFEINE AND CODEINE PHOSPHATE 300; 50; 40; 30 MG/1; MG/1; MG/1; MG/1
1 CAPSULE ORAL
Status: DISCONTINUED | OUTPATIENT
Start: 2017-07-21 | End: 2017-07-23 | Stop reason: HOSPADM

## 2017-07-21 RX ORDER — LUBIPROSTONE 8 UG/1
8 CAPSULE, GELATIN COATED ORAL 2 TIMES DAILY WITH MEALS
Status: DISCONTINUED | OUTPATIENT
Start: 2017-07-22 | End: 2017-07-23 | Stop reason: HOSPADM

## 2017-07-21 RX ORDER — OLANZAPINE 2.5 MG/1
7.5 TABLET ORAL
Status: DISCONTINUED | OUTPATIENT
Start: 2017-07-21 | End: 2017-07-23 | Stop reason: HOSPADM

## 2017-07-21 RX ORDER — MORPHINE SULFATE 15 MG/1
15 TABLET ORAL
Status: DISCONTINUED | OUTPATIENT
Start: 2017-07-21 | End: 2017-07-23 | Stop reason: HOSPADM

## 2017-07-21 RX ORDER — SUCRALFATE 1 G/10ML
1 SUSPENSION ORAL
Status: DISCONTINUED | OUTPATIENT
Start: 2017-07-21 | End: 2017-07-23 | Stop reason: HOSPADM

## 2017-07-21 RX ORDER — LANOLIN ALCOHOL/MO/W.PET/CERES
3 CREAM (GRAM) TOPICAL 3 TIMES DAILY
Status: DISCONTINUED | OUTPATIENT
Start: 2017-07-21 | End: 2017-07-23 | Stop reason: HOSPADM

## 2017-07-21 RX ORDER — LANOLIN ALCOHOL/MO/W.PET/CERES
400 CREAM (GRAM) TOPICAL
Status: DISCONTINUED | OUTPATIENT
Start: 2017-07-22 | End: 2017-07-23 | Stop reason: HOSPADM

## 2017-07-21 RX ORDER — LEVOTHYROXINE SODIUM 125 UG/1
125 TABLET ORAL
Status: DISCONTINUED | OUTPATIENT
Start: 2017-07-22 | End: 2017-07-23 | Stop reason: HOSPADM

## 2017-07-21 RX ORDER — POLYETHYLENE GLYCOL 3350 17 G/17G
17 POWDER, FOR SOLUTION ORAL 2 TIMES DAILY
Status: DISCONTINUED | OUTPATIENT
Start: 2017-07-21 | End: 2017-07-23 | Stop reason: HOSPADM

## 2017-07-21 RX ORDER — ASCORBIC ACID 250 MG
500 TABLET ORAL DAILY
Status: DISCONTINUED | OUTPATIENT
Start: 2017-07-22 | End: 2017-07-23 | Stop reason: HOSPADM

## 2017-07-21 RX ORDER — NALOXONE HYDROCHLORIDE 0.4 MG/ML
0.4 INJECTION, SOLUTION INTRAMUSCULAR; INTRAVENOUS; SUBCUTANEOUS AS NEEDED
Status: DISCONTINUED | OUTPATIENT
Start: 2017-07-21 | End: 2017-07-23 | Stop reason: HOSPADM

## 2017-07-21 RX ORDER — LORATADINE AND PSEUDOEPHEDRINE 10; 240 MG/1; MG/1
1 TABLET, EXTENDED RELEASE ORAL DAILY
Status: DISCONTINUED | OUTPATIENT
Start: 2017-07-22 | End: 2017-07-23 | Stop reason: HOSPADM

## 2017-07-21 RX ORDER — PREDNISONE 5 MG/1
5 TABLET ORAL DAILY
Status: DISCONTINUED | OUTPATIENT
Start: 2017-07-22 | End: 2017-07-23 | Stop reason: HOSPADM

## 2017-07-21 RX ORDER — LANOLIN ALCOHOL/MO/W.PET/CERES
324 CREAM (GRAM) TOPICAL
Status: DISCONTINUED | OUTPATIENT
Start: 2017-07-22 | End: 2017-07-23 | Stop reason: HOSPADM

## 2017-07-21 RX ORDER — LACOSAMIDE 100 MG/1
100 TABLET ORAL 2 TIMES DAILY
Status: DISCONTINUED | OUTPATIENT
Start: 2017-07-21 | End: 2017-07-22

## 2017-07-21 RX ORDER — LORAZEPAM 2 MG/ML
1 INJECTION INTRAMUSCULAR ONCE
Status: COMPLETED | OUTPATIENT
Start: 2017-07-21 | End: 2017-07-21

## 2017-07-21 RX ORDER — FACIAL-BODY WIPES
10 EACH TOPICAL DAILY PRN
Status: DISCONTINUED | OUTPATIENT
Start: 2017-07-21 | End: 2017-07-23 | Stop reason: HOSPADM

## 2017-07-21 RX ORDER — HEPARIN SODIUM 5000 [USP'U]/ML
5000 INJECTION, SOLUTION INTRAVENOUS; SUBCUTANEOUS EVERY 8 HOURS
Status: DISCONTINUED | OUTPATIENT
Start: 2017-07-21 | End: 2017-07-23 | Stop reason: HOSPADM

## 2017-07-21 RX ORDER — LACOSAMIDE 10 MG/ML
200 INJECTION, SOLUTION INTRAVENOUS ONCE
Status: COMPLETED | OUTPATIENT
Start: 2017-07-21 | End: 2017-07-21

## 2017-07-21 RX ORDER — LORAZEPAM 2 MG/ML
INJECTION INTRAMUSCULAR
Status: DISPENSED
Start: 2017-07-21 | End: 2017-07-22

## 2017-07-21 RX ORDER — GABAPENTIN 300 MG/1
900 CAPSULE ORAL 3 TIMES DAILY
Status: DISCONTINUED | OUTPATIENT
Start: 2017-07-21 | End: 2017-07-23 | Stop reason: HOSPADM

## 2017-07-21 RX ORDER — ROPINIROLE 0.25 MG/1
0.25 TABLET, FILM COATED ORAL DAILY
Status: DISCONTINUED | OUTPATIENT
Start: 2017-07-22 | End: 2017-07-23 | Stop reason: HOSPADM

## 2017-07-21 RX ORDER — DOCUSATE SODIUM 100 MG/1
100 CAPSULE, LIQUID FILLED ORAL
Status: DISCONTINUED | OUTPATIENT
Start: 2017-07-21 | End: 2017-07-23 | Stop reason: HOSPADM

## 2017-07-21 RX ORDER — CYCLOBENZAPRINE HCL 10 MG
10 TABLET ORAL
Status: DISCONTINUED | OUTPATIENT
Start: 2017-07-21 | End: 2017-07-23 | Stop reason: HOSPADM

## 2017-07-21 RX ORDER — ROPINIROLE 0.25 MG/1
0.75 TABLET, FILM COATED ORAL
Status: DISCONTINUED | OUTPATIENT
Start: 2017-07-21 | End: 2017-07-23 | Stop reason: HOSPADM

## 2017-07-21 RX ORDER — DULOXETIN HYDROCHLORIDE 60 MG/1
60 CAPSULE, DELAYED RELEASE ORAL 2 TIMES DAILY
Status: DISCONTINUED | OUTPATIENT
Start: 2017-07-21 | End: 2017-07-23 | Stop reason: HOSPADM

## 2017-07-21 RX ORDER — SODIUM CHLORIDE 9 MG/ML
100 INJECTION, SOLUTION INTRAVENOUS CONTINUOUS
Status: DISCONTINUED | OUTPATIENT
Start: 2017-07-21 | End: 2017-07-23 | Stop reason: HOSPADM

## 2017-07-21 RX ADMIN — Medication 3 CAPSULE: at 23:25

## 2017-07-21 RX ADMIN — HEPARIN SODIUM 5000 UNITS: 5000 INJECTION, SOLUTION INTRAVENOUS; SUBCUTANEOUS at 23:25

## 2017-07-21 RX ADMIN — MIRABEGRON 50 MG: 25 TABLET, FILM COATED, EXTENDED RELEASE ORAL at 23:41

## 2017-07-21 RX ADMIN — PANTOPRAZOLE SODIUM 40 MG: 40 TABLET, DELAYED RELEASE ORAL at 23:25

## 2017-07-21 RX ADMIN — BISACODYL 10 MG: 10 SUPPOSITORY RECTAL at 23:24

## 2017-07-21 RX ADMIN — ROPINIROLE HYDROCHLORIDE 0.75 MG: 0.25 TABLET, FILM COATED ORAL at 23:25

## 2017-07-21 RX ADMIN — DULOXETINE HYDROCHLORIDE 60 MG: 60 CAPSULE, DELAYED RELEASE ORAL at 23:25

## 2017-07-21 RX ADMIN — LACOSAMIDE 200 MG: 10 INJECTION INTRAVENOUS at 20:18

## 2017-07-21 RX ADMIN — MORPHINE SULFATE 15 MG: 15 TABLET ORAL at 23:41

## 2017-07-21 RX ADMIN — SUCRALFATE 1 G: 1 SUSPENSION ORAL at 23:24

## 2017-07-21 RX ADMIN — CYCLOBENZAPRINE HYDROCHLORIDE 10 MG: 10 TABLET, FILM COATED ORAL at 23:25

## 2017-07-21 RX ADMIN — POLYETHYLENE GLYCOL 3350 17 G: 17 POWDER, FOR SOLUTION ORAL at 23:24

## 2017-07-21 RX ADMIN — LORAZEPAM 1 MG: 2 INJECTION INTRAMUSCULAR; INTRAVENOUS at 20:00

## 2017-07-21 RX ADMIN — SODIUM CHLORIDE 100 ML/HR: 900 INJECTION, SOLUTION INTRAVENOUS at 23:26

## 2017-07-21 RX ADMIN — OLANZAPINE 7.5 MG: 2.5 TABLET, FILM COATED ORAL at 23:41

## 2017-07-21 RX ADMIN — GABAPENTIN 900 MG: 300 CAPSULE ORAL at 23:24

## 2017-07-21 NOTE — ED TRIAGE NOTES
Patient arrived to ER via EMS after patient describes a 3 days history of aphasia in which she was seen at [facility] on yesterday. Family also reports that patient had multiple seizures today, the longest lasting 10 minutes and noted around 1700 per . Post ictal stage of confusion and aphasia noted by family. When EMS arrived on the scene, patient was non-verbal. Vitals were stable. BS 99. Upon arriving to ER, patient was alert and oriented x 4. Speech was coherent and clear. No aphasia noted. Bilateral lower extremities contract. Patient is paraplegic. Per family, patient was in a coma x 7 years. Currently lives with  at home. PICC line noted to left upper arm. Gotti catheter noted.

## 2017-07-21 NOTE — ED PROVIDER NOTES
HPI Comments: 6:52 PM   Vick Fernández is a 78 y.o. female presents to the ED via EMS c/o seizure onset 90 mins ago. c/o constant HA, blurred vision and constipation c/w baseline. Per EMS pt was being transported from home and was speaking normally en route and then completely stopped speaking. Blood Glucose en route was 95 per EMS. Pt has been on Rocephin for UTI x 1 day, first dose yesterday. Pt believes that the Rocephin is causing her seizures. Pt was seen at [facility] last night for slurred speech, has resolved once arrived in ED. Per  pt had a tonic-clonic seizure about 1.5 hours ago and it lasted about 10 mins. Per  pt was confusion after the seizure and could not control her speech. Pt states she decided to stop talking en route because she was afraid of embarrassing herself. Pt does not see neurology. PMHx includes seizure and medically induced coma for 7 years. Pt denies one sided weakness and any other symptoms or complaints. Written by Raffy Ann ED Scribshobha, as dictated by Jeraldene Kawasaki, PA-C     The history is provided by the EMS personnel. The history is limited by the condition of the patient. No  was used.         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    Fatigue     along with weakness    GERD (gastroesophageal reflux disease)     Headache     Hypertension     pt denies    Other ill-defined conditions swallowing issues, decubitus    Psychiatric disorder     Seizures ([facility] [address]        Past Surgical History:   Procedure Laterality Date    COLONOSCOPY N/A 2/21/2017    COLONOSCOPY [facility] ANESTHESIA, PATIENT IS IN ROOM 358 performed by Sharad Ernandez MD at [address] COLONOSCOPY N/A 2/22/2017    COLONOSCOPY performed by Sharad Ernandez MD at [facility] ENDOSCOPY    ENDOSCOPY, COLON, DIAGNOSTIC      over 20 years ago    ENDOSCOPY, COLON, DIAGNOSTIC  9/24/09    HX APPENDECTOMY  11/6/84    HX BACK SURGERY  11/24/98    severe back problems    HX CHOLECYSTECTOMY  11/6/84    HX CORONARY STENT PLACEMENT      ivc filter    HX GASTRIC BYPASS  1995    [facility]    HX TUBAL LIGATION  1991         Family History:   Problem Relation Age of Onset    Heart Disease Mother     Arthritis-rheumatoid Mother     Stroke Father     Hypertension Father     Arthritis-rheumatoid Father        Social History     Social History    Marital status:      Spouse name: N/A    Number of children: N/A    Years of education: N/A     Occupational History    Not on file. Social History Main Topics    Smoking status: Never Smoker    Smokeless tobacco: Never Used    Alcohol use No    Drug use: No    Sexual activity: Not on file     Other Topics Concern    Not on file     Social History Narrative         ALLERGIES: Demerol [meperidine] and Seafood [shellfish containing products]    Review of Systems   Eyes: Positive for visual disturbance (blurred). Gastrointestinal: Positive for constipation. Neurological: Positive for seizures and headaches (constant). Negative for weakness. Psychiatric/Behavioral: Positive for confusion. Vitals:    07/21/17 2004 07/21/17 2150 07/21/17 2223 07/22/17 0313   BP: 145/81 133/64 (!) 142/91 126/74   Pulse: 92 95  79   Resp: 18 18 16 17   Temp: 97.4 °F (36.3 °C) 98 °F (36.7 °C)  97.4 °F (36.3 °C)   SpO2: 97% 98% 97% 99%   Weight:    67.6 kg (149 lb 0.5 oz)   Height:                Physical Exam   Constitutional: She is oriented to person, place, and time. She appears well-developed and well-nourished. No distress. Alert, oriented x4, NAD, very talkative w/o slurred speech, non toxic, no obvious neuro deficits    HENT:   Head: Normocephalic and atraumatic.    Right Ear: Tympanic membrane, external ear and ear canal normal. No mastoid tenderness. Tympanic membrane is not perforated, not erythematous, not retracted and not bulging. No hemotympanum. Left Ear: Tympanic membrane, external ear and ear canal normal. No mastoid tenderness. Tympanic membrane is not perforated, not erythematous, not retracted and not bulging. No hemotympanum. Nose: Nose normal.   Mouth/Throat: Uvula is midline, oropharynx is clear and moist and mucous membranes are normal. No trismus in the jaw. No uvula swelling. No oropharyngeal exudate, posterior oropharyngeal edema, posterior oropharyngeal erythema or tonsillar abscesses. Eyes: EOM are normal. Pupils are equal, round, and reactive to light. Neck: Normal range of motion. Neck supple. No spinous process tenderness and no muscular tenderness present. No rigidity. Normal range of motion present. No Brudzinski's sign and no Kernig's sign noted. No meningismus   No lymphadenopathy    Cardiovascular: Normal rate, regular rhythm, normal heart sounds and intact distal pulses. No murmur heard. Pulmonary/Chest: Effort normal and breath sounds normal. No respiratory distress. She has no wheezes. She has no rales. Abdominal: Soft. Bowel sounds are normal. There is no tenderness. Suprapubic catheter in place    Musculoskeletal: Normal range of motion. Paralysis of BLE, atrophic, pulses 2+ for DP and PT    Neurological: She is alert and oriented to person, place, and time. She has normal strength. She displays no atrophy and no tremor. No cranial nerve deficit or sensory deficit. She exhibits normal muscle tone. Coordination and gait normal. GCS eye subscore is 4. GCS verbal subscore is 5. GCS motor subscore is 6. No neuro deficit   No pronator drift  Normal finger nose finger  N/V intact distally   Strength 5/5 and equal in all extremities   No slurred speech   No facial droop    Skin: Skin is warm and dry. Psychiatric: She has a normal mood and affect. Judgment normal.   Nursing note and vitals reviewed. RESULTS:    PULSE OXIMETRY NOTE:  Pulse-ox is 97% on RA  Interpretation: normal        CT HEAD WO CONT   Final Result   IMPRESSION: There are no acute intracranial abnormalities. Changes of small  vessel ischemia are noted.     These findings were communicated to and discussed with JACKI Montoya at 1912 hours  on the day of this dictation    As read by the radiologist.        Labs Reviewed   CBC WITH AUTOMATED DIFF - Abnormal; Notable for the following:        Result Value    RDW 17.9 (*)     MPV 8.4 (*)     All other components within normal limits   METABOLIC PANEL, COMPREHENSIVE - Abnormal; Notable for the following:     Sodium 130 (*)     Chloride 92 (*)     Glucose 101 (*)     Creatinine 0.48 (*)     Calcium 7.8 (*)     Alk. phosphatase 122 (*)     Albumin 3.1 (*)     All other components within normal limits   URINALYSIS W/ RFLX MICROSCOPIC - Abnormal; Notable for the following:     Protein TRACE (*)     Blood TRACE (*)     All other components within normal limits   URINE MICROSCOPIC ONLY - Abnormal; Notable for the following:     Bacteria FEW (*)     All other components within normal limits   CULTURE, URINE   PTT   PROTHROMBIN TIME + INR   TSH 3RD GENERATION   DRUG SCREEN, URINE   METABOLIC PANEL, COMPREHENSIVE   CBC WITH AUTOMATED DIFF   PROTHROMBIN TIME + INR   PTT   POC GLUCOSE       Recent Results (from the past 12 hour(s))   CBC WITH AUTOMATED DIFF    Collection Time: 07/21/17  7:05 PM   Result Value Ref Range    WBC 6.5 4.6 - 13.2 K/uL    RBC 4.48 4.20 - 5.30 M/uL    HGB 13.1 12.0 - 16.0 g/dL    HCT 37.5 35.0 - 45.0 %    MCV 83.7 74.0 - 97.0 FL    MCH 29.2 24.0 - 34.0 PG    MCHC 34.9 31.0 - 37.0 g/dL    RDW 17.9 (H) 11.6 - 14.5 %    PLATELET 088 183 - 529 K/uL    MPV 8.4 (L) 9.2 - 11.8 FL    NEUTROPHILS 62 40 - 73 %    LYMPHOCYTES 24 21 - 52 %    MONOCYTES 10 3 - 10 %    EOSINOPHILS 4 0 - 5 %    BASOPHILS 0 0 - 2 %    ABS. NEUTROPHILS 4.0 1.8 - 8.0 K/UL    ABS. LYMPHOCYTES 1.6 0.9 - 3.6 K/UL    ABS. MONOCYTES 0.6 0.05 - 1.2 K/UL    ABS. EOSINOPHILS 0.3 0.0 - 0.4 K/UL    ABS. BASOPHILS 0.0 0.0 - 0.06 K/UL    DF AUTOMATED     METABOLIC PANEL, COMPREHENSIVE    Collection Time: 07/21/17  7:05 PM   Result Value Ref Range    Sodium 130 (L) 136 - 145 mmol/L    Potassium 3.5 3.5 - 5.5 mmol/L    Chloride 92 (L) 100 - 108 mmol/L    CO2 29 21 - 32 mmol/L    Anion gap 9 3.0 - 18 mmol/L    Glucose 101 (H) 74 - 99 mg/dL    BUN 8 7.0 - 18 MG/DL    Creatinine 0.48 (L) 0.6 - 1.3 MG/DL    BUN/Creatinine ratio 17 12 - 20      GFR est AA >60 >60 ml/min/1.73m2    GFR est non-AA >60 >60 ml/min/1.73m2    Calcium 7.8 (L) 8.5 - 10.1 MG/DL    Bilirubin, total 0.3 0.2 - 1.0 MG/DL    ALT (SGPT) 44 13 - 56 U/L    AST (SGOT) 27 15 - 37 U/L    Alk.  phosphatase 122 (H) 45 - 117 U/L    Protein, total 6.6 6.4 - 8.2 g/dL    Albumin 3.1 (L) 3.4 - 5.0 g/dL    Globulin 3.5 2.0 - 4.0 g/dL    A-G Ratio 0.9 0.8 - 1.7     PTT    Collection Time: 07/21/17  7:05 PM   Result Value Ref Range    aPTT 28.4 23.0 - 36.4 SEC   PROTHROMBIN TIME + INR    Collection Time: 07/21/17  7:05 PM   Result Value Ref Range    Prothrombin time 12.3 11.5 - 15.2 sec    INR 0.9 0.8 - 1.2     URINALYSIS W/ RFLX MICROSCOPIC    Collection Time: 07/21/17  7:08 PM   Result Value Ref Range    Color YELLOW      Appearance CLEAR      Specific gravity 1.013 1.005 - 1.030      pH (UA) 7.0 5.0 - 8.0      Protein TRACE (A) NEG mg/dL    Glucose NEGATIVE  NEG mg/dL    Ketone NEGATIVE  NEG mg/dL    Bilirubin NEGATIVE  NEG      Blood TRACE (A) NEG      Urobilinogen 0.2 0.2 - 1.0 EU/dL    Nitrites NEGATIVE  NEG      Leukocyte Esterase NEGATIVE  NEG     URINE MICROSCOPIC ONLY    Collection Time: 07/21/17  7:08 PM   Result Value Ref Range    WBC 0 to 3 0 - 5 /hpf    RBC 0 to 3 0 - 5 /hpf    Epithelial cells FEW 0 - 5 /lpf    Bacteria FEW (A) NEG /hpf    Hyaline cast 0 to 3 0 - 2 /lpf      MDM  Number of Diagnoses or Management Options  Post-ictal aphasia:   Seizure [facility]):   Diagnosis management comments: CVA, TIA, seizure, post ictal state, psych, med reaction, malingering, UTI        Amount and/or Complexity of Data Reviewed  Clinical lab tests: ordered and reviewed  Tests in the radiology section of CPT®: ordered and reviewed (CT Head  )  Obtain history from someone other than the patient: yes (EMS  )  Discuss the patient with other providers: yes (Neurology   )      ED Course   MEDICATIONS GIVEN:  Medications   OLANZapine (ZyPREXA) tablet 7.5 mg (7.5 mg Oral Given 7/21/17 2341)   lacosamide (VIMPAT) tablet 100 mg ( Oral Canceled Entry 7/21/17 2300)   DULoxetine (CYMBALTA) capsule 60 mg (60 mg Oral Given 7/21/17 2325)   levothyroxine (SYNTHROID) tablet 125 mcg (not administered)   loratadine-pseudoephedrine (CLARITIN-D 24-hour)  mg per tablet 1 Tab (not administered)   pantoprazole (PROTONIX) tablet 40 mg (40 mg Oral Given 7/21/17 2325)   glucosamine-chondroitin (ARTHX) 500-400 mg capsule 3 Cap (3 Caps Oral Given 7/21/17 2325)   ferrous sulfate tablet 324 mg (not administered)   magnesium oxide (MAG-OX) tablet 400 mg (not administered)   ascorbic acid (vitamin C) (VITAMIN C) tablet 500 mg (not administered)   cyclobenzaprine (FLEXERIL) tablet 10 mg (10 mg Oral Given 7/21/17 2325)   rOPINIRole (REQUIP) tablet 0.25 mg (not administered)   mirabegron ER (MYRBETRIQ) tablet 50 mg (50 mg Oral Given 7/21/17 2341)   rOPINIRole (REQUIP) tablet 0.75 mg (0.75 mg Oral Given 7/21/17 2325)   sucralfate (CARAFATE) 100 mg/mL oral suspension 1 g (1 g Oral Given 7/21/17 2324)   predniSONE (DELTASONE) tablet 5 mg (not administered)   butalbital-acetaminop-caf-cod -80-30 mg cap 1 Cap (not administered)   gabapentin (NEURONTIN) capsule 900 mg (900 mg Oral Given 7/21/17 2324)   morphine IR (MS IR) tablet 15 mg (15 mg Oral Given 7/21/17 2341)   fluticasone (FLONASE) 50 mcg/actuation nasal spray 2 Spray (not administered)   lubiPROStone (AMITIZA) capsule 8 mcg (not administered)   polyethylene glycol (MIRALAX) packet 17 g (17 g Oral Given 7/21/17 2324)   0.9% sodium chloride infusion (100 mL/hr IntraVENous New Bag 7/21/17 2326)   acetaminophen (TYLENOL) tablet 650 mg (not administered)   heparin (porcine) injection 5,000 Units (5,000 Units SubCUTAneous Given 7/21/17 2325)   naloxone [facility]) injection 0.4 mg (not administered)   docusate sodium (COLACE) capsule 100 mg (not administered)   LORazepam (ATIVAN) 2 mg/mL injection (  Canceled Entry 7/21/17 2300)   bisacodyl (DULCOLAX) suppository 10 mg (10 mg Rectal Given 7/21/17 2324)   ondansetron (ZOFRAN) injection 4 mg (not administered)   lacosamide (VIMPAT) injection 200 mg (200 mg IntraVENous Given 7/21/17 2018)   LORazepam (ATIVAN) injection 1 mg (1 mg IntraVENous Given 7/21/17 2000)        Procedures    PROGRESS NOTE:  6:52 PM  Initial assessment performed. [facility] from [facility] reviewed, pt was seen on 7/17/17 dxd with UTI and started on Rocephin. On 7/20/17 was seen in ED twice for slurred speech that was not appreciated by staff at time of evaluation. Urine results did not appear to show signs of infection at that time. CONSULT NOTE:   6:58 PM  Bita Higgins PA-C spoke with Danielle Jenkins MD.   Discussed pt's hx, disposition, and available diagnostic and imaging results over the telephone. Reviewed care plans. Consulting physician agrees with plans as outlined. Will evaluate pt. CONSULT NOTE:   7:12 PM  Bita Higgins PA-C spoke with Tawana Menjivar MD  Specialty: Radiology   Discussed pt's hx, disposition, and available diagnostic and imaging results over the telephone. Reviewed care plans. Consulting physician agrees with plans as outlined. Chronic changes, nothing acute. CONSULT NOTE:   7:38 PM  Bita Higgins PA-C  spoke with Danielle Jenkins MD.    Discussed pt's hx, disposition, and available diagnostic and imaging results over the telephone. Reviewed care plans.  Consulting physician agrees with plans as outlined. Does not feel that pt had stroke, but appears to have altered stated due to postictal state after seizure. Notes Ceftriaxone may alter levels of her rx and increase frequency of seizures. Recommends to  increase Vempat. PROGRESS NOTE:   7:52 PM  Pt has been re-examined by Leonides Isidro PA-C. When discussing plan with pt, pt appeared to have episode of aphasia which  states is not usual but what happened earlier and appears to be on the verge of seizure. Dr. Jc Chauhan MD, attending. Witnessed episode agrees with admission for observation. CONSULT NOTE:   7:56 PM   Leonides Isidro PA-C spoke with Juan Back MD    Specialty: Hospitalist   Discussed pt's hx, disposition, and available diagnostic and imaging results over the telephone. Reviewed care plans. Consulting physician agrees with plans as outlined. Will admit to Tele but requests consult with Jefe Ernandez MD.       ADMISSION NOTE:  8:03 PM  Patient is being admitted to the hospital by Juan Back MD. The results of their tests and reasons for their admission have been discussed with them and/or available family. They convey agreement and understanding for the need to be admitted and for their admission diagnosis. Written by SARITA Lopez, as dictated by Leonides Isidro PA-C .    CONSULT NOTE:   8:32 PM  Leonides Isidro PA-C  spoke with Jefe Ernandez MD    Specialty: Neurology   Discussed pt's hx, disposition, and available diagnostic and imaging results over the telephone. Reviewed care plans. Consulting physician agrees with plans as outlined. Will see pt in the morning. CONDITIONS ON ADMISSION:  Urinary Tract Infection is not present at the time of admission. MRSA is not present at the time of admission. Pressure Ulcer is not present at the time of admission. CLINICAL IMPRESSION    1. Seizure ([address]    2. Post-ictal aphasia       CLINICAL IMPRESSION:    1. Seizure ([address]    2. Post-ictal aphasia            SCRIBE ATTESTATION STATEMENT  Documented by: Emeli Colin, scribing for and in the presence of Zora Moura PA-C      PROVIDER ATTESTATION STATEMENT  Zora Moura PA-C  : I personally performed the services described in the documentation, reviewed the documentation, as recorded by the scribe in my presence, and it accurately and completely records my words and actions.

## 2017-07-21 NOTE — IP AVS SNAPSHOT
[address] [place] [id number] 
[phone] Patient: Jaleel eVlazquez MRN: [id number] ZFA:[id number] You are allergic to the following Allergen Reactions Demerol (Meperidine) Unknown (comments) Seafood (Shellfish Containing Products) Unknown (comments) Recent Documentation Height Weight Breastfeeding? BMI OB Status Smoking Status 1.676 m 66.3 kg No 23.59 kg/m2 Postmenopausal Never Smoker Emergency Contacts Name Discharge Info Relation Home Work Mobile Tulio Rose DISCHARGE CAREGIVER [3] Spouse [3] [phone] About your hospitalization You were admitted on:  July 21, 2017 You last received care in the:  [address] [place] [address] You were discharged on:  July 23, 2017 Unit phone number:  [phone] Why you were hospitalized Your primary diagnosis was:  Seizure (Hcc) Your diagnoses also included:  Bipolar 1 Disorder (Hcc), Decubitus Ulcer Of Sacral Area, Severe Protein-Calorie Malnutrition (Hcc), Depression, Hyponatremia, Mild Dehydration Providers Seen During Your Hospitalizations Provider Role Specialty Primary office phone Luiza Brush MD Attending Provider Family Practice [phone] Kayy Connors MD Attending Provider Internal Medicine [phone] Zeus Sosa DO Attending Provider Internal Medicine [phone] Your Primary Care Physician (PCP) Primary Care Physician Office Phone Office Fax Tera  [phone] Follow-up Information Follow up With Details Comments Contact Info Diaz Marroquin MD Schedule an appointment as soon as possible for a visit  [phone] [address] 
[phone] Psychiatry Schedule an appointment as soon as possible for a visit  Corina Mireles MD Schedule an appointment as soon as possible for a visit Neurology [address]/ [address] [facility] 
[phone] Current Discharge Medication List  
  
START taking these medications Dose & Instructions Dispensing Information Comments Morning Noon Evening Bedtime  
 ciprofloxacin HCl 500 mg tablet Commonly known as:  CIPRO Your next dose is: Today Dose:  500 mg Take 1 Tab by mouth two (2) times a day for 12 days. TAKE IN REPLACEMENT OF CEFTRIAXONE DAILY FOR ESBL KLEB PYELO IF YOU FEEL YOU CANNOT TOLERATE THE MEDICATION Quantity:  24 Tab Refills:  0  
     
   
   
  
   
  
 levETIRAcetam 500 mg tablet Commonly known as:  KEPPRA Your next dose is:  07/23/17 Dose:  500 mg Take 1 Tab by mouth two (2) times a day. Quantity:  180 Tab Refills:  1 CONTINUE these medications which have CHANGED Dose & Instructions Dispensing Information Comments Morning Noon Evening Bedtime DULoxetine 30 mg capsule Commonly known as:  CYMBALTA What changed:  how much to take Your next dose is: Today Dose:  60 mg Take 2 Caps by mouth two (2) times a day. Quantity:  60 Cap Refills:  1  
     
   
   
  
   
  
 * lacosamide 200 mg Tab tablet Commonly known as:  VIMPAT What changed: You were already taking a medication with the same name, and this prescription was added. Make sure you understand how and when to take each. Dose:  200 mg Take 1 Tab by mouth two (2) times a day. Max Daily Amount: 400 mg. Indications: PARTIAL EPILEPSY TREATMENT ADJUNCT Quantity:  180 Tab Refills:  1  
     
   
   
   
  
 * lacosamide 200 mg Tab tablet Commonly known as:  VIMPAT What changed:  Another medication with the same name was added. Make sure you understand how and when to take each. Dose:  100 mg Take 100 mg by mouth two (2) times a day. Indications: PARTIAL EPILEPSY TREATMENT ADJUNCT Refills:  0 levothyroxine 100 mcg tablet Commonly known as:  SYNTHROID What changed:  how much to take Your next dose is:  Tomorrow Dose:  125 mcg Take 1.5 Tabs by mouth Daily (before breakfast). Quantity:  30 Tab Refills:  0  
     
  
   
   
   
  
 potassium chloride SR 10 mEq tablet Commonly known as:  KLOR-CON 10 What changed:  how much to take Your next dose is: Today Dose:  20 mEq Take 2 Tabs by mouth two (2) times a day. Quantity:  10 Tab Refills:  0  
     
   
   
  
   
  
 * Notice: This list has 2 medication(s) that are the same as other medications prescribed for you. Read the directions carefully, and ask your doctor or other care provider to review them with you. CONTINUE these medications which have NOT CHANGED Dose & Instructions Dispensing Information Comments Morning Noon Evening Bedtime Monet Ceja 434-20-08 mg-mg-million Tab Your next dose is: Today Dose:  61031 mg Take 25,000 mg by mouth two (2) times a day. Indications: Takes at 1200 and 2000 Refills:  0  
     
   
   
  
   
  
 CARAFATE 100 mg/mL suspension Generic drug:  sucralfate Your next dose is: Today Dose:  1 g Take 1 g by mouth Before breakfast, lunch, dinner and at bedtime. Refills:  0 CLARITIN-D 24 HOUR  mg per tablet Generic drug:  loratadine-pseudoephedrine Your next dose is:  Tomorrow Dose:  1 Tab Take 1 Tab by mouth daily. Refills:  0  
     
  
   
   
   
  
 codeine-butalbital-acetaminophen-caffeine -76-30 mg per capsule Commonly known as:  FIORICET WITH CODEINE Dose:  1 Cap Take 1 Cap by mouth every four (4) hours as needed for Headache. Refills:  0  
     
   
   
   
  
 cyclobenzaprine 10 mg tablet Commonly known as:  FLEXERIL Dose:  10 mg Take 10 mg by mouth nightly. Indications: Takes at 2000 Refills:  0 ferrous sulfate 324 mg (65 mg iron) tablet Dose:  324 mg Take 324 mg by mouth Daily (before breakfast). Indications: 65 mg iron Refills:  0  
     
   
   
   
  
 fluticasone 50 mcg/actuation nasal spray Commonly known as:  Irais Dill Your next dose is:  Tomorrow Dose:  2 Spray 2 Sprays by Both Nostrils route daily. Quantity:  1 Bottle Refills:  1  
     
  
   
   
   
  
 gabapentin 300 mg capsule Commonly known as:  NEURONTIN Your next dose is: Today Dose:  900 mg Take 900 mg by mouth three (3) times daily. Refills:  0 Glucosamine &Chondroit-MV-Min3 860-005-33-0.5 mg Tab Your next dose is: Today Dose:  1500 mg Take 1,500 mg by mouth three (3) times daily. Refills:  0  
     
   
   
  
   
  
 lubiPROStone 8 mcg capsule Commonly known as:  [place] Salmons Your next dose is: Today Dose:  8 mcg Take 1 Cap by mouth two (2) times daily (with meals). Quantity:  60 Cap Refills:  0  
     
   
   
  
   
  
 magnesium oxide 500 mg Tab Your next dose is:  Tomorrow Dose:  500 mg Take 500 mg by mouth daily (with lunch). Refills:  0  
     
   
  
   
   
  
 morphine IR 15 mg tablet Commonly known as:  MS IR Dose:  15 mg Take 15 mg by mouth every eight (8) hours as needed for Pain. Refills:  0 MYRBETRIQ 50 mg ER tablet Generic drug:  mirabegron ER Dose:  50 mg Take 50 mg by mouth nightly. Takes at [phone] Refills:  0  
     
   
   
   
  
 MIGUEL PO Dose:  625 mg Take 625 mg by mouth two (2) times a day. Indications: Takes at 1200 and 2000 Refills:  0  
     
   
   
   
  
 OTHER(NON-FORMULARY) Your next dose is:  Tomorrow Dose:  1 Cap Take 1 Cap by mouth daily (with lunch). Indications: Bariatric Multi Formula Refills:  0  
     
   
  
   
   
  
 polyethylene glycol 17 gram packet Commonly known as:  Morales Driver Your next dose is: Today Dose:  17 g Take 1 Packet by mouth two (2) times a day. Quantity:  60 Packet Refills:  0  
     
   
   
  
   
  
 predniSONE 5 mg tablet Commonly known as:  Cj Noss Your next dose is:  Tomorrow Dose:  5 mg Take 5 mg by mouth daily. Refills:  0 PROTONIX 20 mg tablet Generic drug:  pantoprazole Your next dose is: Today Dose:  20 mg Take 20 mg by mouth two (2) times a day. Refills:  0  
     
   
   
  
   
  
 * REQUIP 0.25 mg tablet Generic drug:  rOPINIRole Your next dose is:  Tomorrow Dose:  0.25 mg Take 0.25 mg by mouth daily. Takes at 5 Refills:  0  
     
  
   
   
   
  
 * REQUIP 0.25 mg tablet Generic drug:  rOPINIRole Your next dose is: Today Dose:  0.75 mg Take 0.75 mg by mouth nightly. Takes at [phone] Refills:  0  
     
   
   
   
  
  
 VITAMIN C 500 mg tablet Generic drug:  ascorbic acid (vitamin C) Your next dose is:  Tomorrow Dose:  500 mg Take 500 mg by mouth daily. Refills:  0 ZyPREXA 7.5 mg tablet Generic drug:  OLANZapine Your next dose is: Today Dose:  7.5 mg Take 7.5 mg by mouth nightly. Refills:  0  
     
   
   
   
  
  
 * Notice: This list has 2 medication(s) that are the same as other medications prescribed for you. Read the directions carefully, and ask your doctor or other care provider to review them with you. STOP taking these medications ROCEPHIN 1 gram injection Generic drug:  cefTRIAXone Where to Get Your Medications Information on where to get these meds will be given to you by the nurse or doctor. ! Ask your nurse or doctor about these medications  
  ciprofloxacin HCl 500 mg tablet  
 lacosamide 200 mg Tab tablet  
 levETIRAcetam 500 mg tablet Discharge Instructions Seizure: Care Instructions Your Care Instructions Seizures are caused by abnormal patterns of electrical signals in the brain. They are different for each person. Seizures can affect movement, speech, vision, or awareness. Some people have only slight shaking of a hand and do not pass out. Other people may pass out and have violent shaking of the whole body. Some people appear to stare into space. They are awake, but they can't respond normally. Later, they may not remember what happened. You may need tests to identify the type and cause of the seizures. A seizure may occur only once, or you may have them more than one time. Taking medicines as directed and following up with your doctor may help keep you from having more seizures. The doctor has checked you carefully, but problems can develop later. If you notice any problems or new symptoms, get medical treatment right away. Follow-up care is a key part of your treatment and safety. Be sure to make and go to all appointments, and call your doctor if you are having problems. It's also a good idea to know your test results and keep a list of the medicines you take. How can you care for yourself at home? · Be safe with medicines. Take your medicines exactly as prescribed. Call your doctor if you think you are having a problem with your medicine. · Do not do any activity that could be dangerous to you or others until your doctor says it is safe to do so. For example, do not drive a car, operate machinery, swim, or climb ladders. · Be sure that anyone treating you for any health problem knows that you have had a seizure and what medicines you are taking for it. · Identify and avoid things that may make you more likely to have a seizure. These may include lack of sleep, alcohol or drug use, stress, or not eating. · Make sure you go to your follow-up appointment. When should you call for help? Call 911 anytime you think you may need emergency care. For example, call if: 
· You have another seizure. · You have more than one seizure in 24 hours. · You have new symptoms, such as trouble walking, speaking, or thinking clearly. Call your doctor now or seek immediate medical care if: 
· You are not acting normally. Watch closely for changes in your health, and be sure to contact your doctor if you have any problems. Where can you learn more? Go to http://sav-susannah.info/. Enter J339 in the search box to learn more about \"Seizure: Care Instructions. \" Current as of: October 14, 2016 Content Version: 11.3 © 5100-1330 [employer]. Care instructions adapted under license by [employer] (which disclaims liability or warranty for this information). If you have questions about a medical condition or this instruction, always ask your healthcare professional. [address] any warranty or liability for your use of this information. Learning About Turning a Person in Bed Why is it important to turn a person in bed? People sometimes have to stay in bed for long periods of time. They may be very sick, in pain, or very weak and not be able to move themselves into different positions. It's very important that your loved one changes positions. Lying in one position for a long time can cause pressure injuries (also called pressure sores). Pressure injuries are damage to the skin. They can range from red areas on the surface of the skin to severe tissue damage that goes deep into muscle and bone. These problems are hard to treat and slow to heal. When pressure injuries don't heal well, they can cause problems such as bone, blood, and skin infections. Pressure injuries usually occur over bony areas, such as the hips, lower back, elbows, heels, and shoulders. They can also occur in places where the skin folds over on itself.  
You can help your loved one avoid pressure injuries by helping him or her turn and change position in bed. A drawsheet can help. What is a drawsheet? A drawsheet makes it easier to \"roll\" your loved one into another position. You can buy a drawsheet, or you can make one with a sheet. You then make the bed using the drawsheet. To make a drawsheet: 
· Fold a sheet in half lengthwise. · Place the sheet on top of the fitted bottom sheet so that the top and bottom of the drawsheet go across the bed (perpendicular to the bed). Position the drawsheet so that it will be between your loved one's head and knees. · Tuck in the drawsheet tightly on both sides. Smooth out any wrinkles to reduce possible skin irritation. How do you turn a person in bed? Before getting started, tell your loved one that you want him or her to roll into another position. If your loved one has any drains, tubes, or other medical equipment, adjust them so they don't get in the way. If your loved one can help · You may need to help your loved one scoot toward the opposite side of the bed so that he or she will have room to roll. · Go to the side of the bed you want your love one to roll toward. · Ask your loved one to lie on his or her back with the knees bent. Have your loved one place his or her arms across the body. · Ask your loved one to roll toward you while keeping the knees bent. If you have a rail on the bed, have your loved one reach toward the rail. · Help your loved one as needed. Gently place your hands on the shoulders and hips, and guide him or her toward you. If your loved one can't help It is best to turn your loved one every 2 hours. If your loved one cannot move or finds it very hard, you can use your drawsheet (see \"What is a drawsheet? \"). Have a family member or friend help you. It is easier for two people to turn someone, and it can be dangerous for one person to do it. Position your loved one · One person stands on each side of the bed.  If your loved one is in a hospital bed, lower the height of the bed. This will make it easier to turn the person. · Untuck the drawsheet on both sides of the bed. Each person gathers up one side so you both almost have a \"handle\" to grab. You may also need to make sure your loved one is high enough up in the bed. If not, lift him or her toward the head of the bed. · Agree on a count, and then lift and move your loved one to the side of the bed you're going to roll away from. · Tuck in the drawsheet on the side of the bed that your loved one will roll toward. Positioning a drawsheet When you make someone's bed with a drawsheet, position it so that it is between the person's head and knees. Turning or moving a person in bed The drawsheet can then be used to turn or move the person you're caring for. Move your loved one When you and your assistant are ready: 
· Help your loved one lie on his or her back with the knees bent. If your loved one can't bend the knees, cross one ankle over the other in the direction of the turn. Position your loved one's arms across his or her body. · Stand on opposite sides of the bed. One person will pull and the other push. Be sure that you and your assistant have your feet shoulder-width apart. This will help you avoid straining your back. ¨ If you're pulling your loved one toward you, lean from your hips (don't bend your back), reach over your loved one, and grab the drawsheet at your loved one's hip and shoulder areas. Slowly pull the drawsheet toward you to roll your loved one over. ¨ If you're rolling your loved one away from you, slowly push at the hip and shoulder areas. Moving someone in bed is best as a two-person job. If your loved one can help, even a little, you may be able to do it yourself. But do all you can to find someone to help you. What do you do after turning someone? You can use pillows to help your loved one get comfortable and avoid pressure injuries. If your loved one is on his or her side: · Place pillows in front of your loved one, at chest level, with the top arm draped over a pillow. · If needed, tuck one edge of a pillow under the buttock, lengthwise. Then fold the pillow under and tuck the other edge under the first edge. That creates a \"roll\" that stays in place better and helps keep your loved one from rolling back. · Place a pillow between your loved one's knees, with the legs slightly bent. · Put the top leg a little in front of the bottom leg. This takes pressure off the bottom leg. · Put a pillow under the bottom leg so that the bottom ankle is off the bed. If your loved one is on his or her back: 
· Put a pillow under your loved one's legs between the knees and ankles. · Do not put anything under the heels. · If you have a hospital bed, don't adjust the top end above 30 degrees. This helps prevent your loved one from sliding down. When you are finished, smooth out the drawsheet in its original position and tuck it in. Where can you learn more? Go to http://sav-susannah.info/. Enter C024 in the search box to learn more about \"Learning About Turning a Person in Bed. \" Current as of: April 3, 2017 Content Version: 11.3 © 5165-6073 [employer]. Care instructions adapted under license by [employer] (which disclaims liability or warranty for this information). If you have questions about a medical condition or this instruction, always ask your healthcare professional. [address] any warranty or liability for your use of this information. Learning About Preventing Pressure Sores What are pressure sores? A pressure sore is an injury to the skin caused by constant pressure over a period of time. The constant pressure blocks the blood supply to the skin. This causes skin cells to die and creates a sore. Pressure sores are also called bedsores. Pressure sores usually occur over bony areas, such as the hips, lower back, elbows, heels, and shoulders. Pressure sores can also occur in places where the skin folds over on itself, or where medical equipment presses on the skin, such as when oxygen tubes press on the ears or cheeks. Pressure sores can range from red areas on the surface of the skin to severe tissue damage that goes deep into muscle and bone. Severe sores are hard to treat and slow to heal. When pressure sores do not heal properly, problems such as bone, blood, and skin infections can develop. What causes pressure sores? Things that cause pressure sores include: · Pressure on the skin and tissues. For example, the sores may happen when a person lies in bed or sits in a wheelchair for a long time. This is the most common cause of pressure sores. · Sliding down in a bed or chair, forcing the skin to fold over itself (shear force). · Being pulled across bed sheets or other surfaces (friction burns). As we get older, our skin gets more thin and dry and less elastic, so it is easier to damage. Poor nutrition and not getting enough fluids make these natural changes in the skin worse. Skin in this condition may easily develop a pressure sore. Skin can also be damaged by sweat, feces, or urine, making pressure sores more likely and harder to heal. 
How can you help prevent pressure sores? If you are not able to do these things yourself, ask a family member or friend for help. Change position often · In a bed, change position every 2 hours. · In a wheelchair or other type of chair, shift your weight every 15 minutes, and give yourself a full relief of weight every hour. ¨ For a weight shift, lean forward and to the left and right. Push up out of the chair with your arms. If you have a chair that tilts, use the tilt control to help relieve pressure. ¨ For a full relief of weight, stand up or move to another chair or bed if you are able to. Personal care · Check your skin every day, especially around bony areas. When a pressure sore is forming, skin temperature can be different than nearby skin. It might be warmer or cooler. The skin can feel either firmer or softer than the surrounding skin. · Keep your skin clean and free of sweat, wound drainage, urine, and feces. · Use skin lotions to keep your skin from drying out and cracking. Barrier lotions or creams have ingredients that can act as a shield to help protect the skin from moisture or irritation. · Try not to slide or slump across sheets in a chair or bed. And try not to sleep in a recliner chair. Lifestyle choices · Eat healthy foods with plenty of protein to help heal damaged skin and to help new skin grow. · Get plenty of fluids. · Stay at a healthy weight. Being either overweight or underweight can make pressure sores more likely. · If you smoke, stop. Smoking dries the skin and reduces its blood supply. If you need help quitting, talk to your doctor about stop-smoking programs and medicines. These can increase your chances of quitting for good. Ask about using cushions or pads · Overlays are special pads you put on top of a mattress. They provide a softer surface that will fit your body's shape better than a regular mattress. · Cushions or devices can be used to reduce pressure on certain areas of the body. For example, you can use a \"medical heel pillow\" to help prevent pressure sores on heels. You can also get cushions for seating surfaces, such as wheelchair seats. Talk with your doctor about cushions and pads. Some products, such as doughnut-type devices, may actually cause pressure sores or make them worse. Where can you learn more? Go to http://sav-susannah.info/. Enter 405 9427 in the search box to learn more about \"Learning About Preventing Pressure Sores. \" Current as of: March 20, 2017 Content Version: 11.3 © 1540-6104 [employer]. Care instructions adapted under license by [employer] (which disclaims liability or warranty for this information). If you have questions about a medical condition or this instruction, always ask your healthcare professional. [address] any warranty or liability for your use of this information. Learning About Mood Disorders What are mood disorders? Mood disorders are medical problems that affect how you feel. They can impact your moods, thoughts, and actions. Mood disorders include: · Depression. This causes you to feel sad or hopeless for much of the time. · Bipolar disorder. This causes extreme mood changes from manic episodes of very high energy to extreme lows of depression. · Seasonal affective disorder (SAD). This is a type of depression that affects you during the same season each year. Most often people experience SAD during the fall and winter months when days are shorter and there is less light. What are the symptoms? Depression You may: · Feel sad or hopeless nearly every day. · Lose interest in or not get pleasure from most daily activities. You feel this way nearly every day. · Have low energy, changes in your appetite, or changes in how well you sleep. · Have trouble concentrating. · Think about death and suicide. Keep the numbers for these national suicide hotlines: 1-757-088-TALK (2-198.855.4436) and 4-963-BONTWEC (8-573.121.4918). If you or someone you know talks about suicide or feeling hopeless, get help right away. Bipolar disorder Symptoms depend on your mood swings. You may: · Feel very happy, energetic, or on edge. · Feel like you need very little sleep. · Feel overly self-confident. · Do impulsive things, such as spending a lot of money. · Feel sad or hopeless. · Have racing thoughts or trouble thinking and making decisions.  
· Lose interest in things you have enjoyed in the past. 
 · Think about death and suicide. Keep the numbers for these national suicide hotlines: 4-591-494-TALK (5-413.347.7132) and 1-399-MFXZEMS ([phone]). If you or someone you know talks about suicide or feeling hopeless, get help right away. Seasonal affective disorder (SAD) Symptoms come and go at about the same time each year. For most people with SAD, symptoms come during the winter when there is less daylight. You may: · Feel sad, grumpy, saravia, or anxious. · Lose interest in your usual activities. · Eat more and crave carbohydrates, such as bread and pasta. · Gain weight. · Sleep more and feel drowsy during the daytime. How are mood disorders treated? Mood disorders can be treated with medicines or counseling, or a combination of both. Medicines for depression and SAD may include antidepressants. Medicines for bipolar disorder may include: · Mood stabilizers. · Antipsychotics. · Benzodiazepines. Counseling may involve cognitive-behavioral therapy. It teaches you how to change the ways you think and behave. This can help you stop thinking bad thoughts about yourself and your life. Light therapy is the main treatment for SAD. This therapy uses a special kind of lamp. You let the lamp shine on you at certain times, usually in the morning. This may help your symptoms during the months when there is less sunlight. Healthy lifestyle Healthy lifestyle changes may help you feel better. · Get at least 30 minutes of exercise on most days of the week. Walking is a good choice. · Eat a healthy diet. Include fruits, vegetables, lean proteins, and whole grains in your diet each day. · Keep a regular sleep schedule. Try for 8 hours of sleep a night. · Find ways to manage stress, such as relaxation exercises. · Avoid alcohol and illegal drugs. Follow-up care is a key part of your treatment and safety.  Be sure to make and go to all appointments, and call your doctor if you are having problems. It's also a good idea to know your test results and keep a list of the medicines you take. Where can you learn more? Go to http://sav-susannah.info/. Enter I956 in the search box to learn more about \"Learning About Mood Disorders. \" Current as of: May 3, 2017 Content Version: 11.3 © 0586-4418 [employer]. Care instructions adapted under license by [employer] (which disclaims liability or warranty for this information). If you have questions about a medical condition or this instruction, always ask your healthcare professional. [address] any warranty or liability for your use of this information. Recovering From Depression: Care Instructions Your Care Instructions Taking good care of yourself is important as you recover from depression. In time, your symptoms will fade as your treatment takes hold. Do not give up. Instead, focus your energy on getting better. Your mood will improve. It just takes some time. Focus on things that can help you feel better, such as being with friends and family, eating well, and getting enough rest. But take things slowly. Do not do too much too soon. You will begin to feel better gradually. Follow-up care is a key part of your treatment and safety. Be sure to make and go to all appointments, and call your doctor if you are having problems. It's also a good idea to know your test results and keep a list of the medicines you take. How can you care for yourself at home? Be realistic · If you have a large task to do, break it up into smaller steps you can handle, and just do what you can. · You may want to put off important decisions until your depression has lifted. If you have plans that will have a major impact on your life, such as marriage, divorce, or a job change, try to wait a bit.  Talk it over with friends and loved ones who can help you look at the overall picture first. 
 · Reaching out to people for help is important. Do not isolate yourself. Let your family and friends help you. Find someone you can trust and confide in, and talk to that person. · Be patient, and be kind to yourself. Remember that depression is not your fault and is not something you can overcome with willpower alone. Treatment is necessary for depression, just like for any other illness. Feeling better takes time, and your mood will improve little by little. Stay active · Stay busy and get outside. Take a walk, or try some other light exercise. · Talk with your doctor about an exercise program. Exercise can help with mild depression. · Go to a movie or concert. Take part in a [employer] activity or other social gathering. Go to a ball game. · Ask a friend to have dinner with you. Take care of yourself · Eat a balanced diet with plenty of fresh fruits and vegetables, whole grains, and lean protein. If you have lost your appetite, eat small snacks rather than large meals. · Avoid drinking alcohol or using illegal drugs. Do not take medicines that have not been prescribed for you. They may interfere with medicines you may be taking for depression, or they may make your depression worse. · Take your medicines exactly as they are prescribed. You may start to feel better within 1 to 3 weeks of taking antidepressant medicine. But it can take as many as 6 to 8 weeks to see more improvement. If you have questions or concerns about your medicines, or if you do not notice any improvement by 3 weeks, talk to your doctor. · If you have any side effects from your medicine, tell your doctor. Antidepressants can make you feel tired, dizzy, or nervous. Some people have dry mouth, constipation, headaches, sexual problems, or diarrhea. Many of these side effects are mild and will go away on their own after you have been taking the medicine for a few weeks. Some may last longer. Talk to your doctor if side effects are bothering you too much. You might be able to try a different medicine. · Get enough sleep. If you have problems sleeping: ¨ Go to bed at the same time every night, and get up at the same time every morning. ¨ Keep your bedroom dark and quiet. ¨ Do not exercise after 5:00 p.m. ¨ Avoid drinks with caffeine after 5:00 p.m. · Avoid sleeping pills unless they are prescribed by the doctor treating your depression. Sleeping pills may make you groggy during the day, and they may interact with other medicine you are taking. · If you have any other illnesses, such as diabetes, heart disease, or high blood pressure, make sure to continue with your treatment. Tell your doctor about all of the medicines you take, including those with or without a prescription. · Keep the numbers for these national suicide hotlines: 6-582-911-TALK (7-407.948.9967) and 4-193-WIDUFOO (4-626.664.2030). If you or someone you know talks about suicide or feeling hopeless, get help right away. When should you call for help? Call 911 anytime you think you may need emergency care. For example, call if: 
· You feel like hurting yourself or someone else. · Someone you know has depression and is about to attempt or is attempting suicide. Call your doctor now or seek immediate medical care if: 
· You hear voices. · Someone you know has depression and: 
¨ Starts to give away his or her possessions. ¨ Uses illegal drugs or drinks alcohol heavily. ¨ Talks or writes about death, including writing suicide notes or talking about guns, knives, or pills. ¨ Starts to spend a lot of time alone. ¨ Acts very aggressively or suddenly appears calm. Watch closely for changes in your health, and be sure to contact your doctor if: 
· You do not get better as expected. Where can you learn more? Go to http://sav-susannah.info/.  
Enter W303 in the search box to learn more about \"Recovering From Depression: Care Instructions. \" Current as of: July 26, 2016 Content Version: 11.3 © 4435-0952 [employer]. Care instructions adapted under license by [employer] (which disclaims liability or warranty for this information). If you have questions about a medical condition or this instruction, always ask your healthcare professional. [address] any warranty or liability for your use of this information. DISCHARGE SUMMARY from Nurse The following personal items are in your possession at time of discharge: 
 
Dental Appliances: None Visual Aid: None Home Medications: None Jewelry: None Clothing: None Other Valuables: None PATIENT INSTRUCTIONS: 
 
After general anesthesia or intravenous sedation, for 24 hours or while taking prescription Narcotics: · Limit your activities · Do not drive and operate hazardous machinery · Do not make important personal or business decisions · Do  not drink alcoholic beverages · If you have not urinated within 8 hours after discharge, please contact your surgeon on call. Report the following to your surgeon: 
· Excessive pain, swelling, redness or odor of or around the surgical area · Temperature over 100.5 · Nausea and vomiting lasting longer than 4 hours or if unable to take medications · Any signs of decreased circulation or nerve impairment to extremity: change in color, persistent  numbness, tingling, coldness or increase pain · Any questions What to do at Home: 
Recommended activity: Activity as tolerated. If you experience any of the following symptoms new or worsening of symptoms, please follow up with your Primary Care Provider or Neurologist. 
 
 
*  Please give a list of your current medications to your Primary Care Provider.  
 
*  Please update this list whenever your medications are discontinued, doses are 
    changed, or new medications (including over-the-counter products) are added. 
 
*  Please carry medication information at all times in case of emergency situations. These are general instructions for a healthy lifestyle: No smoking/ No tobacco products/ Avoid exposure to second hand smoke Surgeon General's Warning:  Quitting smoking now greatly reduces serious risk to your health. Obesity, smoking, and sedentary lifestyle greatly increases your risk for illness A healthy diet, regular physical exercise & weight monitoring are important for maintaining a healthy lifestyle You may be retaining fluid if you have a history of heart failure or if you experience any of the following symptoms:  Weight gain of 3 pounds or more overnight or 5 pounds in a week, increased swelling in our hands or feet or shortness of breath while lying flat in bed. Please call your doctor as soon as you notice any of these symptoms; do not wait until your next office visit. Recognize signs and symptoms of STROKE: 
 
F-face looks uneven A-arms unable to move or move unevenly S-speech slurred or non-existent T-time-call 911 as soon as signs and symptoms begin-DO NOT go Back to bed or wait to see if you get better-TIME IS BRAIN. Warning Signs of HEART ATTACK Call 911 if you have these symptoms: 
? Chest discomfort. Most heart attacks involve discomfort in the center of the chest that lasts more than a few minutes, or that goes away and comes back. It can feel like uncomfortable pressure, squeezing, fullness, or pain. ? Discomfort in other areas of the upper body. Symptoms can include pain or discomfort in one or both arms, the back, neck, jaw, or stomach. ? Shortness of breath with or without chest discomfort. ? Other signs may include breaking out in a cold sweat, nausea, or lightheadedness. Don't wait more than five minutes to call [address]! Fast action can save your life.  Calling 911 is almost always the fastest way to get lifesaving treatment. Emergency Medical Services staff can begin treatment when they arrive  up to an hour sooner than if someone gets to the hospital by car. The discharge information has been reviewed with the patient. The patient verbalized understanding. Discharge medications reviewed with the patient and appropriate educational materials and side effects teaching were provided. Patient armband removed and shredded. Discharge Instructions Attachments/References BIPOLAR DISORDER (ENGLISH) UTI (URINARY TRACT INFECTION): FEMALE (ENGLISH) DEHYDRATION (ENGLISH) Discharge Orders None [facility] Announcement We are excited to announce that we are making your provider's discharge notes available to you in Glio. You will see these notes when they are completed and signed by the physician that discharged you from your recent hospital stay. If you have any questions or concerns about any information you see in Glio, please call the Health Information Department where you were seen or reach out to your Primary Care Provider for more information about your plan of care. Introducing [facility]! Angella Nino introduces Glio patient portal. Now you can access parts of your medical record, email your doctor's office, and request medication refills online. 1. In your internet browser, go to [email or web address] 2. Click on the First Time User? Click Here link in the Sign In box. You will see the New Member Sign Up page. 3. Enter your Glio Access Code exactly as it appears below. You will not need to use this code after youve completed the sign-up process. If you do not sign up before the expiration date, you must request a new code. · Glio Access Code: [email or web address] Expires: 10/20/2017  1:44 PM 
 
4.  Enter the last four digits of your Social Security Number (xxxx) and Date of Birth (mm/dd/yyyy) as indicated and click Submit. You will be taken to the next sign-up page. 5. Create a Atmail ID. This will be your Atmail login ID and cannot be changed, so think of one that is secure and easy to remember. 6. Create a Atmail password. You can change your password at any time. 7. Enter your Password Reset Question and Answer. This can be used at a later time if you forget your password. 8. Enter your e-mail address. You will receive e-mail notification when new information is available in [address] 9. Click Sign Up. You can now view and download portions of your medical record. 10. Click the Download Summary menu link to download a portable copy of your medical information. If you have questions, please visit the Frequently Asked Questions section of the Atmail website. Remember, Atmail is NOT to be used for urgent needs. For medical emergencies, dial 911. Now available from your iPhone and Android! General Information Please provide this summary of care documentation to your next provider. Patient Signature:  ____________________________________________________________ Date:  ____________________________________________________________  
  
Erlinda Dewey Provider Signature:  ____________________________________________________________ Date:  ____________________________________________________________ More Information Bipolar Disorder: Care Instructions Your Care Instructions Bipolar disorder is an illness that causes extreme mood changes, from times of very high energy (manic episodes) to times of depression. But many people with bipolar disorder show only the symptoms of depression. These moods may cause problems with your work, school, family life, friendships, and how well you function. This disease is also called manic-depression.  
There is no cure for bipolar disorder, but it can be helped with medicines. Counseling may also help. It is important to take your medicines exactly as prescribed, even when you feel well. You may need lifelong treatment. Follow-up care is a key part of your treatment and safety. Be sure to make and go to all appointments, and call your doctor if you are having problems. It's also a good idea to know your test results and keep a list of the medicines you take. How can you care for yourself at home? · Be safe with medicines. Take your medicines exactly as prescribed. Do not stop or change a medicine without talking to your doctor first. Dee Preston and your doctor may need to try different combinations of medicines to find what works for you. · Take your medicines on schedule to keep your moods even. When you feel good, you may think that you do not need your medicines. But it is important to keep taking them. · Go to your counseling sessions. Call and talk with your counselor if you can't go to a session or if you don't think the sessions are helping. Do not just stop going. · Get at least 30 minutes of activity on most days of the week. Walking is a good choice. You also may want to do other things, such as running, swimming, or cycling. · Get enough sleep. Keep your room dark and quiet. Try to go to bed at the same time every night. · Eat a healthy diet. This includes whole grains, dairy, fruits, vegetables, and protein. Eat foods from each of these groups. · Try to lower your stress. Manage your time, build a strong support system, and lead a healthy lifestyle. To lower your stress, try physical activity, slow deep breathing, or getting a massage. · Do not use alcohol or illegal drugs. · Learn the early signs of your mood changes. You can then take steps to help yourself feel better. · Ask for help from friends and family when you need it. You may need help with daily chores when you are depressed.  When you are manic, you may need support to control your high energy levels. What should you do if someone in your family has bipolar disorder? · Learn about the disease and the signs that it is getting worse. · Remind your family member that you love him or her. · Make a plan with all family members about how to take care of your loved one when his or her symptoms are bad. · Talk about your fears and concerns and those of other family members. Seek counseling if needed. · Do not focus attention only on the person who is in treatment. · Remind yourself that it will take time for changes to occur. · Do not blame yourself for the disease. · Know your legal rights and the legal rights of your family member. Support groups or counselors can help you with this information. · Take care of yourself. Keep up with your own interests, such as your career, hobbies, and friends. Use exercise, positive self-talk, deep breathing, and other relaxing exercises to help lower your stress. · Give yourself time to grieve. You may need to deal with emotions such as anger, fear, and frustration. After you work through your feelings, you will be better able to care for yourself and your family. · If you are having a hard time with your feelings or with your relationship with your family member, talk with a counselor. When should you call for help? Call 911 anytime you think you may need emergency care. For example, call if: 
· You feel like hurting yourself or someone else. · Someone who has bipolar disorder displays dangerous behavior, and you think the person might hurt himself or herself or someone else. Call your doctor now or seek immediate medical care if: 
· You hear voices. · Someone you know has bipolar disorder and talks about suicide. Keep the numbers for these national suicide hotlines: 2-734-640-TALK (7-233.320.9921) and 1-869-NPVBKQU (5-685.509.3402).  If a suicide threat seems real, with a specific plan and a way to carry it out, stay with the person, or ask someone you trust to stay with the person, until you can get help. · Someone you know has bipolar disorder and: 
¨ Starts to give away possessions. ¨ Is using illegal drugs or drinking alcohol heavily. ¨ Talks or writes about death, including writing suicide notes or talking about guns, knives, or pills. ¨ Talks or writes about hurting someone else. ¨ Starts to spend a lot of time alone. ¨ Acts very aggressively or suddenly appears calm. ¨ Talks about beliefs that are not based in reality (delusions). Watch closely for changes in your health, and be sure to contact your doctor if: 
· You cannot go to your counseling sessions. Where can you learn more? Go to http://savSeeMediasusannah.info/. Enter K052 in the search box to learn more about \"Bipolar Disorder: Care Instructions. \" Current as of: July 26, 2016 Content Version: 11.3 © 4204-9282 [employer]. Care instructions adapted under license by [employer] (which disclaims liability or warranty for this information). If you have questions about a medical condition or this instruction, always ask your healthcare professional. [place] [address] any warranty or liability for your use of this information. Urinary Tract Infection in Women: Care Instructions Your Care Instructions A urinary tract infection, or UTI, is a general term for an infection anywhere between the kidneys and the urethra (where urine comes out). Most UTIs are bladder infections. They often cause pain or burning when you urinate. UTIs are caused by bacteria and can be cured with antibiotics. Be sure to complete your treatment so that the infection goes away. Follow-up care is a key part of your treatment and safety.  Be sure to make and go to all appointments, and call your doctor if you are having problems. It's also a good idea to know your test results and keep a list of the medicines you take. How can you care for yourself at home? · Take your antibiotics as directed. Do not stop taking them just because you feel better. You need to take the full course of antibiotics. · Drink extra water and other fluids for the next day or two. This may help wash out the bacteria that are causing the infection. (If you have kidney, heart, or liver disease and have to limit fluids, talk with your doctor before you increase your fluid intake.) · Avoid drinks that are carbonated or have caffeine. They can irritate the bladder. · Urinate often. Try to empty your bladder each time. · To relieve pain, take a hot bath or lay a heating pad set on low over your lower belly or genital area. Never go to sleep with a heating pad in place. To prevent UTIs · Drink plenty of water each day. This helps you urinate often, which clears bacteria from your system. (If you have kidney, heart, or liver disease and have to limit fluids, talk with your doctor before you increase your fluid intake.) · Urinate when you need to. · Urinate right after you have sex. · Change sanitary pads often. · Avoid douches, bubble baths, feminine hygiene sprays, and other feminine hygiene products that have deodorants. · After going to the bathroom, wipe from front to back. When should you call for help? Call your doctor now or seek immediate medical care if: · Symptoms such as fever, chills, nausea, or vomiting get worse or appear for the first time. · You have new pain in your back just below your rib cage. This is called flank pain. · There is new blood or pus in your urine. · You have any problems with your antibiotic medicine. Watch closely for changes in your health, and be sure to contact your doctor if: 
· You are not getting better after taking an antibiotic for 2 days. · Your symptoms go away but then come back. Where can you learn more? Go to http://sav-susannah.info/. Enter G394 in the search box to learn more about \"Urinary Tract Infection in Women: Care Instructions. \" Current as of: November 28, 2016 Content Version: 11.3 © 7793-5943 [employer]. Care instructions adapted under license by [employer] (which disclaims liability or warranty for this information). If you have questions about a medical condition or this instruction, always ask your healthcare professional. [address] any warranty or liability for your use of this information. Dehydration: Care Instructions Your Care Instructions Dehydration happens when your body loses too much fluid. This might happen when you do not drink enough water or you lose large amounts of fluids from your body because of diarrhea, vomiting, or sweating. Severe dehydration can be life-threatening. Water and minerals called electrolytes help put your body fluids back in balance. Learn the early signs of fluid loss, and drink more fluids to prevent dehydration. Follow-up care is a key part of your treatment and safety. Be sure to make and go to all appointments, and call your doctor if you are having problems. It's also a good idea to know your test results and keep a list of the medicines you take. How can you care for yourself at home? · To prevent dehydration, drink plenty of fluids, enough so that your urine is light yellow or clear like water. Choose water and other caffeine-free clear liquids until you feel better. If you have kidney, heart, or liver disease and have to limit fluids, talk with your doctor before you increase the amount of fluids you drink. · If you do not feel like eating or drinking, try taking small sips of water, sports drinks, or other rehydration drinks. · Get plenty of rest. 
To prevent dehydration · Add more fluids to your diet and daily routine, unless your doctor has told you not to. · During hot weather, drink more fluids. Drink even more fluids if you exercise a lot. Stay away from drinks with alcohol or caffeine. · Watch for the symptoms of dehydration. These include: ¨ A dry, sticky mouth. ¨ Dark yellow urine, and not much of it. ¨ Dry and sunken eyes. ¨ Feeling very tired. · Learn what problems can lead to dehydration. These include: ¨ Diarrhea, fever, and vomiting. ¨ Any illness with a fever, such as pneumonia or the flu. ¨ Activities that cause heavy sweating, such as endurance races and heavy outdoor work in hot or humid weather. ¨ Alcohol or drug abuse or withdrawal. 
¨ Certain medicines, such as cold and allergy pills (antihistamines), diet pills (diuretics), and laxatives. ¨ Certain diseases, such as diabetes, cancer, and heart or kidney disease. When should you call for help? Call 911 anytime you think you may need emergency care. For example, call if: 
· You passed out (lost consciousness). Call your doctor now or seek immediate medical care if: 
· You are confused and cannot think clearly. · You are dizzy or lightheaded, or you feel like you may faint. · You have signs of needing more fluids. You have sunken eyes and a dry mouth, and you pass only a little dark urine. · You cannot keep fluids down. Watch closely for changes in your health, and be sure to contact your doctor if: 
· You are not making tears. · Your skin is very dry and sags slowly back into place after you pinch it. · Your mouth and eyes are very dry. Where can you learn more? Go to http://sav-susannah.info/. Enter K658 in the search box to learn more about \"Dehydration: Care Instructions. \" Current as of: March 20, 2017 Content Version: 11.3 © 0904-7588 [employer].  Care instructions adapted under license by [employer] (which disclaims liability or warranty for this information). If you have questions about a medical condition or this instruction, always ask your healthcare professional. [place] [address] any warranty or liability for your use of this information.

## 2017-07-21 NOTE — IP AVS SNAPSHOT
[address] [place] [id number] 
[phone] Patient: Charisse Quintana MRN: [id number] SVO:[id number] Current Discharge Medication List  
  
START taking these medications Dose & Instructions Dispensing Information Comments Morning Noon Evening Bedtime  
 ciprofloxacin HCl 500 mg tablet Commonly known as:  CIPRO Your next dose is: Today Dose:  500 mg Take 1 Tab by mouth two (2) times a day for 12 days. TAKE IN REPLACEMENT OF CEFTRIAXONE DAILY FOR ESBL KLEB PYELO IF YOU FEEL YOU CANNOT TOLERATE THE MEDICATION Quantity:  24 Tab Refills:  0 CONTINUE these medications which have CHANGED Dose & Instructions Dispensing Information Comments Morning Noon Evening Bedtime DULoxetine 30 mg capsule Commonly known as:  CYMBALTA What changed:  how much to take Your next dose is: Today Dose:  60 mg Take 2 Caps by mouth two (2) times a day. Quantity:  60 Cap Refills:  1  
     
   
   
  
   
  
 levothyroxine 100 mcg tablet Commonly known as:  SYNTHROID What changed:  how much to take Your next dose is:  Tomorrow Dose:  125 mcg Take 1.5 Tabs by mouth Daily (before breakfast). Quantity:  30 Tab Refills:  0  
     
  
   
   
   
  
 potassium chloride SR 10 mEq tablet Commonly known as:  KLOR-CON 10 What changed:  how much to take Your next dose is: Today Dose:  20 mEq Take 2 Tabs by mouth two (2) times a day. Quantity:  10 Tab Refills:  0 CONTINUE these medications which have NOT CHANGED Dose & Instructions Dispensing Information Comments Morning Noon Evening Bedtime Indy Rodriguez [id number] mg-mg-million Tab Your next dose is: Today Dose:  42761 mg Take 25,000 mg by mouth two (2) times a day. Indications: Takes at 1200 and 2000 Refills:  0 CARAFATE 100 mg/mL suspension Generic drug:  sucralfate Your next dose is: Today Dose:  1 g Take 1 g by mouth Before breakfast, lunch, dinner and at bedtime. Refills:  0 CLARITIN-D 24 HOUR  mg per tablet Generic drug:  loratadine-pseudoephedrine Your next dose is:  Tomorrow Dose:  1 Tab Take 1 Tab by mouth daily. Refills:  0  
     
  
   
   
   
  
 codeine-butalbital-acetaminophen-caffeine -15-30 mg per capsule Commonly known as:  FIORICET WITH CODEINE Dose:  1 Cap Take 1 Cap by mouth every four (4) hours as needed for Headache. Refills:  0  
     
   
   
   
  
 cyclobenzaprine 10 mg tablet Commonly known as:  FLEXERIL Dose:  10 mg Take 10 mg by mouth nightly. Indications: Takes at 2000 Refills:  0  
     
   
   
   
  
 ferrous sulfate 324 mg (65 mg iron) tablet Dose:  324 mg Take 324 mg by mouth Daily (before breakfast). Indications: 65 mg iron Refills:  0  
     
   
   
   
  
 fluticasone 50 mcg/actuation nasal spray Commonly known as:  Irais [place] Your next dose is:  Tomorrow Dose:  2 Spray 2 Sprays by Both Nostrils route daily. Quantity:  1 Bottle Refills:  1  
     
  
   
   
   
  
 gabapentin 300 mg capsule Commonly known as:  NEURONTIN Your next dose is: Today Dose:  900 mg Take 900 mg by mouth three (3) times daily. Refills:  0 Glucosamine &[place]-MV-Min3 [phone] mg Tab Your next dose is: Today Dose:  1500 mg Take 1,500 mg by mouth three (3) times daily. Refills:  0  
     
   
   
  
   
  
 lacosamide 200 mg Tab tablet Commonly known as:  VIMPAT Your next dose is: Today Dose:  100 mg Take 100 mg by mouth two (2) times a day. Indications: PARTIAL EPILEPSY TREATMENT ADJUNCT Refills:  0  
     
   
   
  
   
  
 lubiPROStone 8 mcg capsule Commonly known as:  Abi Hoang Your next dose is: Today Dose:  8 mcg Take 1 Cap by mouth two (2) times daily (with meals). Quantity:  60 Cap Refills:  0  
     
   
   
  
   
  
 magnesium oxide 500 mg Tab Your next dose is:  Tomorrow Dose:  500 mg Take 500 mg by mouth daily (with lunch). Refills:  0  
     
   
  
   
   
  
 morphine IR 15 mg tablet Commonly known as:  MS IR Dose:  15 mg Take 15 mg by mouth every eight (8) hours as needed for Pain. Refills:  0 MYRBETRIQ 50 mg ER tablet Generic drug:  mirabegron ER Dose:  50 mg Take 50 mg by mouth nightly. Takes at [phone] Refills:  0  
     
   
   
   
  
 MIGUEL PO Dose:  625 mg Take 625 mg by mouth two (2) times a day. Indications: Takes at 1200 and 2000 Refills:  0  
     
   
   
   
  
 OTHER(NON-FORMULARY) Your next dose is:  Tomorrow Dose:  1 Cap Take 1 Cap by mouth daily (with lunch). Indications: Bariatric Multi Formula Refills:  0  
     
   
  
   
   
  
 polyethylene glycol 17 gram packet Commonly known as:  Emmer [place] Your next dose is: Today Dose:  17 g Take 1 Packet by mouth two (2) times a day. Quantity:  60 Packet Refills:  0  
     
   
   
  
   
  
 predniSONE 5 mg tablet Commonly known as:  Moura Aver Your next dose is:  Tomorrow Dose:  5 mg Take 5 mg by mouth daily. Refills:  0 PROTONIX 20 mg tablet Generic drug:  pantoprazole Your next dose is: Today Dose:  20 mg Take 20 mg by mouth two (2) times a day. Refills:  0  
     
   
   
  
   
  
 * REQUIP 0.25 mg tablet Generic drug:  rOPINIRole Your next dose is:  Tomorrow Dose:  0.25 mg Take 0.25 mg by mouth daily. Takes at [employer] Refills:  0  
     
  
   
   
   
  
 * REQUIP 0.25 mg tablet Generic drug:  rOPINIRole Your next dose is: Today Dose:  0.75 mg Take 0.75 mg by mouth nightly. Takes at [phone] Refills:  0 ROCEPHIN 1 gram injection Generic drug:  cefTRIAXone Dose:  1 g  
1 g by IntraMUSCular route once. Indications: ESBL Kleb Oxyt. Pyelo Refills:  0  
     
   
   
   
  
 VITAMIN C 500 mg tablet Generic drug:  ascorbic acid (vitamin C) Your next dose is:  Tomorrow Dose:  500 mg Take 500 mg by mouth daily. Refills:  0 ZyPREXA 7.5 mg tablet Generic drug:  OLANZapine Your next dose is: Today Dose:  7.5 mg Take 7.5 mg by mouth nightly. Refills:  0  
     
   
   
   
  
  
 * Notice: This list has 2 medication(s) that are the same as other medications prescribed for you. Read the directions carefully, and ask your doctor or other care provider to review them with you. Where to Get Your Medications Information on where to get these meds will be given to you by the nurse or doctor. ! Ask your nurse or doctor about these medications  
  ciprofloxacin HCl 500 mg tablet

## 2017-07-22 LAB
ALBUMIN SERPL BCP-MCNC: 3.3 G/DL (ref 3.4–5)
ALBUMIN/GLOB SERPL: 0.9 {RATIO} (ref 0.8–1.7)
ALP SERPL-CCNC: 137 U/L (ref 45–117)
ALT SERPL-CCNC: 44 U/L (ref 13–56)
ANION GAP BLD CALC-SCNC: 6 MMOL/L (ref 3–18)
APTT PPP: 30.6 SEC (ref 23–36.4)
AST SERPL W P-5'-P-CCNC: 26 U/L (ref 15–37)
BASOPHILS # BLD AUTO: 0 K/UL (ref 0–0.06)
BASOPHILS # BLD: 0 % (ref 0–2)
BILIRUB SERPL-MCNC: 0.4 MG/DL (ref 0.2–1)
BUN SERPL-MCNC: 7 MG/DL (ref 7–18)
BUN/CREAT SERPL: 12 (ref 12–20)
CALCIUM SERPL-MCNC: 8.6 MG/DL (ref 8.5–10.1)
CHLORIDE SERPL-SCNC: 91 MMOL/L (ref 100–108)
CO2 SERPL-SCNC: 32 MMOL/L (ref 21–32)
CREAT SERPL-MCNC: 0.57 MG/DL (ref 0.6–1.3)
DIFFERENTIAL METHOD BLD: ABNORMAL
EOSINOPHIL # BLD: 0.2 K/UL (ref 0–0.4)
EOSINOPHIL NFR BLD: 2 % (ref 0–5)
ERYTHROCYTE [DISTWIDTH] IN BLOOD BY AUTOMATED COUNT: 18 % (ref 11.6–14.5)
GLOBULIN SER CALC-MCNC: 3.6 G/DL (ref 2–4)
GLUCOSE SERPL-MCNC: 117 MG/DL (ref 74–99)
HCT VFR BLD AUTO: 38.2 % (ref 35–45)
HGB BLD-MCNC: 13.3 G/DL (ref 12–16)
INR PPP: 0.9 (ref 0.8–1.2)
LYMPHOCYTES # BLD AUTO: 14 % (ref 21–52)
LYMPHOCYTES # BLD: 1.4 K/UL (ref 0.9–3.6)
MCH RBC QN AUTO: 29.1 PG (ref 24–34)
MCHC RBC AUTO-ENTMCNC: 34.8 G/DL (ref 31–37)
MCV RBC AUTO: 83.6 FL (ref 74–97)
MONOCYTES # BLD: 0.7 K/UL (ref 0.05–1.2)
MONOCYTES NFR BLD AUTO: 7 % (ref 3–10)
NEUTS SEG # BLD: 7.7 K/UL (ref 1.8–8)
NEUTS SEG NFR BLD AUTO: 77 % (ref 40–73)
PLATELET # BLD AUTO: 244 K/UL (ref 135–420)
PMV BLD AUTO: 8.7 FL (ref 9.2–11.8)
POTASSIUM SERPL-SCNC: 3.5 MMOL/L (ref 3.5–5.5)
PROT SERPL-MCNC: 6.9 G/DL (ref 6.4–8.2)
PROTHROMBIN TIME: 12.2 SEC (ref 11.5–15.2)
RBC # BLD AUTO: 4.57 M/UL (ref 4.2–5.3)
SODIUM SERPL-SCNC: 129 MMOL/L (ref 136–145)
TSH SERPL DL<=0.05 MIU/L-ACNC: 5.97 UIU/ML (ref 0.36–3.74)
WBC # BLD AUTO: 9.9 K/UL (ref 4.6–13.2)

## 2017-07-22 PROCEDURE — 99218 HC RM OBSERVATION: CPT

## 2017-07-22 PROCEDURE — 95819 EEG AWAKE AND ASLEEP: CPT | Performed by: PSYCHIATRY & NEUROLOGY

## 2017-07-22 PROCEDURE — 85610 PROTHROMBIN TIME: CPT | Performed by: INTERNAL MEDICINE

## 2017-07-22 PROCEDURE — 80053 COMPREHEN METABOLIC PANEL: CPT | Performed by: INTERNAL MEDICINE

## 2017-07-22 PROCEDURE — 74011250637 HC RX REV CODE- 250/637: Performed by: INTERNAL MEDICINE

## 2017-07-22 PROCEDURE — 85730 THROMBOPLASTIN TIME PARTIAL: CPT | Performed by: INTERNAL MEDICINE

## 2017-07-22 PROCEDURE — 74011250636 HC RX REV CODE- 250/636: Performed by: INTERNAL MEDICINE

## 2017-07-22 PROCEDURE — 85025 COMPLETE CBC W/AUTO DIFF WBC: CPT | Performed by: INTERNAL MEDICINE

## 2017-07-22 PROCEDURE — 65660000000 HC RM CCU STEPDOWN

## 2017-07-22 PROCEDURE — 74011636637 HC RX REV CODE- 636/637: Performed by: INTERNAL MEDICINE

## 2017-07-22 PROCEDURE — A9270 NON-COVERED ITEM OR SERVICE: HCPCS | Performed by: INTERNAL MEDICINE

## 2017-07-22 RX ORDER — LACOSAMIDE 100 MG/1
200 TABLET ORAL 2 TIMES DAILY
Status: DISCONTINUED | OUTPATIENT
Start: 2017-07-22 | End: 2017-07-23 | Stop reason: HOSPADM

## 2017-07-22 RX ORDER — LEVETIRACETAM 500 MG/1
1000 TABLET ORAL ONCE
Status: COMPLETED | OUTPATIENT
Start: 2017-07-22 | End: 2017-07-22

## 2017-07-22 RX ORDER — LEVETIRACETAM 500 MG/1
500 TABLET ORAL 2 TIMES DAILY
Status: DISCONTINUED | OUTPATIENT
Start: 2017-07-22 | End: 2017-07-23 | Stop reason: HOSPADM

## 2017-07-22 RX ORDER — CEFTRIAXONE 1 G/1
1 INJECTION, POWDER, FOR SOLUTION INTRAMUSCULAR; INTRAVENOUS ONCE
COMMUNITY
Start: 2017-07-22 | End: 2017-07-23

## 2017-07-22 RX ORDER — CIPROFLOXACIN 500 MG/1
500 TABLET ORAL 2 TIMES DAILY
Qty: 24 TAB | Refills: 0 | Status: SHIPPED | OUTPATIENT
Start: 2017-07-22 | End: 2017-08-03

## 2017-07-22 RX ORDER — ONDANSETRON 2 MG/ML
4 INJECTION INTRAMUSCULAR; INTRAVENOUS
Status: DISCONTINUED | OUTPATIENT
Start: 2017-07-22 | End: 2017-07-23 | Stop reason: HOSPADM

## 2017-07-22 RX ADMIN — HEPARIN SODIUM 5000 UNITS: 5000 INJECTION, SOLUTION INTRAVENOUS; SUBCUTANEOUS at 17:11

## 2017-07-22 RX ADMIN — LEVOTHYROXINE SODIUM 125 MCG: 125 TABLET ORAL at 07:07

## 2017-07-22 RX ADMIN — LUBIPROSTONE 8 MCG: 8 CAPSULE, GELATIN COATED ORAL at 17:10

## 2017-07-22 RX ADMIN — SODIUM CHLORIDE 100 ML/HR: 900 INJECTION, SOLUTION INTRAVENOUS at 10:27

## 2017-07-22 RX ADMIN — DULOXETINE HYDROCHLORIDE 60 MG: 60 CAPSULE, DELAYED RELEASE ORAL at 10:26

## 2017-07-22 RX ADMIN — LEVETIRACETAM 1000 MG: 500 TABLET, FILM COATED ORAL at 18:19

## 2017-07-22 RX ADMIN — Medication 3 CAPSULE: at 17:10

## 2017-07-22 RX ADMIN — SUCRALFATE 1 G: 1 SUSPENSION ORAL at 07:07

## 2017-07-22 RX ADMIN — SUCRALFATE 1 G: 1 SUSPENSION ORAL at 17:11

## 2017-07-22 RX ADMIN — GABAPENTIN 900 MG: 300 CAPSULE ORAL at 10:26

## 2017-07-22 RX ADMIN — FERROUS SULFATE TAB 325 MG (65 MG ELEMENTAL FE) 324 MG: 325 (65 FE) TAB at 07:07

## 2017-07-22 RX ADMIN — PANTOPRAZOLE SODIUM 40 MG: 40 TABLET, DELAYED RELEASE ORAL at 10:26

## 2017-07-22 RX ADMIN — LUBIPROSTONE 8 MCG: 8 CAPSULE, GELATIN COATED ORAL at 10:26

## 2017-07-22 RX ADMIN — ROPINIROLE HYDROCHLORIDE 0.25 MG: 0.25 TABLET, FILM COATED ORAL at 10:26

## 2017-07-22 RX ADMIN — Medication 3 CAPSULE: at 10:25

## 2017-07-22 RX ADMIN — HEPARIN SODIUM 5000 UNITS: 5000 INJECTION, SOLUTION INTRAVENOUS; SUBCUTANEOUS at 07:07

## 2017-07-22 RX ADMIN — Medication 500 MG: at 10:26

## 2017-07-22 RX ADMIN — LACOSAMIDE 100 MG: 100 TABLET, FILM COATED ORAL at 10:26

## 2017-07-22 RX ADMIN — GABAPENTIN 900 MG: 300 CAPSULE ORAL at 17:11

## 2017-07-22 RX ADMIN — FLUTICASONE PROPIONATE 2 SPRAY: 50 SPRAY, METERED NASAL at 09:00

## 2017-07-22 RX ADMIN — PREDNISONE 5 MG: 5 TABLET ORAL at 10:26

## 2017-07-22 NOTE — PROGRESS NOTES
1371:  Assumed care of patient, white board updated, bed locked in lowest position. Patient in room cleaning jhony area with basin of soap and water with washcloths. 0900:  Patient continuing to clean jhony area compulsively. Asked patient if she would like a spa bath, patient states she would. 3696:  Spa bath completed. 0940:  Rounded with Dr. Luz Dwyer. Patient stuttering in and out of conversation. States she comes \"in and out of not being able to talk\". STAT EEG. If negative patient needs to be moved to medical or go home verbal order per Dr. Luz Dwyer. Dr. Luz Dwyer texting team and wants me to page on call. Call placed. Verbal orders to discontinue frequent neuro checks from Dr. Luz Dwyer. 1017:  Called Nursing Supervisor to let her know about STAT EEG orders Dr. Luz Dwyer put in. I have paged on call EEG through hospital pager and have not heard back. Nursing Supervisor, [place] to look into it. 1026:  STEFANIE Pratt at bedside with this RN. Patient states she wants the plastic bag I used to bring her medications to her. States \"I want to give it to my granddaughter\". She also wants to have my stethoscope. While trying to administer morning medications patient tried to lay her head of the bed flat. This RN educated patient on patient safety while eating or taking medications. Risks and benefits explained. Patient looked me in the eye and continued to lay the head of bed flat. This RN explained I cannot administer medications with patient laying flat. This RN educated patient on aspiration risks and safety. Patient gives verbal understanding and agrees to have head of bed elevated for pill administration. 1110:  Dr. Gomez bedside and speaking with patient. Patient speaking in full sentences and states she had a tonic colonic seizure yesterday. Patient has no problem speaking and no signs of stuttering.  Dr. Gomez trying to talk with patient about antibiotics, however, patient is not listening and keeps stating wrong information about rocephin (insisting it is for seizures). Dr. Gomez and myself to educate patient, patient refusing teaching and only wishes to discuss side effects from rocephin and other treatment option suggested from Dr. Gomez. Patient refusing teaching.     1300:  EEG Tech at bedside. 1429:  EEG completed, tech has left bedside. Patient requesting blue bath wipes and a summary discharge. Dr. Tammie Fernández updated and is waiting for tech to transfer test results. Patient states she has gotten a hold of her  and he is home and will be able to let her in when she comes home via transport. 1610:  Dr. Gomez called to update on patient staying one more night of observation. Orders to follow via Dr. Gomez. 1640:   at bedside with a list of demands from patient. Demands include a bed bath, paper, to see the neurologist, to eat breakfast, see wound care. I updated  patient had a spa bath this morning and then ate chocolate pudding right after for breakfast.  Patient had head to toe bath with sheets changed. This RN was at bedside when they completed bed bad, administered morning medications and put towel on patient so she wouldn't spill her pudding as she ate her breakfast.  Educated patient and  that wound care will come on Monday. There are no wound care RN's in the hospital or on call on weekends. Patient's  updated that patient has mepilex on sacrum protecting wound.  then stated \"Are you aware that there is a dressing for the wound in the top drawer of the night stand? \". This RN stated that we keep an extra dressing in the stand incase patient needs to be changed and dressing needs to be changed. Patient's  said he had many years experience with wound vacs and taking care of wife's wounds, that he would bring in his own dressings and ointments to treat patient's sacral wound.   This RN educated patient and  on hospital policy about over the counter medication.  unreceptive to education, interrupting, raising voice, and becoming aggressive. Tried to calm  down. Explained the neurologist has been in to see the patient (as spa was finishing), Dr. Gomez has been in to see the patient, EEG ordered, medications updated.  states patient is in a manic state and is unable to make decisions for herself, she comes in and out of slurring, and doesn't understand what she is saying and can't process information. Let  know if he would like to speak with Dr. Gomez or Dr. Mary Trevino, they will be in the morning and he can address his concerns with them at that time. 1710:  Tried to administer Hepearin sub q to patient.  states he is not sure if patient should receive blood thinner. Educated this nurse on what to look out for if wife was having side effects or blood was too thin. Educated patient on heparin, heparin administration, pros and cons, at the end of the day it is there decision but they are informed.  gives a verbal okay for patient to have heparin but wants this RN to observe for any side effects of bleeding.

## 2017-07-22 NOTE — PROCEDURES
ELECTROENCEPHALOGRAPHY     Patient: Heather Conrad MRN: [id number]  CSN: [id number]    YOB: 1950  Age: 78 y.o. Sex: female    DOA: 7/21/2017 LOS:  LOS: 1 day        Date of Service: 07/22/2017    DICTATING: Myra Renae MD     REFERRING PHYSICIAN: Dr. Gustavo Acevedoutz: 17-72    HISTORY OF PRESENT ILLNESS: This is a 78year old female with history off epilepsy, who presented with altered mental status in the setting of UTI. She has intermittent speech difficulty with stuttering. This EEG was performed in order to assess electrodiagnostic risk factors for epilepsy. CURRENT MEDICATIONS: Lacosamide    ELECTROENCEPHALOGRAM INTERPRETATION: This is a referential and bipolar EEG recorded with a 10-20 system. EEG shows myogenic artifact at several epochs, which makes it difficult to interpret. However, EEG starts with a quasi-rhythmic spike-and-slow waves, mainly localized in the frontal regions, more on the right side. This activity is accompanied by patient being not responsive. Later on, the background EEG shows a normal    wakefulness pattern with a posterior dominant rhythm of 9 hertz, better seen on the left side. During the recording, there are 3 to 4 second episodes of quasi-rhythmic delta activity originating from the right frontal region, indicating electrographic seizure activities. These episodes happen at least 4 times. Some of these episodes are accompanied by facial twitching movements and unresponsiveness. Please note that, this EEG is performed without a video component, and the twitching movements are recorded by the EEG technologist. In between these episodes, the EEG shows normal background activity. At some epochs, the patient is seen to be sleeping with vertex waves, but no K-Complexes. Photic stimulation, seemingly performed during sleep, does not show remarkable driving response but central theta/delta activities with vertex waves.     IMPRESSION: This EEG is abnormal due to recorded seizure activities, which appear to be brief complex partial seizures. There are interictal discharges originating mainly from the right frontal region, which indicates a focal cerebral dysfunction and possible seizure focus. Clinical correlation is recommended.         Signed:  Greta Mccarty MD  7/22/2017  4:12 PM

## 2017-07-22 NOTE — PROGRESS NOTES
EEG is abnormal, showing interictal discharges and brief seizures. We will excalate the treatment. Recs: We will increase lacosamide to 200mg PO BID for now. We need information from her neurologist (unknown if she had one). I am not sure if she had been on Keppra before. If not, Keppra can be initiated.     [place]

## 2017-07-22 NOTE — ROUTINE PROCESS
TRANSFER - OUT REPORT:    Verbal report given to Benard Opitz, RN (name) on Ann Arboleda  being transferred to Telemetry (unit) for routine progression of care       Report consisted of patients Situation, Background, Assessment and   Recommendations(SBAR). Information from the following report(s) SBAR, ED Summary, Intake/Output and MAR was reviewed with the receiving nurse. Lines:   PICC Single Lumen 07/21/17 Other(comment); Left (Active)        Opportunity for questions and clarification was provided.       Patient transported with:   Monitor  Registered Nurse  Tech

## 2017-07-22 NOTE — PROGRESS NOTES
2120:  Pt in floor transported by ED personnel via stretcher. Family at bedside. 2240:  Entered Pt room and was told by family that Pt having seizure. Observed Pt to be unable to respond and staring straight ahead. VS WDL. Called RRT. Dr. Gamaliel Sánchez, ICU RN and tele staff on scene. Dr. Gamaliel Sánchez determined Pt to be having pseudoseizure. RRT cancelled. Shift summary:  Pt very manipulative and demanding toward entire staff. She continually made demands of staff and when she received what was asked, she demanded more. For example, she asked of every staff member to be given 5 to 7 washcloths, wash basin, 3 towels and soap for bathing. When she was bathed she asked another staff member for the same items again. There were many demands made by this Pt to staff in order to have staff's full attention at all times. Pt educated regarding floor nursing policies.

## 2017-07-22 NOTE — ROUTINE PROCESS
TRANSFER - IN REPORT:    Verbal report received from [place], RN on Hilaria Forte  being received from ED for routine progression of care      Report consisted of patients Situation, Background, Assessment and   Recommendations(SBAR). Information from the following report(s) SBAR, Kardex, ED Summary, Procedure Summary, Intake/Output, MAR, Accordion, Recent Results, Med Rec Status and Cardiac Rhythm SR was reviewed with the receiving nurse. Opportunity for questions and clarification was provided. Assessment completed upon patients arrival to unit and care assumed.

## 2017-07-22 NOTE — H&P
History & Physical    Patient: Kika Nur MRN: [id number]  CSN: [id number]    YOB: 1950  Age: 78 y.o. Sex: female      DOA: 7/21/2017  Primary Care Provider:  Zhen Galdamez MD      Assessment/Plan     Patient Active Problem List   Diagnosis Code    Bipolar 1 disorder ([facility] F31.9    Decubitus ulcer of sacral area L89.159    Anemia D64.9    UTI (urinary tract infection) N39.0    Severe protein-calorie malnutrition ([facility] E43    Seizure disorder ([facility] G40.909    Depression F32.9    Hypotension, unspecified I95.9    Personal history of DVT (deep vein thrombosis) Z86.718    Hypomagnesemia E83.42    Unspecified constipation K59.00    Gingival bleeding K06.8    GI (gastrointestinal bleed) K92.2    Chronic anticoagulation Z79.01    Epistaxis R04.0    Seizure ([facility] R56.9    Hyponatremia E87.1    Mild dehydration E86.0       1. Possible Seizure; Either medication induced vs pseudoseizure. No witnesses   2. Recent UTI, treated with Ceftriaxone   3. Bipolar Disorder   4. Hyponatremia; mild dehydration? Vs due to psych meds   5. Mild Dehydration   6. Mild to moderate Protein calorie deficiency   7. Depression   8. Constipation, chronic   FULL CODE     -admit for further care   -neurochecks, teleneuro and Dr Luz Dwyer consulted  -CT head - neg; seizure precaution   -Vimpat 200mg po bid, cont for now   -cont other h ome medication   -IVF, recheck labs in am to make sure Na is improving   -encourage po diet   -bedrest   -no needs for Abx for uti at this time, UA is clear but will send urine cultures   -supportive care     Patient states if she has to stay longer than a day at the hospital she would like to be transferred to [facility] tomorrow. CC: Possible seizure        HPI:     Kika Nur is a 78 y.o. female who comes to the ED with complaints of possible seizures. Patient states she went to [facility] yesterday and was diagnosed to UTI.  She was give a dose of ceftriaxone and discharged home. Later she took another dose around 4pm and soon after she started having episodes where she was noted to be stuttering and slurrying, she was having difficulty speaking and said her speech was very \"jerky\". She was having episodes of these lasting about 15-20 mins and happened every hour. She went back to the ED and she was told it may have been from her Abx. She was discharged home again but later continued to have same symptoms and  noted she was staring in the space at times. They did not seek medical attention in the morning as they thought she would do better once the Abx is out of her system but finally in the evening her symptoms continued to persistent therefore called EMT who brought her in. HEr symptoms were not witnessed by EMT or ED staff. In the ED she admitted to having blurry vision from both eyes and numbness/tingling in both of her legs. She denied any post ictal confusion, urinary or bowel in continence. While in ED  noted patient was turning red and told the ED staff that's the signs of her about to have a seizure, she was given Ativan but no seizure activity was noted. Tele neuro was consulted who recommended observing her. Dr Iqra Rosales was consulted who will see the patient in am otherwise no new recs at this time. Patients hx has been inconsistent with what she provided me and what she provided ED staff in terms of timeline of events and symptomatology. Looking at the ED records from [facility][1] she was there on the 7/17 for UTI diagnosed with ESBL and midline was placed prior to discharge on ceftriaxone (but patient reports her first dose was yesterday). She returned to the ED due to Midline malfunction yesterday but no report/notes is available from her yesterday evening visit for the details of her visit. At this time, patient keeps on changing stories a little when asked same thing later.  She wishes to be transferred to [facility][1] [facility] tomorrow and not today if she has to stay longer. 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    Fatigue     along with weakness    GERD (gastroesophageal reflux disease)     Headache     Hypertension     pt denies    Other ill-defined conditions swallowing issues, decubitus    Psychiatric disorder     Seizures ([address]        Past Surgical History:   Procedure Laterality Date    COLONOSCOPY N/A 2/21/2017    COLONOSCOPY [facility] ANESTHESIA, PATIENT IS IN ROOM 358 performed by Mel Mota MD at [address] N/A 2/22/2017    COLONOSCOPY performed by Mel Mota MD at [address], COLON, DIAGNOSTIC      over 20 years ago    ENDOSCOPY, COLON, DIAGNOSTIC  9/24/09    HX APPENDECTOMY  11/6/84    HX BACK SURGERY  11/24/98    severe back problems    HX CHOLECYSTECTOMY  11/6/84    HX CORONARY STENT PLACEMENT      ivc filter    HX GASTRIC BYPASS  1995    Patricio    HX TUBAL LIGATION  1991       Family History   Problem Relation Age of Onset    Heart Disease Mother     Arthritis-rheumatoid Mother     Stroke Father     Hypertension Father     Arthritis-rheumatoid Father        Social History     Social History    Marital status:      Spouse name: N/A    Number of children: N/A    Years of education: N/A     Social History Main Topics    Smoking status: Never Smoker    Smokeless tobacco: Never Used    Alcohol use No    Drug use: No    Sexual activity: Not on file     Other Topics Concern    Not on file     Social History Narrative       Prior to Admission medications    Medication Sig Start Date End Date Taking? Authorizing Provider   fluticasone (FLONASE) 50 mcg/actuation nasal spray 2 Sprays by Both Nostrils route daily.  3/1/17   Jenny Ontiveros MD lubiPROStone (AMITIZA) 8 mcg capsule Take 1 Cap by mouth two (2) times daily (with meals). 3/1/17   Jennyfer Reyes MD   polyethylene glycol [facility]) 17 gram packet Take 1 Packet by mouth two (2) times a day. 3/1/17   Jennyfer Reyes MD   loratadine-pseudoephedrine (CLARITIN-D 24 HOUR)  mg per tablet Take 1 Tab by mouth daily. Historical Provider   pantoprazole (PROTONIX) 20 mg tablet Take 20 mg by mouth two (2) times a day. Historical Provider   Glucosamine &Chondroit-MV-Min3 707-292-51-0.5 mg tab Take 1,500 mg by mouth three (3) times daily. Historical Provider   MORINDA CITRIFOLIA FRUIT (MIGUEL PO) Take 625 mg by mouth two (2) times a day. Indications: Takes at 1200 and 2000    Historical Provider   ferrous sulfate 324 mg (65 mg iron) tablet Take 324 mg by mouth Daily (before breakfast). Indications: 65 mg iron    Historical Provider   AZO CRANBERRY 104-30-06 mg-mg-million tab Take 25,000 mg by mouth two (2) times a day. Indications: Takes at 1200 and 2000    Historical Provider   OTHER,NON-FORMULARY, Take 1 Cap by mouth daily (with lunch). Indications: Bariatric Multi Formula    Historical Provider   magnesium oxide 500 mg tab Take 500 mg by mouth daily (with lunch). Historical Provider   ascorbic acid, vitamin C, (VITAMIN C) 500 mg tablet Take 500 mg by mouth daily. Historical Provider   cyclobenzaprine (FLEXERIL) 10 mg tablet Take 10 mg by mouth nightly. Indications: Takes at 2000    Historical Provider   rOPINIRole (REQUIP) 0.25 mg tablet Take 0.25 mg by mouth daily. Takes at [address] Provider   mirabegron ER (MYRBETRIQ) 50 mg ER tablet Take 50 mg by mouth nightly. Takes at 2300    Historical Provider   rOPINIRole (REQUIP) 0.25 mg tablet Take 0.75 mg by mouth nightly. Takes at [address] Provider   sucralfate (CARAFATE) 100 mg/mL suspension Take 1 g by mouth Before breakfast, lunch, dinner and at bedtime.     Historical Provider   predniSONE (DELTASONE) 5 mg tablet Take 5 mg by mouth daily. Historical Provider   codeine-butalbital-acetaminophen-caffeine (FIORICET WITH CODEINE) -79-30 mg per capsule Take 1 Cap by mouth every four (4) hours as needed for Headache. Historical Provider   gabapentin (NEURONTIN) 300 mg capsule Take 900 mg by mouth three (3) times daily. Historical Provider   morphine IR (MS IR) 15 mg tablet Take 15 mg by mouth every eight (8) hours as needed for Pain. Historical Provider   levothyroxine (SYNTHROID) 100 mcg tablet Take 1.5 Tabs by mouth Daily (before breakfast). Patient taking differently: Take 75 mcg by mouth Daily (before breakfast). 9/20/12   Loretta Vasquez MD   DULoxetine (CYMBALTA) 30 mg capsule Take 2 Caps by mouth two (2) times a day. Patient taking differently: Take 30 mg by mouth two (2) times a day. 7/13/12   Loretta Vasquez MD   potassium chloride SR (KLOR-CON 10) 10 mEq tablet Take 2 Tabs by mouth two (2) times a day. Patient taking differently: Take 10 mEq by mouth two (2) times a day. 6/11/12   Loretta Vasquez MD   lacosamide (VIMPAT) 200 mg Tab tablet Take 100 mg by mouth two (2) times a day. Indications: PARTIAL EPILEPSY TREATMENT ADJUNCT    Historical Provider   OLANZapine (ZYPREXA) 7.5 mg tablet Take 7.5 mg by mouth nightly. Historical Provider       Allergies   Allergen Reactions    Demerol [Meperidine] Unknown (comments)    Seafood [Shellfish Containing Products] Unknown (comments)       Review of Systems  Gen: No fever, chills, malaise, weight loss/gain. Heent: No headache, rhinorrhea, epistaxis, ear pain, hearing loss, sinus pain, neck pain/stiffness, sore throat. Heart: No chest pain, palpitations, VIRK, pnd, or orthopnea. Resp: No cough, hemoptysis, wheezing and shortness of breath. GI: No nausea, vomiting, diarrhea, constipation, melena or hematochezia. : No urinary obstruction, dysuria or hematuria. Derm: No rash, new skin lesion or pruritis. Musc/skeletal: no bone or joint complains.   Vasc: No edema, cyanosis or claudication. Endo: No heat/cold intolerance, no polyuria,polydipsia or polyphagia. Neuro: No unilateral weakness No seizures. Heme: No easy bruising or bleeding. Physical Exam:     Physical Exam:  Visit Vitals    /81 (BP 1 Location: Right arm, BP Patient Position: At rest)    Pulse 92    Temp 97.4 °F (36.3 °C)    Resp 18    Ht 5' 6\" (1.676 m)    Wt 68.5 kg (151 lb)    SpO2 97%    Breastfeeding No    BMI 24.37 kg/m2      O2 Device: Room air    Temp (24hrs), Av.8 °F (36.6 °C), Min:97.4 °F (36.3 °C), Max:98.2 °F (36.8 °C)             General:  Awake, cooperative, no distress. Head:  Normocephalic, without obvious abnormality, atraumatic. Eyes:  Conjunctivae/corneas clear, sclera anicteric, PERRL, EOMs intact. Nose: Nares normal. No drainage or sinus tenderness. Throat: Lips, mucosa, and tongue normal.    Neck: Supple, symmetrical, trachea midline, no adenopathy. Lungs:   Clear to auscultation bilaterally. Heart:  Regular rate and rhythm, S1, S2 normal, no murmur, click, rub or gallop. Abdomen: Soft, non-tender. Bowel sounds normal. No masses,  No organomegaly. Extremities: Extremities normal, atraumatic, no cyanosis or edema. Capillary refill normal.   Pulses: 2+ and symmetric all extremities. Skin: Skin color pink/pale/mottled/flushed, turgor normal. No rashes or lesions   Neurologic: CNII-XII intact. No focal motor or sensory deficit.        Labs Reviewed:      Recent Results (from the past 24 hour(s))   CBC WITH AUTOMATED DIFF    Collection Time: 17  7:05 PM   Result Value Ref Range    WBC 6.5 4.6 - 13.2 K/uL    RBC 4.48 4.20 - 5.30 M/uL    HGB 13.1 12.0 - 16.0 g/dL    HCT 37.5 35.0 - 45.0 %    MCV 83.7 74.0 - 97.0 FL    MCH 29.2 24.0 - 34.0 PG    MCHC 34.9 31.0 - 37.0 g/dL    RDW 17.9 (H) 11.6 - 14.5 %    PLATELET 061 675 - 470 K/uL    MPV 8.4 (L) 9.2 - 11.8 FL    NEUTROPHILS 62 40 - 73 %    LYMPHOCYTES 24 21 - 52 %    MONOCYTES 10 3 - 10 %    EOSINOPHILS 4 0 - 5 %    BASOPHILS 0 0 - 2 %    ABS. NEUTROPHILS 4.0 1.8 - 8.0 K/UL    ABS. LYMPHOCYTES 1.6 0.9 - 3.6 K/UL    ABS. MONOCYTES 0.6 0.05 - 1.2 K/UL    ABS. EOSINOPHILS 0.3 0.0 - 0.4 K/UL    ABS. BASOPHILS 0.0 0.0 - 0.06 K/UL    DF AUTOMATED     METABOLIC PANEL, COMPREHENSIVE    Collection Time: 07/21/17  7:05 PM   Result Value Ref Range    Sodium 130 (L) 136 - 145 mmol/L    Potassium 3.5 3.5 - 5.5 mmol/L    Chloride 92 (L) 100 - 108 mmol/L    CO2 29 21 - 32 mmol/L    Anion gap 9 3.0 - 18 mmol/L    Glucose 101 (H) 74 - 99 mg/dL    BUN 8 7.0 - 18 MG/DL    Creatinine 0.48 (L) 0.6 - 1.3 MG/DL    BUN/Creatinine ratio 17 12 - 20      GFR est AA >60 >60 ml/min/1.73m2    GFR est non-AA >60 >60 ml/min/1.73m2    Calcium 7.8 (L) 8.5 - 10.1 MG/DL    Bilirubin, total 0.3 0.2 - 1.0 MG/DL    ALT (SGPT) 44 13 - 56 U/L    AST (SGOT) 27 15 - 37 U/L    Alk.  phosphatase 122 (H) 45 - 117 U/L    Protein, total 6.6 6.4 - 8.2 g/dL    Albumin 3.1 (L) 3.4 - 5.0 g/dL    Globulin 3.5 2.0 - 4.0 g/dL    A-G Ratio 0.9 0.8 - 1.7     PTT    Collection Time: 07/21/17  7:05 PM   Result Value Ref Range    aPTT 28.4 23.0 - 36.4 SEC   PROTHROMBIN TIME + INR    Collection Time: 07/21/17  7:05 PM   Result Value Ref Range    Prothrombin time 12.3 11.5 - 15.2 sec    INR 0.9 0.8 - 1.2     URINALYSIS W/ RFLX MICROSCOPIC    Collection Time: 07/21/17  7:08 PM   Result Value Ref Range    Color YELLOW      Appearance CLEAR      Specific gravity 1.013 1.005 - 1.030      pH (UA) 7.0 5.0 - 8.0      Protein TRACE (A) NEG mg/dL    Glucose NEGATIVE  NEG mg/dL    Ketone NEGATIVE  NEG mg/dL    Bilirubin NEGATIVE  NEG      Blood TRACE (A) NEG      Urobilinogen 0.2 0.2 - 1.0 EU/dL    Nitrites NEGATIVE  NEG      Leukocyte Esterase NEGATIVE  NEG     URINE MICROSCOPIC ONLY    Collection Time: 07/21/17  7:08 PM   Result Value Ref Range    WBC 0 to 3 0 - 5 /hpf    RBC 0 to 3 0 - 5 /hpf    Epithelial cells FEW 0 - 5 /lpf    Bacteria FEW (A) NEG /hpf Hyaline cast 0 to 3 0 - 2 /lpf       Procedures/imaging: see electronic medical records for all procedures/Xrays and details which were not copied into this note but were reviewed prior to creation of Plan    60 minutes of care time spent in the direct evaluation and treatment of this high risk patient.      CC: Javon Ornelas MD

## 2017-07-22 NOTE — DISCHARGE SUMMARY
2 [facility]  Hospitalist Division    Discharge Summary      Patient: Sorin Christian MRN: [id number]  CSN: [id number]    YOB: 1950  Age: 78 y.o. Sex: female    DOA: 7/21/2017 LOS:  LOS: 1 day   Discharge Date: 07/23/17     PCP:  Kamran Benedict MD    Chief Complaint:    Chief Complaint   Patient presents with    Aphasia     Seizure [facility])    Admission Diagnosis:   Hospital Problems as of 7/23/2017  Date Reviewed: 7/21/2017          Codes Class Noted - Resolved POA    * (Principal)Seizure ([facility] ICD-10-CM: R56.9  ICD-9-CM: 780.39  7/21/2017 - Present Unknown        Hyponatremia ICD-10-CM: E87.1  ICD-9-CM: 276.1  7/21/2017 - Present Unknown        Mild dehydration ICD-10-CM: E86.0  ICD-9-CM: 276.51  7/21/2017 - Present Unknown        Depression ICD-10-CM: F32.9  ICD-9-CM: 006  6/24/2012 - Present Yes        Severe protein-calorie malnutrition ([facility] ICD-10-CM: E43  ICD-9-CM: 850  6/1/2012 - Present Yes        Bipolar 1 disorder ([facility] (Chronic) ICD-10-CM: F31.9  ICD-9-CM: 296.7  5/3/2012 - Present Yes        Decubitus ulcer of sacral area (Chronic) ICD-10-CM: L89.159  ICD-9-CM: 707.03, 707.20  5/3/2012 - Present Yes              Discharge Diagnoses:    Recent ESBL Kelb Ocyt pyelonephritis 7/17: sens to cipro, and carbepenams. Any PCN and ceph options are falsely sensitive. Needs 12-14 days of antibiotics. Plan to try oral cipro 500mg BIDafter lacosamide increase and keppra loading  Has midline L arm, can be removed on discharge.      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 seen on EEG. neurology consulting. titrating epileptic medications.  Needs close neuro follow-up     Bipolar disorder and defiant behavoir -  Needs psych follow-up  Hyponatremia; mild dehydration vs psych meds   Mild Dehydration   Mild to moderate Protein calorie deficiency   Depression   Constipation, chronic    Hospital Course:    67F with extensive medical history including bipolar disorder, james-n-y procedure in 95 with dumping syndrome/nutritional deficiencies generalized seizures in 2012 was on keppra, vimpat, and depakote at different times, she became debilitated, bed bound, and  protein-calorie malnutrition. Her seizures were under good control with lacosamide 100mg po BID until 7/18 when she had a midline placed and was started on a IV ceftriaxone at [facility] for ESBL Klebsiela oxyt UTI vs pyelonephritis, (culture from 7/12/17) and she had a generalized seizure on Friday. Her speech became stuttering and she was intermittently confused afterwards. She was having difficulty speaking and said her speech was very \"jerky\". She was having episodes of these lasting about 15-20 mins and happened every hour. She went back to the ED and she was told it may have been from her Abx. She was discharged home again but later continued to have same symptoms and  noted she was staring in the space at times. They did not seek medical attention in the morning as they thought she would do better once the the antibiotics were out of their system but symptoms persisted. She was admited to [facility] 7/21 and neurology consulted  EEG completed 7/22 showed brief complex partial seizures. There were interictal discharges originating mainly from the right frontal region, which indicates a focal cerebral dysfunction and possible seizure focus. We increased the dose of lacosamide to 200mg BID and started her on levetiracetam 500mg BID with 1000mg load. Her speech and mental status were back to baseline as of 7/23. She was converted to ciprofloxacin for ESBL Kleb and she tolerated this for 4 hours without seizure activity. L arm midline removed. Her last recourse would be a carbepenam course however she should fail ciprofloxacin first as the seizure risk will be much higher. She had an MRI prior to discharge and will follow-up with Dr Sarah Theodore.     Significant Diagnostic Studies:  EEG 7/22  MRI 7/23    Consults:  Treatment Team: Attending Provider: More Luong DO; Consulting Provider: Gerri Zacarias MD; Consulting Provider: Amy Warren MD    Operative Procedures:  N/A    Disposition:  Home    Diet:  Cardiac    Discharge Condition:   Good    Discharge Medications:    Current Discharge Medication List      START taking these medications    Details   levETIRAcetam (KEPPRA) 500 mg tablet Take 1 Tab by mouth two (2) times a day. Qty: 180 Tab, Refills: 1      ciprofloxacin HCl (CIPRO) 500 mg tablet Take 1 Tab by mouth two (2) times a day for 12 days. TAKE IN REPLACEMENT OF CEFTRIAXONE DAILY FOR ESBL KLEB PYELO IF YOU FEEL YOU CANNOT TOLERATE THE MEDICATION  Qty: 24 Tab, Refills: 0         CONTINUE these medications which have CHANGED    Details   lacosamide (VIMPAT) 200 mg tab tablet Take 1 Tab by mouth two (2) times a day. Max Daily Amount: 400 mg. Indications: PARTIAL EPILEPSY TREATMENT ADJUNCT  Qty: 180 Tab, Refills: 1         CONTINUE these medications which have NOT CHANGED    Details   fluticasone (FLONASE) 50 mcg/actuation nasal spray 2 Sprays by Both Nostrils route daily. Qty: 1 Bottle, Refills: 1      lubiPROStone (AMITIZA) 8 mcg capsule Take 1 Cap by mouth two (2) times daily (with meals). Qty: 60 Cap, Refills: 0      polyethylene glycol (MIRALAX) 17 gram packet Take 1 Packet by mouth two (2) times a day. Qty: 60 Packet, Refills: 0      loratadine-pseudoephedrine (CLARITIN-D 24 HOUR)  mg per tablet Take 1 Tab by mouth daily. pantoprazole (PROTONIX) 20 mg tablet Take 20 mg by mouth two (2) times a day. Glucosamine &Chondroit-MV-Min3 105-487-88-0.5 mg tab Take 1,500 mg by mouth three (3) times daily. MORINDA CITRIFOLIA FRUIT (MIGUEL PO) Take 625 mg by mouth two (2) times a day. Indications: Takes at 1200 and 2000      ferrous sulfate 324 mg (65 mg iron) tablet Take 324 mg by mouth Daily (before breakfast).  Indications: 65 mg iron      AZO CRANBERRY 611-86-48 mg-mg-million tab Take 25,000 mg by mouth two (2) times a day. Indications: Takes at 1200 and 2000      OTHER,NON-FORMULARY, Take 1 Cap by mouth daily (with lunch). Indications: Bariatric Multi Formula      magnesium oxide 500 mg tab Take 500 mg by mouth daily (with lunch). ascorbic acid, vitamin C, (VITAMIN C) 500 mg tablet Take 500 mg by mouth daily. cyclobenzaprine (FLEXERIL) 10 mg tablet Take 10 mg by mouth nightly. Indications: Takes at 2000      !! rOPINIRole (REQUIP) 0.25 mg tablet Take 0.25 mg by mouth daily. Takes at 2000      mirabegron ER (MYRBETRIQ) 50 mg ER tablet Take 50 mg by mouth nightly. Takes at 2300      !! rOPINIRole (REQUIP) 0.25 mg tablet Take 0.75 mg by mouth nightly. Takes at 2300      sucralfate (CARAFATE) 100 mg/mL suspension Take 1 g by mouth Before breakfast, lunch, dinner and at bedtime. predniSONE (DELTASONE) 5 mg tablet Take 5 mg by mouth daily. codeine-butalbital-acetaminophen-caffeine (FIORICET WITH CODEINE) -30-30 mg per capsule Take 1 Cap by mouth every four (4) hours as needed for Headache.      gabapentin (NEURONTIN) 300 mg capsule Take 900 mg by mouth three (3) times daily. morphine IR (MS IR) 15 mg tablet Take 15 mg by mouth every eight (8) hours as needed for Pain.      levothyroxine (SYNTHROID) 100 mcg tablet Take 1.5 Tabs by mouth Daily (before breakfast). Qty: 30 Tab, Refills: 0      DULoxetine (CYMBALTA) 30 mg capsule Take 2 Caps by mouth two (2) times a day. Qty: 60 Cap, Refills: 1      potassium chloride SR (KLOR-CON 10) 10 mEq tablet Take 2 Tabs by mouth two (2) times a day. Qty: 10 Tab, Refills: 0      OLANZapine (ZYPREXA) 7.5 mg tablet Take 7.5 mg by mouth nightly. !! - Potential duplicate medications found. Please discuss with provider. STOP taking these medications       cefTRIAXone (ROCEPHIN) 1 gram injection Comments:   Reason for Stopping: Follow-Up And Discharge Instructions:    Follow-up Information     Follow up With Details Comments Contact Info    Patrica Omer MD Schedule an appointment as soon as possible for a visit  [address] [facility]  [phone]      Psychiatry Schedule an appointment as soon as possible for a visit      Zully Walls MD Schedule an appointment as soon as possible for a visit Neurology [address] [facility]  [phone]            Wound Care:   N/A      Dr Arianna Fletcher  Hospitalist Division        Time Spent:  45min    Cc: Patrica Omer MD

## 2017-07-22 NOTE — CONSULTS
NEUROLOGY CONSULTATION NOTE    Patient: Deette Nissen MRN: [id number]  CSN: [id number]    YOB: 1950  Age: 78 y.o. Sex: female    DOA: 7/21/2017 LOS:  LOS: 1 day        Requesting Physician: Dimitry San. Reason for Consultation: Speech difficulty, possible seizure. HISTORY OF PRESENT ILLNESS:   Deette Nissen is a 69-year-old female with history of chronic back pain, paraplegia (since 2012?), psychiatric disorder (unclear), seizure disorder (unclear history), depression and hypertension, who presented with altered mental status and possible seizure yesterday. She was recently seen at [facility] for UTI and was started on ceftriaxone. The patient has intermittent speech difficulty and cannot provide clear history to me. Per the chart review, she would get the stuttering speech on and off and her speech looked jerky to her. She different stories to different providers since the admission. When I entered the room, she was trying to write down the paper to communicate with the [facility] providers. When she stopped writing, she admitted that she didnt have any lateralized weakness or numbness on upper extremities. Lower extremity weakness is not new. Again, the speech goes off and on and becomes stuttering. She denies any speech or swallowing difficulties otherwise. She is on lacosamide, possibly for seizure disorder, but she cannot tell me the name of the provider who provides lacosamide. She is being followed by Dr. Melissa Nunes for psychiatry. Her head CT was unremarkable on admission.     PAST MEDICAL HISTORY:  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    Fatigue     along with weakness    GERD (gastroesophageal reflux disease)     Headache     Hypertension     pt denies    Other ill-defined conditions swallowing issues, decubitus    Psychiatric disorder     Seizures ([facility] [address]      PAST SURGICAL HISTORY:  Past Surgical History:   Procedure Laterality Date    COLONOSCOPY N/A 2/21/2017    COLONOSCOPY [facility] ANESTHESIA, PATIENT IS IN ROOM 358 performed by Maikel Cedeno MD at THE [facility] ENDOSCOPY    COLONOSCOPY N/A 2/22/2017    COLONOSCOPY performed by Maikel Cedeno MD at [place] [address], COLON, DIAGNOSTIC      over 20 years ago    ENDOSCOPY, COLON, DIAGNOSTIC  9/24/09    HX APPENDECTOMY  11/6/84    HX BACK SURGERY  11/24/98    severe back problems    HX CHOLECYSTECTOMY  11/6/84    HX CORONARY STENT PLACEMENT      ivc filter    HX GASTRIC BYPASS  1995    Patricio    HX TUBAL LIGATION  1991     FAMILY HISTORY:  Family History   Problem Relation Age of Onset    Heart Disease Mother     Arthritis-rheumatoid Mother     Stroke Father     Hypertension Father     Arthritis-rheumatoid Father      SOCIAL HISTORY:  Social History     Social History    Marital status:      Spouse name: N/A    Number of children: N/A    Years of education: N/A     Social History Main Topics    Smoking status: Never Smoker    Smokeless tobacco: Never Used    Alcohol use No    Drug use: No    Sexual activity: Not on file     Other Topics Concern    Not on file     Social History Narrative     MEDICATIONS:  Current Facility-Administered Medications   Medication Dose Route Frequency    ondansetron (ZOFRAN) injection 4 mg  4 mg IntraVENous Q6H PRN    OLANZapine (ZyPREXA) tablet 7.5 mg  7.5 mg Oral QHS    lacosamide (VIMPAT) tablet 100 mg  100 mg Oral BID    DULoxetine (CYMBALTA) capsule 60 mg  60 mg Oral BID    levothyroxine (SYNTHROID) tablet 125 mcg  125 mcg Oral ACB    loratadine-pseudoephedrine (CLARITIN-D 24-hour)  mg per tablet 1 Tab  1 Tab Oral DAILY    pantoprazole (PROTONIX) tablet 40 mg  40 mg Oral BID    glucosamine-chondroitin (ARTHX) 500-400 mg capsule 3 Cap  3 Cap Oral TID    ferrous sulfate tablet 324 mg  324 mg Oral ACB    magnesium oxide (MAG-OX) tablet 400 mg  400 mg Oral DAILY WITH LUNCH    ascorbic acid (vitamin C) (VITAMIN C) tablet 500 mg  500 mg Oral DAILY    cyclobenzaprine (FLEXERIL) tablet 10 mg  10 mg Oral QHS    rOPINIRole (REQUIP) tablet 0.25 mg  0.25 mg Oral DAILY    mirabegron ER (MYRBETRIQ) tablet 50 mg  50 mg Oral QHS    rOPINIRole (REQUIP) tablet 0.75 mg  0.75 mg Oral QHS    sucralfate (CARAFATE) 100 mg/mL oral suspension 1 g  1 g Oral AC&HS    predniSONE (DELTASONE) tablet 5 mg  5 mg Oral DAILY    butalbital-acetaminop-caf-cod -79-30 mg cap 1 Cap  1 Cap Oral Q4H PRN    gabapentin (NEURONTIN) capsule 900 mg  900 mg Oral TID    morphine IR (MS IR) tablet 15 mg  15 mg Oral Q8H PRN    fluticasone (FLONASE) 50 mcg/actuation nasal spray 2 Spray  2 Spray Both Nostrils DAILY    lubiPROStone (AMITIZA) capsule 8 mcg  8 mcg Oral BID WITH MEALS    polyethylene glycol (MIRALAX) packet 17 g  17 g Oral BID    0.9% sodium chloride infusion  100 mL/hr IntraVENous CONTINUOUS    acetaminophen (TYLENOL) tablet 650 mg  650 mg Oral Q4H PRN    heparin (porcine) injection 5,000 Units  5,000 Units SubCUTAneous Q8H    naloxone (NARCAN) injection 0.4 mg  0.4 mg IntraVENous PRN    docusate sodium (COLACE) capsule 100 mg  100 mg Oral BID PRN    LORazepam (ATIVAN) 2 mg/mL injection        bisacodyl (DULCOLAX) suppository 10 mg  10 mg Rectal DAILY PRN     Prior to Admission medications    Medication Sig Start Date End Date Taking? Authorizing Provider   fluticasone (FLONASE) 50 mcg/actuation nasal spray 2 Sprays by Both Nostrils route daily. 3/1/17   Dee Matthew MD   lubiPROStone (AMITIZA) 8 mcg capsule Take 1 Cap by mouth two (2) times daily (with meals). 3/1/17   Dee Matthew MD   polyethylene glycol [facility]) 17 gram packet Take 1 Packet by mouth two (2) times a day.  3/1/17   Dee Matthew MD   loratadine-pseudoephedrine (CLARITIN-D 24 HOUR)  mg per tablet Take 1 Tab by mouth daily. Historical Provider   pantoprazole (PROTONIX) 20 mg tablet Take 20 mg by mouth two (2) times a day. Historical Provider   Glucosamine &Chondroit-MV-Min3 359-081-60-0.5 mg tab Take 1,500 mg by mouth three (3) times daily. Historical Provider   MORINDA CITRIFOLIA FRUIT (MIGUEL PO) Take 625 mg by mouth two (2) times a day. Indications: Takes at 1200 and 2000    Historical Provider   ferrous sulfate 324 mg (65 mg iron) tablet Take 324 mg by mouth Daily (before breakfast). Indications: 65 mg iron    Historical Provider   AZO CRANBERRY [id number] mg-mg-million tab Take 25,000 mg by mouth two (2) times a day. Indications: Takes at 1200 and 2000    Historical Provider   OTHER,NON-FORMULARY, Take 1 Cap by mouth daily (with lunch). Indications: Bariatric Multi Formula    Historical Provider   magnesium oxide 500 mg tab Take 500 mg by mouth daily (with lunch). Historical Provider   ascorbic acid, vitamin C, (VITAMIN C) 500 mg tablet Take 500 mg by mouth daily. Historical Provider   cyclobenzaprine (FLEXERIL) 10 mg tablet Take 10 mg by mouth nightly. Indications: Takes at 2000    Historical Provider   rOPINIRole (REQUIP) 0.25 mg tablet Take 0.25 mg by mouth daily. Takes at [address] Provider   mirabegron ER (MYRBETRIQ) 50 mg ER tablet Take 50 mg by mouth nightly. Takes at 2300    Historical Provider   rOPINIRole (REQUIP) 0.25 mg tablet Take 0.75 mg by mouth nightly. Takes at [address] Provider   sucralfate (CARAFATE) 100 mg/mL suspension Take 1 g by mouth Before breakfast, lunch, dinner and at bedtime. Historical Provider   predniSONE (DELTASONE) 5 mg tablet Take 5 mg by mouth daily. Historical Provider   codeine-butalbital-acetaminophen-caffeine (FIORICET WITH CODEINE) -17-30 mg per capsule Take 1 Cap by mouth every four (4) hours as needed for Headache.     Historical Provider   gabapentin (NEURONTIN) 300 mg capsule Take 900 mg by mouth three (3) times daily.    Historical Provider   morphine IR (MS IR) 15 mg tablet Take 15 mg by mouth every eight (8) hours as needed for Pain. Historical Provider   levothyroxine (SYNTHROID) 100 mcg tablet Take 1.5 Tabs by mouth Daily (before breakfast). Patient taking differently: Take 75 mcg by mouth Daily (before breakfast). 9/20/12   Darcy Zapata MD   DULoxetine (CYMBALTA) 30 mg capsule Take 2 Caps by mouth two (2) times a day. Patient taking differently: Take 30 mg by mouth two (2) times a day. 7/13/12   Darcy Zapata MD   potassium chloride SR (KLOR-CON 10) 10 mEq tablet Take 2 Tabs by mouth two (2) times a day. Patient taking differently: Take 10 mEq by mouth two (2) times a day. 6/11/12   Darcy Zapata MD   lacosamide (VIMPAT) 200 mg Tab tablet Take 100 mg by mouth two (2) times a day. Indications: PARTIAL EPILEPSY TREATMENT ADJUNCT    Historical Provider   OLANZapine (ZYPREXA) 7.5 mg tablet Take 7.5 mg by mouth nightly. Historical Provider       ALLERGIES:  Allergies   Allergen Reactions    Demerol [Meperidine] Unknown (comments)    Seafood [Shellfish Containing Products] Unknown (comments)       Review of Systems  I cannot review all the systems due to speech difficulty, which is intermittent and stuttering. However, the patient denies any chest pain, abdominal pain, upper extremity weakness, swallowing difficulty, vision changes. PHYSICAL EXAMINATION:     Visit Vitals    /52    Pulse 84    Temp 97.9 °F (36.6 °C)    Resp 17    Ht 5' 6\" (1.676 m)    Wt 67.6 kg (149 lb 0.5 oz)    SpO2 99%    Breastfeeding No    BMI 24.05 kg/m2      O2 Device: Room air  GENERAL: In no apparent distress. HEENT: Moist mucous membranes, sclerae anicteric, scalp is atraumatic. CVS: Regular rate and rhythm, no murmurs or gallops. No carotid bruits. PULMONARY: Clear to auscultation bilaterally. No rales or rhonchi. No wheezing.   EXTREMITIES: Lower extremity increased tone (L>>R), left foot plantar flexion contracture. ABDOMEN: Soft, nontender. SKIN: No rashes or ecchymoses. Warm and dry. NEUROLOGIC: The patient is alert and tries to write down what she wants to say. Shes able to write a couple of words. Her speech is stuttering. However, there are times that she stops stuttering and talks normally. She is oriented to hospital and to date. She doesnt follow all the commands but follows them intermittently. Shes able to understand questions definitely. Her face is symmetric. She smiles symmetrically. Tongue is midline. Facial sensation is intact. Extraocular movements are intact in all directions but she doesnt follow my finger. Shes able to look right and left up-and-down with no difficulty. Pupils are equal and reactive to light. She has full strength on upper extremities. Sensation on upper extremities is intact. Sensation to touch is intact also on lower extremities. She has difficulty moving the lower extremities, weaker on both sides, more on the left side. The tone is increased on the left lower extremity. Deep tendon reflexes are decreased on the lower extremities, possibly due to increased tone. Labs: Results:       Chemistry Recent Labs      07/22/17   0500  07/21/17 1905   GLU  117*  101*   NA  129*  130*   K  3.5  3.5   CL  91*  92*   CO2  32  29   BUN  7  8   CREA  0.57*  0.48*   CA  8.6  7.8*   AGAP  6  9   BUCR  12  17   AP  137*  122*   TP  6.9  6.6   ALB  3.3*  3.1*   GLOB  3.6  3.5   AGRAT  0.9  0.9      CBC w/Diff Recent Labs      07/22/17   0500  07/21/17 1905   WBC  9.9  6.5   RBC  4.57  4.48   HGB  13.3  13.1   HCT  38.2  37.5   PLT  244  222   GRANS  77*  62   LYMPH  14*  24   EOS  2  4      Cardiac Enzymes No results for input(s): CPK, CKND1, PAOLA in the last 72 hours.     No lab exists for component: CKRMB, TROIP   Coagulation Recent Labs      07/22/17   0500  07/21/17 1905   PTP  12.2  12.3   INR  0.9  0.9   APTT  30.6  28.4       Lipid Panel No results found for: CHOL, CHOLPOCT, CHOLX, CHLST, CHOLV, [id number], HDL, LDL, LDLC, DLDLP, 127340, VLDLC, VLDL, TGLX, TRIGL, TRIGP, TGLPOCT, CHHD, CHHDX   BNP No results for input(s): BNPP in the last 72 hours. Liver Enzymes Recent Labs      07/22/17   0500   TP  6.9   ALB  3.3*   AP  137*   SGOT  26      Thyroid Studies Lab Results   Component Value Date/Time    TSH 2.73 02/23/2017 02:18 AM          Radiology:  Ct Head Wo Cont    Result Date: 7/21/2017  History: Aphasia rule out stroke. Technique: Multidetector CT images were acquired through the head without contrast. Reformatted coronal and sagittal images were also provided. Dose reduction techniques: Dose reduction techniques were employed to include lowering the KVP, iterative reconstruction and automatic exposure control. COMPARISON: 2012 FINDINGS: There are no acute intracranial findings. Specifically, there is no evidence for hemorrhage, hydrocephalus, contusion, infarct or extra-axial fluid collection. There are diffuse periventricular white matter changes consistent with small vessel ischemia, a chronic finding. There is mild cerebral atrophy. The paranasal sinuses and mastoid air cells are well aerated. The orbits have a normal appearance. The soft tissues about the scalp are prominent over the right occipital bone. There are no acute or destructive osseous abnormalities. IMPRESSION: There are no acute intracranial abnormalities. Changes of small vessel ischemia are noted. These findings were communicated to and discussed with JACKI Montoya at 1912 hours on the day of this dictation. 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 However, it would be reasonable to evaluate the patient with an EEG just to be sure there are no seizure activity is going on.  If the EEG is normal, the patient can be discharged home with close follow-up by her neurologist. Her head CT was unremarkable for an acute event. I do not get the impression of a stroke. RECOMMENDATIONS:  1. EEG awake/asleep  2. If EEG is normal, the patient can be discharged home with her home medications. 3. Continue lacosamide at her home dose (100 mg twice a day)  4.  follow-up by psychiatry and her own neurologist as outpatient    Please do not hesitate to return with any questions.    ------------------------------------  Zully Walls MD  7/22/2017  10:37 AM

## 2017-07-22 NOTE — PROGRESS NOTES
RRT Note  RRT called by the nurse as she thought patient was having a seizure. When I arrived at bedside patient was stuttering and having difficulty speaking, her vitals were stable. When I tried to ask patient some questions she was making an attempt to answer them but wouldn't clearly say it. During my exam she was everytime I tired to look at her pupils she would shut her eyes close and resist to open, she was moving her eyes around as nursing staff walked around the room for assess her. Her strength in her hands was variable when asked to repeatively squeeze it. When asked to raise her arm, she would raise it and hold it up. Vitals remain stable. This period lasted for about 5-7 minutes but soon after she started speaking to me in clear sentences, AAOx3. Telling me stories about her travel during lifetime,  present at bedside who confirmed it. No other symptoms at the time. No focal neuro deficit. NSR on tele. 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 Cont management as planned. Will order sitter if necessary. Patient continues to states she wants to go to [facility] tomorrow but not today. Please call with further questions.

## 2017-07-22 NOTE — ROUTINE PROCESS
Bedside and Verbal shift change report given to Doris Bangura RN (oncoming nurse) by Kati Titus RN   (offgoing nurse). Report included the following information SBAR, Kardex, Procedure Summary, Intake/Output, MAR, Accordion, Recent Results, Med Rec Status and Alarm Parameters .

## 2017-07-22 NOTE — PROGRESS NOTES
Daily Progress Note: 7/22/2017 4:34 PM   Admit Date: 7/21/2017    Patient seen in follow up for multiple medical problems as listed below:  Patient Active Problem List   Diagnosis Code    Bipolar 1 disorder ([facility] [address] F31.9    Decubitus ulcer of sacral area L89.159    Anemia D64.9    UTI (urinary tract infection) N39.0    Severe protein-calorie malnutrition ([facility] [address] E43    Seizure disorder ([facility] [address] G40.909    Depression F32.9    Hypotension, unspecified I95.9    Personal history of DVT (deep vein thrombosis) Z86.718    Hypomagnesemia E83.42    Unspecified constipation K59.00    Gingival bleeding K06.8    GI (gastrointestinal bleed) K92.2    Chronic anticoagulation Z79.01    Epistaxis R04.0    Seizure ([facility] [address] R56.9    Hyponatremia E87.1    Mild dehydration E86.0       Assesment     Recent ESBL Kelb Ocyt pyelonephritis 7/17: sens to cipro, and carbepenams. Any PCN and ceph options are falsely sensitive. Needs 12-14d IV Abx has midline. Plan to try oral cipro 500mg BID tomorrow after lacosamide increase and keppra loading  Has midline L arm    Recurrent seizures vs pseudoseizures - neurology consulting. titrating epileptic medications. Needs close neuro follow-up    Bipolar disorder and manipulative behavoir - watch out. Needs psych follow-up  Hyponatremia; mild dehydration? Vs due to psych meds   Mild Dehydration   Mild to moderate Protein calorie deficiency   Depression   Constipation, chronic    DVT Protocol Active: yes  Code Status:  Full Code     Disposition: home tomorrow likely. follow-up with her psych clinicians and neurologist and at [facility] for any further issues. Subjective:     CC: Aphasia    Interval History: Patient appears to be psychotic and manipulative. She reports issues with her Rocephin causing her altered mentation but it appears she was able to tolerate this medication at [facility]. It is unclear why she is on rocephin for an ESBL organism as it will not work.    Planned EEG than possible discharge pending outcome    ROS: 11 point ROS negative   Objective:     Visit Vitals    /60    Pulse 85    Temp 98.7 °F (37.1 °C)    Resp 17    Ht 5' 6\" (1.676 m)    Wt 67.6 kg (149 lb 0.5 oz)    SpO2 97%    Breastfeeding No    BMI 24.05 kg/m2       Temp (24hrs), Av °F (36.7 °C), Min:97.4 °F (36.3 °C), Max:98.7 °F (37.1 °C)        Intake/Output Summary (Last 24 hours) at 17 1634  Last data filed at 17 1507   Gross per 24 hour   Intake             2195 ml   Output             3050 ml   Net             -855 ml       Gen: AOx3, NAD  HEENT:  HERNANDEZ, EOMI. Neck: No Bruits/JVD   Lungs:   CTAB. Good respiratory effort  Heart:   RR S1 S2 without M/R/G  Abdomen: ND,NT, BSX4,   Extremities:   No LE edema. No cyanosis.   Skin:  no jaundice/lesions      Data Review:     Meds/Labs/Tests reviewed    Current Shift:  701 - 1900  In: 480 [P.O.:480]  Out: 1650 [Urine:1650]  Last three shifts:  1901 -  0700  In: 6464 [P.O.:960; I.V.:755]  Out: 1400 [Urine:1400]  Recent Labs      17   0500  17   1905   WBC  9.9  6.5   RBC  4.57  4.48   HGB  13.3  13.1   HCT  38.2  37.5   PLT  244  222   GRANS  77*  62   LYMPH  14*  24   EOS  2  4       Recent Labs      17   0500  17   1905   BUN  7  8   CREA  0.57*  0.48*   CA  8.6  7.8*   ALB  3.3*  3.1*   K  3.5  3.5   NA  129*  130*   CL  91*  92*   CO2  32  29   GLU  117*  101*        Lab Results   Component Value Date/Time    Glucose 117 2017 05:00 AM    Glucose 101 2017 07:05 PM    Glucose 84 2017 05:10 AM    Glucose 90 2017 10:28 AM    Glucose 91 2017 02:23 AM          Care coordination with Nursing/Consultants/staff: 15  Prior history, labs, and charting reviewed: 15    Procedures/Imaging:  EEG     Total time spent with chart review, patient examination/education, discussion with staff on case,documentation and medication management / adjustment  :  27 Minutes       [facility]  Hospitalist Division

## 2017-07-22 NOTE — ROUTINE PROCESS
Bedside and Verbal shift change report given to Radha Doshi RN (oncoming nurse) by Calin Howard RN (offgoing nurse).  Report included the following information SBAR, Kardex, Intake/Output, MAR, Recent Results, Med Rec Status and Cardiac Rhythm SR.

## 2017-07-22 NOTE — PROGRESS NOTES
Dual AVS reviewed with Brittney Andrews RN. All medications reviewed individually with patient. Opportunities for questions and concerns provided. Patient discharged via (mode of transport ie. Car, ambulance or air transport) transport. Patient's arm band appropriately discarded.

## 2017-07-23 ENCOUNTER — APPOINTMENT (OUTPATIENT)
Dept: MRI IMAGING | Age: 67
DRG: 101 | End: 2017-07-23
Attending: PSYCHIATRY & NEUROLOGY
Payer: MEDICARE

## 2017-07-23 VITALS
OXYGEN SATURATION: 100 % | HEART RATE: 90 BPM | SYSTOLIC BLOOD PRESSURE: 140 MMHG | RESPIRATION RATE: 18 BRPM | TEMPERATURE: 98 F | DIASTOLIC BLOOD PRESSURE: 70 MMHG | BODY MASS INDEX: 23.49 KG/M2 | WEIGHT: 146.16 LBS | HEIGHT: 66 IN

## 2017-07-23 LAB
BACTERIA SPEC CULT: NORMAL
SERVICE CMNT-IMP: NORMAL

## 2017-07-23 PROCEDURE — 70551 MRI BRAIN STEM W/O DYE: CPT

## 2017-07-23 PROCEDURE — 74011250637 HC RX REV CODE- 250/637: Performed by: INTERNAL MEDICINE

## 2017-07-23 PROCEDURE — A9270 NON-COVERED ITEM OR SERVICE: HCPCS | Performed by: INTERNAL MEDICINE

## 2017-07-23 PROCEDURE — 99218 HC RM OBSERVATION: CPT

## 2017-07-23 PROCEDURE — 74011636637 HC RX REV CODE- 636/637: Performed by: INTERNAL MEDICINE

## 2017-07-23 RX ORDER — CIPROFLOXACIN 250 MG/1
500 TABLET, FILM COATED ORAL EVERY 12 HOURS
Status: DISCONTINUED | OUTPATIENT
Start: 2017-07-23 | End: 2017-07-23 | Stop reason: HOSPADM

## 2017-07-23 RX ORDER — LEVETIRACETAM 500 MG/1
500 TABLET ORAL 2 TIMES DAILY
Qty: 180 TAB | Refills: 1 | Status: SHIPPED | OUTPATIENT
Start: 2017-07-23 | End: 2017-11-22

## 2017-07-23 RX ORDER — NALOXONE HYDROCHLORIDE 0.4 MG/ML
.8-1.2 INJECTION, SOLUTION INTRAMUSCULAR; INTRAVENOUS; SUBCUTANEOUS AS NEEDED
Status: DISCONTINUED | OUTPATIENT
Start: 2017-07-23 | End: 2017-07-23 | Stop reason: HOSPADM

## 2017-07-23 RX ORDER — LACOSAMIDE 200 MG/1
200 TABLET ORAL 2 TIMES DAILY
Qty: 180 TAB | Refills: 1 | Status: SHIPPED | OUTPATIENT
Start: 2017-07-23

## 2017-07-23 RX ADMIN — Medication 400 MG: at 12:03

## 2017-07-23 RX ADMIN — LUBIPROSTONE 8 MCG: 8 CAPSULE, GELATIN COATED ORAL at 09:52

## 2017-07-23 RX ADMIN — DULOXETINE HYDROCHLORIDE 60 MG: 60 CAPSULE, DELAYED RELEASE ORAL at 09:52

## 2017-07-23 RX ADMIN — FLUTICASONE PROPIONATE 2 SPRAY: 50 SPRAY, METERED NASAL at 10:05

## 2017-07-23 RX ADMIN — Medication 500 MG: at 09:52

## 2017-07-23 RX ADMIN — CIPROFLOXACIN HYDROCHLORIDE 500 MG: 250 TABLET, FILM COATED ORAL at 12:03

## 2017-07-23 RX ADMIN — LORATADINE AND PSEUDOEPHEDRINE SULFATE 1 TABLET: 10; 240 TABLET, FILM COATED, EXTENDED RELEASE ORAL at 09:00

## 2017-07-23 RX ADMIN — Medication 3 CAPSULE: at 09:51

## 2017-07-23 RX ADMIN — LACOSAMIDE 200 MG: 100 TABLET, FILM COATED ORAL at 09:52

## 2017-07-23 RX ADMIN — ROPINIROLE HYDROCHLORIDE 0.25 MG: 0.25 TABLET, FILM COATED ORAL at 09:52

## 2017-07-23 RX ADMIN — LEVETIRACETAM 500 MG: 500 TABLET, FILM COATED ORAL at 09:51

## 2017-07-23 RX ADMIN — GABAPENTIN 900 MG: 300 CAPSULE ORAL at 09:51

## 2017-07-23 RX ADMIN — PANTOPRAZOLE SODIUM 40 MG: 40 TABLET, DELAYED RELEASE ORAL at 09:52

## 2017-07-23 RX ADMIN — POLYETHYLENE GLYCOL 3350 17 G: 17 POWDER, FOR SOLUTION ORAL at 09:53

## 2017-07-23 RX ADMIN — PREDNISONE 5 MG: 5 TABLET ORAL at 09:52

## 2017-07-23 RX ADMIN — SUCRALFATE 1 G: 1 SUSPENSION ORAL at 12:03

## 2017-07-23 NOTE — ROUTINE PROCESS
Bedside and Verbal shift change report given to Ana Hopson RN  (oncoming nurse) by Norris Bocanegra RN  (offgoing nurse). Report given with SBAR, Kardex, Intake/Output and Recent Results.

## 2017-07-23 NOTE — PROGRESS NOTES
1930 At change of shift the patient was resting in bed with her eyes closed. Breathing is even and unlabored. No s/s of distress of any kind. Call bell within reach. NO SEIZURE ACTIVITY. 2200 Pt resting in bed with eyes closed. Breathing even and unlabored. No s/s of distress of any kind. Call bell within reach. NO SEIZURE ACTIVITY. 2350 Attempted to administer evening medications. When elevating the [facility] the patient yelled at the nurse to lower it. Explained that she cannot take pills laying flat for safety reasons. She nodded. Pt then refused to speak to the nurse and refused to open her eyes. No seizure activity noted. The PCT stated the patient was talking normally and opening her eyes prior to the nurse arriving to the room. Explained to the patient that her medications could not be administered safety if she refused to open her eyes and follow directions. She continued to refuse. Once the nurse left the room the patient immediately began speaking to the PCT and accusing the staff of taking her large bottle of robitussin. No one has seen a bottle of robitussin. This was witnessed also by Marc Angelucci RN, who was standing in the doorway. The patient was left laying in bed safety. Her lungs are clear and she does not appear to be to have sob or pain of any kind. NO SEIZURE ACTIVITY.    0005 Called and reported to Dr Roslyn Kearney the patient's behavior and refusal to take all evening medications including her seizure medications and heparin. No new orders received. Marco Notified by fellow RN that the patient was screaming and demanding her robitussin. She believes that someone has stolen her robitussin. Staff is not aware of any robitussin bottles and if the patient had brought a bottle in then staff would not have allowed the patient to keep it at the bedside. No current orders for robitussin. 0330 Found two pills laying on the patient's stomach.  One was a blue and white capsule with the words \"fiorcet codeine\" and the other was a small perfectly round gel tablet which resembled tessalon. The patient stated that she forgot to take them tonight. I reminded her that she did not receive any medications from the nurse tonight. She quickly stated that her  had them in his pocket and when he kissed her goodbye tonight they fell out his pocket. The patient denies having any home medications with her. Her purse is in the bed with her and when asked if she could open it to show that there are no medications she began yelling at the nurse and adamantly refusing. Called and report to the nursing supervisor. Wasted both pills with Francine Carroll RN witnessing. 0400 Pooja Garza RN from ICU attempted to speak to the patient about the possible medications in the patient's purse. The patient admitted to having tylenol and Vimpat and Sedrick Franco saw a seven day pill box in the patient's lap when she entered the room. The patient is adamantly refusing to give nursing her home medications. 0430 Reported found medications to Dr Aron Mercado. Order received for IV narcan 0.8-1.2 mg prn sedation. MD agrees that for safety no further medications will be given. 0500 The patient is repeatedly hitting the call bell asking for a variety of things such as \"6 pages of xerox paper. \"     0700 [id number] Shift Summary: In addition to the above notes the patient adamantly refused to allow staff to clean her or turn her overnight after many, many attempts. [facility]

## 2017-07-23 NOTE — DISCHARGE INSTRUCTIONS
Seizure: Care Instructions  Your Care Instructions    Seizures are caused by abnormal patterns of electrical signals in the brain. They are different for each person. Seizures can affect movement, speech, vision, or awareness. Some people have only slight shaking of a hand and do not pass out. Other people may pass out and have violent shaking of the whole body. Some people appear to stare into space. They are awake, but they can't respond normally. Later, they may not remember what happened. You may need tests to identify the type and cause of the seizures. A seizure may occur only once, or you may have them more than one time. Taking medicines as directed and following up with your doctor may help keep you from having more seizures. The doctor has checked you carefully, but problems can develop later. If you notice any problems or new symptoms, get medical treatment right away. Follow-up care is a key part of your treatment and safety. Be sure to make and go to all appointments, and call your doctor if you are having problems. It's also a good idea to know your test results and keep a list of the medicines you take. How can you care for yourself at home? · Be safe with medicines. Take your medicines exactly as prescribed. Call your doctor if you think you are having a problem with your medicine. · Do not do any activity that could be dangerous to you or others until your doctor says it is safe to do so. For example, do not drive a car, operate machinery, swim, or climb ladders. · Be sure that anyone treating you for any health problem knows that you have had a seizure and what medicines you are taking for it. · Identify and avoid things that may make you more likely to have a seizure. These may include lack of sleep, alcohol or drug use, stress, or not eating. · Make sure you go to your follow-up appointment. When should you call for help? Call 911 anytime you think you may need emergency care. For example, call if:  · You have another seizure. · You have more than one seizure in 24 hours. · You have new symptoms, such as trouble walking, speaking, or thinking clearly. Call your doctor now or seek immediate medical care if:  · You are not acting normally. Watch closely for changes in your health, and be sure to contact your doctor if you have any problems. Where can you learn more? Go to http://sav-susannah.info/. Enter N842 in the search box to learn more about \"Seizure: Care Instructions. \"  Current as of: October 14, 2016  Content Version: 11.3  © 6605-5106 [employer]. Care instructions adapted under license by [employer] (which disclaims liability or warranty for this information). If you have questions about a medical condition or this instruction, always ask your healthcare professional. [address] any warranty or liability for your use of this information. Learning About Turning a Person in Bed  Why is it important to turn a person in bed? People sometimes have to stay in bed for long periods of time. They may be very sick, in pain, or very weak and not be able to move themselves into different positions. It's very important that your loved one changes positions. Lying in one position for a long time can cause pressure injuries (also called pressure sores). Pressure injuries are damage to the skin. They can range from red areas on the surface of the skin to severe tissue damage that goes deep into muscle and bone. These problems are hard to treat and slow to heal. When pressure injuries don't heal well, they can cause problems such as bone, blood, and skin infections. Pressure injuries usually occur over bony areas, such as the hips, lower back, elbows, heels, and shoulders. They can also occur in places where the skin folds over on itself.   You can help your loved one avoid pressure injuries by helping him or her turn and change position in bed. A drawsheet can help. What is a drawsheet? A drawsheet makes it easier to \"roll\" your loved one into another position. You can buy a drawsheet, or you can make one with a sheet. You then make the bed using the drawsheet. To make a drawsheet:  · Fold a sheet in half lengthwise. · Place the sheet on top of the fitted bottom sheet so that the top and bottom of the drawsheet go across the bed (perpendicular to the bed). Position the drawsheet so that it will be between your loved one's head and knees. · Tuck in the drawsheet tightly on both sides. Smooth out any wrinkles to reduce possible skin irritation. How do you turn a person in bed? Before getting started, tell your loved one that you want him or her to roll into another position. If your loved one has any drains, tubes, or other medical equipment, adjust them so they don't get in the way. If your loved one can help  · You may need to help your loved one scoot toward the opposite side of the bed so that he or she will have room to roll. · Go to the side of the bed you want your love one to roll toward. · Ask your loved one to lie on his or her back with the knees bent. Have your loved one place his or her arms across the body. · Ask your loved one to roll toward you while keeping the knees bent. If you have a rail on the bed, have your loved one reach toward the rail. · Help your loved one as needed. Gently place your hands on the shoulders and hips, and guide him or her toward you. If your loved one can't help  It is best to turn your loved one every 2 hours. If your loved one cannot move or finds it very hard, you can use your drawsheet (see \"What is a drawsheet? \"). Have a family member or friend help you. It is easier for two people to turn someone, and it can be dangerous for one person to do it. Position your loved one  · One person stands on each side of the bed.  If your loved one is in a hospital bed, lower the height of the bed. This will make it easier to turn the person. · Untuck the drawsheet on both sides of the bed. Each person gathers up one side so you both almost have a \"handle\" to grab. You may also need to make sure your loved one is high enough up in the bed. If not, lift him or her toward the head of the bed. · Agree on a count, and then lift and move your loved one to the side of the bed you're going to roll away from. · Tuck in the drawsheet on the side of the bed that your loved one will roll toward. Positioning a drawsheet    When you make someone's bed with a drawsheet, position it so that it is between the person's head and knees. Turning or moving a person in bed    The drawsheet can then be used to turn or move the person you're caring for. Move your loved one  When you and your assistant are ready:  · Help your loved one lie on his or her back with the knees bent. If your loved one can't bend the knees, cross one ankle over the other in the direction of the turn. Position your loved one's arms across his or her body. · Stand on opposite sides of the bed. One person will pull and the other push. Be sure that you and your assistant have your feet shoulder-width apart. This will help you avoid straining your back. ¨ If you're pulling your loved one toward you, lean from your hips (don't bend your back), reach over your loved one, and grab the drawsheet at your loved one's hip and shoulder areas. Slowly pull the drawsheet toward you to roll your loved one over. ¨ If you're rolling your loved one away from you, slowly push at the hip and shoulder areas. Moving someone in bed is best as a two-person job. If your loved one can help, even a little, you may be able to do it yourself. But do all you can to find someone to help you. What do you do after turning someone? You can use pillows to help your loved one get comfortable and avoid pressure injuries.   If your loved one is on his or her side:  · Place pillows in front of your loved one, at chest level, with the top arm draped over a pillow. · If needed, tuck one edge of a pillow under the buttock, lengthwise. Then fold the pillow under and tuck the other edge under the first edge. That creates a \"roll\" that stays in place better and helps keep your loved one from rolling back. · Place a pillow between your loved one's knees, with the legs slightly bent. · Put the top leg a little in front of the bottom leg. This takes pressure off the bottom leg. · Put a pillow under the bottom leg so that the bottom ankle is off the bed. If your loved one is on his or her back:  · Put a pillow under your loved one's legs between the knees and ankles. · Do not put anything under the heels. · If you have a hospital bed, don't adjust the top end above 30 degrees. This helps prevent your loved one from sliding down. When you are finished, smooth out the drawsheet in its original position and tuck it in. Where can you learn more? Go to http://savSourcebazaarsusannah.info/. Enter I569 in the search box to learn more about \"Learning About Turning a Person in Bed. \"  Current as of: April 3, 2017  Content Version: 11.3  © 8231-0307 [employer]. Care instructions adapted under license by [employer] (which disclaims liability or warranty for this information). If you have questions about a medical condition or this instruction, always ask your healthcare professional. [place] [address] any warranty or liability for your use of this information. Learning About Preventing Pressure Sores  What are pressure sores? A pressure sore is an injury to the skin caused by constant pressure over a period of time. The constant pressure blocks the blood supply to the skin. This causes skin cells to die and creates a sore. Pressure sores are also called bedsores.   Pressure sores usually occur over bony areas, such as the hips, lower back, elbows, heels, and shoulders. Pressure sores can also occur in places where the skin folds over on itself, or where medical equipment presses on the skin, such as when oxygen tubes press on the ears or cheeks. Pressure sores can range from red areas on the surface of the skin to severe tissue damage that goes deep into muscle and bone. Severe sores are hard to treat and slow to heal. When pressure sores do not heal properly, problems such as bone, blood, and skin infections can develop. What causes pressure sores? Things that cause pressure sores include:  · Pressure on the skin and tissues. For example, the sores may happen when a person lies in bed or sits in a wheelchair for a long time. This is the most common cause of pressure sores. · Sliding down in a bed or chair, forcing the skin to fold over itself (shear force). · Being pulled across bed sheets or other surfaces (friction burns). As we get older, our skin gets more thin and dry and less elastic, so it is easier to damage. Poor nutrition and not getting enough fluids make these natural changes in the skin worse. Skin in this condition may easily develop a pressure sore. Skin can also be damaged by sweat, feces, or urine, making pressure sores more likely and harder to heal.  How can you help prevent pressure sores? If you are not able to do these things yourself, ask a family member or friend for help. Change position often  · In a bed, change position every 2 hours. · In a wheelchair or other type of chair, shift your weight every 15 minutes, and give yourself a full relief of weight every hour. ¨ For a weight shift, lean forward and to the left and right. Push up out of the chair with your arms. If you have a chair that tilts, use the tilt control to help relieve pressure. ¨ For a full relief of weight, stand up or move to another chair or bed if you are able to.   Personal care  · Check your skin every day, especially around bony areas. When a pressure sore is forming, skin temperature can be different than nearby skin. It might be warmer or cooler. The skin can feel either firmer or softer than the surrounding skin. · Keep your skin clean and free of sweat, wound drainage, urine, and feces. · Use skin lotions to keep your skin from drying out and cracking. Barrier lotions or creams have ingredients that can act as a shield to help protect the skin from moisture or irritation. · Try not to slide or slump across sheets in a chair or bed. And try not to sleep in a recliner chair. Lifestyle choices  · Eat healthy foods with plenty of protein to help heal damaged skin and to help new skin grow. · Get plenty of fluids. · Stay at a healthy weight. Being either overweight or underweight can make pressure sores more likely. · If you smoke, stop. Smoking dries the skin and reduces its blood supply. If you need help quitting, talk to your doctor about stop-smoking programs and medicines. These can increase your chances of quitting for good. Ask about using cushions or pads  · Overlays are special pads you put on top of a mattress. They provide a softer surface that will fit your body's shape better than a regular mattress. · Cushions or devices can be used to reduce pressure on certain areas of the body. For example, you can use a \"medical heel pillow\" to help prevent pressure sores on heels. You can also get cushions for seating surfaces, such as wheelchair seats. Talk with your doctor about cushions and pads. Some products, such as doughnut-type devices, may actually cause pressure sores or make them worse. Where can you learn more? Go to http://sav-susannah.info/. Enter 276 5796 in the search box to learn more about \"Learning About Preventing Pressure Sores. \"  Current as of: March 20, 2017  Content Version: 11.3  © 7053-2414 [employer].  Care instructions adapted under license by [employer] (which disclaims liability or warranty for this information). If you have questions about a medical condition or this instruction, always ask your healthcare professional. [address] any warranty or liability for your use of this information. Learning About Mood Disorders  What are mood disorders? Mood disorders are medical problems that affect how you feel. They can impact your moods, thoughts, and actions. Mood disorders include:  · Depression. This causes you to feel sad or hopeless for much of the time. · Bipolar disorder. This causes extreme mood changes from manic episodes of very high energy to extreme lows of depression. · Seasonal affective disorder (SAD). This is a type of depression that affects you during the same season each year. Most often people experience SAD during the fall and winter months when days are shorter and there is less light. What are the symptoms? Depression  You may:  · Feel sad or hopeless nearly every day. · Lose interest in or not get pleasure from most daily activities. You feel this way nearly every day. · Have low energy, changes in your appetite, or changes in how well you sleep. · Have trouble concentrating. · Think about death and suicide. Keep the numbers for these national suicide hotlines: 6-422-818-TALK (2-408.645.5620) and 7-494-SQZRGKY (1-626.999.1880). If you or someone you know talks about suicide or feeling hopeless, get help right away. Bipolar disorder  Symptoms depend on your mood swings. You may:  · Feel very happy, energetic, or on edge. · Feel like you need very little sleep. · Feel overly self-confident. · Do impulsive things, such as spending a lot of money. · Feel sad or hopeless. · Have racing thoughts or trouble thinking and making decisions. · Lose interest in things you have enjoyed in the past.  · Think about death and suicide.  Keep the numbers for these national suicide hotlines: 2-252-694-TALK (5-328.253.6814) and 5-229-XXKVZNJ (3-987.590.9483). If you or someone you know talks about suicide or feeling hopeless, get help right away. Seasonal affective disorder (SAD)  Symptoms come and go at about the same time each year. For most people with SAD, symptoms come during the winter when there is less daylight. You may:  · Feel sad, grumpy, saravia, or anxious. · Lose interest in your usual activities. · Eat more and crave carbohydrates, such as bread and pasta. · Gain weight. · Sleep more and feel drowsy during the daytime. How are mood disorders treated? Mood disorders can be treated with medicines or counseling, or a combination of both. Medicines for depression and SAD may include antidepressants. Medicines for bipolar disorder may include:  · Mood stabilizers. · Antipsychotics. · Benzodiazepines. Counseling may involve cognitive-behavioral therapy. It teaches you how to change the ways you think and behave. This can help you stop thinking bad thoughts about yourself and your life. Light therapy is the main treatment for SAD. This therapy uses a special kind of lamp. You let the lamp shine on you at certain times, usually in the morning. This may help your symptoms during the months when there is less sunlight. Healthy lifestyle  Healthy lifestyle changes may help you feel better. · Get at least 30 minutes of exercise on most days of the week. Walking is a good choice. · Eat a healthy diet. Include fruits, vegetables, lean proteins, and whole grains in your diet each day. · Keep a regular sleep schedule. Try for 8 hours of sleep a night. · Find ways to manage stress, such as relaxation exercises. · Avoid alcohol and illegal drugs. Follow-up care is a key part of your treatment and safety. Be sure to make and go to all appointments, and call your doctor if you are having problems. It's also a good idea to know your test results and keep a list of the medicines you take.   Where can you learn more? Go to http://sav-susannah.info/. Enter G591 in the search box to learn more about \"Learning About Mood Disorders. \"  Current as of: May 3, 2017  Content Version: 11.3  © 4018-8435 [employer]. Care instructions adapted under license by [employer] (which disclaims liability or warranty for this information). If you have questions about a medical condition or this instruction, always ask your healthcare professional. [address] any warranty or liability for your use of this information. Recovering From Depression: Care Instructions  Your Care Instructions  Taking good care of yourself is important as you recover from depression. In time, your symptoms will fade as your treatment takes hold. Do not give up. Instead, focus your energy on getting better. Your mood will improve. It just takes some time. Focus on things that can help you feel better, such as being with friends and family, eating well, and getting enough rest. But take things slowly. Do not do too much too soon. You will begin to feel better gradually. Follow-up care is a key part of your treatment and safety. Be sure to make and go to all appointments, and call your doctor if you are having problems. It's also a good idea to know your test results and keep a list of the medicines you take. How can you care for yourself at home? Be realistic  · If you have a large task to do, break it up into smaller steps you can handle, and just do what you can. · You may want to put off important decisions until your depression has lifted. If you have plans that will have a major impact on your life, such as marriage, divorce, or a job change, try to wait a bit. Talk it over with friends and loved ones who can help you look at the overall picture first.  · Reaching out to people for help is important. Do not isolate yourself. Let your family and friends help you.  Find someone you can trust and confide in, and talk to that person. · Be patient, and be kind to yourself. Remember that depression is not your fault and is not something you can overcome with willpower alone. Treatment is necessary for depression, just like for any other illness. Feeling better takes time, and your mood will improve little by little. Stay active  · Stay busy and get outside. Take a walk, or try some other light exercise. · Talk with your doctor about an exercise program. Exercise can help with mild depression. · Go to a movie or concert. Take part in a Jain activity or other social gathering. Go to a [employer] game. · Ask a friend to have dinner with you. Take care of yourself  · Eat a balanced diet with plenty of fresh fruits and vegetables, whole grains, and lean protein. If you have lost your appetite, eat small snacks rather than large meals. · Avoid drinking alcohol or using illegal drugs. Do not take medicines that have not been prescribed for you. They may interfere with medicines you may be taking for depression, or they may make your depression worse. · Take your medicines exactly as they are prescribed. You may start to feel better within 1 to 3 weeks of taking antidepressant medicine. But it can take as many as 6 to 8 weeks to see more improvement. If you have questions or concerns about your medicines, or if you do not notice any improvement by 3 weeks, talk to your doctor. · If you have any side effects from your medicine, tell your doctor. Antidepressants can make you feel tired, dizzy, or nervous. Some people have dry mouth, constipation, headaches, sexual problems, or diarrhea. Many of these side effects are mild and will go away on their own after you have been taking the medicine for a few weeks. Some may last longer. Talk to your doctor if side effects are bothering you too much. You might be able to try a different medicine. · Get enough sleep.  If you have problems sleeping:  ¨ Go to bed at the same time every night, and get up at the same time every morning. ¨ Keep your bedroom dark and quiet. ¨ Do not exercise after 5:00 p.m. ¨ Avoid drinks with caffeine after 5:00 p.m. · Avoid sleeping pills unless they are prescribed by the doctor treating your depression. Sleeping pills may make you groggy during the day, and they may interact with other medicine you are taking. · If you have any other illnesses, such as diabetes, heart disease, or high blood pressure, make sure to continue with your treatment. Tell your doctor about all of the medicines you take, including those with or without a prescription. · Keep the numbers for these national suicide hotlines: 9-555-643-TALK (2-112.500.2290) and 4-868-CSLWHLI (8-323.430.3579). If you or someone you know talks about suicide or feeling hopeless, get help right away. When should you call for help? Call 911 anytime you think you may need emergency care. For example, call if:  · You feel like hurting yourself or someone else. · Someone you know has depression and is about to attempt or is attempting suicide. Call your doctor now or seek immediate medical care if:  · You hear voices. · Someone you know has depression and:  ¨ Starts to give away his or her possessions. ¨ Uses illegal drugs or drinks alcohol heavily. ¨ Talks or writes about death, including writing suicide notes or talking about guns, knives, or pills. ¨ Starts to spend a lot of time alone. ¨ Acts very aggressively or suddenly appears calm. Watch closely for changes in your health, and be sure to contact your doctor if:  · You do not get better as expected. Where can you learn more? Go to http://sav-susannah.info/. Enter Q561 in the search box to learn more about \"Recovering From Depression: Care Instructions. \"  Current as of: July 26, 2016  Content Version: 11.3  © 3473-6144 [employer]  Care instructions adapted under license by [facility] (which disclaims liability or warranty for this information). If you have questions about a medical condition or this instruction, always ask your healthcare professional. [address] any warranty or liability for your use of this information. DISCHARGE SUMMARY from Nurse    The following personal items are in your possession at time of discharge:    Dental Appliances: None  Visual Aid: None     Home Medications: None  Jewelry: None  Clothing: None  Other Valuables: None             PATIENT INSTRUCTIONS:    After general anesthesia or intravenous sedation, for 24 hours or while taking prescription Narcotics:  · Limit your activities  · Do not drive and operate hazardous machinery  · Do not make important personal or business decisions  · Do  not drink alcoholic beverages  · If you have not urinated within 8 hours after discharge, please contact your surgeon on call. Report the following to your surgeon:  · Excessive pain, swelling, redness or odor of or around the surgical area  · Temperature over 100.5  · Nausea and vomiting lasting longer than 4 hours or if unable to take medications  · Any signs of decreased circulation or nerve impairment to extremity: change in color, persistent  numbness, tingling, coldness or increase pain  · Any questions        What to do at Home:  Recommended activity: Activity as tolerated. If you experience any of the following symptoms new or worsening of symptoms, please follow up with your Primary Care Provider or Neurologist.      *  Please give a list of your current medications to your Primary Care Provider. *  Please update this list whenever your medications are discontinued, doses are      changed, or new medications (including over-the-counter products) are added. *  Please carry medication information at all times in case of emergency situations.           These are general instructions for a healthy lifestyle:    No smoking/ No tobacco products/ Avoid exposure to second hand smoke    Surgeon General's Warning:  Quitting smoking now greatly reduces serious risk to your health. Obesity, smoking, and sedentary lifestyle greatly increases your risk for illness    A healthy diet, regular physical exercise & weight monitoring are important for maintaining a healthy lifestyle    You may be retaining fluid if you have a history of heart failure or if you experience any of the following symptoms:  Weight gain of 3 pounds or more overnight or 5 pounds in a week, increased swelling in our hands or feet or shortness of breath while lying flat in bed. Please call your doctor as soon as you notice any of these symptoms; do not wait until your next office visit. Recognize signs and symptoms of STROKE:    F-face looks uneven    A-arms unable to move or move unevenly    S-speech slurred or non-existent    T-time-call 911 as soon as signs and symptoms begin-DO NOT go       Back to bed or wait to see if you get better-TIME IS BRAIN. Warning Signs of HEART ATTACK     Call 911 if you have these symptoms:   Chest discomfort. Most heart attacks involve discomfort in the center of the chest that lasts more than a few minutes, or that goes away and comes back. It can feel like uncomfortable pressure, squeezing, fullness, or pain.  Discomfort in other areas of the upper body. Symptoms can include pain or discomfort in one or both arms, the back, neck, jaw, or stomach.  Shortness of breath with or without chest discomfort.  Other signs may include breaking out in a cold sweat, nausea, or lightheadedness. Don't wait more than five minutes to call 911 - MINUTES MATTER! Fast action can save your life. Calling 911 is almost always the fastest way to get lifesaving treatment. Emergency Medical Services staff can begin treatment when they arrive -- up to an hour sooner than if someone gets to the hospital by car.        The discharge information has been reviewed with the patient. The patient verbalized understanding. Discharge medications reviewed with the patient and appropriate educational materials and side effects teaching were provided. Patient armband removed and shredded.

## 2017-07-23 NOTE — PROGRESS NOTES
NEUROLOGY PROGRESS NOTE        Patient: Hilaria Forte        Sex: female          DOA: 7/21/2017  YOB: 1950      Age:  78 y.o.         LOS: 1 day     Identification:  77 yo female with history of seizure disorder, bipolar disorder and chronic pain, who presented with a seizure and speech difficulty. SUBJECTIVE:   The patient is accompanied by her , Chalino Gaspar. I was able to get a more clear history about the patient's medical  Past. 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 Apparently, that was the beginning of her medical decline. The procedure was not reversed and she continued to have nutritional problems. In December 2012, she had a series of generalized seizures, and was seen in [facility] at that time. She was placed on Keppra, which controlled the seizures. (According to the chart, she seems to have had seizures earlier than December 2012). Then, she demonstrated more symptoms of sergey (due to BPD). Apparently, she had more seizures and was placed on Vimpat and Depakote at high doses. She was sleeping most of the time with these seizures. She was seen in EMU of [facility], and these medications were stopped, which brought about series of seizures and she needed to be intubated. Since Feb 2013, she did not have any seizures. But, she was mostly debilitated, had sacral ulcers, and was treated with antibiotics and surgeries. At one point, she was on hospice care due to protein-calorie malnutrition. With nutritional support, she improved, but she was never mobile. She was not followed by a neurologist (as the  says). She had bouts of manic episodes, which was controlled with olanzapine. Her seizures were under good control with lacosamide 100mg po BID until Friday. She was being treated with a cephalosporine antibiotic for esbl Klebsiella, and she had a generalized seizure on Friday.  Her speech became stuttering and she was intermittently confused afterwards. Yesterday, her EEG was abnormal. Therefore, we increased the dose of lacosamide and started her on levetiracetam. Her speech and mental status is much better today. REVIEW OF SYSTEMS: Denies chest pain, abdominal pain, nausea or vomiting. No fever or chills. OBJECTIVE:      Visit Vitals    /69    Pulse 81    Temp 97.9 °F (36.6 °C)    Resp 18    Ht 5' 6\" (1.676 m)    Wt 66.3 kg (146 lb 2.6 oz)    SpO2 100%    Breastfeeding No    BMI 23.59 kg/m2     Physical Exam:  GEN: Alert, NAD  EYES: conjunctiva normal, lids with out lesions  HEENT: MMM. ABDOMEN: Soft, non-tender. EXTREMITIES: No edema cyanosis  SKIN: no rashes or skin breakdown, no nodules  [facility]: Alert[facility]. Speech is fluent. Cranials: Face is symmetric. Smiles symmetrically. EOMI, VFF. Tongue is midline. Motor: Full strength on UEs, but limited movement on LEs (increased tone distally, weak HF bilaterally). Sensory: Intact to crude touch on all four. Coordination: Intact with no dysmetria during FNF.      Current Facility-Administered Medications   Medication Dose Route Frequency    naloxone (NARCAN) injection 0.8-1.2 mg  0.8-1.2 mg IntraVENous PRN    ciprofloxacin HCl (CIPRO) tablet 500 mg  500 mg Oral Q12H    ondansetron (ZOFRAN) injection 4 mg  4 mg IntraVENous Q6H PRN    lacosamide (VIMPAT) tablet 200 mg  200 mg Oral BID    levETIRAcetam (KEPPRA) tablet 500 mg  500 mg Oral BID    OLANZapine (ZyPREXA) tablet 7.5 mg  7.5 mg Oral QHS    DULoxetine (CYMBALTA) capsule 60 mg  60 mg Oral BID    levothyroxine (SYNTHROID) tablet 125 mcg  125 mcg Oral ACB    loratadine-pseudoephedrine (CLARITIN-D 24-hour)  mg per tablet 1 Tab  1 Tab Oral DAILY    pantoprazole (PROTONIX) tablet 40 mg  40 mg Oral BID    glucosamine-chondroitin (ARTHX) 500-400 mg capsule 3 Cap  3 Cap Oral TID    ferrous sulfate tablet 324 mg  324 mg Oral ACB    magnesium oxide (MAG-OX) tablet 400 mg  400 mg Oral DAILY WITH LUNCH    ascorbic acid (vitamin C) (VITAMIN C) tablet 500 mg  500 mg Oral DAILY    cyclobenzaprine (FLEXERIL) tablet 10 mg  10 mg Oral QHS    rOPINIRole (REQUIP) tablet 0.25 mg  0.25 mg Oral DAILY    mirabegron ER (MYRBETRIQ) tablet 50 mg  50 mg Oral QHS    rOPINIRole (REQUIP) tablet 0.75 mg  0.75 mg Oral QHS    sucralfate (CARAFATE) 100 mg/mL oral suspension 1 g  1 g Oral AC&HS    predniSONE (DELTASONE) tablet 5 mg  5 mg Oral DAILY    butalbital-acetaminop-caf-cod -84-30 mg cap 1 Cap  1 Cap Oral Q4H PRN    gabapentin (NEURONTIN) capsule 900 mg  900 mg Oral TID    morphine IR (MS IR) tablet 15 mg  15 mg Oral Q8H PRN    fluticasone (FLONASE) 50 mcg/actuation nasal spray 2 Spray  2 Spray Both Nostrils DAILY    lubiPROStone (AMITIZA) capsule 8 mcg  8 mcg Oral BID WITH MEALS    polyethylene glycol (MIRALAX) packet 17 g  17 g Oral BID    0.9% sodium chloride infusion  100 mL/hr IntraVENous CONTINUOUS    acetaminophen (TYLENOL) tablet 650 mg  650 mg Oral Q4H PRN    heparin (porcine) injection 5,000 Units  5,000 Units SubCUTAneous Q8H    naloxone (NARCAN) injection 0.4 mg  0.4 mg IntraVENous PRN    docusate sodium (COLACE) capsule 100 mg  100 mg Oral BID PRN    bisacodyl (DULCOLAX) suppository 10 mg  10 mg Rectal DAILY PRN       Laboratory  No results found for this or any previous visit (from the past 24 hour(s)). Radiology:  Ct Head Wo Cont    Result Date: 7/21/2017  History: Aphasia rule out stroke. Technique: Multidetector CT images were acquired through the head without contrast. Reformatted coronal and sagittal images were also provided. Dose reduction techniques: Dose reduction techniques were employed to include lowering the KVP, iterative reconstruction and automatic exposure control. COMPARISON: 2012 FINDINGS: There are no acute intracranial findings. Specifically, there is no evidence for hemorrhage, hydrocephalus, contusion, infarct or extra-axial fluid collection.  There are diffuse periventricular white matter changes consistent with small vessel ischemia, a chronic finding. There is mild cerebral atrophy. The paranasal sinuses and mastoid air cells are well aerated. The orbits have a normal appearance. The soft tissues about the scalp are prominent over the right occipital bone. There are no acute or destructive osseous abnormalities. IMPRESSION: There are no acute intracranial abnormalities. Changes of small vessel ischemia are noted. These findings were communicated to and discussed with JACKI Montoya at 1912 hours on the day of this dictation. 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 Her speech may be related to her seizures as well, but not all stuttering speech seems to be related to a corticall event. She needs to be on levetiracetam and lacosamide at a higher dose. Levetiracetam controlled her seizures in the past, and it is not clear why it was discontinued. PLAN/RECOMMENDATIONS:  1. Continue levetiracetam 500mg po BID  2. Continue lacosamide 200mg po BID  3. Brain MRI wwo contrast.    OK to d/c her home of medically suitable. I will follow the patient as outpatient since she does not have any neurologist following her. Please do not hesitate to return with any questions. Signed:  Barbara Paige MD  7/23/2017   11:19 AM      90 minutes of clinical time spent with the patient, her , chart review and management.

## 2017-07-23 NOTE — ROUTINE PROCESS
Dual AVS reviewed with AUSTIN Simental. All medications reviewed individually with patient. Opportunities for questions and concerns provided. Patient discharged via (mode of transport ie. Car, ambulance or air transport) ambulance  Patient's arm band appropriately discarded.

## 2017-07-23 NOTE — PROGRESS NOTES
0730 Assumed pt care, pt is alert and oriented x4, SR with infrequent PACs on the monitor, pt denies pain or any further needs at this time. Informed by previous nurse that pt might be secretly taking home medications. Discussed this concern with pt's  and he stated that he will be taking these medications home. 1200 Paged on call MRI tech. 1400 Walked into pt's room and noticed that pt had pulled out their left picc line. No bleeding/SOB noted. Cath tip is intact.

## 2017-08-10 ENCOUNTER — APPOINTMENT (OUTPATIENT)
Dept: GENERAL RADIOLOGY | Age: 67
End: 2017-08-10
Attending: EMERGENCY MEDICINE
Payer: MEDICARE

## 2017-08-10 ENCOUNTER — HOSPITAL ENCOUNTER (EMERGENCY)
Age: 67
Discharge: OTHER INSTITUTION | End: 2017-08-10
Attending: EMERGENCY MEDICINE
Payer: MEDICARE

## 2017-08-10 ENCOUNTER — APPOINTMENT (OUTPATIENT)
Dept: CT IMAGING | Age: 67
End: 2017-08-10
Attending: EMERGENCY MEDICINE
Payer: MEDICARE

## 2017-08-10 VITALS
HEIGHT: 66 IN | TEMPERATURE: 98.1 F | DIASTOLIC BLOOD PRESSURE: 75 MMHG | SYSTOLIC BLOOD PRESSURE: 134 MMHG | BODY MASS INDEX: 24.27 KG/M2 | OXYGEN SATURATION: 98 % | RESPIRATION RATE: 16 BRPM | WEIGHT: 151 LBS | HEART RATE: 95 BPM

## 2017-08-10 DIAGNOSIS — T83.511A URINARY TRACT INFECTION ASSOCIATED WITH INDWELLING URETHRAL CATHETER, INITIAL ENCOUNTER (HCC): Primary | ICD-10-CM

## 2017-08-10 DIAGNOSIS — E87.1 HYPONATREMIA: ICD-10-CM

## 2017-08-10 DIAGNOSIS — F31.9 BIPOLAR 1 DISORDER (HCC): ICD-10-CM

## 2017-08-10 DIAGNOSIS — R41.82 ALTERED MENTAL STATUS, UNSPECIFIED: ICD-10-CM

## 2017-08-10 DIAGNOSIS — N39.0 URINARY TRACT INFECTION ASSOCIATED WITH INDWELLING URETHRAL CATHETER, INITIAL ENCOUNTER (HCC): Primary | ICD-10-CM

## 2017-08-10 LAB
ALBUMIN SERPL BCP-MCNC: 3.2 G/DL (ref 3.4–5)
ALBUMIN/GLOB SERPL: 0.7 {RATIO} (ref 0.8–1.7)
ALP SERPL-CCNC: 214 U/L (ref 45–117)
ALT SERPL-CCNC: 34 U/L (ref 13–56)
ANION GAP BLD CALC-SCNC: 6 MMOL/L (ref 3–18)
APPEARANCE UR: ABNORMAL
AST SERPL W P-5'-P-CCNC: 28 U/L (ref 15–37)
BACTERIA URNS QL MICRO: ABNORMAL /HPF
BASOPHILS # BLD AUTO: 0 K/UL (ref 0–0.06)
BASOPHILS # BLD: 0 % (ref 0–2)
BILIRUB SERPL-MCNC: 0.4 MG/DL (ref 0.2–1)
BILIRUB UR QL: ABNORMAL
BUN SERPL-MCNC: 17 MG/DL (ref 7–18)
BUN/CREAT SERPL: 25 (ref 12–20)
CALCIUM SERPL-MCNC: 8.7 MG/DL (ref 8.5–10.1)
CHLORIDE SERPL-SCNC: 90 MMOL/L (ref 100–108)
CK MB CFR SERPL CALC: 2.3 % (ref 0–4)
CK MB SERPL-MCNC: 1.3 NG/ML (ref 5–25)
CK SERPL-CCNC: 56 U/L (ref 26–192)
CO2 SERPL-SCNC: 31 MMOL/L (ref 21–32)
COLOR UR: YELLOW
CREAT SERPL-MCNC: 0.67 MG/DL (ref 0.6–1.3)
DIFFERENTIAL METHOD BLD: ABNORMAL
EOSINOPHIL # BLD: 0.2 K/UL (ref 0–0.4)
EOSINOPHIL NFR BLD: 3 % (ref 0–5)
EPITH CASTS URNS QL MICRO: NEGATIVE /LPF (ref 0–5)
ERYTHROCYTE [DISTWIDTH] IN BLOOD BY AUTOMATED COUNT: 16.5 % (ref 11.6–14.5)
GLOBULIN SER CALC-MCNC: 4.5 G/DL (ref 2–4)
GLUCOSE SERPL-MCNC: 117 MG/DL (ref 74–99)
GLUCOSE UR STRIP.AUTO-MCNC: NEGATIVE MG/DL
HCT VFR BLD AUTO: 41.3 % (ref 35–45)
HGB BLD-MCNC: 14.1 G/DL (ref 12–16)
HGB UR QL STRIP: ABNORMAL
INR PPP: 0.9 (ref 0.8–1.2)
KETONES UR QL STRIP.AUTO: ABNORMAL MG/DL
LEUKOCYTE ESTERASE UR QL STRIP.AUTO: ABNORMAL
LYMPHOCYTES # BLD AUTO: 31 % (ref 21–52)
LYMPHOCYTES # BLD: 2 K/UL (ref 0.9–3.6)
MCH RBC QN AUTO: 29.8 PG (ref 24–34)
MCHC RBC AUTO-ENTMCNC: 34.1 G/DL (ref 31–37)
MCV RBC AUTO: 87.3 FL (ref 74–97)
MONOCYTES # BLD: 0.6 K/UL (ref 0.05–1.2)
MONOCYTES NFR BLD AUTO: 8 % (ref 3–10)
NEUTS SEG # BLD: 3.8 K/UL (ref 1.8–8)
NEUTS SEG NFR BLD AUTO: 58 % (ref 40–73)
NITRITE UR QL STRIP.AUTO: NEGATIVE
PH UR STRIP: 5.5 [PH] (ref 5–8)
PLATELET # BLD AUTO: 376 K/UL (ref 135–420)
PMV BLD AUTO: 8 FL (ref 9.2–11.8)
POTASSIUM SERPL-SCNC: 4.2 MMOL/L (ref 3.5–5.5)
PROT SERPL-MCNC: 7.7 G/DL (ref 6.4–8.2)
PROT UR STRIP-MCNC: 100 MG/DL
PROTHROMBIN TIME: 11.6 SEC (ref 11.5–15.2)
RBC # BLD AUTO: 4.73 M/UL (ref 4.2–5.3)
RBC #/AREA URNS HPF: ABNORMAL /HPF (ref 0–5)
SODIUM SERPL-SCNC: 127 MMOL/L (ref 136–145)
SP GR UR REFRACTOMETRY: 0 (ref 1–1.03)
SP GR UR REFRACTOMETRY: 1.02 (ref 1–1.03)
TROPONIN I SERPL-MCNC: <0.02 NG/ML (ref 0–0.06)
UROBILINOGEN UR QL STRIP.AUTO: 0.2 EU/DL (ref 0.2–1)
WBC # BLD AUTO: 6.6 K/UL (ref 4.6–13.2)
WBC URNS QL MICRO: ABNORMAL /HPF (ref 0–5)

## 2017-08-10 PROCEDURE — 71010 XR CHEST SNGL V: CPT

## 2017-08-10 PROCEDURE — 77030034850

## 2017-08-10 PROCEDURE — 77030011256 HC DRSG MEPILEX <16IN NO BORD MOLN -A

## 2017-08-10 PROCEDURE — 94762 N-INVAS EAR/PLS OXIMTRY CONT: CPT

## 2017-08-10 PROCEDURE — 51702 INSERT TEMP BLADDER CATH: CPT

## 2017-08-10 PROCEDURE — 74011250637 HC RX REV CODE- 250/637: Performed by: EMERGENCY MEDICINE

## 2017-08-10 PROCEDURE — 87077 CULTURE AEROBIC IDENTIFY: CPT | Performed by: EMERGENCY MEDICINE

## 2017-08-10 PROCEDURE — 80053 COMPREHEN METABOLIC PANEL: CPT | Performed by: EMERGENCY MEDICINE

## 2017-08-10 PROCEDURE — 99285 EMERGENCY DEPT VISIT HI MDM: CPT

## 2017-08-10 PROCEDURE — 81001 URINALYSIS AUTO W/SCOPE: CPT | Performed by: EMERGENCY MEDICINE

## 2017-08-10 PROCEDURE — 85610 PROTHROMBIN TIME: CPT | Performed by: EMERGENCY MEDICINE

## 2017-08-10 PROCEDURE — 82550 ASSAY OF CK (CPK): CPT | Performed by: EMERGENCY MEDICINE

## 2017-08-10 PROCEDURE — 70450 CT HEAD/BRAIN W/O DYE: CPT

## 2017-08-10 PROCEDURE — 74011250636 HC RX REV CODE- 250/636: Performed by: EMERGENCY MEDICINE

## 2017-08-10 PROCEDURE — 96365 THER/PROPH/DIAG IV INF INIT: CPT

## 2017-08-10 PROCEDURE — 74011636637 HC RX REV CODE- 636/637: Performed by: EMERGENCY MEDICINE

## 2017-08-10 PROCEDURE — A9270 NON-COVERED ITEM OR SERVICE: HCPCS | Performed by: EMERGENCY MEDICINE

## 2017-08-10 PROCEDURE — 87186 SC STD MICRODIL/AGAR DIL: CPT | Performed by: EMERGENCY MEDICINE

## 2017-08-10 PROCEDURE — 85025 COMPLETE CBC W/AUTO DIFF WBC: CPT | Performed by: EMERGENCY MEDICINE

## 2017-08-10 PROCEDURE — 87086 URINE CULTURE/COLONY COUNT: CPT | Performed by: EMERGENCY MEDICINE

## 2017-08-10 PROCEDURE — 93005 ELECTROCARDIOGRAM TRACING: CPT

## 2017-08-10 RX ORDER — LEVOTHYROXINE SODIUM 100 UG/1
100 TABLET ORAL ONCE
Status: COMPLETED | OUTPATIENT
Start: 2017-08-10 | End: 2017-08-10

## 2017-08-10 RX ORDER — CYCLOBENZAPRINE HCL 10 MG
10 TABLET ORAL
Status: COMPLETED | OUTPATIENT
Start: 2017-08-10 | End: 2017-08-10

## 2017-08-10 RX ORDER — LEVETIRACETAM 500 MG/1
500 TABLET ORAL
Status: COMPLETED | OUTPATIENT
Start: 2017-08-10 | End: 2017-08-10

## 2017-08-10 RX ORDER — PREDNISONE 10 MG/1
5 TABLET ORAL ONCE
Status: COMPLETED | OUTPATIENT
Start: 2017-08-10 | End: 2017-08-10

## 2017-08-10 RX ORDER — LACOSAMIDE 100 MG/1
200 TABLET ORAL ONCE
Status: COMPLETED | OUTPATIENT
Start: 2017-08-10 | End: 2017-08-10

## 2017-08-10 RX ORDER — LEVOFLOXACIN 5 MG/ML
750 INJECTION, SOLUTION INTRAVENOUS
Status: COMPLETED | OUTPATIENT
Start: 2017-08-10 | End: 2017-08-10

## 2017-08-10 RX ORDER — OLANZAPINE 2.5 MG/1
7.5 TABLET ORAL ONCE
Status: COMPLETED | OUTPATIENT
Start: 2017-08-10 | End: 2017-08-10

## 2017-08-10 RX ORDER — MORPHINE SULFATE 15 MG/1
15 TABLET ORAL
Status: COMPLETED | OUTPATIENT
Start: 2017-08-10 | End: 2017-08-10

## 2017-08-10 RX ORDER — LORAZEPAM 1 MG/1
1 TABLET ORAL SEE ADMIN INSTRUCTIONS
Status: DISCONTINUED | OUTPATIENT
Start: 2017-08-10 | End: 2017-08-10 | Stop reason: HOSPADM

## 2017-08-10 RX ORDER — GABAPENTIN 300 MG/1
300 CAPSULE ORAL
Status: COMPLETED | OUTPATIENT
Start: 2017-08-10 | End: 2017-08-10

## 2017-08-10 RX ADMIN — GABAPENTIN 300 MG: 300 CAPSULE ORAL at 11:04

## 2017-08-10 RX ADMIN — SODIUM CHLORIDE 1000 ML: 900 INJECTION, SOLUTION INTRAVENOUS at 11:13

## 2017-08-10 RX ADMIN — LORAZEPAM 1 MG: 1 TABLET ORAL at 12:42

## 2017-08-10 RX ADMIN — OLANZAPINE 7.5 MG: 2.5 TABLET, FILM COATED ORAL at 12:42

## 2017-08-10 RX ADMIN — LEVOFLOXACIN 750 MG: 5 INJECTION, SOLUTION INTRAVENOUS at 11:02

## 2017-08-10 RX ADMIN — LACOSAMIDE 200 MG: 100 TABLET, FILM COATED ORAL at 12:42

## 2017-08-10 RX ADMIN — LEVETIRACETAM 500 MG: 500 TABLET ORAL at 12:42

## 2017-08-10 RX ADMIN — MORPHINE SULFATE 15 MG: 15 TABLET ORAL at 11:04

## 2017-08-10 RX ADMIN — LEVOTHYROXINE SODIUM 100 MCG: 100 TABLET ORAL at 12:42

## 2017-08-10 RX ADMIN — CYCLOBENZAPRINE HYDROCHLORIDE 10 MG: 10 TABLET, FILM COATED ORAL at 11:04

## 2017-08-10 RX ADMIN — PREDNISONE 5 MG: 10 TABLET ORAL at 12:42

## 2017-08-10 NOTE — ED NOTES
MD to bedside to discuss admission with pt. Pt requesting to be transferred to [facility]. ED manager to bedside to speak with pt. Pt appears diaphoretic at this time and states that she feels hot. Pt's temperature checked with result of 98.7 F.  Pt uncovered per request.

## 2017-08-10 NOTE — ED NOTES
Pt report given to Medardo Anne RN at [facility] for pt transfer. SBAR, ED summary, MAR and recent results reviewed.

## 2017-08-10 NOTE — ED NOTES
New IV placed and pt medicated per MAR. Pt continues to talk rapidly but is cooperative with ED staff.  at bedside. Pt given water and apple sauce per MD consent.

## 2017-08-10 NOTE — ED NOTES
Pt turned and placed on green chux and draw sheet. Pt w/ ulcer to sacral area. Small [place] of 1st degree breakdown noted with some redness. Old scar tissue present. Pt's back and buttocks cleaned with warm wipes and mepilex applied to sacral area. No incontinence at this time. Pt repositioned in stretcher for comfort. Warm blankets and pillows provided. Pt on CCM and continuous pulse ox. Side rails raised x 2 and call light within reach.  to bedside.

## 2017-08-10 NOTE — ED TRIAGE NOTES
Pt brought by EMS for chief complaint of AMS. Pt w/ hx of TIA's and seizures. Pt's  called EMS this morning stating that he believed his wife had an unwitnessed seizure and was not acting per her normal baseline. Last known normal time was 1100 last night. Pt states that around 1100 last night she started to feel weak and dizzy. FSBS was 107 mg/dL en route. Pt arrives A&O x 3, pt oriented to self, location, situation, and year but states that she believes it is September/ October. Pt very talkative, continually pointing out details of the room/ staff and her family, \"to show you all that I am lucid. \"  Pt w/ león in place x 4 yrs for \"urinary frequency. \"     Sepsis Screening completed    (  )Patient meets SIRS criteria. ( X )Patient does not meet SIRS criteria.       SIRS Criteria is achieved when two or more of the following are present   Temperature < 96.8°F (36°C) or > 100.9°F (38.3°C)   Heart Rate > 90 beats per minute   Respiratory Rate > 20 breaths per minute   WBC count > 12,000 or <4,000 or > 10% bands

## 2017-08-10 NOTE — ED NOTES
Urine specimen obtained from león catheter present on arrival.     Pt remains talkative. Pt states, \"I need ativan for my back and something else for my feet. \" Pt also requesting something to drink. Pt educated that she must remain NPO at this time until cleared by provider.  remains at bedside. Lights dimmed for pt comfort. Call light within reach.

## 2017-08-10 NOTE — ED PROVIDER NOTES
[address]  EMERGENCY DEPARTMENT HISTORY AND PHYSICAL EXAM       Date: 8/10/2017   Patient Name: uJsto Arzola   YOB: 1950  Medical Record Number: [id number]    History of Presenting Illness     Chief Complaint   Patient presents with    Altered mental status        History Provided By:  patient and EMS    Additional History: 8:34 AM  Justo Arzola is a 78 y.o. female with PMHx of seizure disorder, bipolar disorder, chronic pain, and TIA who presents to the emergency department via EMS C/O confusion onset 10 hours ago. Associated sxs include generalized body weakness and dizziness (room spinning). Pt's  called EMS this morning stating that he believed his wife had an unwitnessed seizure last night and was not acting per her normal baseline. Last known normal time was 11:00 PM last night. Reports her neurologist is Dr. Martinez Esparza; she has an appointment on the 30th. Pt is in a hospital bed at home and ambulates with a walker. Last known food and drink was last night. Pt reports previous use of Coumadin but stopped due to hematemesis. Pt also reports abdominal and back pain that is consistent with her baseline. Pt denies coughing, vomiting, known fall/injury and any other symptoms or complaints at this time.     Primary Care Provider: Kang Klein MD   Specialist:    Past History     Past Medical History:   Past Medical History:   Diagnosis Date    Anemia NEC     history of blood transfusion    Anxiety     panic attacks    Arthritis     Bipolar 1 disorder ([address]     Chronic back pain     Depression     Elevated diaphragm 5/4/2012    Fatigue     along with weakness    GERD (gastroesophageal reflux disease)     Headache     Hypertension     pt denies    Other ill-defined conditions swallowing issues, decubitus    Psychiatric disorder     Seizures ([address]     TIA (transient ischemic attack)         Past Surgical History:   Past Surgical History:   Procedure Laterality Date    COLONOSCOPY N/A 2/21/2017    COLONOSCOPY [facility] ANESTHESIA, PATIENT IS IN ROOM 358 performed by Enid Conrad MD at [address] COLONOSCOPY N/A 2/22/2017    COLONOSCOPY performed by Enid Conrad MD at [facility], COLON, DIAGNOSTIC      over 20 years ago    ENDOSCOPY, COLON, DIAGNOSTIC  9/24/09    HX APPENDECTOMY  11/6/84    HX BACK SURGERY  11/24/98    severe back problems    HX CHOLECYSTECTOMY  11/6/84    HX CORONARY STENT PLACEMENT      ivc filter    HX GASTRIC BYPASS  1995    Patricio    HX TUBAL LIGATION  1991        Family History:   Family History   Problem Relation Age of Onset    Heart Disease Mother    [facility] [address] Arthritis-rheumatoid Mother     Stroke Father     Hypertension Father     Arthritis-rheumatoid Father         Social History:   Social History   Substance Use Topics    Smoking status: Never Smoker    Smokeless tobacco: Never Used    Alcohol use No        Allergies: Allergies   Allergen Reactions    Ceftriaxone Seizures    Demerol [Meperidine] Unknown (comments)    Seafood [Shellfish Containing Products] Unknown (comments)        Review of Systems   Review of Systems   Constitutional: Negative for chills and fever. Respiratory: Negative for cough. Gastrointestinal: Negative for vomiting. Neurological: Positive for dizziness (room spinning) and weakness (generalized). Psychiatric/Behavioral: Positive for confusion. All other systems reviewed and are negative.       Physical Exam  Vitals:    08/10/17 0915 08/10/17 0930 08/10/17 0945 08/10/17 1000   BP: 107/53 117/87 108/85 117/64   Pulse: 95 92 98 94   Resp: 14 15 15 14   Temp:       SpO2: 97% 98% 97% 95%   Weight:       Height:           Physical Exam  Constitutional: Elderly, Chronic ill appearing, no acute distress  Head: Normocephalic, Atraumatic  Eyes: Pupils are equal, round, and reactive to light, EOMI  Neck: Supple, non-tender  Cardiovascular: Regular rate and rhythm, no murmurs, rubs, or gallops  Chest: Normal work of breathing and chest excursion bilaterally  Lungs: Diminished bilaterally  Abdomen: Soft, non tender, non distended, normoactive bowel sounds, Well healed surgical scar  Back: No evidence of trauma or deformity  Extremities: No evidence of trauma or deformity, no LE edema, Contractures of her ankles  Skin: Warm and dry  Neuro: Alert and appropriate, face is symmetric, weakness in BLE that is chronic  Psychiatric: Normal mood and affect, but speech is pressured and tangential     Diagnostic Study Results     Labs -      Recent Results (from the past 12 hour(s))   EKG, 12 LEAD, INITIAL    Collection Time: 08/10/17  8:42 AM   Result Value Ref Range    Ventricular Rate 91 BPM    Atrial Rate 91 BPM    P-R Interval 162 ms    QRS Duration 116 ms    Q-T Interval 380 ms    QTC Calculation (Bezet) 467 ms    Calculated P Axis 56 degrees    Calculated R Axis -51 degrees    Calculated T Axis 68 degrees    Diagnosis       Sinus rhythm with premature atrial complexes  Possible Left atrial enlargement  Incomplete right bundle branch block  Left anterior fascicular block  Possible Lateral infarct , age undetermined  ST & T wave abnormality, consider anterior ischemia  Abnormal ECG  When compared with ECG of 01-MAR-2017 13:40,  No significant change was found     URINALYSIS W/ RFLX MICROSCOPIC    Collection Time: 08/10/17  8:45 AM   Result Value Ref Range    Color YELLOW      Appearance CLOUDY      Specific gravity 1.025 1.005 - 1.030      Specific gravity 0.000 (L) 1.003 - 1.030      pH (UA) 5.5 5.0 - 8.0      Protein 100 (A) NEG mg/dL    Glucose NEGATIVE  NEG mg/dL    Ketone TRACE (A) NEG mg/dL    Bilirubin SMALL AMOUNT (A) NEG      Blood MODERATE (A) NEG      Urobilinogen 0.2 0.2 - 1.0 EU/dL    Nitrites NEGATIVE  NEG      Leukocyte Esterase LARGE (A) NEG     URINE MICROSCOPIC ONLY    Collection Time: 08/10/17  8:45 AM   Result Value Ref Range    WBC TOO NUMEROUS TO COUNT 0 - 5 /hpf    RBC 0 to 3 0 - 5 /hpf    Epithelial cells NEGATIVE  0 - 5 /lpf    Bacteria 2+ (A) NEG /hpf   CBC WITH AUTOMATED DIFF    Collection Time: 08/10/17  9:05 AM   Result Value Ref Range    WBC 6.6 4.6 - 13.2 K/uL    RBC 4.73 4.20 - 5.30 M/uL    HGB 14.1 12.0 - 16.0 g/dL    HCT 41.3 35.0 - 45.0 %    MCV 87.3 74.0 - 97.0 FL    MCH 29.8 24.0 - 34.0 PG    MCHC 34.1 31.0 - 37.0 g/dL    RDW 16.5 (H) 11.6 - 14.5 %    PLATELET 519 483 - 687 K/uL    MPV 8.0 (L) 9.2 - 11.8 FL    NEUTROPHILS 58 40 - 73 %    LYMPHOCYTES 31 21 - 52 %    MONOCYTES 8 3 - 10 %    EOSINOPHILS 3 0 - 5 %    BASOPHILS 0 0 - 2 %    ABS. NEUTROPHILS 3.8 1.8 - 8.0 K/UL    ABS. LYMPHOCYTES 2.0 0.9 - 3.6 K/UL    ABS. MONOCYTES 0.6 0.05 - 1.2 K/UL    ABS. EOSINOPHILS 0.2 0.0 - 0.4 K/UL    ABS. BASOPHILS 0.0 0.0 - 0.06 K/UL    DF AUTOMATED     PROTHROMBIN TIME + INR    Collection Time: 08/10/17  9:05 AM   Result Value Ref Range    Prothrombin time 11.6 11.5 - 15.2 sec    INR 0.9 0.8 - 1.2     METABOLIC PANEL, COMPREHENSIVE    Collection Time: 08/10/17  9:05 AM   Result Value Ref Range    Sodium 127 (L) 136 - 145 mmol/L    Potassium 4.2 3.5 - 5.5 mmol/L    Chloride 90 (L) 100 - 108 mmol/L    CO2 31 21 - 32 mmol/L    Anion gap 6 3.0 - 18 mmol/L    Glucose 117 (H) 74 - 99 mg/dL    BUN 17 7.0 - 18 MG/DL    Creatinine 0.67 0.6 - 1.3 MG/DL    BUN/Creatinine ratio 25 (H) 12 - 20      GFR est AA >60 >60 ml/min/1.73m2    GFR est non-AA >60 >60 ml/min/1.73m2    Calcium 8.7 8.5 - 10.1 MG/DL    Bilirubin, total 0.4 0.2 - 1.0 MG/DL    ALT (SGPT) 34 13 - 56 U/L    AST (SGOT) 28 15 - 37 U/L    Alk.  phosphatase 214 (H) 45 - 117 U/L    Protein, total 7.7 6.4 - 8.2 g/dL    Albumin 3.2 (L) 3.4 - 5.0 g/dL    Globulin 4.5 (H) 2.0 - 4.0 g/dL    A-G Ratio 0.7 (L) 0.8 - 1.7     CARDIAC PANEL,(CK, CKMB & TROPONIN)    Collection Time: 08/10/17  9:05 AM   Result Value Ref Range    CK 56 26 - 192 U/L    CK - MB 1.3 <3.6 ng/ml    CK-MB Index 2.3 0.0 - 4.0 %    Troponin-I, Qt. <0.02 0.00 - 0.06 NG/ML       Radiologic Studies -  The following have been ordered and reviewed:   CT HEAD WO CONT   Final Result   IMPRESSION:     No acute intracranial abnormalities, stable examination.     Subcortical and periventricular white matter hypoattenuation; a nonspecific  finding likely reflective of chronic ischemic microvascular change. As read by the radiologist.     XR CHEST SNGL V   Final Result   IMPRESSION:     No significant interval change or acute pulmonary process identified. As read by the radiologist.       Medical Decision Making   I am the first provider for this patient. I reviewed the vital signs, available nursing notes, past medical history, past surgical history, family history and social history. Vital Signs-Reviewed the patient's vital signs. Patient Vitals for the past 12 hrs:   Temp Pulse Resp BP SpO2   08/10/17 1000 - 94 14 117/64 95 %   08/10/17 0945 - 98 15 108/85 97 %   08/10/17 0930 - 92 15 117/87 98 %   08/10/17 0915 - 95 14 107/53 97 %   08/10/17 0836 98.5 °F (36.9 °C) 96 16 (!) 126/98 97 %       Pulse Oximetry Analysis - Normal 97% on RA     Cardiac Monitor:   Rate: 98 bpm  Rhythm: Normal Sinus Rhythm      EKG interpretation: (Preliminary)  Rhythm: Sinus rhythm with premature atrial complexes. Rate (approx.): 91 bpm; QRS duration: 116 ms   EKG read by Casandra Burns MD at 8:42 AM    Old Medical Records: Old medical records. Nursing notes. Procedures:   Procedures    ED Course:  8:34 AM  Initial assessment performed. The patients presenting problems have been discussed, and they are in agreement with the care plan formulated and outlined with them. I have encouraged them to ask questions as they arise throughout their visit. 11:07 AM Pt is repeatedly demanind Ativan and pain medication. Pt is agreessive towards staff and demanding transfer while yelling at ED physician.     Medications Given in the ED:  Medications   levoFLOXacin (LEVAQUIN) 750 mg in D5W IVPB (not administered)     CONSULT NOTE:   11:21 AM  Ruchi Pike MD spoke with Dr. Sergio Miller   Discussed pt's hx, disposition, and available diagnostic and imaging results. Reviewed care plans. Consulting physician agrees to accept transfer. Written by SARITA Brink, as dictated by Ruchi Pike MD    TRANSFER PROGRESS NOTE:    11:21 AM  Discussed impending transfer with Patient and/or family. Pt and/or family instructed that Pt would be transferred to [facility]. Discussed reasoning for transfer and future treatment plan. Family and Pt understands and agrees with care plan. Written by SARITA Brink, as dictated by Ruchi Pike MD.    Labs Reviewed   CBC WITH AUTOMATED DIFF - Abnormal; Notable for the following:        Result Value    RDW 16.5 (*)     MPV 8.0 (*)     All other components within normal limits   METABOLIC PANEL, COMPREHENSIVE - Abnormal; Notable for the following:     Sodium 127 (*)     Chloride 90 (*)     Glucose 117 (*)     BUN/Creatinine ratio 25 (*)     Alk.  phosphatase 214 (*)     Albumin 3.2 (*)     Globulin 4.5 (*)     A-G Ratio 0.7 (*)     All other components within normal limits   URINALYSIS W/ RFLX MICROSCOPIC - Abnormal; Notable for the following:     Specific gravity 0.000 (*)     Protein 100 (*)     Ketone TRACE (*)     Bilirubin SMALL AMOUNT (*)     Blood MODERATE (*)     Leukocyte Esterase LARGE (*)     All other components within normal limits   URINE MICROSCOPIC ONLY - Abnormal; Notable for the following:     Bacteria 2+ (*)     All other components within normal limits   CULTURE, URINE   PROTHROMBIN TIME + INR   CARDIAC PANEL,(CK, CKMB & TROPONIN)       Recent Results (from the past 12 hour(s))   EKG, 12 LEAD, INITIAL    Collection Time: 08/10/17  8:42 AM   Result Value Ref Range    Ventricular Rate 91 BPM    Atrial Rate 91 BPM    P-R Interval 162 ms    QRS Duration 116 ms    Q-T Interval 380 ms    QTC Calculation (Bezet) 467 ms Calculated P Axis 56 degrees    Calculated R Axis -51 degrees    Calculated T Axis 68 degrees    Diagnosis       Sinus rhythm with premature atrial complexes  Possible Left atrial enlargement  Incomplete right bundle branch block  Left anterior fascicular block  Possible Lateral infarct , age undetermined  ST & T wave abnormality, consider anterior ischemia  Abnormal ECG  When compared with ECG of 01-MAR-2017 13:40,  No significant change was found     URINALYSIS W/ RFLX MICROSCOPIC    Collection Time: 08/10/17  8:45 AM   Result Value Ref Range    Color YELLOW      Appearance CLOUDY      Specific gravity 1.025 1.005 - 1.030      Specific gravity 0.000 (L) 1.003 - 1.030      pH (UA) 5.5 5.0 - 8.0      Protein 100 (A) NEG mg/dL    Glucose NEGATIVE  NEG mg/dL    Ketone TRACE (A) NEG mg/dL    Bilirubin SMALL AMOUNT (A) NEG      Blood MODERATE (A) NEG      Urobilinogen 0.2 0.2 - 1.0 EU/dL    Nitrites NEGATIVE  NEG      Leukocyte Esterase LARGE (A) NEG     URINE MICROSCOPIC ONLY    Collection Time: 08/10/17  8:45 AM   Result Value Ref Range    WBC TOO NUMEROUS TO COUNT 0 - 5 /hpf    RBC 0 to 3 0 - 5 /hpf    Epithelial cells NEGATIVE  0 - 5 /lpf    Bacteria 2+ (A) NEG /hpf   CBC WITH AUTOMATED DIFF    Collection Time: 08/10/17  9:05 AM   Result Value Ref Range    WBC 6.6 4.6 - 13.2 K/uL    RBC 4.73 4.20 - 5.30 M/uL    HGB 14.1 12.0 - 16.0 g/dL    HCT 41.3 35.0 - 45.0 %    MCV 87.3 74.0 - 97.0 FL    MCH 29.8 24.0 - 34.0 PG    MCHC 34.1 31.0 - 37.0 g/dL    RDW 16.5 (H) 11.6 - 14.5 %    PLATELET 459 077 - 028 K/uL    MPV 8.0 (L) 9.2 - 11.8 FL    NEUTROPHILS 58 40 - 73 %    LYMPHOCYTES 31 21 - 52 %    MONOCYTES 8 3 - 10 %    EOSINOPHILS 3 0 - 5 %    BASOPHILS 0 0 - 2 %    ABS. NEUTROPHILS 3.8 1.8 - 8.0 K/UL    ABS. LYMPHOCYTES 2.0 0.9 - 3.6 K/UL    ABS. MONOCYTES 0.6 0.05 - 1.2 K/UL    ABS. EOSINOPHILS 0.2 0.0 - 0.4 K/UL    ABS.  BASOPHILS 0.0 0.0 - 0.06 K/UL    DF AUTOMATED     PROTHROMBIN TIME + INR    Collection Time: 08/10/17  9:05 AM   Result Value Ref Range    Prothrombin time 11.6 11.5 - 15.2 sec    INR 0.9 0.8 - 1.2     METABOLIC PANEL, COMPREHENSIVE    Collection Time: 08/10/17  9:05 AM   Result Value Ref Range    Sodium 127 (L) 136 - 145 mmol/L    Potassium 4.2 3.5 - 5.5 mmol/L    Chloride 90 (L) 100 - 108 mmol/L    CO2 31 21 - 32 mmol/L    Anion gap 6 3.0 - 18 mmol/L    Glucose 117 (H) 74 - 99 mg/dL    BUN 17 7.0 - 18 MG/DL    Creatinine 0.67 0.6 - 1.3 MG/DL    BUN/Creatinine ratio 25 (H) 12 - 20      GFR est AA >60 >60 ml/min/1.73m2    GFR est non-AA >60 >60 ml/min/1.73m2    Calcium 8.7 8.5 - 10.1 MG/DL    Bilirubin, total 0.4 0.2 - 1.0 MG/DL    ALT (SGPT) 34 13 - 56 U/L    AST (SGOT) 28 15 - 37 U/L    Alk. phosphatase 214 (H) 45 - 117 U/L    Protein, total 7.7 6.4 - 8.2 g/dL    Albumin 3.2 (L) 3.4 - 5.0 g/dL    Globulin 4.5 (H) 2.0 - 4.0 g/dL    A-G Ratio 0.7 (L) 0.8 - 1.7     CARDIAC PANEL,(CK, CKMB & TROPONIN)    Collection Time: 08/10/17  9:05 AM   Result Value Ref Range    CK 56 26 - 192 U/L    CK - MB 1.3 <3.6 ng/ml    CK-MB Index 2.3 0.0 - 4.0 %    Troponin-I, Qt. <0.02 0.00 - 0.06 NG/ML       Diagnosis   Clinical Impression:   1. Urinary tract infection associated with indwelling urethral catheter, initial encounter ([facility] [address]    2. Hyponatremia    3. Altered mental status, unspecified         Discussion:  78 y.o. female with UTI. Recommended admission here; pt declined. She is requesting transfer. Appropriate transfer arranged. Antibiotics initiated.    _______________________________   Attestations: This note is prepared by Sandhya Cole, acting as a Scribe for Viridiana King MD on 8:38 AM on 8/10/2017 . Viridiana King MD: The scribe's documentation has been prepared under my direction and personally reviewed by me in its entirety.   _______________________________

## 2017-08-10 NOTE — ED NOTES
Gotti draining cloudy, yellow urine. Pt updated on plan of care regarding pending transfer, verbalized understanding. Pt in NAD at this time. Pt repositioned in stretcher for comfort. IV fluids and antibiotics infusing. Call light within reach.

## 2017-08-12 LAB
BACTERIA SPEC CULT: ABNORMAL
SERVICE CMNT-IMP: ABNORMAL

## 2017-08-13 LAB
ATRIAL RATE: 91 BPM
CALCULATED P AXIS, ECG09: 56 DEGREES
CALCULATED R AXIS, ECG10: -51 DEGREES
CALCULATED T AXIS, ECG11: 68 DEGREES
DIAGNOSIS, 93000: NORMAL
P-R INTERVAL, ECG05: 162 MS
Q-T INTERVAL, ECG07: 380 MS
QRS DURATION, ECG06: 116 MS
QTC CALCULATION (BEZET), ECG08: 467 MS
VENTRICULAR RATE, ECG03: 91 BPM

## 2017-08-14 NOTE — PROGRESS NOTES
Occupational Therapy Screening:  Services are not indicated at this time. [facility] therapy based on results obtained during the nursing admission assessment. The patients chart was reviewed and patient has been transferred to [facility]. Thank you.   Madiha Chavez, OTR/L

## 2017-08-27 ENCOUNTER — APPOINTMENT (OUTPATIENT)
Dept: GENERAL RADIOLOGY | Age: 67
DRG: 917 | End: 2017-08-27
Attending: EMERGENCY MEDICINE
Payer: MEDICARE

## 2017-08-27 ENCOUNTER — APPOINTMENT (OUTPATIENT)
Dept: CT IMAGING | Age: 67
DRG: 917 | End: 2017-08-27
Attending: EMERGENCY MEDICINE
Payer: MEDICARE

## 2017-08-27 ENCOUNTER — HOSPITAL ENCOUNTER (INPATIENT)
Age: 67
LOS: 3 days | Discharge: HOME HEALTH | DRG: 917 | End: 2017-08-30
Attending: EMERGENCY MEDICINE | Admitting: HOSPITALIST
Payer: MEDICARE

## 2017-08-27 DIAGNOSIS — T50.901A DRUG OVERDOSE, ACCIDENTAL OR UNINTENTIONAL, INITIAL ENCOUNTER: ICD-10-CM

## 2017-08-27 DIAGNOSIS — N30.00 ACUTE CYSTITIS WITHOUT HEMATURIA: ICD-10-CM

## 2017-08-27 DIAGNOSIS — R41.82 ALTERED MENTAL STATUS, UNSPECIFIED: Primary | ICD-10-CM

## 2017-08-27 PROBLEM — K92.2 GI (GASTROINTESTINAL BLEED): Status: RESOLVED | Noted: 2017-02-18 | Resolved: 2017-08-27

## 2017-08-27 PROBLEM — Z79.01 CHRONIC ANTICOAGULATION: Status: RESOLVED | Noted: 2017-02-18 | Resolved: 2017-08-27

## 2017-08-27 PROBLEM — R04.0 EPISTAXIS: Status: RESOLVED | Noted: 2017-02-19 | Resolved: 2017-08-27

## 2017-08-27 PROBLEM — K06.8 GINGIVAL BLEEDING: Status: RESOLVED | Noted: 2017-02-18 | Resolved: 2017-08-27

## 2017-08-27 PROBLEM — R06.89 NARCOSIS: Status: ACTIVE | Noted: 2017-08-27

## 2017-08-27 PROBLEM — E87.1 HYPONATREMIA: Status: RESOLVED | Noted: 2017-07-21 | Resolved: 2017-08-27

## 2017-08-27 PROBLEM — E86.0 MILD DEHYDRATION: Status: RESOLVED | Noted: 2017-07-21 | Resolved: 2017-08-27

## 2017-08-27 LAB
ALBUMIN SERPL-MCNC: 3.5 G/DL (ref 3.4–5)
ALBUMIN/GLOB SERPL: 0.8 {RATIO} (ref 0.8–1.7)
ALP SERPL-CCNC: 249 U/L (ref 45–117)
ALT SERPL-CCNC: 25 U/L (ref 13–56)
AMMONIA PLAS-SCNC: 32 UMOL/L (ref 11–32)
AMPHET UR QL SCN: NEGATIVE
ANION GAP SERPL CALC-SCNC: 11 MMOL/L (ref 3–18)
APPEARANCE UR: ABNORMAL
ARTERIAL PATENCY WRIST A: YES
ARTERIAL PATENCY WRIST A: YES
AST SERPL-CCNC: 23 U/L (ref 15–37)
BACTERIA URNS QL MICRO: ABNORMAL /HPF
BARBITURATES UR QL SCN: POSITIVE
BASE DEFICIT BLD-SCNC: 1 MMOL/L
BASE EXCESS BLD CALC-SCNC: 0 MMOL/L
BASOPHILS # BLD: 0 K/UL (ref 0–0.06)
BASOPHILS NFR BLD: 0 % (ref 0–2)
BDY SITE: ABNORMAL
BDY SITE: ABNORMAL
BENZODIAZ UR QL: NEGATIVE
BILIRUB SERPL-MCNC: 0.4 MG/DL (ref 0.2–1)
BILIRUB UR QL: NEGATIVE
BUN SERPL-MCNC: 25 MG/DL (ref 7–18)
BUN/CREAT SERPL: 32 (ref 12–20)
CALCIUM SERPL-MCNC: 8.1 MG/DL (ref 8.5–10.1)
CANNABINOIDS UR QL SCN: NEGATIVE
CHLORIDE SERPL-SCNC: 97 MMOL/L (ref 100–108)
CK MB CFR SERPL CALC: 3.2 % (ref 0–4)
CK MB SERPL-MCNC: 2.7 NG/ML (ref 5–25)
CK SERPL-CCNC: 85 U/L (ref 26–192)
CO2 SERPL-SCNC: 28 MMOL/L (ref 21–32)
COCAINE UR QL SCN: NEGATIVE
COLOR UR: YELLOW
CREAT SERPL-MCNC: 0.79 MG/DL (ref 0.6–1.3)
DIFFERENTIAL METHOD BLD: ABNORMAL
EOSINOPHIL # BLD: 0.2 K/UL (ref 0–0.4)
EOSINOPHIL NFR BLD: 2 % (ref 0–5)
EPITH CASTS URNS QL MICRO: ABNORMAL /LPF (ref 0–5)
ERYTHROCYTE [DISTWIDTH] IN BLOOD BY AUTOMATED COUNT: 16.1 % (ref 11.6–14.5)
ETHANOL SERPL-MCNC: <3 MG/DL (ref 0–3)
GAS FLOW.O2 O2 DELIVERY SYS: ABNORMAL L/MIN
GAS FLOW.O2 O2 DELIVERY SYS: ABNORMAL L/MIN
GAS FLOW.O2 SETTING OXYMISER: 2 L/M
GAS FLOW.O2 SETTING OXYMISER: 2 L/M
GLOBULIN SER CALC-MCNC: 4.3 G/DL (ref 2–4)
GLUCOSE BLD STRIP.AUTO-MCNC: 120 MG/DL (ref 70–110)
GLUCOSE SERPL-MCNC: 116 MG/DL (ref 74–99)
GLUCOSE UR STRIP.AUTO-MCNC: NEGATIVE MG/DL
HCO3 BLD-SCNC: 25.7 MMOL/L (ref 22–26)
HCO3 BLD-SCNC: 26.2 MMOL/L (ref 22–26)
HCT VFR BLD AUTO: 42.4 % (ref 35–45)
HDSCOM,HDSCOM: ABNORMAL
HGB BLD-MCNC: 14.1 G/DL (ref 12–16)
HGB UR QL STRIP: NEGATIVE
INR PPP: 0.9 (ref 0.8–1.2)
KETONES UR QL STRIP.AUTO: NEGATIVE MG/DL
LACTATE SERPL-SCNC: 1.9 MMOL/L (ref 0.4–2)
LEUKOCYTE ESTERASE UR QL STRIP.AUTO: ABNORMAL
LYMPHOCYTES # BLD: 1.8 K/UL (ref 0.9–3.6)
LYMPHOCYTES NFR BLD: 24 % (ref 21–52)
MAGNESIUM SERPL-MCNC: 2.1 MG/DL (ref 1.6–2.6)
MCH RBC QN AUTO: 30 PG (ref 24–34)
MCHC RBC AUTO-ENTMCNC: 33.3 G/DL (ref 31–37)
MCV RBC AUTO: 90.2 FL (ref 74–97)
METHADONE UR QL: NEGATIVE
MONOCYTES # BLD: 0.7 K/UL (ref 0.05–1.2)
MONOCYTES NFR BLD: 9 % (ref 3–10)
MUCOUS THREADS URNS QL MICRO: ABNORMAL /LPF
NEUTS SEG # BLD: 4.9 K/UL (ref 1.8–8)
NEUTS SEG NFR BLD: 65 % (ref 40–73)
NITRITE UR QL STRIP.AUTO: NEGATIVE
OPIATES UR QL: POSITIVE
PCO2 BLD: 53.4 MMHG (ref 35–45)
PCO2 BLD: 53.7 MMHG (ref 35–45)
PCP UR QL: NEGATIVE
PH BLD: 7.29 [PH] (ref 7.35–7.45)
PH BLD: 7.3 [PH] (ref 7.35–7.45)
PH UR STRIP: 6.5 [PH] (ref 5–8)
PLATELET # BLD AUTO: 278 K/UL (ref 135–420)
PMV BLD AUTO: 8.9 FL (ref 9.2–11.8)
PO2 BLD: 72 MMHG (ref 80–100)
PO2 BLD: 91 MMHG (ref 80–100)
POTASSIUM SERPL-SCNC: 4.3 MMOL/L (ref 3.5–5.5)
PROT SERPL-MCNC: 7.8 G/DL (ref 6.4–8.2)
PROT UR STRIP-MCNC: NEGATIVE MG/DL
PROTHROMBIN TIME: 11.2 SEC (ref 11.5–15.2)
RBC # BLD AUTO: 4.7 M/UL (ref 4.2–5.3)
RBC #/AREA URNS HPF: ABNORMAL /HPF (ref 0–5)
SAO2 % BLD: 92 % (ref 92–97)
SAO2 % BLD: 96 % (ref 92–97)
SERVICE CMNT-IMP: ABNORMAL
SERVICE CMNT-IMP: ABNORMAL
SODIUM SERPL-SCNC: 136 MMOL/L (ref 136–145)
SP GR UR REFRACTOMETRY: 1.01 (ref 1–1.03)
SPECIMEN TYPE: ABNORMAL
SPECIMEN TYPE: ABNORMAL
TOTAL RESP. RATE, ITRR: 12
TOTAL RESP. RATE, ITRR: 13
TROPONIN I SERPL-MCNC: <0.02 NG/ML (ref 0–0.06)
TSH SERPL DL<=0.05 MIU/L-ACNC: 17.3 UIU/ML (ref 0.36–3.74)
UROBILINOGEN UR QL STRIP.AUTO: 0.2 EU/DL (ref 0.2–1)
WBC # BLD AUTO: 7.5 K/UL (ref 4.6–13.2)
WBC URNS QL MICRO: ABNORMAL /HPF (ref 0–5)

## 2017-08-27 PROCEDURE — 87040 BLOOD CULTURE FOR BACTERIA: CPT | Performed by: EMERGENCY MEDICINE

## 2017-08-27 PROCEDURE — 85025 COMPLETE CBC W/AUTO DIFF WBC: CPT | Performed by: EMERGENCY MEDICINE

## 2017-08-27 PROCEDURE — 74011000258 HC RX REV CODE- 258: Performed by: HOSPITALIST

## 2017-08-27 PROCEDURE — 74011000250 HC RX REV CODE- 250: Performed by: EMERGENCY MEDICINE

## 2017-08-27 PROCEDURE — 94761 N-INVAS EAR/PLS OXIMETRY MLT: CPT

## 2017-08-27 PROCEDURE — 96365 THER/PROPH/DIAG IV INF INIT: CPT

## 2017-08-27 PROCEDURE — 94400 HC END TIDAL CO2 RESPONSE CURVE: CPT

## 2017-08-27 PROCEDURE — 80307 DRUG TEST PRSMV CHEM ANLYZR: CPT | Performed by: EMERGENCY MEDICINE

## 2017-08-27 PROCEDURE — 80053 COMPREHEN METABOLIC PANEL: CPT | Performed by: EMERGENCY MEDICINE

## 2017-08-27 PROCEDURE — 83605 ASSAY OF LACTIC ACID: CPT | Performed by: EMERGENCY MEDICINE

## 2017-08-27 PROCEDURE — 82803 BLOOD GASES ANY COMBINATION: CPT

## 2017-08-27 PROCEDURE — 36600 WITHDRAWAL OF ARTERIAL BLOOD: CPT

## 2017-08-27 PROCEDURE — 82962 GLUCOSE BLOOD TEST: CPT

## 2017-08-27 PROCEDURE — 77030032490 HC SLV COMPR SCD KNE COVD -B

## 2017-08-27 PROCEDURE — 81001 URINALYSIS AUTO W/SCOPE: CPT | Performed by: EMERGENCY MEDICINE

## 2017-08-27 PROCEDURE — 82550 ASSAY OF CK (CPK): CPT | Performed by: EMERGENCY MEDICINE

## 2017-08-27 PROCEDURE — 74011250636 HC RX REV CODE- 250/636: Performed by: EMERGENCY MEDICINE

## 2017-08-27 PROCEDURE — 87077 CULTURE AEROBIC IDENTIFY: CPT | Performed by: HOSPITALIST

## 2017-08-27 PROCEDURE — 74011250636 HC RX REV CODE- 250/636: Performed by: INTERNAL MEDICINE

## 2017-08-27 PROCEDURE — 77030011989 HC AIRWY NP ROBTZ RUSC -A

## 2017-08-27 PROCEDURE — 82140 ASSAY OF AMMONIA: CPT | Performed by: HOSPITALIST

## 2017-08-27 PROCEDURE — 84443 ASSAY THYROID STIM HORMONE: CPT | Performed by: HOSPITALIST

## 2017-08-27 PROCEDURE — 85610 PROTHROMBIN TIME: CPT | Performed by: EMERGENCY MEDICINE

## 2017-08-27 PROCEDURE — 74011250636 HC RX REV CODE- 250/636: Performed by: HOSPITALIST

## 2017-08-27 PROCEDURE — 87186 SC STD MICRODIL/AGAR DIL: CPT | Performed by: HOSPITALIST

## 2017-08-27 PROCEDURE — 93005 ELECTROCARDIOGRAM TRACING: CPT

## 2017-08-27 PROCEDURE — 65610000006 HC RM INTENSIVE CARE

## 2017-08-27 PROCEDURE — 74011250636 HC RX REV CODE- 250/636

## 2017-08-27 PROCEDURE — 87086 URINE CULTURE/COLONY COUNT: CPT | Performed by: HOSPITALIST

## 2017-08-27 PROCEDURE — 71010 XR CHEST SNGL V: CPT

## 2017-08-27 PROCEDURE — 77010033678 HC OXYGEN DAILY

## 2017-08-27 PROCEDURE — 36415 COLL VENOUS BLD VENIPUNCTURE: CPT | Performed by: EMERGENCY MEDICINE

## 2017-08-27 PROCEDURE — 77030028584 HC ELECTRD ECG PHYS -B

## 2017-08-27 PROCEDURE — 99285 EMERGENCY DEPT VISIT HI MDM: CPT

## 2017-08-27 PROCEDURE — 74011000250 HC RX REV CODE- 250: Performed by: HOSPITALIST

## 2017-08-27 PROCEDURE — 74011000258 HC RX REV CODE- 258: Performed by: EMERGENCY MEDICINE

## 2017-08-27 PROCEDURE — 83735 ASSAY OF MAGNESIUM: CPT | Performed by: HOSPITALIST

## 2017-08-27 PROCEDURE — 70450 CT HEAD/BRAIN W/O DYE: CPT

## 2017-08-27 RX ORDER — BENZONATATE 100 MG/1
100 CAPSULE ORAL
COMMUNITY
End: 2017-08-30

## 2017-08-27 RX ORDER — NALOXONE HYDROCHLORIDE 0.4 MG/ML
0.4 INJECTION, SOLUTION INTRAMUSCULAR; INTRAVENOUS; SUBCUTANEOUS ONCE
Status: COMPLETED | OUTPATIENT
Start: 2017-08-27 | End: 2017-08-27

## 2017-08-27 RX ORDER — ENOXAPARIN SODIUM 100 MG/ML
40 INJECTION SUBCUTANEOUS EVERY 24 HOURS
Status: DISCONTINUED | OUTPATIENT
Start: 2017-08-27 | End: 2017-08-30 | Stop reason: HOSPADM

## 2017-08-27 RX ORDER — BACLOFEN 10 MG/1
10 TABLET ORAL 3 TIMES DAILY
COMMUNITY
End: 2018-04-08

## 2017-08-27 RX ORDER — CHLORPHENIRAMINE MALEATE 4 MG
TABLET ORAL 2 TIMES DAILY
COMMUNITY
End: 2017-11-22

## 2017-08-27 RX ORDER — NALOXONE HYDROCHLORIDE 1 MG/ML
INJECTION INTRAMUSCULAR; INTRAVENOUS; SUBCUTANEOUS
Status: DISPENSED
Start: 2017-08-27 | End: 2017-08-27

## 2017-08-27 RX ORDER — SODIUM CHLORIDE 9 MG/ML
125 INJECTION, SOLUTION INTRAVENOUS CONTINUOUS
Status: DISCONTINUED | OUTPATIENT
Start: 2017-08-27 | End: 2017-08-28

## 2017-08-27 RX ORDER — DICLOFENAC SODIUM 10 MG/G
2 GEL TOPICAL 4 TIMES DAILY
COMMUNITY

## 2017-08-27 RX ORDER — FACIAL-BODY WIPES
10 EACH TOPICAL DAILY
COMMUNITY

## 2017-08-27 RX ORDER — NALOXONE HYDROCHLORIDE 0.4 MG/ML
INJECTION, SOLUTION INTRAMUSCULAR; INTRAVENOUS; SUBCUTANEOUS
Status: DISPENSED
Start: 2017-08-27 | End: 2017-08-28

## 2017-08-27 RX ORDER — NALOXONE HYDROCHLORIDE 1 MG/ML
INJECTION INTRAMUSCULAR; INTRAVENOUS; SUBCUTANEOUS
Status: COMPLETED
Start: 2017-08-27 | End: 2017-08-27

## 2017-08-27 RX ORDER — MISOPROSTOL 100 UG/1
TABLET ORAL ONCE
COMMUNITY
End: 2017-08-30

## 2017-08-27 RX ORDER — NALOXONE HYDROCHLORIDE 0.4 MG/ML
2 INJECTION, SOLUTION INTRAMUSCULAR; INTRAVENOUS; SUBCUTANEOUS
Status: COMPLETED | OUTPATIENT
Start: 2017-08-27 | End: 2017-08-27

## 2017-08-27 RX ORDER — LEVETIRACETAM 5 MG/ML
500 INJECTION INTRAVASCULAR EVERY 12 HOURS
Status: DISCONTINUED | OUTPATIENT
Start: 2017-08-27 | End: 2017-08-28 | Stop reason: CLARIF

## 2017-08-27 RX ORDER — ONDANSETRON 2 MG/ML
2 INJECTION INTRAMUSCULAR; INTRAVENOUS
Status: COMPLETED | OUTPATIENT
Start: 2017-08-27 | End: 2017-08-27

## 2017-08-27 RX ORDER — ERGOCALCIFEROL 1.25 MG/1
50000 CAPSULE ORAL
COMMUNITY

## 2017-08-27 RX ORDER — ONDANSETRON 2 MG/ML
4 INJECTION INTRAMUSCULAR; INTRAVENOUS
Status: DISCONTINUED | OUTPATIENT
Start: 2017-08-27 | End: 2017-08-27

## 2017-08-27 RX ORDER — AZTREONAM 1 G/1
1 INJECTION, POWDER, LYOPHILIZED, FOR SOLUTION INTRAMUSCULAR; INTRAVENOUS EVERY 8 HOURS
Status: DISCONTINUED | OUTPATIENT
Start: 2017-08-27 | End: 2017-08-27

## 2017-08-27 RX ORDER — NALOXONE HYDROCHLORIDE 0.4 MG/ML
2 INJECTION, SOLUTION INTRAMUSCULAR; INTRAVENOUS; SUBCUTANEOUS
Status: DISCONTINUED | OUTPATIENT
Start: 2017-08-27 | End: 2017-08-27

## 2017-08-27 RX ADMIN — ENOXAPARIN SODIUM 40 MG: 40 INJECTION SUBCUTANEOUS at 11:12

## 2017-08-27 RX ADMIN — SODIUM CHLORIDE 600 MG: 900 INJECTION, SOLUTION INTRAVENOUS at 06:04

## 2017-08-27 RX ADMIN — SODIUM CHLORIDE 125 ML/HR: 900 INJECTION, SOLUTION INTRAVENOUS at 06:07

## 2017-08-27 RX ADMIN — SODIUM CHLORIDE 600 MG: 900 INJECTION, SOLUTION INTRAVENOUS at 14:02

## 2017-08-27 RX ADMIN — NALOXONE HYDROCHLORIDE 0.4 MG: 0.4 INJECTION, SOLUTION INTRAMUSCULAR; INTRAVENOUS; SUBCUTANEOUS at 14:30

## 2017-08-27 RX ADMIN — NALOXONE HYDROCHLORIDE 2 MG: 0.4 INJECTION, SOLUTION INTRAMUSCULAR; INTRAVENOUS; SUBCUTANEOUS at 03:26

## 2017-08-27 RX ADMIN — AZTREONAM 2 G: 2 INJECTION, POWDER, LYOPHILIZED, FOR SOLUTION INTRAMUSCULAR; INTRAVENOUS at 04:24

## 2017-08-27 RX ADMIN — AZTREONAM 1 G: 1 INJECTION, POWDER, LYOPHILIZED, FOR SOLUTION INTRAMUSCULAR; INTRAVENOUS at 11:12

## 2017-08-27 RX ADMIN — LEVOTHYROXINE SODIUM ANHYDROUS 25 MCG: 100 INJECTION, POWDER, LYOPHILIZED, FOR SOLUTION INTRAVENOUS at 06:00

## 2017-08-27 RX ADMIN — NALOXONE HYDROCHLORIDE 0.4 MG: 0.4 INJECTION, SOLUTION INTRAMUSCULAR; INTRAVENOUS; SUBCUTANEOUS at 14:36

## 2017-08-27 RX ADMIN — LEVETIRACETAM 500 MG: 5 INJECTION INTRAVENOUS at 14:51

## 2017-08-27 RX ADMIN — FAMOTIDINE 20 MG: 10 INJECTION, SOLUTION INTRAVENOUS at 20:52

## 2017-08-27 RX ADMIN — SODIUM CHLORIDE 250 MG: 900 INJECTION, SOLUTION INTRAVENOUS at 06:06

## 2017-08-27 RX ADMIN — SODIUM CHLORIDE 600 MG: 900 INJECTION, SOLUTION INTRAVENOUS at 22:15

## 2017-08-27 RX ADMIN — NALOXONE HYDROCHLORIDE: 1 INJECTION PARENTERAL at 04:55

## 2017-08-27 RX ADMIN — AZTREONAM 1 G: 1 INJECTION, POWDER, LYOPHILIZED, FOR SOLUTION INTRAMUSCULAR; INTRAVENOUS at 20:52

## 2017-08-27 RX ADMIN — SODIUM CHLORIDE 125 ML/HR: 900 INJECTION, SOLUTION INTRAVENOUS at 16:04

## 2017-08-27 RX ADMIN — NALOXONE HYDROCHLORIDE 2 MG: 1 INJECTION PARENTERAL at 04:50

## 2017-08-27 RX ADMIN — NALOXONE HYDROCHLORIDE: 1 INJECTION PARENTERAL at 15:34

## 2017-08-27 RX ADMIN — NALOXONE HYDROCHLORIDE: 1 INJECTION PARENTERAL at 10:00

## 2017-08-27 RX ADMIN — ONDANSETRON 2 MG: 2 INJECTION INTRAMUSCULAR; INTRAVENOUS at 03:56

## 2017-08-27 RX ADMIN — FAMOTIDINE 20 MG: 10 INJECTION, SOLUTION INTRAVENOUS at 11:12

## 2017-08-27 RX ADMIN — SODIUM CHLORIDE 2055 ML: 900 INJECTION, SOLUTION INTRAVENOUS at 03:30

## 2017-08-27 NOTE — ED NOTES
Pt more alert and awake but still in and out of sleep. Follows commands, eyes open. Requests that her HOB be lowered for comfort. Speech is slurred and very hard to understand at times. Answers simple yes/no questions. Pt has intermittent non-productive cough. VSS on 2L O2 at this time. Rails up for safety.

## 2017-08-27 NOTE — PROGRESS NOTES
Physical Therapy Screening:  Services are not indicated at this time. [facility] based on results obtained during the nursing admission assessment. The patients chart was reviewed and the patient is not appropriate for a skilled therapy evaluation at this time. Please consult physical therapy if any therapy needs arise. Thank you.     Zakia Quintana PTA

## 2017-08-27 NOTE — ED NOTES
Several warm blankets and irina hugger blanket placed over patient to help correct hypothermia at this time.

## 2017-08-27 NOTE — CONSULTS
[address]    Name:  Pati Veras  MR#:  [id number]  :  1950  Account #:  [de-identified]  Date of Adm:  2017  Date of Consultation:  2017      CRITICAL CARE CONSULTATION    REFERRING PHYSICIAN: Dr. Lo Pastor. HISTORY OF PRESENT ILLNESS: The patient is a complicated  female who was admitted to hospital complaining of altered level of  consciousness. From what I can gather, she was seen in the  emergency room by Dr. Criss Raymond. The patient has a chronic history of  pain, anxiety, anemia, seizures, hypertension, UTI, TIA and came to  the hospital with decreased level of responsiveness.  noted  that the patient was not at her baseline. She was breathing strange,  according to him. He actually tried CPR. The patient had a weak radial  pulse with blood pressure 74/30. The patient was given Narcan and  that certainly parked her up. The patient's blood sugar was 98 and  blood pressure actually improved to 130/80. The patient was brought  to our hospital and admitted to the ICU for possible opiate overdose. Although it has been told to me that she has had no opiates since July  a tox screen is positive for barbiturates and opiates. The patient has a  well known history of chronic severe back pain for which she takes  morphine and other medications. Her last dose of medications  apparently was 6 p.m. last night and according to patient's   she controls her own medications. There were no symptoms of any  infection, sepsis or UTI at this admission. REVIEW OF SYSTEMS  A detailed 12-point review of systems done, pertinent positives are  above. The patient currently is awake. She is able to answer me, she  can tell me her name, she knows where she is. She is maintaining her  airway. She denies any chest pain or shortness of breath.     PAST MEDICAL HISTORY: Consists of anemia, anxiety, seizures,  bipolar disorder, chronic back pain, narcotic dependence, chronically  elevated right hemidiaphragm, fatigue, GERD, hypertension, history of  some psych issues in the past and TIA. PAST SURGICAL HISTORY: Consists of multiple colonoscopies,  coronary stenting, gastric bypass, tubal ligation, and back surgery. FAMILY HISTORY: Noncontributory. SOCIAL HISTORY: No smoking, drug use, alcohol use. ALLERGIES:  1. CEFTRIAXONE. 2. DEMEROL. 3. SEAFOOD. CURRENT MEDICATION REVIEWED: She is on:    1. Narcan drip along with intravenous fluids. 2. Azactam.  3. Clindamycin. 4. Pepcid. 5. Keppra. 6. Synthroid. PHYSICAL EXAMINATION  VITAL SIGNS: Temperature 98 degrees, pulse in the 80s, respiratory  rate is comfortable at 16/minute, blood pressure is 103/43, and  saturating 99% on 2 liters of oxygen. GENERAL: The patient is drowsy but easily arousable. She is  answering appropriately. HEAD: Normocephalic, atraumatic. EYES: Pupils are slightly small, but not pinpoint. NECK: Supple. No JVD, lymphadenopathy is present. LUNGS: Clear, slightly diminished at the right lung base, but consistent  with her elevated right diaphragm. HEART: Regular rate and rhythm. ABDOMEN: Benign with midline scar. No hepatosplenomegaly,  tenderness or masses. EXTREMITIES: No cyanosis, clubbing, or edema. NEUROLOGIC: She does move all 4 extremities. There are no focal  deficits. She is maintaining her airway. SKIN: Negative for any breakdown. LABORATORY DATA was reviewed. The blood sugar was 120. Unremarkable CBC. Unremarkable urinalysis. Coagulation panel within  normal. Blood sugar is 116, otherwise unremarkable electrolytes. Alkaline phosphatase was 249. Lactic acid was normal at 1.9. Troponin  is negative. Ammonia level was 32. Electrolytes unremarkable. Tox  screen positive for barbiturates and opiates. Blood and urine cultures  negative. ABG repeat of 7.29, pCO2 of 53 and a pO2 of 72 yesterday. CT head was negative for any acute disease.  Chest x-ray is clear with  a chronically elevated right hemidiaphragm. ASSESSMENT:  1. Inadvertent drug overdose with narcotics in my opinion. Urine tox  screen was positive for narcotics as well as barbiturates. Responding  to Narcan drip. 2. History of chronic back pain. 3. History of bipolar disorder. 4. Mild acute respiratory failure due to pleural effusion. 5. Chronically elevated right hemidiaphragm. PLAN: Reviewed. The patient has been admitted to the ICU for further  care. At this point, I would continue to use Narcan drip. If she stops  responding to it or we run out of Narcan drip and she can stop it,  she may need intubation for airway protection until her medicines wear  off. I will get more history from the  when he is available. For  now she has been started on antibiotics by the hospitalist and we can  continue them for a day along with IV fluids. Will continue Pepcid in the  meantime and Synthroid. I have taken the liberty of putting the patient  on Lovenox for DVT prophylaxis. We will follow closely in the ICU. Thank you for the consultation. Thirty minutes of critical care time spent with this patient exclusive of  any procedures.         MD DONNA Main / [email or web address]  D:  08/27/2017   09:23  T:  08/27/2017   10:51  Job #:  [id number]

## 2017-08-27 NOTE — ED NOTES
Called ICU to give report, notified that RN assuming care of patient will call ED back after completing care on another patient.

## 2017-08-27 NOTE — ED TRIAGE NOTES
Found to be unresponsive per spouse who started compressions. EMS arrived to weak pulse and low BP. EMS familiar with pt and states that she usually has back pain and is bed ridden but recently began to have seizures and is known to be taking morphine. Pt respirations weak and EMS gave IN narcan which improved rate of respirations but not depth. Additional narcan given IV after access established and pt began to breathe more effectively and respond to EMS. Pt moaning upon arrival but breathing on own with strong pulse. Sepsis Screening completed    (  )Patient meets SIRS criteria. ( x )Patient does not meet SIRS criteria.       SIRS Criteria is achieved when two or more of the following are present   Temperature < 96.8°F (36°C) or > 100.9°F (38.3°C)   Heart Rate > 90 beats per minute   Respiratory Rate > 20 breaths per minute   WBC count > 12,000 or <4,000 or > 10% bands

## 2017-08-27 NOTE — H&P
[address] [place] [address]  ROUTINE HISTORY AND PHYSICAL    Name:  Luis Manuel Kwon  MR#:  [id number]  :  1950  Account #:  [de-identified]  Date of Adm:  2017      ADMITTING DIAGNOSES:  1. Severe narcosis. 2. History of seizure disorder. 3. Bed bound debility. 4. History of gastrointestinal bleeding. 5. Bipolar disorder. 6. History of deep venous thrombosis with IVC filter in place. 7. Chronic narcotic dependence for chronic pain. 8. Sacral decubitus ulcer. 9. Anemia. 10. Chronic urinary tract infection with indwelling chronic Gotti  catheter. 11. History of chronic opioid constipation. HISTORY OF PRESENT ILLNESS: This is a 44-year-old white female  who was here recently in the hospital in July. At that time, she had  seizures, there was concern about potentially a reaction to Rocephin. She had been treated for pyelonephritis. Was seen in consultation then  by Neurology. Treated for her seizure disorder, had an EEG. Treated  for UTI and her medications were adjusted at that point in time. Prior to  that hospitalization she was in the hospital in March for GI bleeding  and epistaxis, severe anemia. Had a GI workup that was extensive and  difficult to enact because of her constipation issues. Now she comes  into the hospital early this morning after her  found her  unresponsive. He started doing CPR and chest compressions on her. EMS was called. They noticed she did have a pulse and blood  pressure was 74/30. She is on chronic morphine, so they gave her  Narcan which she had improvement in her respiratory drive from. They  checked her blood sugar, it was 98. They also watched her blood  pressure get better with Zofran Narcan and then subsequently she was  transferred to the emergency room here where she has continued to  have evidence for narcosis, receiving Narcan and then subsequently  being placed on a Narcan drip.  The  states that she controls  her own pain medicine and she usually takes morphine and baclofen at  night, but he has no idea what she did last night with her medications  and he is worried she may have been confused and taken certainly too  much which appears to be evident at this point. Her primary care  doctor is listed as Dr. Lex Dailey. He is a cardiologist; however, would  have to verify that with her . PAST MEDICAL HISTORY: Notable for chronic UTIs with an  indwelling catheter, seizure disorder, bedbound debilitated state,  apparently having been intubated for seizures about 5 years ago and  never regaining her ability to walk, but a lot of this is dependent also on  chronic hip disease and bilateral previous hip arthritis and dislocation. She has bipolar disorder, chronic pain, GERD, headaches, seizures,  anxiety disorder, recent GI bleeding and a previous history of TIA as  well as a decubitus ulcer. Hypothyroidism. PREVIOUS SURGERIES: She has had GI evaluation with endoscopy,  colonoscopy, she has had an appendectomy. Back surgery,  cholecystectomy, coronary stent placement, IVC filter placement and  gastric bypass with Soham-en-Y, and a history of tubal ligation. FAMILY HISTORY: Her mother had rheumatoid disease night and  heart disease. Her father had a stroke and hypertension. SOCIAL HISTORY: She does not smoke or drink. She lives at home  with her  who is her primary caretaker. ALLERGIES: SHE HAS AN ALLERGY NOW LISTED TO:  1. CEFTRIAXONE FOR CONCERN OF SEIZURES DUE TO THAT  RECENTLY. 2. HER OTHERS ALLERGIES: DEMEROL. 3. SEAFOOD. REVIEW OF SYSTEMS  Unable to be obtained as the patient is delirious and encephalopathic  currently due to her narcosis state but she is starting to wake up more. PHYSICAL EXAMINATION  GENERAL: This is a chronically ill-appearing, pale white female. She  opens her eyes. She mumbles some words. She is encephalopathic,  not able to follow commands fully.   VITAL SIGNS: Her temperature is 98.4, pulse 90, blood pressure  120/57, respiratory rate is 19. SaO2 is 99% on 5 L. NECK: Appears supple. No thyromegaly or lymphadenopathy. HEENT: Oropharynx is dry. Sclerae anicteric, conjunctivae pale. LUNGS: Clear anteriorly. No rales, rhonchi, or wheezes heard. CARDIAC: Regular rate and rhythm. No murmur, rub, or gallop. ABDOMEN: Soft, nontender. No distention noted. She has loose  brown stool in her diaper pad. She has a Gotti catheter in place  draining clear yellow urine. EXTREMITIES: Lower extremities trace edema. No notable skin  changes or contractures. Due to the extent of mess she has right now  on her bottom area, I cannot fully evaluate her skin at this juncture. Clean up is pending. LABORATORY DATA: Her labs show white count 7.5. Hemoglobin  and hematocrit 14.1 and 42.4, platelets are 253, urinalysis shows large  leukocyte esterase, too numerous to count WBCs, 2+ bacteria. BUN  and creatinine are 25 and 0.79. Potassium 4.3. Sodium 136. Lactic  acid 1.9. Troponin less than 0.02. Urine drug screen positive for  barbiturates and opioids, alcohol less than 3. Urine culture and blood  culture ordered and pending. A CT scan of the head showed no acute findings. Chest x-ray - full  radiologic review pending. EKG showed sinus rhythm with PVCs and  fusion complexes, incomplete right bundle branch block. Ammonia  level pending. MEDICATIONS: That she is on at home are the followin. Ascorbic acid. 2. Baclofen. 3. Dulcolax. 4. Fioricet. 5. Voltaren gel. 6. Cymbalta. 7. Vitamin D.  8. Neurontin. 9. Vimpat. 10. Keppra. 11. Synthroid. 12. Amitiza. 13. Claritin. 14. Mag-Ox. 15. Myrbetriq. 16. Cytotec. 17. Morphine IR. 18. Zyprexa. 19. Protonix. 20. Prednisone. 21. Requip. ASSESSMENT:  1. Severe narcosis, this lady has likely taken in more of her chronic  opioids than would be allotted on a regular basis to effect the severity  of narcosis that she has currently.  After multiple doses of Narcan and  now being placed on the high concentration Narcan drip she is starting  to come around. With that said, the hospital has concerns about their  Narcan supply if she continues to require this high-dose a  concentration. So if that becomes an issue we certainly have  consideration that she might require further intervention until she clears  the opioid from her system. 2. She has a urinary tract infection. She has been started on Azactam  which she will continue. Urine culture pending. 3. Chronic Gotti catheter in place, that will need to be changed once  she is more medically stable. 4. Chronic opioid use with history of constipation, but now I see loose  stools, so I do not think that is currently an active issue. 5. Seizure disorder. We will resume her Keppra intravenously, later  today if she is improving as she would be at risk for having seizures  with the amount of Narcan that she has had at this point, but I am  going to hold any active seizure medicine for right now, at least until  she starts to show stability in her awakening. 6. Hypothyroidism. Will transition her oral Synthroid to half dose of  that via IV until she is stable to take p.o.  7. Possible sepsis. Her lactic acid was normal. She does have a  urinary tract infection. I do not think this lady is septic. She did  receive the fluid bolus. Will continue fluids at this point, but at this point  no notable parameters otherwise to indicate sepsis. 8. Possible aspiration pneumonia with having had CPR performed by  her , being down for an unknown period of time and then  subsequently  in her debilitated state she would be at high risk for  aspiration pneumonia, so I have added clindamycin to the Azactam for  now. 9. History of bipolar. We will hold all her psychiatric medications for  now. 10. History of gastrointestinal bleeding. Her last hemoglobin and  hematocrit here is within normal limits.  It appears that there is no  current bleeding issue as of now. She has an IVC filter in place, so  DVT prophylaxis, we can start SCDs for now. In general, this lady will be admitted to the Intensive Care unit. SYSTEMS:  CARDIOVASCULAR: Currently stable. RESPIRATORY: Close monitoring of this. If she requires continued  Narcan and she is not responding to this, she may need to be  intubated. Recommend intensivist consultation for critical care status  and potential ventilatory management. NEUROLOGIC: Seizure disorder. We will hold her seizure medicine  until a few hours from now to make sure her mental status is  appropriately clearing and start her Keppra IV at that point in time. FLUIDS, ELECTROLYTES, NUTRITION: Continue IV normal saline at  125 mL an hour. Hold any nutrition p.o. as well as medications p.o. for  now. Electrolytes are stable at this juncture. Follow up again in 12-24  hours. INFECTION: As noted above, Azactam for UTI and clindamycin for  possible aspiration pneumonia. ENDOCRINE: Check her TSH level. Change her Synthroid to IV half  dose of what it is oral.    She is being admitted to the intensive care unit. Intensivist  consultation. Continue Narcan drip as is ordered for now. Transition thereafter to p.r.n. Narcan if her mental status is improving. If we require further continue drip we may have to reduce the dose to  preserve the supply here at the hospital as long as she is improving. Otherwise, will have to ask the pharmacy to procure from another  facility. Her status is guarded at this point in time. She is a very  chronically ill lady who has a multitude of medical issues and now with  probable opioid overdose. We  would have to have Psychiatry see her  after she is medically clear, as well during this hospital stay. Critical care time 45 minutes.         MD TAMMIE Chavarria / Leslie.Copper  D:  08/27/2017   05:35  T:  08/27/2017   10:17  Job #:  [id number]

## 2017-08-27 NOTE — PROGRESS NOTES
conducted an initial consultation and Spiritual Assessment for Sorin Christian, who is a 78 y.o.,female. Patients Primary Language is: [place]. According to the patients EMR Sikhism Affiliation is: Restorationism. The reason the Patient came to the hospital is:   Patient Active Problem List    Diagnosis Date Noted    Overdose 08/27/2017    Narcosis 08/27/2017    Seizure ([facility] 07/21/2017    Hypomagnesemia 09/12/2012    Unspecified constipation 09/12/2012    Personal history of DVT (deep vein thrombosis) 07/07/2012    Hypotension, unspecified 06/26/2012    Seizure disorder ([facility] [address] 06/24/2012    Depression 06/24/2012    UTI (urinary tract infection) 06/01/2012    Severe protein[facility]calorie malnutrition ([facility] 06/01/2012    Bipolar 1 disorder ([facility] 05/03/2012    Decubitus ulcer of sacral area 05/03/2012        The  provided the following Interventions:  Initiated a relationship of care and support. Explored issues of nevaeh, spirituality and/or Yarsani needs while hospitalized. Listened empathically. Provided chaplaincy education. Provided information about Spiritual Care Services. Offered assurance of continued prayers on patient's behalf. Chart reviewed. The following outcomes were achieved:  Patient shared some information about their medical narrative and spiritual journey/beliefs. Patient processed feeling about current hospitalization. Patient expressed gratitude for the 's visit. Assessment:  Patient did not indicate any spiritual or Yarsani issues which require Spiritual Care Services interventions at this time. Patient does not have any Yarsani/cultural needs that will affect patients preferences in health care. Plan:  Chaplains will continue to follow and will provide pastoral care on an as needed or requested basis.  recommends bedside caregivers page  on duty if patient shows signs of acute spiritual or emotional distress.     [place] [facility]

## 2017-08-27 NOTE — PROGRESS NOTES
79 yo lady admitted to ICU this am for severe narcosis, she is on morphine at home, and took unspecified pills last night per , unknown if she took intentional overdose or became confused and took two many pills. He performed CPR but EMS did find a pulse and BP upon arrival  She has a UTI  She has seizure disorder, bipolar, and is bedbound due to chronic hip issues and what  describes as never thriving physically after being intubated for a while 5 yrs ago for seizures    She is being admitted to ICU on a narcan gtt (pharmacy mixed high dose gtt for her severe narcosis in ER, supplies are a concern at this concentarion,but she is now responding)    Intensivist consult in ICU  Add clinda for possible aspiration pneumonia  Protect airway  Resume keppra as she continues to wake up  Hold PO meds    H&P is dictated        45minutes of critical care time spent in the direct evaluation and treatment of this high risk patient. The reason for providing this level of medical care for this critically ill patient was due a critical illness that impaired one or more vital organ systems such that there was a high probability of imminent or life threatening deterioration in the patients condition. This care involved high complexity decision making to assess, manipulate, and support vital system functions, to treat this degreee vital organ system failure and to prevent further life threatening deterioration of the patients condition.     Patient Active Problem List   Diagnosis Code    Bipolar 1 disorder ([address] F31.9    Decubitus ulcer of sacral area L89.159    UTI (urinary tract infection) N39.0    Severe protein-calorie malnutrition ([address] E43    Seizure disorder ([address] G40.909    Depression F32.9    Hypotension, unspecified I95.9    Personal history of DVT (deep vein thrombosis) Z86.718    Hypomagnesemia E83.42    Unspecified constipation K59.00    Seizure ([address] R56.9    Overdose T50.901A    Narcosis R40.1

## 2017-08-27 NOTE — IP AVS SNAPSHOT
Kwasi Colon 
 
 
 [address] [facility] [address] 
[phone] Patient: Misa García MRN: [id number]:[id number] You are allergic to the following Allergen Reactions Ceftriaxone Seizures Demerol (Meperidine) Unknown (comments) Seafood (Shellfish Containing Products) Unknown (comments) Recent Documentation Height Weight BMI OB Status Smoking Status 1.676 m 65.9 kg 23.45 kg/m2 Postmenopausal Never Smoker Unresulted Labs Order Current Status CULTURE, BLOOD Preliminary result Emergency Contacts Name Discharge Info Relation Home Work Mobile Rose,Tulio HERNANDEZ DISCHARGE CAREGIVER [3] Spouse [3] [phone] About your hospitalization You were admitted on:  August 27, 2017 You last received care in the:  [address] [place] [address] You were discharged on:  August 30, 2017 Unit phone number:  [phone] Why you were hospitalized Your primary diagnosis was:  Narcosis Your diagnoses also included:  Overdose, Uti (Urinary Tract Infection), Severe Protein-Calorie Malnutrition (Hcc), Seizure Disorder (Hcc), Personal History Of Dvt (Deep Vein Thrombosis), Bipolar 1 Disorder (Hcc), Depression, Decubitus Ulcer Of Sacral Area Providers Seen During Your Hospitalizations Provider Role Specialty Primary office phone Kavon Williamson MD Attending Provider Emergency Medicine [phone] Frankie Bolivar MD Attending Provider [facility] [phone] Your Primary Care Physician (PCP) Primary Care Physician Office Phone Office Fax Sherry Jose [phone] Follow-up Information Follow up With Details Comments Contact Info [address] to continue managing your healthcare needs [facility] [phone] MD [place] [address] 
[phone] [facility] Schedule an appointment as soon as possible for a visit in 1 week Follow up appointment scheduled for Thursday, Sept 7, 2017. Patient please contact [facility] at [phone] upon discharge, per MD's offfice, f/u appointment may change once patient notifies office. [facility] Jordyn Dale MD 
[phone] Priti Locke MD   [id number] [address] 
[phone] Current Discharge Medication List  
  
START taking these medications Dose & Instructions Dispensing Information Comments Morning Noon Evening Bedtime  
 nitrofurantoin (macrocrystal-monohydrate) 100 mg capsule Commonly known as:  MACROBID Your last dose was: Your next dose is:    
   
   
 Dose:  100 mg Take 1 Cap by mouth two (2) times a day for 5 days. Quantity:  10 Cap Refills:  0  
     
   
   
   
  
 traMADol 50 mg tablet Commonly known as:  ULTRAM  
   
Your last dose was: Your next dose is:    
   
   
 Dose:  50 mg Take 1 Tab by mouth every six (6) hours as needed. Max Daily Amount: 200 mg. Quantity:  15 Tab Refills:  0  
     
   
   
   
  
 trimethoprim-sulfamethoxazole 160-800 mg per tablet Commonly known as:  BACTRIM DS Your last dose was: Your next dose is:    
   
   
 Dose:  1 Tab Take 1 Tab by mouth two (2) times a day for 7 days. Quantity:  14 Tab Refills:  0 CONTINUE these medications which have CHANGED Dose & Instructions Dispensing Information Comments Morning Noon Evening Bedtime DULoxetine 30 mg capsule Commonly known as:  CYMBALTA What changed:   
- how much to take - when to take this Your last dose was: Your next dose is:    
   
   
 Dose:  30 mg Take 1 Cap by mouth daily. Quantity:  1 Cap Refills:  0  
     
   
   
   
  
 levothyroxine 100 mcg tablet Commonly known as:  SYNTHROID  
 What changed:  how much to take Your last dose was: Your next dose is:    
   
   
 Dose:  125 mcg Take 1.5 Tabs by mouth Daily (before breakfast). Quantity:  30 Tab Refills:  0 CONTINUE these medications which have NOT CHANGED Dose & Instructions Dispensing Information Comments Morning Noon Evening Bedtime  
 baclofen 10 mg tablet Commonly known as:  LIORESAL Your last dose was: Your next dose is:    
   
   
 Dose:  10 mg Take 10 mg by mouth two (2) times a day. Refills:  0 CLARITIN-D 24 HOUR  mg per tablet Generic drug:  loratadine-pseudoephedrine Your last dose was: Your next dose is:    
   
   
 Dose:  1 Tab Take 1 Tab by mouth daily. Refills:  0  
     
   
   
   
  
 clotrimazole 1 % topical cream  
Commonly known as:  Simmie  Your last dose was: Your next dose is:    
   
   
 Apply  to affected area two (2) times a day. Refills:  0 DULCOLAX (BISACODYL) 10 mg suppository Generic drug:  bisacodyl Your last dose was: Your next dose is:    
   
   
 Dose:  10 mg Insert 10 mg into rectum daily. Refills:  0  
     
   
   
   
  
 gabapentin 300 mg capsule Commonly known as:  NEURONTIN Your last dose was: Your next dose is:    
   
   
 Dose:  900 mg Take 900 mg by mouth three (3) times daily. Refills:  0  
     
   
   
   
  
 lacosamide 200 mg Tab tablet Commonly known as:  VIMPAT Your last dose was: Your next dose is:    
   
   
 Dose:  200 mg Take 1 Tab by mouth two (2) times a day. Max Daily Amount: 400 mg. Indications: PARTIAL EPILEPSY TREATMENT ADJUNCT Quantity:  180 Tab Refills:  1  
     
   
   
   
  
 levETIRAcetam 500 mg tablet Commonly known as:  KEPPRA Your last dose was:     
   
Your next dose is:    
   
   
 Dose:  500 mg  
 Take 1 Tab by mouth two (2) times a day. Quantity:  180 Tab Refills:  1  
     
   
   
   
  
 lubiPROStone 8 mcg capsule Commonly known as:  Chaparrita Showers Your last dose was: Your next dose is:    
   
   
 Dose:  8 mcg Take 1 Cap by mouth two (2) times daily (with meals). Quantity:  60 Cap Refills:  0  
     
   
   
   
  
 magnesium oxide 500 mg Tab Your last dose was: Your next dose is:    
   
   
 Dose:  500 mg Take 500 mg by mouth daily (with lunch). Refills:  0 MIRALAX 17 gram packet Generic drug:  polyethylene glycol Your last dose was: Your next dose is:    
   
   
 Dose:  17 g Take 17 g by mouth four (4) times daily. Refills:  0 MYRBETRIQ 50 mg ER tablet Generic drug:  mirabegron ER Your last dose was: Your next dose is:    
   
   
 Dose:  50 mg Take 50 mg by mouth nightly. Takes at [phone] Refills:  0  
     
   
   
   
  
 OTHER(NON-FORMULARY) Your last dose was: Your next dose is:    
   
   
 Dose:  1 Cap Take 1 Cap by mouth daily (with lunch). Indications: Bariatric Multi Formula Refills:  0  
     
   
   
   
  
 predniSONE 5 mg tablet Commonly known as:  Cristina Reels Your last dose was: Your next dose is:    
   
   
 Dose:  5 mg Take 5 mg by mouth daily. Refills:  0 PROTONIX 20 mg tablet Generic drug:  pantoprazole Your last dose was: Your next dose is:    
   
   
 Dose:  20 mg Take 20 mg by mouth two (2) times a day. Refills:  0  
     
   
   
   
  
 REQUIP 0.25 mg tablet Generic drug:  rOPINIRole Your last dose was: Your next dose is:    
   
   
 Dose:  0.75 mg Take 0.75 mg by mouth nightly. Takes at [phone] Refills:  0 VENELEX  mg/gram ointment Generic drug:  balsam peru-castor oil Your last dose was: Your next dose is:    
   
   
 Apply  to affected area as needed. Refills:  0  
     
   
   
   
  
 VITAMIN C 500 mg tablet Generic drug:  ascorbic acid (vitamin C) Your last dose was: Your next dose is:    
   
   
 Dose:  500 mg Take 500 mg by mouth daily. Refills:  0  
     
   
   
   
  
 VITAMIN D2 50,000 unit capsule Generic drug:  ergocalciferol Your last dose was: Your next dose is:    
   
   
 Dose:  [address] Units Take 50,000 Units by mouth. Refills:  0 VOLTAREN 1 % Gel Generic drug:  diclofenac Your last dose was: Your next dose is:    
   
   
 Apply  to affected area four (4) times daily. Refills:  0 ZyPREXA 7.5 mg tablet Generic drug:  OLANZapine Your last dose was: Your next dose is:    
   
   
 Dose:  7.5 mg Take 7.5 mg by mouth nightly. Refills:  0 STOP taking these medications   
 codeine-butalbital-acetaminophen-caffeine -02-30 mg per capsule Commonly known as:  FIORICET WITH CODEINE  
   
  
 miSOPROStol 100 mcg tablet Commonly known as:  CYTOTEC  
   
  
 morphine IR 15 mg tablet Commonly known as:  MS IR  
   
  
 TESSALON PERLES 100 mg capsule Generic drug:  benzonatate Where to Get Your Medications These medications were sent to [facility] [place] [address]  [address] [facility] Phone:  [phone]  
  nitrofurantoin (macrocrystal-monohydrate) 100 mg capsule  
 trimethoprim-sulfamethoxazole 160-800 mg per tablet Information on where to get these meds will be given to you by the nurse or doctor. ! Ask your nurse or doctor about these medications DULoxetine 30 mg capsule  
 traMADol 50 mg tablet Discharge Instructions Urinary Tract Infection in Women: Care Instructions Your Care Instructions A urinary tract infection, or UTI, is a general term for an infection anywhere between the kidneys and the urethra (where urine comes out). Most UTIs are bladder infections. They often cause pain or burning when you urinate. UTIs are caused by bacteria and can be cured with antibiotics. Be sure to complete your treatment so that the infection goes away. Follow-up care is a key part of your treatment and safety. Be sure to make and go to all appointments, and call your doctor if you are having problems. It's also a good idea to know your test results and keep a list of the medicines you take. How can you care for yourself at home? · Take your antibiotics as directed. Do not stop taking them just because you feel better. You need to take the full course of antibiotics. · Drink extra water and other fluids for the next day or two. This may help wash out the bacteria that are causing the infection. (If you have kidney, heart, or liver disease and have to limit fluids, talk with your doctor before you increase your fluid intake.) · Avoid drinks that are carbonated or have caffeine. They can irritate the bladder. · Urinate often. Try to empty your bladder each time. · To relieve pain, take a hot bath or lay a heating pad set on low over your lower belly or genital area. Never go to sleep with a heating pad in place. To prevent UTIs · Drink plenty of water each day. This helps you urinate often, which clears bacteria from your system. (If you have kidney, heart, or liver disease and have to limit fluids, talk with your doctor before you increase your fluid intake.) · Urinate when you need to. · Urinate right after you have sex. · Change sanitary pads often. · Avoid douches, bubble baths, feminine hygiene sprays, and other feminine hygiene products that have deodorants. · After going to the bathroom, wipe from front to back. When should you call for help? Call your doctor now or seek immediate medical care if: · Symptoms such as fever, chills, nausea, or vomiting get worse or appear for the first time. · You have new pain in your back just below your rib cage. This is called flank pain. · There is new blood or pus in your urine. · You have any problems with your antibiotic medicine. Watch closely for changes in your health, and be sure to contact your doctor if: 
· You are not getting better after taking an antibiotic for 2 days. · Your symptoms go away but then come back. Where can you learn more? Go to http://sav-susannah.info/. Enter Z935 in the search box to learn more about \"Urinary Tract Infection in Women: Care Instructions. \" Current as of: November 28, 2016 Content Version: 11.3 © 2191-1572 [employer]. Care instructions adapted under license by [employer] (which disclaims liability or warranty for this information). If you have questions about a medical condition or this instruction, always ask your healthcare professional. [place] [address] any warranty or liability for your use of this information. Patient armband removed and shredded DISCHARGE SUMMARY from Nurse The following personal items are in your possession at time of discharge: 
 
Dental Appliances: None Visual Aid: Glasses Home Medications: None Jewelry: None Clothing: None Other Valuables: None PATIENT INSTRUCTIONS: 
 
 
F-face looks uneven A-arms unable to move or move unevenly S-speech slurred or non-existent T-time-call 911 as soon as signs and symptoms begin-DO NOT go Back to bed or wait to see if you get better-TIME IS BRAIN.  
 
Warning Signs of HEART ATTACK  
 Call 911 if you have these symptoms: 
? Chest discomfort. Most heart attacks involve discomfort in the center of the chest that lasts more than a few minutes, or that goes away and comes back. It can feel like uncomfortable pressure, squeezing, fullness, or pain. ? Discomfort in other areas of the upper body. Symptoms can include pain or discomfort in one or both arms, the back, neck, jaw, or stomach. ? Shortness of breath with or without chest discomfort. ? Other signs may include breaking out in a cold sweat, nausea, or lightheadedness. Don't wait more than five minutes to call [address]! Fast action can save your life. Calling 911 is almost always the fastest way to get lifesaving treatment. Emergency Medical Services staff can begin treatment when they arrive  up to an hour sooner than if someone gets to the hospital by car. The discharge information has been reviewed with the patient. The patient verbalized understanding. Discharge medications reviewed with the patient and appropriate educational materials and side effects teaching were provided. Discharge Orders None [employer] Announcement We are excited to announce that we are making your provider's discharge notes available to you in [employer]. You will see these notes when they are completed and signed by the physician that discharged you from your recent hospital stay. If you have any questions or concerns about any information you see in [employer], please call the Health Information Department where you were seen or reach out to your Primary Care Provider for more information about your plan of care. Introducing [facility]! [facility] introduces [employer] patient portal. Now you can access parts of your medical record, email your doctor's office, and request medication refills online. 1. In your internet browser, go to [email or web address] 2. Click on the First Time User? Click Here link in the Sign In box. You will see the New Member Sign Up page. 3. Enter your [employer] Access Code exactly as it appears below. You will not need to use this code after youve completed the sign-up process. If you do not sign up before the expiration date, you must request a new code. · [employer] Access Code: [email or web address] Expires: 10/20/2017  1:44 PM 
 
4. Enter the last four digits of your Social Security Number (xxxx) and Date of Birth (mm/dd/yyyy) as indicated and click Submit. You will be taken to the next sign-up page. 5. Create a [employer] ID. This will be your [employer] login ID and cannot be changed, so think of one that is secure and easy to remember. 6. Create a [employer] password. You can change your password at any time. 7. Enter your Password Reset Question and Answer. This can be used at a later time if you forget your password. 8. Enter your e-mail address. You will receive e-mail notification when new information is available in [address] 9. Click Sign Up. You can now view and download portions of your medical record. 10. Click the Download Summary menu link to download a portable copy of your medical information. If you have questions, please visit the Frequently Asked Questions section of the [employer] website. Remember, [employer] is NOT to be used for urgent needs. For medical emergencies, dial 911. Now available from your iPhone and Android! General Information Please provide this summary of care documentation to your next provider. Patient Signature:  ____________________________________________________________ Date:  ____________________________________________________________  
  
Konstantin Landsman Provider Signature:  ____________________________________________________________ Date:  ____________________________________________________________

## 2017-08-27 NOTE — PROGRESS NOTES
5668  Report received from Byron Kelley RN. Assumed patient care. 0800  Shift assessment complete per flowsheet. Patient responds to noxious stimuli. Gotti in place draining hazy yellow urine. Narcan infusing at 2 mg/hr. 1446  Patient nonresponsive to vigorous sternal rub despite increase in narcan drip. Dr. Brenda Foreman called, orders received for 2 additional dose of narcan, 0.4 mg IV. Patient remains nonresponsive after narcan doses. 1500  Dr. Brenda Foreman at bedside, no new orders received. 1600  Reassessment complete per flowsheet. 1950  Bedside and Verbal shift change report given to LINH Pride RN (oncoming nurse) by Lynn Page. Janene Cali RN (offgoing nurse).  Report included the following information SBAR, Kardex, Intake/Output, MAR, Recent Results and Cardiac Rhythm SR.

## 2017-08-27 NOTE — ED PROVIDER NOTES
[address]  EMERGENCY DEPARTMENT HISTORY AND PHYSICAL EXAM       Date: 8/27/2017   Patient Name: Gina Osborne   YOB: 1950  Medical Record Number: [id number]    HISTORY OF THE PRESENT ILLNESS    Chief Complaint   Patient presents with    Drug Overdose        History Provided By:  EMS    Additional History:   1:54 AM    Gina Osborne is a 78 y.o. female with pertinent PMHx of chronic pain, anxiety, anemia, seizures, HTN, and TIA presenting via EMS to the ED due to decreased responsiveness x ~1 hour PTA in the ED. Per EMS, they were called as the pt's  noticed that the pt was \"off\". Pt's  states that the pt was \"breathing strangely\" and was unresponsive/limp. When they arrived, the pt was experiencing agonal breathing and the pt's  was completing chest compressions. Per EMS, he likely caused some rib fxs. Pt had a weak radial pulse and her blood pressure was 74/30. They gave the pt 2 of Narcan, with improvement of her respiratory drive. They administered more Narcan, with more increase of her respiratory drive. They also gave the pt Zofran. Blood sugar was 98 via EMS and her blood pressure improved to 130/80. Pt is A&O x 4 and bedridden at baseline. Per pt's , the pt has had similar sxs in the past which he believes is due to overdosing on opiates. Pt is well known to EMS for chronic back pain, for which she takes Morphine and other medications. Pt's  states that the pt's last does of her medications was at ~1800 tonight, but states that the pt \"controls her own pain medicine\". Pt's  specifically denies any recent illness.     PCP: Sarah Payan MD  Social Hx: - tobacco use, - alcohol use, - illicit drug use      PAST HISTORY    Past Medical History:   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    Fatigue     along with weakness    GERD (gastroesophageal reflux disease)     Headache     Hypertension     pt denies    Other ill-defined conditions swallowing issues, decubitus    Psychiatric disorder     Seizures (HCC)     TIA (transient ischemic attack)         Past Surgical History:   Past Surgical History:   Procedure Laterality Date    COLONOSCOPY N/A 2/21/2017    COLONOSCOPY [facility] ANESTHESIA, PATIENT IS IN ROOM 358 performed by Gretchen Boyce MD at [address] COLONOSCOPY N/A 2/22/2017    [facility] [address] [facility], COLON, DIAGNOSTIC      over 20 years ago    ENDOSCOPY, COLON, DIAGNOSTIC  9/24/09    HX APPENDECTOMY  11/6/84    HX BACK SURGERY  11/24/98    severe back problems    HX CHOLECYSTECTOMY  11/6/84    HX CORONARY STENT PLACEMENT      ivc filter    HX GASTRIC BYPASS  1995    [place]    HX TUBAL LIGATION  1991        Family History:   Family History   Problem Relation Age of Onset    Heart Disease Mother    [facility] Arthritis-rheumatoid Mother     Stroke Father     Hypertension Father     Arthritis-rheumatoid Father         Social History:   Social History   Substance Use Topics    Smoking status: Never Smoker    Smokeless tobacco: Never Used    Alcohol use No        Allergies:    Allergies   Allergen Reactions    Ceftriaxone Seizures    Demerol [Meperidine] Unknown (comments)    Seafood [Shellfish Containing Products] Unknown (comments)        Review of Systems   Review of Systems   Unable to perform ROS: Acuity of condition         PHYSICAL EXAM  Vitals:    08/27/17 0207 08/27/17 0210 08/27/17 0215 08/27/17 0223   BP: (!) 161/97  (!) 161/95    Pulse: 80  98    Resp: 15  17    Temp: (!) 94.1 °F (34.5 °C) 94.9 °F (34.9 °C)  94.9 °F (34.9 °C)   SpO2: 99%  98%    Weight: 68.5 kg (151 lb)      Height: 5' 6\" (1.676 m)          Physical Exam   Nursing note and vitals reviewed. Constitutional: Elderly, groaning  Head: Normocephalic, Atraumatic  Eyes: Pupils are dilated, but equally reactive  Neck: Supple  Cardiovascular: Regular rate, no murmurs, rubs, or gallops  Chest/lungs: breath sounds are slightly diminished  Abdomen: Soft, non tender, non distended, well healed midline surgical scar  Back: No evidence of trauma or deformity  Extremities: No evidence of trauma  Skin: Warm and dry  Neuro: Alert, moving upper extremities, contractures of lower extremities, intermittently answers questions, face symmetric        DIAGNOSTIC RESULTS    Labs -      Recent Results (from the past 12 hour(s))   CBC WITH AUTOMATED DIFF    Collection Time: 08/27/17  2:00 AM   Result Value Ref Range    WBC 7.5 4.6 - 13.2 K/uL    RBC 4.70 4.20 - 5.30 M/uL    HGB 14.1 12.0 - 16.0 g/dL    HCT 42.4 35.0 - 45.0 %    MCV 90.2 74.0 - 97.0 FL    MCH 30.0 24.0 - 34.0 PG    MCHC 33.3 31.0 - 37.0 g/dL    RDW 16.1 (H) 11.6 - 14.5 %    PLATELET 615 337 - 615 K/uL    MPV 8.9 (L) 9.2 - 11.8 FL    NEUTROPHILS 65 40 - 73 %    LYMPHOCYTES 24 21 - 52 %    MONOCYTES 9 3 - 10 %    EOSINOPHILS 2 0 - 5 %    BASOPHILS 0 0 - 2 %    ABS. NEUTROPHILS 4.9 1.8 - 8.0 K/UL    ABS. LYMPHOCYTES 1.8 0.9 - 3.6 K/UL    ABS. MONOCYTES 0.7 0.05 - 1.2 K/UL    ABS. EOSINOPHILS 0.2 0.0 - 0.4 K/UL    ABS.  BASOPHILS 0.0 0.0 - 0.06 K/UL    DF AUTOMATED     PROTHROMBIN TIME + INR    Collection Time: 08/27/17  2:00 AM   Result Value Ref Range    Prothrombin time 11.2 (L) 11.5 - 15.2 sec    INR 0.9 0.8 - 1.2     METABOLIC PANEL, COMPREHENSIVE    Collection Time: 08/27/17  2:00 AM   Result Value Ref Range    Sodium 136 136 - 145 mmol/L    Potassium 4.3 3.5 - 5.5 mmol/L    Chloride 97 (L) 100 - 108 mmol/L    CO2 28 21 - 32 mmol/L    Anion gap 11 3.0 - 18 mmol/L    Glucose 116 (H) 74 - 99 mg/dL    BUN 25 (H) 7.0 - 18 MG/DL    Creatinine 0.79 0.6 - 1.3 MG/DL    BUN/Creatinine ratio 32 (H) 12 - 20      GFR est AA >60 >60 ml/min/1.73m2    GFR est non-AA >60 >60 ml/min/1.73m2    Calcium 8.1 (L) 8.5 - 10.1 MG/DL    Bilirubin, total 0.4 0.2 - 1.0 MG/DL    ALT (SGPT) 25 13 - 56 U/L    AST (SGOT) 23 15 - 37 U/L    Alk.  phosphatase 249 (H) 45 - 117 U/L    Protein, total 7.8 6.4 - 8.2 g/dL    Albumin 3.5 3.4 - 5.0 g/dL    Globulin 4.3 (H) 2.0 - 4.0 g/dL    A-G Ratio 0.8 0.8 - 1.7     URINALYSIS W/ RFLX MICROSCOPIC    Collection Time: 08/27/17  2:00 AM   Result Value Ref Range    Color YELLOW      Appearance CLOUDY      Specific gravity 1.014 1.005 - 1.030      pH (UA) 6.5 5.0 - 8.0      Protein NEGATIVE  NEG mg/dL    Glucose NEGATIVE  NEG mg/dL    Ketone NEGATIVE  NEG mg/dL    Bilirubin NEGATIVE  NEG      Blood NEGATIVE  NEG      Urobilinogen 0.2 0.2 - 1.0 EU/dL    Nitrites NEGATIVE  NEG      Leukocyte Esterase LARGE (A) NEG     CARDIAC PANEL,(CK, CKMB & TROPONIN)    Collection Time: 08/27/17  2:00 AM   Result Value Ref Range    CK 85 26 - 192 U/L    CK - MB 2.7 <3.6 ng/ml    CK-MB Index 3.2 0.0 - 4.0 %    Troponin-I, Qt. <0.02 0.00 - 0.06 NG/ML   ETHYL ALCOHOL    Collection Time: 08/27/17  2:00 AM   Result Value Ref Range    ALCOHOL(ETHYL),SERUM <3 0 - 3 MG/DL   DRUG SCREEN, URINE    Collection Time: 08/27/17  2:00 AM   Result Value Ref Range    BENZODIAZEPINE NEGATIVE  NEG      BARBITURATES POSITIVE (A) NEG      THC (TH-CANNABINOL) NEGATIVE  NEG      OPIATES POSITIVE (A) NEG      PCP(PHENCYCLIDINE) NEGATIVE  NEG      COCAINE NEGATIVE  NEG      AMPHETAMINES NEGATIVE  NEG      METHADONE NEGATIVE  NEG      HDSCOM (NOTE)    URINE MICROSCOPIC ONLY    Collection Time: 08/27/17  2:00 AM   Result Value Ref Range    WBC TOO NUMEROUS TO COUNT 0 - 5 /hpf    RBC 0 to 1 0 - 5 /hpf    Epithelial cells FEW 0 - 5 /lpf    Bacteria 2+ (A) NEG /hpf    Mucus FEW (A) NEG /lpf   LACTIC ACID    Collection Time: 08/27/17  2:00 AM   Result Value Ref Range    Lactic acid 1.9 0.4 - 2.0 MMOL/L   EKG, 12 LEAD, INITIAL    Collection Time: 08/27/17  2:00 AM   Result Value Ref Range Ventricular Rate 86 BPM    Atrial Rate 86 BPM    P-R Interval 144 ms    QRS Duration 112 ms    Q-T Interval 542 ms    QTC Calculation (Bezet) 648 ms    Calculated P Axis 53 degrees    Calculated R Axis -50 degrees    Calculated T Axis 54 degrees    Diagnosis       Sinus rhythm with premature supraventricular complexes and fusion complexes  Incomplete right bundle branch block  Left anterior fascicular block  Possible Lateral infarct (cited on or before 10-AUG-2017)  Prolonged QT  Abnormal ECG  When compared with ECG of 10-AUG-2017 08:42,  Significant changes have occurred         Radiologic Studies -  The following have been ordered and reviewed:  CT HEAD WO CONT   Final Result   IMPRESSION:     No acute findings are identified. Age concordant changes, stable.     No sign of hemorrhage, mass effect or recent stroke of a major vascular  distribution. XR CHEST SNGL V    (Results Pending)     3:11 AM  RADIOLOGY FINDINGS  Chest X-ray shows no acute abnormality  Pending review by Radiologist  Recorded by SARITA Aguilar, as dictated by Leyda Koch MD       MEDICAL DECISION MAKING  I am the first provider for this patient. I reviewed the vital signs, available nursing notes, past medical history, past surgical history, family history and social history. Vital Signs-Reviewed the patient's vital signs. Patient Vitals for the past 12 hrs:   Temp Pulse Resp BP SpO2   08/27/17 0223 94.9 °F (34.9 °C) - - - -   08/27/17 0215 - 98 17 (!) 161/95 98 %   08/27/17 0210 94.9 °F (34.9 °C) - - - -   08/27/17 0207 (!) 94.1 °F (34.5 °C) 80 15 (!) 161/97 99 %   08/27/17 0200 - 81 11 (!) 161/104 -       Pulse Oximetry Analysis - Normal 99% on RA    Cardiac Monitor:   Rate: 80 bpm  Rhythm: Normal Sinus Rhythm     EKG interpretation: (Preliminary)  Rhythm: sinus rhythm. Rate (approx.): 86 bpm; QRS duration is 112 ms. EKG read by Leyda Koch MD at [address]  Records: Old medical records. Nursing notes. Procedures:   Procedures    ED Course:  1:54 AM  Initial assessment performed. The patients presenting problems have been discussed, and they are in agreement with the care plan formulated and outlined with them. I have encouraged them to ask questions as they arise throughout their visit. CONSULT NOTE:   3:10 AM  Eileen Salvador MD spoke with Genia Zavaleta MD,   Specialty: Hospitalist  Discussed pt's hx, disposition, and available diagnostic and imaging results. Reviewed care plans. Consultant will admit the pt to ICU. Written by Merle Bowman ED Scribe, as dictated by Eileen Salvador MD.    3:26 AM - Pt has decreased in her prior level of responsiveness. Will withdraw from pain, will not verbally respond to instructions. Pupils are small again. Will administer Narcan and re-assess. 3:40 AM - Pt is awake now with purposeful movement. Pupils are dilated. Pt still does not respond to questions. 3:42 AM - Updated Genia Zavaleta MD on the pt's condition. 3:59 AM - Pt is even more alert. She is answering questions. 4:54 AM - Pt once again became unresponsive with decreased respirations. She required another 2 mg on narcan. Worked with pharmacy to get appropriate narcan drip started and she is currently on 2mg/hr. 5:11 AM - Discussed the pt's case with Genia Zavaleta MD at bedside. Medications Given in the ED:  Medications   aztreonam (AZACTAM) 2 g in 0.9% sodium chloride (MBP/ADV) 100 mL MBP (not administered)   sodium chloride 0.9 % bolus infusion 2,055 mL (not administered)     3:11 AM  Patient is being admitted to the hospital by Eileen Salvador MD. The results of their tests and reasons for their admission have been discussed with them and/or available family. They convey agreement and understanding for the need to be admitted and for their admission diagnosis. CONDITIONS ON ADMISSION:  Urinary Tract Infection is present at the time of admission. MRSA is not present at the time of admission.  Wound infection is not present at the time of admission. Pressure Ulcer is not present at the time of admission. DIAGNOSIS  Clinical Impression:   1. Altered mental status, unspecified    2. Drug overdose, accidental or unintentional, initial encounter    3. Acute cystitis without hematuria         Discussion:  78 y.o. female brought in by EMS. Call initially for full arrest, though hx from EMS sounds more likely to be opiate overdose without complete arrest. On arrival here she was hemodynamically improved and had improved mental status. UTI was noted on labs. Discussed with hospitalist. Will require further inpatient management. Based on hx, will need monitoring for possible aspiration. Had recurrent decreased mental status and respiratory drive in ED requiring multiple doses of Narcan and a Narcan gtt. PLAN: ADMIT TO ICU  _______________________________   Attestation: This note is prepared by Brianda Younger. Jena Padilla, acting as Scribe for Carolyn Muñoz MD; at 1:54 AM on 8/27/2017. Carolyn Muñoz MD: The scribe's documentation has been prepared under my direction and personally reviewed by me in its entirety. I confirm that the note above accurately reflects all work, treatment, procedures, and medical decision making performed by me.     _______________________________             3:52 AM  I have spent 78 minutes of critical care time involved in lab review, consultations with specialist, family decision-making, and documentation. During this entire length of time I was immediately available to the patient. Critical Care: The reason for providing this level of medical care for this critically ill patient was due a critical illness that impaired one or more vital organ systems such that there was a high probability of imminent or life threatening deterioration in the patients condition.  This care involved high complexity decision making to assess, manipulate, and support vital system functions, to treat this degreee vital organ system failure and to prevent further life threatening deterioration of the patients condition.

## 2017-08-27 NOTE — ROUTINE PROCESS
TRANSFER - OUT REPORT:    Verbal report given to Lilia Gomez RN(name) on Vick Fernández  being transferred to ICU(unit) for routine progression of care       Report consisted of patients Situation, Background, Assessment and   Recommendations(SBAR). MEWS score: 2 communicated with nurse assuming care with all questions answered at this time. Information from the following report(s) SBAR, Kardex, ED Summary, Procedure Summary, Intake/Output, MAR, Accordion, Recent Results, Med Rec Status, Cardiac Rhythm NSR with PVCs and Alarm Parameters  was reviewed with the receiving nurse. Lines:   Peripheral IV 08/27/17 Right Antecubital (Active)   Site Assessment Clean, dry, & intact 8/27/2017  2:05 AM   Phlebitis Assessment 0 8/27/2017  2:05 AM   Infiltration Assessment 0 8/27/2017  2:05 AM   Dressing Status Clean, dry, & intact 8/27/2017  2:05 AM   Dressing Type Transparent;Elastic bandage 8/27/2017  2:05 AM   Hub Color/Line Status Pink;Patent; Flushed 8/27/2017  2:05 AM   Alcohol Cap Used Yes 8/27/2017  2:05 AM       Peripheral IV 08/27/17 Left Leg (Active)   Site Assessment Clean, dry, & intact 8/27/2017  2:30 AM   Phlebitis Assessment 0 8/27/2017  2:30 AM   Infiltration Assessment 0 8/27/2017  2:30 AM   Dressing Status Clean, dry, & intact 8/27/2017  2:30 AM   Dressing Type Transparent 8/27/2017  2:30 AM   Hub Color/Line Status Pink 8/27/2017  2:30 AM   Action Taken Dressing reinforced 8/27/2017  2:30 AM        Opportunity for questions and clarification was provided.       Patient transported with:   Monitor  O2 @ 2 liters  Registered Nurse   [facility]

## 2017-08-27 NOTE — ED NOTES
Pt incontinent with medium brown liquid stool. Patient perineal areas cleaned and linens changed. Repositioned in bed for comfort. VSS on RA. Pt alert and awake for entire procedure. Rails up for safety. Warm blankets provided for comfort.

## 2017-08-28 LAB
ANION GAP SERPL CALC-SCNC: 10 MMOL/L (ref 3–18)
ATRIAL RATE: 86 BPM
BUN SERPL-MCNC: 5 MG/DL (ref 7–18)
BUN/CREAT SERPL: 11 (ref 12–20)
CALCIUM SERPL-MCNC: 8.5 MG/DL (ref 8.5–10.1)
CALCULATED P AXIS, ECG09: 53 DEGREES
CALCULATED R AXIS, ECG10: -50 DEGREES
CALCULATED T AXIS, ECG11: 54 DEGREES
CHLORIDE SERPL-SCNC: 98 MMOL/L (ref 100–108)
CO2 SERPL-SCNC: 29 MMOL/L (ref 21–32)
CREAT SERPL-MCNC: 0.44 MG/DL (ref 0.6–1.3)
DIAGNOSIS, 93000: NORMAL
ERYTHROCYTE [DISTWIDTH] IN BLOOD BY AUTOMATED COUNT: 16 % (ref 11.6–14.5)
GLUCOSE SERPL-MCNC: 89 MG/DL (ref 74–99)
HCT VFR BLD AUTO: 40.9 % (ref 35–45)
HGB BLD-MCNC: 13.9 G/DL (ref 12–16)
MCH RBC QN AUTO: 30.6 PG (ref 24–34)
MCHC RBC AUTO-ENTMCNC: 34 G/DL (ref 31–37)
MCV RBC AUTO: 90.1 FL (ref 74–97)
P-R INTERVAL, ECG05: 144 MS
PLATELET # BLD AUTO: 247 K/UL (ref 135–420)
PMV BLD AUTO: 8.7 FL (ref 9.2–11.8)
POTASSIUM SERPL-SCNC: 3.3 MMOL/L (ref 3.5–5.5)
Q-T INTERVAL, ECG07: 542 MS
QRS DURATION, ECG06: 112 MS
QTC CALCULATION (BEZET), ECG08: 648 MS
RBC # BLD AUTO: 4.54 M/UL (ref 4.2–5.3)
SODIUM SERPL-SCNC: 137 MMOL/L (ref 136–145)
VENTRICULAR RATE, ECG03: 86 BPM
WBC # BLD AUTO: 6.9 K/UL (ref 4.6–13.2)

## 2017-08-28 PROCEDURE — 74011250637 HC RX REV CODE- 250/637: Performed by: INTERNAL MEDICINE

## 2017-08-28 PROCEDURE — 77030020887 HC CRM SKN PROT DIMTH S&N -A

## 2017-08-28 PROCEDURE — 74011250636 HC RX REV CODE- 250/636: Performed by: INTERNAL MEDICINE

## 2017-08-28 PROCEDURE — 74011000250 HC RX REV CODE- 250: Performed by: HOSPITALIST

## 2017-08-28 PROCEDURE — 85027 COMPLETE CBC AUTOMATED: CPT | Performed by: HOSPITALIST

## 2017-08-28 PROCEDURE — 74011000258 HC RX REV CODE- 258: Performed by: HOSPITALIST

## 2017-08-28 PROCEDURE — 74011250636 HC RX REV CODE- 250/636: Performed by: HOSPITALIST

## 2017-08-28 PROCEDURE — 80048 BASIC METABOLIC PNL TOTAL CA: CPT | Performed by: HOSPITALIST

## 2017-08-28 PROCEDURE — 36415 COLL VENOUS BLD VENIPUNCTURE: CPT | Performed by: HOSPITALIST

## 2017-08-28 PROCEDURE — 65270000029 HC RM PRIVATE

## 2017-08-28 RX ORDER — POLYETHYLENE GLYCOL 3350 17 G/17G
17 POWDER, FOR SOLUTION ORAL 2 TIMES DAILY
Status: DISCONTINUED | OUTPATIENT
Start: 2017-08-28 | End: 2017-08-29

## 2017-08-28 RX ORDER — LEVOFLOXACIN 5 MG/ML
500 INJECTION, SOLUTION INTRAVENOUS EVERY 24 HOURS
Status: DISCONTINUED | OUTPATIENT
Start: 2017-08-28 | End: 2017-08-30 | Stop reason: HOSPADM

## 2017-08-28 RX ORDER — LEVETIRACETAM 500 MG/1
500 TABLET ORAL 2 TIMES DAILY
Status: DISCONTINUED | OUTPATIENT
Start: 2017-08-28 | End: 2017-08-30 | Stop reason: HOSPADM

## 2017-08-28 RX ORDER — MORPHINE SULFATE 2 MG/ML
2 INJECTION, SOLUTION INTRAMUSCULAR; INTRAVENOUS
Status: DISCONTINUED | OUTPATIENT
Start: 2017-08-28 | End: 2017-08-30 | Stop reason: HOSPADM

## 2017-08-28 RX ORDER — POTASSIUM CHLORIDE 20 MEQ/1
40 TABLET, EXTENDED RELEASE ORAL
Status: COMPLETED | OUTPATIENT
Start: 2017-08-28 | End: 2017-08-28

## 2017-08-28 RX ORDER — LACOSAMIDE 100 MG/1
200 TABLET ORAL 2 TIMES DAILY
Status: DISCONTINUED | OUTPATIENT
Start: 2017-08-28 | End: 2017-08-30 | Stop reason: HOSPADM

## 2017-08-28 RX ADMIN — MORPHINE SULFATE 2 MG: 2 INJECTION, SOLUTION INTRAMUSCULAR; INTRAVENOUS at 11:41

## 2017-08-28 RX ADMIN — ENOXAPARIN SODIUM 40 MG: 40 INJECTION SUBCUTANEOUS at 09:47

## 2017-08-28 RX ADMIN — MORPHINE SULFATE 2 MG: 2 INJECTION, SOLUTION INTRAMUSCULAR; INTRAVENOUS at 16:41

## 2017-08-28 RX ADMIN — LACOSAMIDE 200 MG: 100 TABLET, FILM COATED ORAL at 11:41

## 2017-08-28 RX ADMIN — AZTREONAM 1 G: 1 INJECTION, POWDER, LYOPHILIZED, FOR SOLUTION INTRAMUSCULAR; INTRAVENOUS at 04:30

## 2017-08-28 RX ADMIN — POLYETHYLENE GLYCOL 3350 17 G: 17 POWDER, FOR SOLUTION ORAL at 09:47

## 2017-08-28 RX ADMIN — LEVOTHYROXINE SODIUM ANHYDROUS 25 MCG: 100 INJECTION, POWDER, LYOPHILIZED, FOR SOLUTION INTRAVENOUS at 06:59

## 2017-08-28 RX ADMIN — POTASSIUM CHLORIDE 40 MEQ: 20 TABLET, EXTENDED RELEASE ORAL at 09:47

## 2017-08-28 RX ADMIN — POLYETHYLENE GLYCOL 3350 17 G: 17 POWDER, FOR SOLUTION ORAL at 22:43

## 2017-08-28 RX ADMIN — LACOSAMIDE 200 MG: 100 TABLET, FILM COATED ORAL at 22:44

## 2017-08-28 RX ADMIN — SODIUM CHLORIDE 600 MG: 900 INJECTION, SOLUTION INTRAVENOUS at 06:00

## 2017-08-28 RX ADMIN — MORPHINE SULFATE 2 MG: 2 INJECTION, SOLUTION INTRAMUSCULAR; INTRAVENOUS at 21:53

## 2017-08-28 RX ADMIN — LEVETIRACETAM 500 MG: 500 TABLET, FILM COATED ORAL at 22:44

## 2017-08-28 RX ADMIN — LEVETIRACETAM 500 MG: 5 INJECTION INTRAVENOUS at 01:02

## 2017-08-28 RX ADMIN — FAMOTIDINE 20 MG: 10 INJECTION, SOLUTION INTRAVENOUS at 08:15

## 2017-08-28 NOTE — INTERDISCIPLINARY ROUNDS
CRITICAL CARE INTERDISCIPLINARY ROUNDS    Patient Information:    Name:   Deette Nissen    Age:   78 y.o. Admission Date:   8/27/2017    Critical Care Day:2  Surgery Date: n/a    Attending Provider:   Pina Prieto MD    Surgeon:  Dipesh Lantigua):      Critical Care Physician:  janine  Code Status: Prior    Problem List:     Patient Active Problem List   Diagnosis Code    Bipolar 1 disorder ([facility] [facility] F31.9    Decubitus ulcer of sacral area L89.159    UTI (urinary tract infection) N39.0    Severe protein-calorie malnutrition ([facility] [facility] E43    Seizure disorder ([facility] [facility] G40.909    Depression F32.9    Hypotension, unspecified I95.9    Personal history of DVT (deep vein thrombosis) Z86.718    Hypomagnesemia E83.42    Unspecified constipation K59.00    Seizure ([facility] [facility] R56.9    Overdose T50.901A    Narcosis R40.1       Principal Problem:  Narcosis    During rounds the following quality care indicators and evidence based practices were addressed :  DVT Prophylaxis, PUD Prophylaxis and Pain Management [place] from home     Ventilator Bundle:  Room air     Sepsis Order Set: n/a     Glycemic Control:   Recent Labs      08/28/17   0510  08/27/17   0200   GLU  89  116*   ;  Recent Labs      08/27/17   0456  08/27/17   0353   PHI  7.289*  7.299*   PCO2I  53.7*  53.4*   PO2I  72*  91    Adjustments    Acute MI/PCI:   Not applicable    Door to Balloon: Admission Time: 0156      Heart Failure:    Not applicable     SCIP Measures for other Surgeries:   Not applicable CHG Bath Daily on All Ortho n/a    Pneumonia:   N/a    Interdisciplinary team rounds were held with the following team membersDiabetes Treatment Specialist, Nursing, Nutrition, Pharmacy, Physician, Respiratory Therapy and Clinical Coordinator. Plan of care discussed. Goals of Care/ Recommendations:added morphine for w/d. Narcan off since midnight transfer to floor on room air.      See clinical pathway and/or care plan for interventions and desired outcomes.     Critical Care Discharge Status:  [place]

## 2017-08-28 NOTE — CDMP QUERY
Please clarify if this patient is being treated/managed for:    =>Toxic encephalopathy in setting of  drug  overdose  =>Other Explanation of clinical findings  =>Unable to Determine (no explanation of clinical findings)    The medical record reflects the following:    Risk:bipolar    Clinical Indicators:Positive for barbiturates and opiates , unresponsive . Treatment: Narcan     Please clarify and document your clinical opinion in the progress notes and discharge summary including the definitive and/or presumptive diagnosis, (suspected or probable), related to the above clinical findings. Please include clinical findings supporting your diagnosis. If you DECLINE this query or would like to communicate with [facility], please utilize the \"[facility] message box\" at the TOP of the Progress Note on the right.       Thank you,  Dhaval Mccollum RN [facility] [phone]

## 2017-08-28 NOTE — PROGRESS NOTES
Received report and assumed care of pt. Assessment completed per flowsheet. Pt awake, alert and oriented x4 at time of assessment. Currently diaphoretic and had pulled off clothing and electrodes. Requesting a suppository despite have 2 large BMs today. Abd intact and soft, but pt states it is tender when palpated. Pt refusing to wear SCDs. On Narcan gtt per MAR. NAD at this time, VSS.

## 2017-08-28 NOTE — CDMP QUERY
Please clarify if this patient is being treated/managed for:    =>UTI due to indwelling león catheter. =>Other Explanation of clinical findings  =>Unable to Determine (no explanation of clinical findings)    The medical record reflects the following:    Risk: bed bound  with chronic indwelling león catheter    Clinical Indicators: positive  uti with indwelling león. Treatment: antibiotics and león change when patient is stable    Please clarify and document your clinical opinion in the progress notes and discharge summary including the definitive and/or presumptive diagnosis, (suspected or probable), related to the above clinical findings. Please include clinical findings supporting your diagnosis. If you DECLINE this query or would like to communicate with [facility], please utilize the \"[employer] message box\" at the TOP of the Progress Note on the right.       Thank you,  Meeta Skelton RN [facility] [phone]

## 2017-08-28 NOTE — PROGRESS NOTES
General Daily Progress Note    Admit Date: 8/27/2017  Hospital day 2    Subjective:     C/o headache, constipation and back pain but according to nurse had 4 large bowel movements      Current Facility-Administered Medications   Medication Dose Route Frequency    polyethylene glycol (MIRALAX) packet 17 g  17 g Oral BID    naloxone (NARCAN) 0.8 mg in 0.9% sodium chloride 500 mL infusion  3 mg/hr IntraVENous CONTINUOUS    0.9% sodium chloride 500 mL with naloxone 8 mg infusion   IntraVENous TITRATE    0.9% sodium chloride infusion  125 mL/hr IntraVENous CONTINUOUS    levothyroxine (SYNTHROID) injection 25 mcg  25 mcg IntraVENous Q24H    levETIRAcetam (KEPPRA) 500 mg in saline (iso-osm) 100 ml IVPB  500 mg IntraVENous Q12H    famotidine (PF) (PEPCID) 20 mg in sodium chloride 0.9 % 10 mL injection  20 mg IntraVENous Q12H    clindamycin phosphate (CLEOCIN) 600 mg in 0.9% sodium chloride (MBP/ADV) 100 mL ADV  600 mg IntraVENous Q8H    aztreonam (AZACTAM) 1 g in 0.9% sodium chloride (MBP/ADV) 100 mL MBP  1 g IntraVENous Q8H    enoxaparin (LOVENOX) injection 40 mg  40 mg SubCUTAneous Q24H        Review of Systems  Constitutional: negative for fevers  Eyes: negative for glaucoma and visual disturbance  Ears, nose, mouth, throat, and face: negative for hearing loss  Respiratory: negative for dyspnea on exertion  Cardiovascular: negative for chest pain  Gastrointestinal: positive for constipation and abdominal pain  Genitourinary:negative for frequency and dysuria  Integument/breast: negative for pruritus and dryness  Hematologic/lymphatic: negative for lymphadenopathy and petechiae  Musculoskeletal:positive for back pain  Neurological: negative for dizziness and vertigo  Behavioral/Psych: negative for abusive relationship, ADHD and aggressive behavior  Endocrine: negative for fertility problems and temperature intolerance  Allergic/Immunologic: negative for angioedema and anaphylaxis    Objective:     Patient Vitals for the past 8 hrs:   BP Temp Pulse Resp SpO2   08/28/17 0801 134/86 - 79 15 100 %   08/28/17 0800 - - 83 13 97 %   08/28/17 0718 - 98.3 °F (36.8 °C) - - -   08/28/17 0700 128/58 - 81 15 99 %   08/28/17 0600 132/70 - 81 19 -   08/28/17 0535 - 98 °F (36.7 °C) - - -   08/28/17 0500 152/86 - 78 12 100 %   08/28/17 0400 149/63 - 81 13 (!) 80 %   08/28/17 0300 147/69 - 83 13 97 %     08/28 0701 - 08/28 1900  In: 1989.6 [P.O.:350; I.V.:1639.6]  Out: 650 [Urine:650]  08/26 1901 - 08/28 0700  In: 3959.3 [I.V.:3959.3]  Out: 3476 [Urine:6175]    Physical Exam:   Visit Vitals    /86 (BP 1 Location: Left arm, BP Patient Position: At rest)    Pulse 79    Temp 98.3 °F (36.8 °C)    Resp 15    Ht 5' 6\" (1.676 m)    Wt 68.5 kg (151 lb)    SpO2 100%    BMI 24.37 kg/m2     General:  Alert, cooperative, no distress, appears stated age. Head:  Normocephalic, without obvious abnormality, atraumatic. Eyes:  Conjunctivae/corneas clear. PERRL, EOMs intact. Fundi benign. Ears:  Normal TMs and external ear canals both ears. Nose: Nares normal. Septum midline. Mucosa normal. No drainage or sinus tenderness. Throat: Lips, mucosa, and tongue normal. Teeth and gums normal.   Neck: Supple, symmetrical, trachea midline, no adenopathy, thyroid: no enlargement/tenderness/nodules, no carotid bruit and no JVD. Back:   Symmetric, no curvature. ROM normal. No CVA tenderness. Lungs:   Clear to auscultation bilaterally. Chest wall:  No tenderness or deformity. Heart:  Regular rate and rhythm, S1, S2 normal, no murmur, click, rub or gallop. Breast Exam:  No tenderness, masses, or nipple abnormality. Abdomen:   Soft, non-tender. Bowel sounds normal. No masses,  No organomegaly. Genitalia:  Normal female without lesion, discharge or tenderness. Rectal:  Normal tone,  no masses or tenderness  Guaiac negative stool. Extremities: Extremities normal, atraumatic, no cyanosis or edema.    Pulses: 2+ and symmetric all extremities. Skin: Skin color, texture, turgor normal. No rashes or lesions. Lymph nodes: Cervical, supraclavicular, and axillary nodes normal.   Neurologic: CNII-XII intact. Normal strength, sensation and reflexes throughout.            ECG: normal EKG, normal sinus rhythm, unchanged from previous tracings     Data Review Recent Results (from the past 24 hour(s))   CBC W/O DIFF    Collection Time: 08/28/17  4:41 AM   Result Value Ref Range    WBC 6.9 4.6 - 13.2 K/uL    RBC 4.54 4.20 - 5.30 M/uL    HGB 13.9 12.0 - 16.0 g/dL    HCT 40.9 35.0 - 45.0 %    MCV 90.1 74.0 - 97.0 FL    MCH 30.6 24.0 - 34.0 PG    MCHC 34.0 31.0 - 37.0 g/dL    RDW 16.0 (H) 11.6 - 14.5 %    PLATELET 431 722 - 665 K/uL    MPV 8.7 (L) 9.2 - 00.2 FL   METABOLIC PANEL, BASIC    Collection Time: 08/28/17  5:10 AM   Result Value Ref Range    Sodium 137 136 - 145 mmol/L    Potassium 3.3 (L) 3.5 - 5.5 mmol/L    Chloride 98 (L) 100 - 108 mmol/L    CO2 29 21 - 32 mmol/L    Anion gap 10 3.0 - 18 mmol/L    Glucose 89 74 - 99 mg/dL    BUN 5 (L) 7.0 - 18 MG/DL    Creatinine 0.44 (L) 0.6 - 1.3 MG/DL    BUN/Creatinine ratio 11 (L) 12 - 20      GFR est AA >60 >60 ml/min/1.73m2    GFR est non-AA >60 >60 ml/min/1.73m2    Calcium 8.5 8.5 - 10.1 MG/DL           Assessment:     Principal Problem:    Narcosis (8/27/2017)    Active Problems:    Bipolar 1 disorder ([facility]) (5/3/2012)      Decubitus ulcer of sacral area (5/3/2012)      UTI (urinary tract infection) (6/1/2012)      Severe protein-calorie malnutrition ([facility]) (6/1/2012)      Seizure disorder ([facility]) (6/24/2012)      Depression (6/24/2012)      Personal history of DVT (deep vein thrombosis) (7/7/2012)      Overdose (8/27/2017)        Plan:     Taper off narcan drip  Resume lower dose of opiates  Regular diet  miralax  Ok to go to floor  D/w bedside nurse  D/c ivf  D/c abx

## 2017-08-28 NOTE — DIABETES MGMT
GLYCEMIC CONTROL & NUTRITION:      - Discussed in rounds and chart reviewed. BG currently within target range. No glycemic control needs identified at this time.   - noted 1x dose of steroids given yesterday, no current orders in place    Recent Glucose Results:   Lab Results   Component Value Date/Time    GLU 89 08/28/2017 05:10 AM               Palomo Cannon MPH, RD, CDE

## 2017-08-28 NOTE — PROGRESS NOTES
Hospitalist Progress Note    Patient: Jaleel Velazquez MRN: [id number]  CSN: [id number]    YOB: 1950  Age: 78 y.o. Sex: female    DOA: 8/27/2017 LOS:  LOS: 1 day          Chief Complaint: I am hungry. Assessment/Plan       Patient Active Problem List   Diagnosis Code    Bipolar 1 disorder ([facility] [address] F31.9    Decubitus ulcer of sacral area L89.159    UTI (urinary tract infection) N39.0    Severe protein-calorie malnutrition ([facility] [address] E43    Seizure disorder ([facility] G40.909    Depression F32.9    Hypotension, unspecified I95.9    Personal history of DVT (deep vein thrombosis) Z86.718    Hypomagnesemia E83.42    Unspecified constipation K59.00    Seizure ([facility] R56.9    Overdose T50.901A    Narcosis R40.1     Stable. Looks great. Start diet. Replete potassium. Continue Miralax. Tells she uses it up to 4x daily. .  Transfer to Floor. Narcan off at the time of my encounter this am.  Cautious / judicious use of pain meds. Subjective:    Admitted with decreased responsiveness. Thought to have unintentionally overdosed on pain medications. Was on narcan gtt. Now off. Doing well. Feels back to her old self. Hungry.         Review of systems:    Constitutional: denies fevers, chills, myalgias  Respiratory: denies SOB, cough  Cardiovascular: denies chest pain, palpitations  Gastrointestinal: denies nausea, vomiting, diarrhea      Vital signs/Intake and Output:  Visit Vitals    /86 (BP 1 Location: Left arm, BP Patient Position: At rest)    Pulse 79    Temp 98.3 °F (36.8 °C)    Resp 15    Ht 5' 6\" (1.676 m)    Wt 68.5 kg (151 lb)    SpO2 100%    BMI 24.37 kg/m2     Current Shift:  08/28 0701 - 08/28 1900  In: 1639.6 [I.V.:1639.6]  Out: 650 [Urine:650]  Last three shifts:  08/26 1901 - 08/28 0700  In: 3959.3 [I.V.:3959.3]  Out: 6175 [ASOJA:0167]    Exam:    General: Well developed, alert, NAD, OX3  Head/Neck: NCAT, supple, No masses, No lymphadenopathy  CVS:Regular rate and rhythm, no M/R/G, S1/S2 heard, no thrill  Lungs:Clear to auscultation bilaterally, no wheezes, rhonchi, or rales  Abdomen: Soft, Nontender, No distention, Normal Bowel sounds, No hepatomegaly  Extremities: No C/C/E, pulses palpable 2+  Skin:normal texture and turgor, no rashes, no lesions  Neuro:grossly normal , follows commands  Psych:appropriate                Labs: Results:       Chemistry Recent Labs      08/28/17   0510  08/27/17   0200   GLU  89  116*   NA  137  136   K  3.3*  4.3   CL  98*  97*   CO2  29  28   BUN  5*  25*   CREA  0.44*  0.79   CA  8.5  8.1*   AGAP  10  11   BUCR  11*  32*   AP   --   249*   TP   --   7.8   ALB   --   3.5   GLOB   --   4.3*   AGRAT   --   0.8      CBC w/Diff Recent Labs      08/28/17   0441  08/27/17   0200   WBC  6.9  7.5   RBC  4.54  4.70   HGB  13.9  14.1   HCT  40.9  42.4   PLT  247  278   GRANS   --   65   LYMPH   --   24   EOS   --   2      Cardiac Enzymes Recent Labs      08/27/17   0200   CPK  85   CKND1  3.2      Coagulation Recent Labs      08/27/17 0200   PTP  11.2*   INR  0.9       Lipid Panel No results found for: CHOL, CHOLPOCT, CHOLX, CHLST, CHOLV, 457510, HDL, LDL, LDLC, DLDLP, 533514, VLDLC, VLDL, TGLX, TRIGL, TRIGP, TGLPOCT, CHHD, CHHDX   BNP No results for input(s): BNPP in the last 72 hours.    Liver Enzymes Recent Labs      08/27/17   0200   TP  7.8   ALB  3.5   AP  249*   SGOT  23      Thyroid Studies Lab Results   Component Value Date/Time    TSH 17.30 08/27/2017 02:00 AM        Procedures/imaging: see electronic medical records for all procedures/Xrays and details which were not copied into this note but were reviewed prior to creation of Moreno Caballero MD

## 2017-08-28 NOTE — PROGRESS NOTES
conducted a Follow up consultation and Spiritual Assessment for Hilaria Forte, who is a 78 y.o.,female. Continued the relationship of care and support. Listened empathically. Offered prayer and assurance of continued prayer on patients behalf. Plan:  Chaplains will continue to follow and will provide pastoral care as needed or requested.  recommends bedside caregivers page the  on duty if patient shows signs of acute spiritual or emotional distress. Father BRIAN LamDiv.   [facility] - Office

## 2017-08-28 NOTE — PROGRESS NOTES
Readmission Risk Assessment:     Moderate Risk and MSSP/Good Help ACO patients    RRAT Score:  13-20    Initial Assessment: Chart reviewed and noted pt admitted to ICU for severe narcosis, seizure disorder. CM following for needs at time of dc. Emergency Contact:  Spouse- Aiden Pearl [phone]    Pertinent Medical Hx:     UTI, bedbound, seizures, indwelling catheter, GERD, bipolar, HA, TIA    PCP/Specialists: Smith Vidal      [employer]:   Has used [facility] in past.     DME:          Moderate Risk Care Transition Plan:  1. Evaluate for [facility] or [facility], SNF, acute rehab, community care coordination of resources. 2. Involve patient/caregiver in assessment, planning, education and implement of intervention. 3. CM daily patient care huddles/interdisciplinary rounds. 4. PCP/Specialist appointment within 5  7 days made prior to discharge. 5. Facilitate transportation and logistics for follow-up appointments. 6. Medication reconciliation [id number]  7. Formal handoff between hospital provider and post-acute provider to transition patient  Handoff to [id number] Nurse Navigator or PCP practice.

## 2017-08-28 NOTE — PROGRESS NOTES
Occupational Therapy Screening:  Services are indicated. Evaluate and Treat Order is requested. [facility] was triggered for occupational therapy based on results obtained during the nursing admission assessment. The patients chart was reviewed and the patient is appropriate for a skilled therapy evaluation. Patient admitted d/t AMS w/ h/o chronic back pain and opiods abuse. Pt may benefit from skilled OT for adaptive strategies and body mechanics to minimize back pain during ADLs. Please order a consult for occupational therapy if you are in agreement and would like an evaluation to be completed. Thank you.   Madiha Chavez, OTR/L

## 2017-08-29 LAB
ANION GAP SERPL CALC-SCNC: 7 MMOL/L (ref 3–18)
BUN SERPL-MCNC: 5 MG/DL (ref 7–18)
BUN/CREAT SERPL: 12 (ref 12–20)
CALCIUM SERPL-MCNC: 8.8 MG/DL (ref 8.5–10.1)
CHLORIDE SERPL-SCNC: 95 MMOL/L (ref 100–108)
CO2 SERPL-SCNC: 29 MMOL/L (ref 21–32)
CREAT SERPL-MCNC: 0.41 MG/DL (ref 0.6–1.3)
ERYTHROCYTE [DISTWIDTH] IN BLOOD BY AUTOMATED COUNT: 15.4 % (ref 11.6–14.5)
GLUCOSE SERPL-MCNC: 98 MG/DL (ref 74–99)
HCT VFR BLD AUTO: 42.4 % (ref 35–45)
HGB BLD-MCNC: 14.6 G/DL (ref 12–16)
MCH RBC QN AUTO: 30.2 PG (ref 24–34)
MCHC RBC AUTO-ENTMCNC: 34.4 G/DL (ref 31–37)
MCV RBC AUTO: 87.8 FL (ref 74–97)
PLATELET # BLD AUTO: 265 K/UL (ref 135–420)
PMV BLD AUTO: 9 FL (ref 9.2–11.8)
POTASSIUM SERPL-SCNC: 3.7 MMOL/L (ref 3.5–5.5)
RBC # BLD AUTO: 4.83 M/UL (ref 4.2–5.3)
SODIUM SERPL-SCNC: 131 MMOL/L (ref 136–145)
WBC # BLD AUTO: 6.7 K/UL (ref 4.6–13.2)

## 2017-08-29 PROCEDURE — 85027 COMPLETE CBC AUTOMATED: CPT | Performed by: HOSPITALIST

## 2017-08-29 PROCEDURE — 65270000029 HC RM PRIVATE

## 2017-08-29 PROCEDURE — 36415 COLL VENOUS BLD VENIPUNCTURE: CPT | Performed by: HOSPITALIST

## 2017-08-29 PROCEDURE — 74011250636 HC RX REV CODE- 250/636: Performed by: INTERNAL MEDICINE

## 2017-08-29 PROCEDURE — 74011250636 HC RX REV CODE- 250/636: Performed by: HOSPITALIST

## 2017-08-29 PROCEDURE — 80048 BASIC METABOLIC PNL TOTAL CA: CPT | Performed by: HOSPITALIST

## 2017-08-29 PROCEDURE — 74011250637 HC RX REV CODE- 250/637: Performed by: INTERNAL MEDICINE

## 2017-08-29 PROCEDURE — 74011250637 HC RX REV CODE- 250/637: Performed by: HOSPITALIST

## 2017-08-29 RX ORDER — LEVOTHYROXINE SODIUM 75 UG/1
75 TABLET ORAL
Status: DISCONTINUED | OUTPATIENT
Start: 2017-08-30 | End: 2017-08-30 | Stop reason: HOSPADM

## 2017-08-29 RX ORDER — OLANZAPINE 2.5 MG/1
7.5 TABLET ORAL
Status: DISCONTINUED | OUTPATIENT
Start: 2017-08-29 | End: 2017-08-30 | Stop reason: HOSPADM

## 2017-08-29 RX ORDER — TRAMADOL HYDROCHLORIDE 50 MG/1
50 TABLET ORAL
Status: DISCONTINUED | OUTPATIENT
Start: 2017-08-29 | End: 2017-08-30 | Stop reason: HOSPADM

## 2017-08-29 RX ORDER — PANTOPRAZOLE SODIUM 40 MG/1
40 TABLET, DELAYED RELEASE ORAL
Status: DISCONTINUED | OUTPATIENT
Start: 2017-08-29 | End: 2017-08-30 | Stop reason: HOSPADM

## 2017-08-29 RX ORDER — POLYETHYLENE GLYCOL 3350 17 G/17G
17 POWDER, FOR SOLUTION ORAL 3 TIMES DAILY
Status: DISCONTINUED | OUTPATIENT
Start: 2017-08-29 | End: 2017-08-30 | Stop reason: HOSPADM

## 2017-08-29 RX ORDER — DULOXETIN HYDROCHLORIDE 30 MG/1
30 CAPSULE, DELAYED RELEASE ORAL 2 TIMES DAILY
Status: DISCONTINUED | OUTPATIENT
Start: 2017-08-29 | End: 2017-08-30 | Stop reason: HOSPADM

## 2017-08-29 RX ORDER — POLYETHYLENE GLYCOL 3350 17 G/17G
17 POWDER, FOR SOLUTION ORAL 2 TIMES DAILY
COMMUNITY
End: 2017-12-04

## 2017-08-29 RX ORDER — ONDANSETRON 2 MG/ML
4 INJECTION INTRAMUSCULAR; INTRAVENOUS ONCE
Status: COMPLETED | OUTPATIENT
Start: 2017-08-29 | End: 2017-08-29

## 2017-08-29 RX ORDER — NYSTATIN 100000 U/G
CREAM TOPICAL 2 TIMES DAILY
Status: DISCONTINUED | OUTPATIENT
Start: 2017-08-29 | End: 2017-08-30 | Stop reason: HOSPADM

## 2017-08-29 RX ADMIN — NYSTATIN: 100000 CREAM TOPICAL at 22:09

## 2017-08-29 RX ADMIN — ENOXAPARIN SODIUM 40 MG: 40 INJECTION SUBCUTANEOUS at 09:09

## 2017-08-29 RX ADMIN — LEVOFLOXACIN 500 MG: 5 INJECTION, SOLUTION INTRAVENOUS at 23:16

## 2017-08-29 RX ADMIN — LEVETIRACETAM 500 MG: 500 TABLET, FILM COATED ORAL at 09:09

## 2017-08-29 RX ADMIN — MORPHINE SULFATE 2 MG: 2 INJECTION, SOLUTION INTRAMUSCULAR; INTRAVENOUS at 07:30

## 2017-08-29 RX ADMIN — DULOXETINE HYDROCHLORIDE 30 MG: 30 CAPSULE, DELAYED RELEASE ORAL at 22:08

## 2017-08-29 RX ADMIN — NYSTATIN: 100000 CREAM TOPICAL at 09:00

## 2017-08-29 RX ADMIN — MORPHINE SULFATE 2 MG: 2 INJECTION, SOLUTION INTRAMUSCULAR; INTRAVENOUS at 01:57

## 2017-08-29 RX ADMIN — ONDANSETRON 4 MG: 2 INJECTION INTRAMUSCULAR; INTRAVENOUS at 04:06

## 2017-08-29 RX ADMIN — LEVOFLOXACIN 500 MG: 5 INJECTION, SOLUTION INTRAVENOUS at 00:53

## 2017-08-29 RX ADMIN — LEVETIRACETAM 500 MG: 500 TABLET, FILM COATED ORAL at 21:24

## 2017-08-29 RX ADMIN — TRAMADOL HYDROCHLORIDE 50 MG: 50 TABLET, FILM COATED ORAL at 16:56

## 2017-08-29 RX ADMIN — MORPHINE SULFATE 2 MG: 2 INJECTION, SOLUTION INTRAMUSCULAR; INTRAVENOUS at 13:51

## 2017-08-29 RX ADMIN — POLYETHYLENE GLYCOL 3350 17 G: 17 POWDER, FOR SOLUTION ORAL at 09:09

## 2017-08-29 RX ADMIN — TRAMADOL HYDROCHLORIDE 50 MG: 50 TABLET, FILM COATED ORAL at 09:29

## 2017-08-29 RX ADMIN — OLANZAPINE 7.5 MG: 2.5 TABLET ORAL at 22:08

## 2017-08-29 RX ADMIN — PANTOPRAZOLE SODIUM 40 MG: 40 TABLET, DELAYED RELEASE ORAL at 07:30

## 2017-08-29 RX ADMIN — MIRABEGRON 50 MG: 25 TABLET, FILM COATED, EXTENDED RELEASE ORAL at 23:16

## 2017-08-29 RX ADMIN — POLYETHYLENE GLYCOL 3350 17 G: 17 POWDER, FOR SOLUTION ORAL at 16:56

## 2017-08-29 RX ADMIN — POLYETHYLENE GLYCOL 3350 17 G: 17 POWDER, FOR SOLUTION ORAL at 21:23

## 2017-08-29 RX ADMIN — TRAMADOL HYDROCHLORIDE 50 MG: 50 TABLET, FILM COATED ORAL at 23:16

## 2017-08-29 RX ADMIN — LACOSAMIDE 200 MG: 100 TABLET, FILM COATED ORAL at 21:24

## 2017-08-29 RX ADMIN — MORPHINE SULFATE 2 MG: 2 INJECTION, SOLUTION INTRAMUSCULAR; INTRAVENOUS at 21:24

## 2017-08-29 RX ADMIN — LACOSAMIDE 200 MG: 100 TABLET, FILM COATED ORAL at 09:09

## 2017-08-29 NOTE — PROGRESS NOTES
Hospitalist Progress Note    Patient: Heather Conrad MRN: [id number]  CSN: [id number]    YOB: 1950  Age: 78 y.o.   Sex: female    DOA: 8/27/2017 LOS:  LOS: 2 days          Chief Complaint:    narcosis      Assessment/Plan     Narcosis resolved  Unintentional OD self meds  UTI due to indwelling catheter  Debility, bedbound  Chronic hip and back pain  Decubitus  Bipolar disorder/psych illness  Narcotic dependence  GERD  Chronic constipation    Tramadol for pain  Resume PO synthroid, cymbalta, protonix  Continue levaquin  Wound care consult  Await urine cx results    Would likely recommend psych eval prior to d/c  I would not rely on her dosing her own meds at home again, but if her  allows it, it will be a big risk as she unintentionally overdosed on her narcotics and other meds      Patient Active Problem List   Diagnosis Code    Bipolar 1 disorder ([facility] [address] F31.9    Decubitus ulcer of sacral area L89.159    UTI (urinary tract infection) N39.0    Severe protein-calorie malnutrition ([facility] [address] E43    Seizure disorder ([facility] [address] G40.909    Depression F32.9    Hypotension, unspecified I95.9    Personal history of DVT (deep vein thrombosis) Z86.718    Hypomagnesemia E83.42    Unspecified constipation K59.00    Seizure ([facility] [address] R56.9    Overdose T50.901A    Narcosis R40.1       Subjective:    States no BM in over 24 hrs, needs miralax multiple X a day  Denies abd pain  Has various requests including a special mattress, sleeping meds, pain meds, wound care    Review of systems:    Constitutional: denies fevers, chills, myalgias  Respiratory: denies SOB, cough  Cardiovascular: denies chest pain, palpitations  Gastrointestinal: denies nausea, vomiting, diarrhea      Vital signs/Intake and Output:  Visit Vitals    BP (!) 178/91 (BP 1 Location: Left arm, BP Patient Position: At rest)    Pulse 78    Temp 97.7 °F (36.5 °C)    Resp 16    Ht 5' 6\" (1.676 m)    Wt 69.7 kg (153 lb 10.6 oz)    SpO2 100%    BMI 24.8 kg/m2     Current Shift:  08/28 1901 - 08/29 0700  In: 100 [I.V.:100]  Out: 3600 [Urine:3600]  Last three shifts:  08/27 0701 - 08/28 1900  In: 6188.9 [P.O.:590; I.V.:5598.9]  Out: 8776 [Urine:8775]    Exam:    General: debilitated WF, alert, NAD, OX3  Head/Neck: NCAT, supple, No masses, No lymphadenopathy  CVS:Regular rate and rhythm, no M/R/G, S1/S2 heard, no thrill  Lungs:Clear to auscultation bilaterally, no wheezes, rhonchi, or rales  Abdomen: Soft, Nontender, No distention, few Bowel sounds, No hepatomegaly, prior surgery scars  Extremities: No C/C/E, pulses palpable 2+  Skin:normal texture and turgor, no rashes, no lesions  Neuro:generalized weakness  Psych:appropriate                Labs: Results:       Chemistry Recent Labs      08/28/17   0510  08/27/17   0200   GLU  89  116*   NA  137  136   K  3.3*  4.3   CL  98*  97*   CO2  29  28   BUN  5*  25*   CREA  0.44*  0.79   CA  8.5  8.1*   AGAP  10  11   BUCR  11*  32*   AP   --   249*   TP   --   7.8   ALB   --   3.5   GLOB   --   4.3*   AGRAT   --   0.8      CBC w/Diff Recent Labs      08/28/17   0441  08/27/17   0200   WBC  6.9  7.5   RBC  4.54  4.70   HGB  13.9  14.1   HCT  40.9  42.4   PLT  247  278   GRANS   --   65   LYMPH   --   24   EOS   --   2      Cardiac Enzymes Recent Labs      08/27/17   0200   CPK  85   CKND1  3.2      Coagulation Recent Labs      08/27/17   0200   PTP  11.2*   INR  0.9       Lipid Panel No results found for: CHOL, CHOLPOCT, CHOLX, CHLST, CHOLV, 278727, HDL, LDL, LDLC, DLDLP, 684161, VLDLC, VLDL, TGLX, TRIGL, TRIGP, TGLPOCT, CHHD, CHHDX   BNP No results for input(s): BNPP in the last 72 hours.    Liver Enzymes Recent Labs      08/27/17   0200   TP  7.8   ALB  3.5   AP  249*   SGOT  23      Thyroid Studies Lab Results   Component Value Date/Time    TSH 17.30 08/27/2017 02:00 AM        Procedures/imaging: see electronic medical records for all procedures/Xrays and details which were not copied into this note but were reviewed prior to creation of Rupal Duke MD

## 2017-08-29 NOTE — WOUND CARE
Wound care in to see patient for consult of wound to sacrum measuring 0.5 x 0.4 x 0.1, area cleaned and dressed with svetlana and mepilex border. Wound care available if needed.

## 2017-08-29 NOTE — PROGRESS NOTES
0800 - Bedside shift report received from Anurag Marie RN. Assumed care of patient. Patient noted resting in bed at this time. Call light in reach. Assumed care of patient. 6300 - Assessment completed as per flowsheet. Reports pain to back, abdomen, and head. Patient states pain related to not having a bowel movement. PRN Tramadol given for pain. 1345 - Morphine given for 10/10 pain. Patient resting in bed with call light in reach. 1440 - Dr. Zuleyka Larson paged, awaiting return call. 1505 - Return call received. Dr. Zuleyka Larson made aware of patient's request for miralax 4 times a day with an hour in between each dose. Dr. Zuleyka Larson to speak with pharmacy about request.    3041 -  Patient continues with c/o 10/10 pain. PRN tramadol given.  present at bedside. Call light in reach. Shift summary: Patient received spa bath. Reports continued 10/10 pain to head, back, and abd that patient relates to constipation. No bowel movements noted this shift. Medicated with morphine and tramadol for pain.

## 2017-08-29 NOTE — PROGRESS NOTES
Hospitalist Progress Note    Patient: Raul Andrews MRN: [id number]  CSN: [id number]    YOB: 1950  Age: 78 y.o.   Sex: female    DOA: 8/27/2017 LOS:  LOS: 1 day          CDMP query responses    Toxic metabolic encephalopathy due to drug overdose, POA  UTI due to indwelling león, ROBBA  Kosta Paige MD

## 2017-08-29 NOTE — ROUTINE PROCESS
Bedside, Verbal and Written shift change report given to JOSE ALEJANDRO Patterson RN (oncoming nurse) by Florie Krabbe, RN (offgoing nurse). Report included the following information SBAR, Kardex, Intake/Output, MAR, Accordion, Recent Results and Med Rec Status.

## 2017-08-29 NOTE — ROUTINE PROCESS
Bedside and Verbal shift change report given to Reuel Bloch, RN (oncoming nurse) by Solo Reynolds RN   (offgoing nurse). Report included the following information SBAR, Kardex, Intake/Output, MAR and Recent Results.

## 2017-08-29 NOTE — PROGRESS NOTES
Assisted patient with the execution of Advance Medical Directive. Placed the copy into patient's \"like paper chart. \"  See Flow Sheet for other pastoral interventions. Chaplains will continue to follow and provide pastoral care on an as needed/requested basis.     Sister Khari Maravilla, [address]  [phone]

## 2017-08-29 NOTE — PROGRESS NOTES
0000 pt wanted her IV taken out but unable to start another IV. Wanted to have PICC line. Informed pt to wait for doctor to see her in the morning if central line needed. Pt wanted VS taken due to report of feeling nausea and light-headed. BP somewhat elevated due to pt still in pain and pain med has not started working per pt's report of pain still at same level. Wanted to ask MD to change order for Miralax to 4 time a day. Informed pt that per pharmacist it is supposed to be once or twice a day only. 0350 pt complained of nausea, MD on-call paged and received order for Zofran for 1 dose. Also received order for Protonix po. Pt assisted with repositioning in bed with pillow under lower back for comfort. 0530 pt unable to sleep all night, very talkative. Frequently requested diet ginger ale and warm water to drink. Dr. José Miguel Colin in to see pt this am and address all questions that pt had listed.

## 2017-08-29 NOTE — PROGRESS NOTES
D/C Plan: [facility] vs SNF    Noted pt is current with [facility]. Please order PT/OT when appropriate to assist with identifying plan of care needs. Noted plan for psych consult prior to discharge, given this is pt's second time taking her medications inappropriately.   CM to continue to follow and assist.

## 2017-08-30 VITALS
WEIGHT: 145.28 LBS | HEART RATE: 93 BPM | TEMPERATURE: 98 F | OXYGEN SATURATION: 96 % | BODY MASS INDEX: 23.35 KG/M2 | DIASTOLIC BLOOD PRESSURE: 63 MMHG | RESPIRATION RATE: 20 BRPM | SYSTOLIC BLOOD PRESSURE: 141 MMHG | HEIGHT: 66 IN

## 2017-08-30 LAB
ANION GAP SERPL CALC-SCNC: 8 MMOL/L (ref 3–18)
BACTERIA SPEC CULT: ABNORMAL
BUN SERPL-MCNC: 7 MG/DL (ref 7–18)
BUN/CREAT SERPL: 14 (ref 12–20)
CALCIUM SERPL-MCNC: 8.4 MG/DL (ref 8.5–10.1)
CHLORIDE SERPL-SCNC: 93 MMOL/L (ref 100–108)
CO2 SERPL-SCNC: 29 MMOL/L (ref 21–32)
CREAT SERPL-MCNC: 0.51 MG/DL (ref 0.6–1.3)
ERYTHROCYTE [DISTWIDTH] IN BLOOD BY AUTOMATED COUNT: 15.5 % (ref 11.6–14.5)
GLUCOSE SERPL-MCNC: 60 MG/DL (ref 74–99)
HCT VFR BLD AUTO: 42.7 % (ref 35–45)
HGB BLD-MCNC: 14.5 G/DL (ref 12–16)
MCH RBC QN AUTO: 29.8 PG (ref 24–34)
MCHC RBC AUTO-ENTMCNC: 34 G/DL (ref 31–37)
MCV RBC AUTO: 87.9 FL (ref 74–97)
PLATELET # BLD AUTO: 222 K/UL (ref 135–420)
PMV BLD AUTO: 8.7 FL (ref 9.2–11.8)
POTASSIUM SERPL-SCNC: 3.1 MMOL/L (ref 3.5–5.5)
RBC # BLD AUTO: 4.86 M/UL (ref 4.2–5.3)
SERVICE CMNT-IMP: ABNORMAL
SODIUM SERPL-SCNC: 130 MMOL/L (ref 136–145)
WBC # BLD AUTO: 6.8 K/UL (ref 4.6–13.2)

## 2017-08-30 PROCEDURE — 74011250637 HC RX REV CODE- 250/637: Performed by: HOSPITALIST

## 2017-08-30 PROCEDURE — 36415 COLL VENOUS BLD VENIPUNCTURE: CPT | Performed by: HOSPITALIST

## 2017-08-30 PROCEDURE — 74011250636 HC RX REV CODE- 250/636: Performed by: INTERNAL MEDICINE

## 2017-08-30 PROCEDURE — 80048 BASIC METABOLIC PNL TOTAL CA: CPT | Performed by: HOSPITALIST

## 2017-08-30 PROCEDURE — 97165 OT EVAL LOW COMPLEX 30 MIN: CPT

## 2017-08-30 PROCEDURE — 74011250637 HC RX REV CODE- 250/637: Performed by: INTERNAL MEDICINE

## 2017-08-30 PROCEDURE — 85027 COMPLETE CBC AUTOMATED: CPT | Performed by: HOSPITALIST

## 2017-08-30 RX ORDER — DULOXETIN HYDROCHLORIDE 30 MG/1
30 CAPSULE, DELAYED RELEASE ORAL DAILY
Qty: 1 CAP | Refills: 0 | Status: SHIPPED
Start: 2017-08-30 | End: 2017-11-22

## 2017-08-30 RX ORDER — NITROFURANTOIN 25; 75 MG/1; MG/1
100 CAPSULE ORAL 2 TIMES DAILY
Qty: 10 CAP | Refills: 0 | Status: SHIPPED | OUTPATIENT
Start: 2017-08-30 | End: 2017-09-04

## 2017-08-30 RX ORDER — POTASSIUM CHLORIDE 20 MEQ/1
40 TABLET, EXTENDED RELEASE ORAL 2 TIMES DAILY
Status: DISCONTINUED | OUTPATIENT
Start: 2017-08-30 | End: 2017-08-30 | Stop reason: HOSPADM

## 2017-08-30 RX ORDER — SULFAMETHOXAZOLE AND TRIMETHOPRIM 800; 160 MG/1; MG/1
1 TABLET ORAL 2 TIMES DAILY
Qty: 14 TAB | Refills: 0 | Status: SHIPPED | OUTPATIENT
Start: 2017-08-30 | End: 2017-09-06

## 2017-08-30 RX ORDER — TRAMADOL HYDROCHLORIDE 50 MG/1
50 TABLET ORAL
Qty: 15 TAB | Refills: 0 | Status: SHIPPED | OUTPATIENT
Start: 2017-08-30 | End: 2017-11-22

## 2017-08-30 RX ADMIN — LEVETIRACETAM 500 MG: 500 TABLET, FILM COATED ORAL at 09:06

## 2017-08-30 RX ADMIN — POLYETHYLENE GLYCOL 3350 17 G: 17 POWDER, FOR SOLUTION ORAL at 09:06

## 2017-08-30 RX ADMIN — MORPHINE SULFATE 2 MG: 2 INJECTION, SOLUTION INTRAMUSCULAR; INTRAVENOUS at 16:02

## 2017-08-30 RX ADMIN — ENOXAPARIN SODIUM 40 MG: 40 INJECTION SUBCUTANEOUS at 09:06

## 2017-08-30 RX ADMIN — POTASSIUM CHLORIDE 40 MEQ: 20 TABLET, EXTENDED RELEASE ORAL at 09:06

## 2017-08-30 RX ADMIN — POLYETHYLENE GLYCOL 3350 17 G: 17 POWDER, FOR SOLUTION ORAL at 16:02

## 2017-08-30 RX ADMIN — LACOSAMIDE 200 MG: 100 TABLET, FILM COATED ORAL at 09:06

## 2017-08-30 RX ADMIN — MORPHINE SULFATE 2 MG: 2 INJECTION, SOLUTION INTRAMUSCULAR; INTRAVENOUS at 07:19

## 2017-08-30 RX ADMIN — PANTOPRAZOLE SODIUM 40 MG: 40 TABLET, DELAYED RELEASE ORAL at 06:30

## 2017-08-30 RX ADMIN — TRAMADOL HYDROCHLORIDE 50 MG: 50 TABLET, FILM COATED ORAL at 06:23

## 2017-08-30 RX ADMIN — MORPHINE SULFATE 2 MG: 2 INJECTION, SOLUTION INTRAMUSCULAR; INTRAVENOUS at 02:21

## 2017-08-30 RX ADMIN — NYSTATIN: 100000 CREAM TOPICAL at 09:12

## 2017-08-30 RX ADMIN — DULOXETINE HYDROCHLORIDE 30 MG: 30 CAPSULE, DELAYED RELEASE ORAL at 09:06

## 2017-08-30 RX ADMIN — LEVOTHYROXINE SODIUM 75 MCG: 75 TABLET ORAL at 07:19

## 2017-08-30 NOTE — PROGRESS NOTES
Patient was visited by AVIVA. Volunteer followed up with patient and/or family and reported no needs to this . Chaplains will continue to follow and will provide pastoral care as needed or requested. Rev.  [facility]  [phone]

## 2017-08-30 NOTE — INTERDISCIPLINARY ROUNDS
Interdisciplinary Round Note   Patient Information: Meagan Russell                                      310/01 Reason for Admission: Overdose  Quality Measure: Not applicable            Attending Provider:   James Williamson MD  Primary Care Physician:       Kyle Aguilar MD       [phone]  Consulting:  IP CONSULT TO HOSPITALIST  IP CONSULT TO HOSPITALIST  IP CONSULT TO INTENSIVIST  IDR Rounding Physician:  Past Medical History:   Past Medical History:   Diagnosis Date    Anemia NEC     history of blood transfusion    Anxiety     panic attacks    Arthritis     Bipolar 1 disorder ([address]     Chronic back pain     Depression     Elevated diaphragm 5/4/2012    Fatigue     along with weakness    GERD (gastroesophageal reflux disease)     Headache     Hypertension     pt denies    Other ill-defined conditions swallowing issues, decubitus    Psychiatric disorder     Seizures ([facility] [address]     TIA (transient ischemic attack)         Hospital day: 3                          3d 12h  Estimated discharge date:   RRAT Score: Medium Risk            17       Total Score        3 Has Seen PCP in Last 6 Months (Yes=3, No=0)    2 . Living with Significant Other. Assisted Living. LTAC. SNF. or   Rehab    4 IP Visits Last 12 Months (1-3=4, 4=9, >4=11)    5 Pt. Coverage (Medicare=5 , Medicaid, or Self-Pay=4)    3 Charlson Comorbidity Score (Age + Comorbid Conditions)        Criteria that do not apply:    Patient Length of Stay (>5 days = 3)         Primary Goals for Today: psychiatric evaluation    Secondary Goals for Today: Change león catheter prior to discharge    Overnight Events: n/a    León: arrived with león, will be replaced prior to discharge    Central Line: n/a    Isolation: n/a       IV Antibiotics?  yes       When started: 8/28/17  Current Medication List:          Current Facility-Administered Medications   Medication Dose Route Frequency    potassium chloride (K-DUR, KLOR-CON) SR tablet 40 mEq  40 mEq Oral BID    pantoprazole (PROTONIX) tablet 40 mg  40 mg Oral ACB    polyethylene glycol (MIRALAX) packet 17 g  17 g Oral TID    traMADol (ULTRAM) tablet 50 mg  50 mg Oral Q6H PRN    nystatin (MYCOSTATIN) 100,000 unit/gram cream   Topical BID    DULoxetine (CYMBALTA) capsule 30 mg  30 mg Oral BID    levothyroxine (SYNTHROID) tablet 75 mcg  75 mcg Oral ACB    mirabegron ER (MYRBETRIQ) tablet 50 mg  50 mg Oral QHS    OLANZapine (ZyPREXA) tablet 7.5 mg  7.5 mg Oral QHS    lacosamide (VIMPAT) tablet 200 mg  200 mg Oral BID    morphine injection 2 mg  2 mg IntraVENous Q4H PRN    levETIRAcetam (KEPPRA) tablet 500 mg  500 mg Oral BID    levoFLOXacin (LEVAQUIN) 500 mg in D5W IVPB  500 mg IntraVENous Q24H    enoxaparin (LOVENOX) injection 40 mg  40 mg SubCUTAneous Q24H      VTE Prophylaxis               Sequential Compression Device: Bilateral               Patient Refused VTE Prophylaxis: Yes Lines, Drains, & Airways  [REMOVED] Peripheral IV 08/27/17 Right Antecubital-Site Assessment: Intact  [REMOVED] Peripheral IV 08/27/17 Left Leg-Site Assessment: Clean, dry, & intact  Peripheral IV 08/29/17 Left Hand-Site Assessment: Clean, dry, & intact  Urinary Catheter 08/10/17 Gotti-Criteria for Appropriate Use: Medically/surgically unstable  Vital signs:  Visit Vitals    /55    Pulse 84    Temp 98.3 °F (36.8 °C)    Resp 18    Ht 5' 6\" (1.676 m)    Wt 65.9 kg (145 lb 4.5 oz)    SpO2 94%    BMI 23.45 kg/m2        Intake and Output:   08/28 1901 - 08/30 0700  In: 1400 [P.O.:1200;  I.V.:200]  Out: 6200 [Urine:6200]  08/30 0701 - 08/30 1900  In: -   Out: 150 [Urine:150]  Last Bowel Movement Date: 08/30/17  Stool Color: Vallery Hamming  Stool Appearance: Loose, Soft (Lumpy)  Stool Amount:  (XXL)  Stool Source/Status: Incontinence     Current Diet: DIET REGULAR       Abdominal   Last Bowel Movement Date: 08/30/17  Stool Appearance: Loose, Soft (Lumpy)  Abdominal Assessment: Intact, Soft  Appetite: Fair  Bowel Sounds: Active   Nutrition  Chewing/Swallowing Problems: Unknown  Difficulty with Secretions: No  Speech Slurred/Thick/Garbled: Unknown    NGT: n/a    Feeding Tube: n/a   Recent Glucose Results:   Lab Results   Component Value Date/Time    GLU 60 (L) 08/30/2017 05:59 AM    GI Prophylaxis: yes        Type: protonix        Activity Level:   Activity Level: Bed Rest    Needs assistance with ADLs: yes  PT Consult Status: completed  Equipment: power wheel chair, pt personal  Surgical/Ortho Notes: n/a   Current Immunizations:  Immunization History   Administered Date(s) Administered    Influenza High Dose Vaccine PF 09/13/2016    Influenza Vaccine 09/13/2016    Influenza Vaccine PF 10/01/2015    Pneumococcal Polysaccharide (PPSV-23) 12/02/2013, 10/01/2015    ZZZ-RETIRED (DO NOT USE) Pneumococcal Vaccine (Unspecified Type) 09/14/2012     RRAT Score:     Readmit Risk Tool  Support Systems: Spouse/Significant Other/Partner  Relationship with Primary Physician Group: Seen at least one time within the past 6 months   Recommendations:       Discharge Disposition: No Needs    Needs for Discharge: pt already has home health aides for assistance at home with appropriate equipment           Recommendations from IDR team: shobha león prior to discharge, complete psych eval    Other Notes:

## 2017-08-30 NOTE — DISCHARGE SUMMARY
[facility] [address] SUMMARY    Name:  Ab Chan  MR#:  [id number]  :  1950  Account #:  [de-identified]  Date of Adm:  2017  Date of Discharge:  2017      DIAGNOSES AT DISCHARGE  1. Narcosis. 2. Urinary tract infection. 3. Indwelling Gotti catheter with chronic urinary tract infection. 4. Debility and bed bound status. 5. Chronic hip and back pain. 6. Bipolar disorder. 7. Narcotic history. 8. Gastroesophageal reflux disease. 9. Chronic constipation. HOSPITAL SUMMARY: The patient is 78years old. She is known to  the medical service. She has been in the hospital prior, had been here  earlier this year for GI issues and constipation, had an extensive GI  workup for bleeding at that time. She came to the emergency room this  time for unresponsiveness. Her  has started doing CPR and  chest compressions on her, but EMS was called and they noted she  had a blood pressure and a pulse. At that time, we thought she had  probably taken her morphine, but after review of the patient's  prescription review online by pharmacy here, it appears she had not  taken morphine, or at least filled morphine since April. She has been  taking Fioricet, however, and she is on various seizure medications. She does have a history of seizures. She has a history of respiratory  failure that led to severe debility and what she describes as paralysis. She has been bound for over 5 years. She has chronic urinary  incontinence and has a Gotti catheter chronically. She has a sacral  decubitus ulcer which is being cared for at home by Nursing, and she  has had chronic UTIs, history of chronic constipation. She required a  Narcan drip for a period of time, but was able to improve and that was  able to wean off. She was monitored in the intensive care unit until she  improved and her mentation came back to baseline, and then she was  transferred to the floor.  Her urine culture is back with 3 different  organisms here notable for likely colonization, but will treat in the  interim. She is certainly not septic or appears to have acute infection  symptoms from this. There are greater than 100,000 colonies of  Enterobacter aerogenes, greater than 100,000 colonies of  Enterobacter aerogenes second morphotype, and greater than  100,000 colonies of enterococcus. She has been on Levaquin since  she has been admitted. We need to change the antibiotics as there is  some resistance of these organisms to that. She has been slightly  hyponatremic. Sodium is 130, potassium is 3.1, which has been  repleted this morning. Creatinine is stable at 0.51. Ammonia level is  32. Cardiac markers were normal. LFTs unremarkable. Blood culture  since the 27th is no growth for 3 days. Hemoglobin and hematocrit are  stable at 14.5 and 42.7, platelets and white count are within normal  limits. She had a CT scan of her head on August 27, which showed no  acute findings, and a chest x-ray also that was done that showed slight  elevation of the right hemidiaphragm with no acute radiographic  abnormality. The patient is back to her baseline the last few days. She is  awake, alert. No acute issues. She is in good spirits. She is hopeful to  go home. She is talking about a trip she wants to go to on Sunday to  [place]. Her temperature today is 98.3, pulse 84, blood pressure 143/55,  respiratory rate 18, SaO2 94% on room air. Her lungs are clear  bilaterally. Cardiac exam: Regular rate and rhythm. No murmur, rub, or  gallop. Her abdomen is soft, nontender. Gotti catheter is in place  draining clear yellow urine. Lower extremities with no pitting edema. No rash on the skin.     Plan is to have a psychiatry consult today for review of her overdose,  which at this time is deemed to be unintentional. She is certainly not  suicidal or homicidal. In fact, she states she does not know what  happened with her medications specifically. Her  thinks maybe  she took too much of one of her medications, but considering that she  had a notable sarcosis and her drug screen was positive for  barbiturates and opiates, it is unclear as to what she actually took. Our  recommendations are to have Psychiatry evaluate her to see if she  needs further psychiatric intervention, and then thereafter to set up  home health to come out, review all of her medications with her and  her  to make sure she has safe medication access and that  somebody is managing her medications for her. For discharge, we recommend that she have the following  medications:  1. Macrobid 100 mg twice daily for 5 days. 2. Bactrim 1 tab twice daily for 7 days. 3. Cymbalta 60 mg twice a day. 4. Synthroid 125 mcg a day. 5. Baclofen 10 mg twice a day as needed. 6. Claritin D 1 tab daily. 7. Lotrimin to affected areas twice a day. 8. Dulcolax suppository 10 mg into rectum daily as needed. 9. Neurontin 900 mg 3 times a day. 10. Vimpat 200 mg twice a day. 11. Keppra 500 mg twice a day. 12. Amitiza 8 mcg twice a day. 13. Magnesium oxide 500 mg daily. 14. MiraLax 17 g p.o. 4 times daily. 15. Myrbetriq 50 mg at night. 16. Prednisone 5 mg daily. 17. Protonix 20 mg twice daily. 18. Requip 0.75 mg at night. 19. Vitamin C 500 mg daily. 20. Vitamin D 50,000 units monthly. 21. Voltaren gel as needed to affected areas. 22. Zyprexa 7.5 mg at night. I took out the morphine and the Fioricet out of her list. I do not see that  she needs these pain medications currently. If anything, she could  utilize potentially tramadol, and I can prescribe that for her. I would  suggest staying away from narcotic medications, and then visiting  physicians can prescribe what they think is appropriate otherwise,  but in the meantime there does not appear to be any other narcotic  medications that would precipitate narcosis for her.  The other  medication she has been on for some time. If Psych clears her from  their standpoint, she can discharge home with her  this  afternoon via medical transport. Gotti catheter should be changed out  before she leaves. Resume home health. Follow up with visiting  physicians in 1 week at the house. Review of her medications with  home nursing. Regular diet in the interim. Forty-five minutes on discharge time.         MD TAMMIE Sapp / OSCAR  D:  08/30/2017   12:27  T:  08/30/2017   13:10  Job #:  [id number]

## 2017-08-30 NOTE — DISCHARGE INSTRUCTIONS
Urinary Tract Infection in Women: Care Instructions  Your Care Instructions    A urinary tract infection, or UTI, is a general term for an infection anywhere between the kidneys and the urethra (where urine comes out). Most UTIs are bladder infections. They often cause pain or burning when you urinate. UTIs are caused by bacteria and can be cured with antibiotics. Be sure to complete your treatment so that the infection goes away. Follow-up care is a key part of your treatment and safety. Be sure to make and go to all appointments, and call your doctor if you are having problems. It's also a good idea to know your test results and keep a list of the medicines you take. How can you care for yourself at home? · Take your antibiotics as directed. Do not stop taking them just because you feel better. You need to take the full course of antibiotics. · Drink extra water and other fluids for the next day or two. This may help wash out the bacteria that are causing the infection. (If you have kidney, heart, or liver disease and have to limit fluids, talk with your doctor before you increase your fluid intake.)  · Avoid drinks that are carbonated or have caffeine. They can irritate the bladder. · Urinate often. Try to empty your bladder each time. · To relieve pain, take a hot bath or lay a heating pad set on low over your lower belly or genital area. Never go to sleep with a heating pad in place. To prevent UTIs  · Drink plenty of water each day. This helps you urinate often, which clears bacteria from your system. (If you have kidney, heart, or liver disease and have to limit fluids, talk with your doctor before you increase your fluid intake.)  · Urinate when you need to. · Urinate right after you have sex. · Change sanitary pads often. · Avoid douches, bubble baths, feminine hygiene sprays, and other feminine hygiene products that have deodorants.   · After going to the bathroom, wipe from front to back.  When should you call for help? Call your doctor now or seek immediate medical care if:  · Symptoms such as fever, chills, nausea, or vomiting get worse or appear for the first time. · You have new pain in your back just below your rib cage. This is called flank pain. · There is new blood or pus in your urine. · You have any problems with your antibiotic medicine. Watch closely for changes in your health, and be sure to contact your doctor if:  · You are not getting better after taking an antibiotic for 2 days. · Your symptoms go away but then come back. Where can you learn more? Go to http://sav-susannah.info/. Enter Q866 in the search box to learn more about \"Urinary Tract Infection in Women: Care Instructions. \"  Current as of: November 28, 2016  Content Version: 11.3  © 2743-2819 [employer]. Care instructions adapted under license by [employer] (which disclaims liability or warranty for this information). If you have questions about a medical condition or this instruction, always ask your healthcare professional. [place] [address] any warranty or liability for your use of this information. Patient armband removed and shredded    DISCHARGE SUMMARY from Nurse    The following personal items are in your possession at time of discharge:    Dental Appliances: None  Visual Aid: Glasses     Home Medications: None  Jewelry: None  Clothing: None  Other Valuables: None             PATIENT INSTRUCTIONS:    After general anesthesia or intravenous sedation, for 24 hours or while taking prescription Narcotics:  · Limit your activities  · Do not drive and operate hazardous machinery  · Do not make important personal or business decisions  · Do  not drink alcoholic beverages  · If you have not urinated within 8 hours after discharge, please contact your surgeon on call.     Report the following to your surgeon:  · Excessive pain, swelling, redness or odor of or around the surgical area  · Temperature over 100.5  · Nausea and vomiting lasting longer than 4 hours or if unable to take medications  · Any signs of decreased circulation or nerve impairment to extremity: change in color, persistent  numbness, tingling, coldness or increase pain  · Any questions        What to do at Home:  Recommended activity: Activity as tolerated. If you experience any of the following symptoms difficulty breathing, confusion, increased pain, please follow up with MD or ED. *  Please give a list of your current medications to your Primary Care Provider. *  Please update this list whenever your medications are discontinued, doses are      changed, or new medications (including over-the-counter products) are added. *  Please carry medication information at all times in case of emergency situations. These are general instructions for a healthy lifestyle:    No smoking/ No tobacco products/ Avoid exposure to second hand smoke    Surgeon General's Warning:  Quitting smoking now greatly reduces serious risk to your health. Obesity, smoking, and sedentary lifestyle greatly increases your risk for illness    A healthy diet, regular physical exercise & weight monitoring are important for maintaining a healthy lifestyle    You may be retaining fluid if you have a history of heart failure or if you experience any of the following symptoms:  Weight gain of 3 pounds or more overnight or 5 pounds in a week, increased swelling in our hands or feet or shortness of breath while lying flat in bed. Please call your doctor as soon as you notice any of these symptoms; do not wait until your next office visit.     Recognize signs and symptoms of STROKE:    F-face looks uneven    A-arms unable to move or move unevenly    S-speech slurred or non-existent    T-time-call 911 as soon as signs and symptoms begin-DO NOT go       Back to bed or wait to see if you get better-TIME IS BRAIN. Warning Signs of HEART ATTACK     Call 911 if you have these symptoms:   Chest discomfort. Most heart attacks involve discomfort in the center of the chest that lasts more than a few minutes, or that goes away and comes back. It can feel like uncomfortable pressure, squeezing, fullness, or pain.  Discomfort in other areas of the upper body. Symptoms can include pain or discomfort in one or both arms, the back, neck, jaw, or stomach.  Shortness of breath with or without chest discomfort.  Other signs may include breaking out in a cold sweat, nausea, or lightheadedness. Don't wait more than five minutes to call 911 - MINUTES MATTER! Fast action can save your life. Calling 911 is almost always the fastest way to get lifesaving treatment. Emergency Medical Services staff can begin treatment when they arrive -- up to an hour sooner than if someone gets to the hospital by car. The discharge information has been reviewed with the patient. The patient verbalized understanding. Discharge medications reviewed with the patient and appropriate educational materials and side effects teaching were provided.

## 2017-08-30 NOTE — PROGRESS NOTES
Cm spoke with patient at bedside and spoke with ,planning for home discharge today,pt will be returning back home @ [address], [place] [id number], pt did request for medical transport services,pt was made aware of possible transport fees,pt spoke with  made him aware of discharge likely today, plans to be home to meet patient,FOC completed for [facility] [place] [facility],please send avs,medication hard scripts with patient. Care Management Interventions  PCP Verified by CM: Yes  Palliative Care Consult (Criteria: CHF and RRAT>21): No  Reason for No Palliative Care Consult:  Other (see comment)  Mode of Transport at Discharge: [facility]  Transition of Care Consult (CM Consult): [address]: No  Reason Outside [place]: Patient already serviced by other home care/hospice agency ([facility])  Discharge Durable Medical Equipment: No  Health Maintenance Reviewed: Yes  Physical Therapy Consult: Yes  Occupational Therapy Consult: Yes  Speech Therapy Consult: No  Current Support Network: Lives with Spouse  Confirm Follow Up Transport: Self  Plan discussed with Pt/Family/Caregiver: Yes  Freedom of Choice Offered: Yes  Discharge Location  Discharge Placement: Home with home health

## 2017-08-30 NOTE — DIABETES MGMT
GLYCEMIC CONTROL PROGRESS NOTE:    -pt with no known h/o DM  Noted:  -mild hypoglycemic episode on AM labs  -pt consumed ~ 50% of early dinner yesterday  Recommendation:   *encourage pt to eat at least 50% of meals & bedtime snack to prevent future hypoglycemic episodes   - Intake:   Patient Vitals for the past 100 hrs:   % Diet Eaten   08/30/17 1451 100 %   08/29/17 1545 50 %   08/29/17 0923 100 %   08/28/17 1839 100 %   08/28/17 0955 100 %        Recent Glucose Results:   Lab Results   Component Value Date/Time    GLU 60 (L) 08/30/2017 05:59 AM     Carmela Melchor RN, MS  Glycemic Control Team

## 2017-08-30 NOTE — ROUTINE PROCESS
Dual AVS reviewed with Taran You RN. All medications reviewed individually with patient. Opportunities for questions and concerns provided. Patient discharged via (mode of transport ie. Car, ambulance or air transport) ambulance, non emergency  Patient's arm band appropriately discarded.

## 2017-08-30 NOTE — ROUTINE PROCESS
Bedside shift change report given to Denzel Rubio RN (oncoming nurse) by Diane Daigle RN (offgoing nurse). Report included the following information SBAR, Kardex, MAR, Med Rec Status and Alarm Parameters .
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
## 2017-08-30 NOTE — PROGRESS NOTES
PT consult received, chart reviewed. Pt light on on PT arrival (pt awaiting CNA assist for hygiene care). Spoke with pt who reports she is being discharged today and will be going out of town thereafter (to multiple states including [place]). Pt expresses happiness that she now has a handicap [employer] and has been able to go to [place]. No evaluation completed at this time as pt awaiting hygiene care. Advised pt to refer to PCP in order to resume HHPT if needed upon return from trip. Nurse Valerie Vazquez notified of above. Thank you.   Kristen Bean, PT

## 2017-08-30 NOTE — ROUTINE PROCESS
Bedside and Verbal shift change report given to [facility], RN (oncoming nurse) by LINH Gutierrez RN (offgoing nurse). Report included the following information SBAR, Kardex, OR Summary, Intake/Output and MAR.

## 2017-08-30 NOTE — PROGRESS NOTES
Alarm parameters reviewed, on and audible Appropriate for patient clinical condition. 2034 - Received bedside report from Betty Damian. 26 - Asks several times when miralax is due and wants to take it now to help with stomach pain. Education given concerning times miralax is due. Pt verbalized understanding. [address] she is happy she had a bm and her stomach is not hurting as bad 8/10 now. PO norco 5/325 given for pain. Shift summary - No other changes since last assessment.     Patient Vitals for the past 12 hrs:   Temp Pulse Resp BP SpO2   08/30/17 0400 97.7 °F (36.5 °C) 95 18 141/86 95 %   08/29/17 2241 98.4 °F (36.9 °C) 92 17 148/80 100 %   08/29/17 1944 98.2 °F (36.8 °C) 82 16 146/85 100 %

## 2017-09-02 LAB
BACTERIA SPEC CULT: NORMAL
SERVICE CMNT-IMP: NORMAL

## 2017-10-07 ENCOUNTER — HOSPITAL ENCOUNTER (EMERGENCY)
Age: 67
Discharge: HOME | End: 2017-10-07
Attending: FAMILY MEDICINE
Payer: MEDICARE

## 2017-10-07 ENCOUNTER — APPOINTMENT (OUTPATIENT)
Dept: CT IMAGING | Age: 67
End: 2017-10-07
Attending: PHYSICIAN ASSISTANT
Payer: MEDICARE

## 2017-10-07 VITALS
DIASTOLIC BLOOD PRESSURE: 67 MMHG | HEIGHT: 66 IN | WEIGHT: 151 LBS | HEART RATE: 108 BPM | TEMPERATURE: 98.3 F | BODY MASS INDEX: 24.27 KG/M2 | SYSTOLIC BLOOD PRESSURE: 143 MMHG | RESPIRATION RATE: 16 BRPM | OXYGEN SATURATION: 100 %

## 2017-10-07 DIAGNOSIS — E87.1 HYPONATREMIA: ICD-10-CM

## 2017-10-07 DIAGNOSIS — N39.0 COMPLICATED UTI (URINARY TRACT INFECTION): Primary | ICD-10-CM

## 2017-10-07 LAB
ALBUMIN SERPL-MCNC: 3.8 G/DL (ref 3.4–5)
ALBUMIN/GLOB SERPL: 0.8 {RATIO} (ref 0.8–1.7)
ALP SERPL-CCNC: 264 U/L (ref 45–117)
ALT SERPL-CCNC: 43 U/L (ref 13–56)
ANION GAP SERPL CALC-SCNC: 9 MMOL/L (ref 3–18)
APPEARANCE UR: ABNORMAL
AST SERPL-CCNC: 38 U/L (ref 15–37)
BACTERIA URNS QL MICRO: ABNORMAL /HPF
BASOPHILS # BLD: 0 K/UL (ref 0–0.06)
BASOPHILS NFR BLD: 0 % (ref 0–2)
BILIRUB SERPL-MCNC: 0.4 MG/DL (ref 0.2–1)
BILIRUB UR QL: NEGATIVE
BUN SERPL-MCNC: 14 MG/DL (ref 7–18)
BUN/CREAT SERPL: 24 (ref 12–20)
CALCIUM SERPL-MCNC: 9.5 MG/DL (ref 8.5–10.1)
CHLORIDE SERPL-SCNC: 89 MMOL/L (ref 100–108)
CO2 SERPL-SCNC: 29 MMOL/L (ref 21–32)
COLOR UR: YELLOW
CREAT SERPL-MCNC: 0.58 MG/DL (ref 0.6–1.3)
DIFFERENTIAL METHOD BLD: ABNORMAL
EOSINOPHIL # BLD: 0 K/UL (ref 0–0.4)
EOSINOPHIL NFR BLD: 0 % (ref 0–5)
EPITH CASTS URNS QL MICRO: ABNORMAL /LPF (ref 0–5)
ERYTHROCYTE [DISTWIDTH] IN BLOOD BY AUTOMATED COUNT: 15.5 % (ref 11.6–14.5)
GLOBULIN SER CALC-MCNC: 4.9 G/DL (ref 2–4)
GLUCOSE SERPL-MCNC: 111 MG/DL (ref 74–99)
GLUCOSE UR STRIP.AUTO-MCNC: NEGATIVE MG/DL
HCT VFR BLD AUTO: 47.3 % (ref 35–45)
HGB BLD-MCNC: 16.5 G/DL (ref 12–16)
HGB UR QL STRIP: ABNORMAL
KETONES UR QL STRIP.AUTO: NEGATIVE MG/DL
LEUKOCYTE ESTERASE UR QL STRIP.AUTO: ABNORMAL
LIPASE SERPL-CCNC: 351 U/L (ref 73–393)
LYMPHOCYTES # BLD: 1.4 K/UL (ref 0.9–3.6)
LYMPHOCYTES NFR BLD: 15 % (ref 21–52)
MCH RBC QN AUTO: 30.4 PG (ref 24–34)
MCHC RBC AUTO-ENTMCNC: 34.9 G/DL (ref 31–37)
MCV RBC AUTO: 87.3 FL (ref 74–97)
MONOCYTES # BLD: 0.6 K/UL (ref 0.05–1.2)
MONOCYTES NFR BLD: 6 % (ref 3–10)
MUCOUS THREADS URNS QL MICRO: ABNORMAL /LPF
NEUTS SEG # BLD: 7.1 K/UL (ref 1.8–8)
NEUTS SEG NFR BLD: 79 % (ref 40–73)
NITRITE UR QL STRIP.AUTO: NEGATIVE
PH UR STRIP: 5.5 [PH] (ref 5–8)
PLATELET # BLD AUTO: 330 K/UL (ref 135–420)
PMV BLD AUTO: 9 FL (ref 9.2–11.8)
POTASSIUM SERPL-SCNC: 3.9 MMOL/L (ref 3.5–5.5)
PROT SERPL-MCNC: 8.7 G/DL (ref 6.4–8.2)
PROT UR STRIP-MCNC: 30 MG/DL
RBC # BLD AUTO: 5.42 M/UL (ref 4.2–5.3)
RBC #/AREA URNS HPF: ABNORMAL /HPF (ref 0–5)
SODIUM SERPL-SCNC: 127 MMOL/L (ref 136–145)
SP GR UR REFRACTOMETRY: 1.03 (ref 1–1.03)
UROBILINOGEN UR QL STRIP.AUTO: 0.2 EU/DL (ref 0.2–1)
WBC # BLD AUTO: 9.1 K/UL (ref 4.6–13.2)
WBC URNS QL MICRO: ABNORMAL /HPF (ref 0–5)

## 2017-10-07 PROCEDURE — 96360 HYDRATION IV INFUSION INIT: CPT

## 2017-10-07 PROCEDURE — 87077 CULTURE AEROBIC IDENTIFY: CPT | Performed by: PHYSICIAN ASSISTANT

## 2017-10-07 PROCEDURE — 87186 SC STD MICRODIL/AGAR DIL: CPT | Performed by: PHYSICIAN ASSISTANT

## 2017-10-07 PROCEDURE — 80053 COMPREHEN METABOLIC PANEL: CPT | Performed by: PHYSICIAN ASSISTANT

## 2017-10-07 PROCEDURE — 85025 COMPLETE CBC W/AUTO DIFF WBC: CPT | Performed by: PHYSICIAN ASSISTANT

## 2017-10-07 PROCEDURE — 81001 URINALYSIS AUTO W/SCOPE: CPT | Performed by: FAMILY MEDICINE

## 2017-10-07 PROCEDURE — 74011250637 HC RX REV CODE- 250/637: Performed by: PHYSICIAN ASSISTANT

## 2017-10-07 PROCEDURE — 51702 INSERT TEMP BLADDER CATH: CPT

## 2017-10-07 PROCEDURE — 74176 CT ABD & PELVIS W/O CONTRAST: CPT

## 2017-10-07 PROCEDURE — 74011250636 HC RX REV CODE- 250/636: Performed by: PHYSICIAN ASSISTANT

## 2017-10-07 PROCEDURE — 83690 ASSAY OF LIPASE: CPT | Performed by: PHYSICIAN ASSISTANT

## 2017-10-07 PROCEDURE — 87086 URINE CULTURE/COLONY COUNT: CPT | Performed by: PHYSICIAN ASSISTANT

## 2017-10-07 PROCEDURE — 77030034849

## 2017-10-07 PROCEDURE — 99284 EMERGENCY DEPT VISIT MOD MDM: CPT

## 2017-10-07 RX ORDER — LEVOFLOXACIN 750 MG/1
750 TABLET ORAL DAILY
Qty: 4 TAB | Refills: 0 | Status: SHIPPED | OUTPATIENT
Start: 2017-10-07 | End: 2017-10-11

## 2017-10-07 RX ORDER — LEVOFLOXACIN 5 MG/ML
750 INJECTION, SOLUTION INTRAVENOUS ONCE
Status: DISCONTINUED | OUTPATIENT
Start: 2017-10-07 | End: 2017-10-07 | Stop reason: SDUPTHER

## 2017-10-07 RX ORDER — LEVOFLOXACIN 750 MG/1
750 TABLET ORAL
Status: COMPLETED | OUTPATIENT
Start: 2017-10-07 | End: 2017-10-07

## 2017-10-07 RX ORDER — BUTALBITAL, ACETAMINOPHEN AND CAFFEINE 50; 325; 40 MG/1; MG/1; MG/1
1 TABLET ORAL ONCE
Status: COMPLETED | OUTPATIENT
Start: 2017-10-07 | End: 2017-10-07

## 2017-10-07 RX ORDER — NITROFURANTOIN 25; 75 MG/1; MG/1
100 CAPSULE ORAL 2 TIMES DAILY
Qty: 14 CAP | Refills: 0 | Status: SHIPPED | OUTPATIENT
Start: 2017-10-07 | End: 2017-10-11

## 2017-10-07 RX ORDER — NITROFURANTOIN 25; 75 MG/1; MG/1
100 CAPSULE ORAL ONCE
Status: COMPLETED | OUTPATIENT
Start: 2017-10-07 | End: 2017-10-07

## 2017-10-07 RX ADMIN — BUTALBITAL, ACETAMINOPHEN AND CAFFEINE 1 TABLET: 50; 325; 40 TABLET ORAL at 17:56

## 2017-10-07 RX ADMIN — SODIUM CHLORIDE 1000 ML: 900 INJECTION, SOLUTION INTRAVENOUS at 17:56

## 2017-10-07 RX ADMIN — LEVOFLOXACIN 750 MG: 750 TABLET, FILM COATED ORAL at 17:56

## 2017-10-07 RX ADMIN — NITROFURANTOIN (MONOHYDRATE/MACROCRYSTALS) 100 MG: 75; 25 CAPSULE ORAL at 17:56

## 2017-10-07 NOTE — DISCHARGE INSTRUCTIONS
Urinary Tract Infection in Women: Care Instructions  Your Care Instructions    A urinary tract infection, or UTI, is a general term for an infection anywhere between the kidneys and the urethra (where urine comes out). Most UTIs are bladder infections. They often cause pain or burning when you urinate. UTIs are caused by bacteria and can be cured with antibiotics. Be sure to complete your treatment so that the infection goes away. Follow-up care is a key part of your treatment and safety. Be sure to make and go to all appointments, and call your doctor if you are having problems. It's also a good idea to know your test results and keep a list of the medicines you take. How can you care for yourself at home? · Take your antibiotics as directed. Do not stop taking them just because you feel better. You need to take the full course of antibiotics. · Drink extra water and other fluids for the next day or two. This may help wash out the bacteria that are causing the infection. (If you have kidney, heart, or liver disease and have to limit fluids, talk with your doctor before you increase your fluid intake.)  · Avoid drinks that are carbonated or have caffeine. They can irritate the bladder. · Urinate often. Try to empty your bladder each time. · To relieve pain, take a hot bath or lay a heating pad set on low over your lower belly or genital area. Never go to sleep with a heating pad in place. To prevent UTIs  · Drink plenty of water each day. This helps you urinate often, which clears bacteria from your system. (If you have kidney, heart, or liver disease and have to limit fluids, talk with your doctor before you increase your fluid intake.)  · Urinate when you need to. · Urinate right after you have sex. · Change sanitary pads often. · Avoid douches, bubble baths, feminine hygiene sprays, and other feminine hygiene products that have deodorants.   · After going to the bathroom, wipe from front to back.  When should you call for help? Call your doctor now or seek immediate medical care if:  · Symptoms such as fever, chills, nausea, or vomiting get worse or appear for the first time. · You have new pain in your back just below your rib cage. This is called flank pain. · There is new blood or pus in your urine. · You have any problems with your antibiotic medicine. Watch closely for changes in your health, and be sure to contact your doctor if:  · You are not getting better after taking an antibiotic for 2 days. · Your symptoms go away but then come back. Where can you learn more? Go to http://sav-susannah.info/. Enter L359 in the search box to learn more about \"Urinary Tract Infection in Women: Care Instructions. \"  Current as of: November 28, 2016  Content Version: 11.3  © 4870-5980 [employer]. Care instructions adapted under license by [employer] (which disclaims liability or warranty for this information). If you have questions about a medical condition or this instruction, always ask your healthcare professional. [address] any warranty or liability for your use of this information.

## 2017-10-07 NOTE — ED TRIAGE NOTES
. Pt states home health had been discontinued but pt did not know she did not have service, Pt states  pulled cath out today and tried to re inset león cath, pt states león cath for 7 years, pt c/o rt flank pain this am, c/o joint pain, pt states usual sx for UTI   Sepsis Screening completed    (  )Patient meets SIRS criteria. (  x)Patient does not meet SIRS criteria.       SIRS Criteria is achieved when two or more of the following are present   Temperature < 96.8°F (36°C) or > 100.9°F (38.3°C)   Heart Rate > 90 beats per minute   Respiratory Rate > 20 breaths per minute   WBC count > 12,000 or <4,000 or > 10% bands

## 2017-10-07 NOTE — ED PROVIDER NOTES
HPI Comments: 3:32 PM  Jean Mckeon is a 78 y.o. female who presents to the ED c/o 10/10 lower back pain onset 3 days ago. Associated sxs include diaphoresis, HA, chills, fever, abdominal pain, flank pain, and arthralgias. Pt has used a León catheter for the past 7 years. States that her  changed her chronic león catheter, and they noticed something floating in her urine. Indwelling león replaced here by RN prior to presentation to me. Pt has been hospitalized for UTI and pyelonephritis several times in the last year, and suspects a UTI. Patient denies any vomiting, other sxs or complaints. Patient is a 78 y.o. female presenting with other event. The history is provided by the patient. No  was used. Other   This is a new problem. The current episode started 3 to 5 hours ago. Associated symptoms include abdominal pain and headaches. Pertinent negatives include no chest pain and no shortness of breath.         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    Fatigue     along with weakness    GERD (gastroesophageal reflux disease)     Headache(784.0)     Hypertension     pt denies    Other ill-defined conditions(799.89) swallowing issues, decubitus    Psychiatric disorder     Seizures (HCC)     Thromboembolus ([facility] [address]     TIA (transient ischemic attack)        Past Surgical History:   Procedure Laterality Date    COLONOSCOPY N/A 2/21/2017    COLONOSCOPY [facility] ANESTHESIA, PATIENT IS IN ROOM 358 performed by Kay Antony MD at [address]  N/A 2/22/2017    COLONOSCOPY performed by Kay Antony MD at [address] [facility], DIAGNOSTIC      over 20 years ago    ENDOSCOPY, COLON, DIAGNOSTIC  9/24/09    HX APPENDECTOMY  11/6/84    HX BACK SURGERY 11/24/98    severe back problems    HX CHOLECYSTECTOMY  11/6/84    HX GASTRIC BYPASS  1995    Patricio    HX OTHER SURGICAL      IVC filter    HX REFRACTIVE SURGERY      HX TUBAL LIGATION  1991         Family History:   Problem Relation Age of Onset    Heart Disease Mother     Arthritis-rheumatoid Mother     Stroke Father     Hypertension Father     Arthritis-rheumatoid Father        Social History     Social History    Marital status:      Spouse name: N/A    Number of children: N/A    Years of education: N/A     Occupational History    Not on file. Social History Main Topics    Smoking status: Never Smoker    Smokeless tobacco: Never Used    Alcohol use No    Drug use: No    Sexual activity: Not on file     Other Topics Concern    Not on file     Social History Narrative         ALLERGIES: Ceftriaxone; Demerol [meperidine]; and Seafood [shellfish containing products]    Review of Systems   Constitutional: Positive for chills, diaphoresis and fever. Respiratory: Negative for shortness of breath. Cardiovascular: Negative for chest pain. Gastrointestinal: Positive for abdominal pain. Genitourinary: Positive for flank pain. Negative for difficulty urinating and pelvic pain. Musculoskeletal: Positive for arthralgias and back pain. Skin: Negative for rash. Allergic/Immunologic: Negative for immunocompromised state. Neurological: Positive for headaches. Hematological: Negative for adenopathy. All other systems reviewed and are negative. Vitals:    10/07/17 1503 10/07/17 1532   BP: 143/67    Pulse: (!) 108    Resp: 16    Temp: 98.3 °F (36.8 °C)    SpO2: 99% 100%   Weight: 68.5 kg (151 lb)    Height: 5' 6\" (1.676 m)             Physical Exam   Constitutional: She appears well-developed and well-nourished. No distress. Chronically ill appearing  geriatric female. A&O. Answering questions. Following directions.  Rapid and pressured speech at times   HENT: Head: Normocephalic and atraumatic. Right Ear: External ear normal.   Left Ear: External ear normal.   Eyes: Conjunctivae are normal.   Neck: Normal range of motion. Cardiovascular: Regular rhythm, normal heart sounds and intact distal pulses. Tachycardia present. Exam reveals no gallop and no friction rub. No murmur heard. Mild tachy at 104   Pulmonary/Chest: Effort normal and breath sounds normal. No respiratory distress. She has no wheezes. She has no rales. Abdominal: Soft. Normal appearance and bowel sounds are normal. She exhibits no distension, no ascites and no mass. There is no tenderness. There is no rigidity, no rebound, no guarding, no CVA tenderness and no tenderness at McBurney's point. Genitourinary:   Genitourinary Comments: Indwelling león in place draining yellow urine   Musculoskeletal: Normal range of motion. Lymphadenopathy:     She has no cervical adenopathy. Neurological: She is alert. Skin: Skin is warm and dry. She is not diaphoretic. No erythema. Psychiatric: She has a normal mood and affect. Judgment normal.   Nursing note and vitals reviewed.        RESULTS:    PULSE OXIMETRY NOTE:  Pulse-ox is 99% on RA  Interpretation: Normal       CT ABD PELV WO CONT   Final Result   IMPRESSION:     No CT evidence of obstructive uropathy    As read by the radiologist.        Labs Reviewed   CULTURE, URINE - Abnormal; Notable for the following:        Result Value    Culture result: 13804 COLONIES/mL GRAM NEGATIVE RODS (*)     Culture result: <10,000 COLONIES/mL 2ND GRAM NEGATIVE MARLENE (*)     All other components within normal limits   URINALYSIS W/ RFLX MICROSCOPIC - Abnormal; Notable for the following:     Protein 30 (*)     Blood MODERATE (*)     Leukocyte Esterase LARGE (*)     All other components within normal limits   CBC WITH AUTOMATED DIFF - Abnormal; Notable for the following:     RBC 5.42 (*)     HGB 16.5 (*)     HCT 47.3 (*)     RDW 15.5 (*)     MPV 9.0 (*) NEUTROPHILS 79 (*)     LYMPHOCYTES 15 (*)     All other components within normal limits   METABOLIC PANEL, COMPREHENSIVE - Abnormal; Notable for the following:     Sodium 127 (*)     Chloride 89 (*)     Glucose 111 (*)     Creatinine 0.58 (*)     BUN/Creatinine ratio 24 (*)     AST (SGOT) 38 (*)     Alk. phosphatase 264 (*)     Protein, total 8.7 (*)     Globulin 4.9 (*)     All other components within normal limits   URINE MICROSCOPIC ONLY - Abnormal; Notable for the following:     Bacteria 1+ (*)     Mucus FEW (*)     All other components within normal limits   LIPASE       No results found for this or any previous visit (from the past 12 hour(s)). MDM  Number of Diagnoses or Management Options  Complicated UTI (urinary tract infection):   Hyponatremia:   Diagnosis management comments: UTI/pyelo, stone, stricture, MSK, Doubt sepsis       Amount and/or Complexity of Data Reviewed  Clinical lab tests: ordered and reviewed  Tests in the radiology section of CPT®: ordered and reviewed (CT a/p)      ED Course     MEDICATIONS GIVEN:  Medications   sodium chloride 0.9 % bolus infusion 1,000 mL (1,000 mL IntraVENous New Bag 10/7/17 1756)   levoFLOXacin (LEVAQUIN) tablet 750 mg (750 mg Oral Given 10/7/17 1756)   nitrofurantoin (macrocrystal-monohydrate) (MACROBID) capsule 100 mg (100 mg Oral Given 10/7/17 1756)   butalbital-acetaminophen-caffeine (FIORICET, ESGIC) -40 mg per tablet 1 Tab (1 Tab Oral Given 10/7/17 1756)       Procedures    PROGRESS NOTE:   3:32 PM  Initial assesment performed. Written by Odilia Melo, ED Scribe, as dictated by Emely Grande PA-C. PROGRESS NOTE:   5:50 PM  Pt has been re-examined by Emely Grande PA-C. The pt has no complaints, feels better. Ready for discharge. UA c/w UTI. Will place on levaquin and macrobid and await culture. No evidence of sepsis. Discussed high salt intake as hyponatremic. Close PCP FU. Reasons to RTED discussed with pt. All questions answered.  Pt feels mg/gram ointment Apply  to affected area as needed., Historical Med      clotrimazole (LOTRIMIN) 1 % topical cream Apply  to affected area two (2) times a day., Historical Med      diclofenac (VOLTAREN) 1 % gel Apply  to affected area four (4) times daily. , Historical Med      ergocalciferol (VITAMIN D2) 50,000 unit capsule Take 50,000 Units by mouth., Historical Med      bisacodyl (DULCOLAX, BISACODYL,) 10 mg suppository Insert 10 mg into rectum daily. , Historical Med      lacosamide (VIMPAT) 200 mg tab tablet Take 1 Tab by mouth two (2) times a day. Max Daily Amount: 400 mg. Indications: PARTIAL EPILEPSY TREATMENT ADJUNCT, Print, Disp-180 Tab, R-1      levETIRAcetam (KEPPRA) 500 mg tablet Take 1 Tab by mouth two (2) times a day., Print, Disp-180 Tab, R-1      lubiPROStone (AMITIZA) 8 mcg capsule Take 1 Cap by mouth two (2) times daily (with meals). , Print, Disp-60 Cap, R-0      loratadine-pseudoephedrine (CLARITIN-D 24 HOUR)  mg per tablet Take 1 Tab by mouth daily. , Historical Med      pantoprazole (PROTONIX) 20 mg tablet Take 20 mg by mouth two (2) times a day., Historical Med      OTHER,NON-FORMULARY, Take 1 Cap by mouth daily (with lunch). Indications: Bariatric Multi Formula, Historical Med      magnesium oxide 500 mg tab Take 500 mg by mouth daily (with lunch). , Historical Med      ascorbic acid, vitamin C, (VITAMIN C) 500 mg tablet Take 500 mg by mouth daily. , Historical Med      mirabegron ER (MYRBETRIQ) 50 mg ER tablet Take 50 mg by mouth nightly. Takes at 2300, Historical Med      rOPINIRole (REQUIP) 0.25 mg tablet Take 0.75 mg by mouth nightly. Takes at 2300, Historical Med      predniSONE (DELTASONE) 5 mg tablet Take 5 mg by mouth daily. , Historical Med      gabapentin (NEURONTIN) 300 mg capsule Take 900 mg by mouth three (3) times daily. , Historical Med      levothyroxine (SYNTHROID) 100 mcg tablet Take 1.5 Tabs by mouth Daily (before breakfast). Print, 150 mcg, Disp-30 Tab, R-0      OLANZapine (ZYPREXA) 7.5 mg tablet Take 7.5 mg by mouth nightly. Historical Med, 7.5 mg           3. Follow-up Information     Follow up With Details Comments Contact Info    Blaine Chen MD Schedule an appointment as soon as possible for a visit in 2 days For PCP follow up [address] [facility] EMERGENCY DEPT  As needed, If symptoms worsen [address] [place]  [phone]            SCRIBE ATTESTATION:  This note is prepared by Gean Peabody. [place], acting as Scribe for Annette Cedillo PA-C at 3:32 PM on 10/7/2017 . PROVIDER ATTESTATION:  Annette Cedillo PA-C: The scribe's documentation has been prepared under my direction and personally reviewed by me in its entirety. I confirm that the note above accurately reflects all work, treatment, procedures, and medical decision making performed by me.

## 2017-10-10 LAB
BACTERIA SPEC CULT: ABNORMAL
SERVICE CMNT-IMP: ABNORMAL

## 2017-10-11 ENCOUNTER — HOSPITAL ENCOUNTER (EMERGENCY)
Age: 67
Discharge: HOME | End: 2017-10-11
Attending: EMERGENCY MEDICINE
Payer: MEDICARE

## 2017-10-11 VITALS
HEIGHT: 66 IN | DIASTOLIC BLOOD PRESSURE: 63 MMHG | BODY MASS INDEX: 23.78 KG/M2 | SYSTOLIC BLOOD PRESSURE: 125 MMHG | TEMPERATURE: 97.9 F | HEART RATE: 92 BPM | RESPIRATION RATE: 18 BRPM | OXYGEN SATURATION: 98 % | WEIGHT: 148 LBS

## 2017-10-11 DIAGNOSIS — E87.1 HYPONATREMIA: ICD-10-CM

## 2017-10-11 DIAGNOSIS — B37.49 YEAST UTI: ICD-10-CM

## 2017-10-11 DIAGNOSIS — N39.0 URINARY TRACT INFECTION ASSOCIATED WITH INDWELLING URETHRAL CATHETER, SUBSEQUENT ENCOUNTER: ICD-10-CM

## 2017-10-11 DIAGNOSIS — T83.511D URINARY TRACT INFECTION ASSOCIATED WITH INDWELLING URETHRAL CATHETER, SUBSEQUENT ENCOUNTER: ICD-10-CM

## 2017-10-11 DIAGNOSIS — Z97.8 CHRONIC INDWELLING FOLEY CATHETER: Primary | ICD-10-CM

## 2017-10-11 LAB
ALBUMIN SERPL-MCNC: 3.4 G/DL (ref 3.4–5)
ALBUMIN/GLOB SERPL: 0.9 {RATIO} (ref 0.8–1.7)
ALP SERPL-CCNC: 193 U/L (ref 45–117)
ALT SERPL-CCNC: 43 U/L (ref 13–56)
ANION GAP SERPL CALC-SCNC: 9 MMOL/L (ref 3–18)
APPEARANCE UR: ABNORMAL
AST SERPL-CCNC: 49 U/L (ref 15–37)
BACTERIA URNS QL MICRO: ABNORMAL /HPF
BASOPHILS # BLD: 0 K/UL (ref 0–0.06)
BASOPHILS NFR BLD: 0 % (ref 0–2)
BILIRUB SERPL-MCNC: 0.3 MG/DL (ref 0.2–1)
BILIRUB UR QL: NEGATIVE
BUN SERPL-MCNC: 18 MG/DL (ref 7–18)
BUN/CREAT SERPL: 35 (ref 12–20)
CALCIUM SERPL-MCNC: 8.5 MG/DL (ref 8.5–10.1)
CHLORIDE SERPL-SCNC: 96 MMOL/L (ref 100–108)
CO2 SERPL-SCNC: 29 MMOL/L (ref 21–32)
COLOR UR: YELLOW
CREAT SERPL-MCNC: 0.51 MG/DL (ref 0.6–1.3)
DIFFERENTIAL METHOD BLD: ABNORMAL
EOSINOPHIL # BLD: 0 K/UL (ref 0–0.4)
EOSINOPHIL NFR BLD: 0 % (ref 0–5)
EPITH CASTS URNS QL MICRO: ABNORMAL /LPF (ref 0–5)
ERYTHROCYTE [DISTWIDTH] IN BLOOD BY AUTOMATED COUNT: 15.9 % (ref 11.6–14.5)
GLOBULIN SER CALC-MCNC: 4 G/DL (ref 2–4)
GLUCOSE SERPL-MCNC: 117 MG/DL (ref 74–99)
GLUCOSE UR STRIP.AUTO-MCNC: NEGATIVE MG/DL
HCT VFR BLD AUTO: 42 % (ref 35–45)
HGB BLD-MCNC: 14.3 G/DL (ref 12–16)
HGB UR QL STRIP: ABNORMAL
KETONES UR QL STRIP.AUTO: NEGATIVE MG/DL
LEUKOCYTE ESTERASE UR QL STRIP.AUTO: ABNORMAL
LYMPHOCYTES # BLD: 1 K/UL (ref 0.9–3.6)
LYMPHOCYTES NFR BLD: 11 % (ref 21–52)
MCH RBC QN AUTO: 30.1 PG (ref 24–34)
MCHC RBC AUTO-ENTMCNC: 34 G/DL (ref 31–37)
MCV RBC AUTO: 88.4 FL (ref 74–97)
MONOCYTES # BLD: 0.4 K/UL (ref 0.05–1.2)
MONOCYTES NFR BLD: 5 % (ref 3–10)
NEUTS SEG # BLD: 8.1 K/UL (ref 1.8–8)
NEUTS SEG NFR BLD: 84 % (ref 40–73)
NITRITE UR QL STRIP.AUTO: NEGATIVE
PH UR STRIP: 5.5 [PH] (ref 5–8)
PLATELET # BLD AUTO: 259 K/UL (ref 135–420)
PMV BLD AUTO: 9 FL (ref 9.2–11.8)
POTASSIUM SERPL-SCNC: 5.1 MMOL/L (ref 3.5–5.5)
PROT SERPL-MCNC: 7.4 G/DL (ref 6.4–8.2)
PROT UR STRIP-MCNC: ABNORMAL MG/DL
RBC # BLD AUTO: 4.75 M/UL (ref 4.2–5.3)
RBC #/AREA URNS HPF: ABNORMAL /HPF (ref 0–5)
SODIUM SERPL-SCNC: 134 MMOL/L (ref 136–145)
SP GR UR REFRACTOMETRY: 1.02 (ref 1–1.03)
UROBILINOGEN UR QL STRIP.AUTO: 0.2 EU/DL (ref 0.2–1)
WBC # BLD AUTO: 9.6 K/UL (ref 4.6–13.2)
WBC URNS QL MICRO: ABNORMAL /HPF (ref 0–5)
YEAST URNS QL MICRO: ABNORMAL

## 2017-10-11 PROCEDURE — 80053 COMPREHEN METABOLIC PANEL: CPT | Performed by: PHYSICIAN ASSISTANT

## 2017-10-11 PROCEDURE — 51702 INSERT TEMP BLADDER CATH: CPT

## 2017-10-11 PROCEDURE — 74011250637 HC RX REV CODE- 250/637: Performed by: PHYSICIAN ASSISTANT

## 2017-10-11 PROCEDURE — 99284 EMERGENCY DEPT VISIT MOD MDM: CPT

## 2017-10-11 PROCEDURE — 85025 COMPLETE CBC W/AUTO DIFF WBC: CPT | Performed by: PHYSICIAN ASSISTANT

## 2017-10-11 PROCEDURE — 81001 URINALYSIS AUTO W/SCOPE: CPT | Performed by: PHYSICIAN ASSISTANT

## 2017-10-11 RX ORDER — FLUCONAZOLE 150 MG/1
150 TABLET ORAL
Qty: 1 TAB | Refills: 0 | Status: SHIPPED | OUTPATIENT
Start: 2017-10-11 | End: 2017-10-11

## 2017-10-11 RX ORDER — BUTALBITAL, ACETAMINOPHEN AND CAFFEINE 50; 325; 40 MG/1; MG/1; MG/1
1 TABLET ORAL
Status: DISCONTINUED | OUTPATIENT
Start: 2017-10-11 | End: 2017-10-11

## 2017-10-11 RX ORDER — BUTALBITAL, ACETAMINOPHEN AND CAFFEINE 50; 325; 40 MG/1; MG/1; MG/1
1 TABLET ORAL
Status: COMPLETED | OUTPATIENT
Start: 2017-10-11 | End: 2017-10-11

## 2017-10-11 RX ORDER — CEPHALEXIN 500 MG/1
500 CAPSULE ORAL 4 TIMES DAILY
Qty: 28 CAP | Refills: 0 | Status: SHIPPED | OUTPATIENT
Start: 2017-10-11 | End: 2017-10-18

## 2017-10-11 RX ADMIN — BUTALBITAL, ACETAMINOPHEN AND CAFFEINE 1 TABLET: 50; 325; 40 TABLET ORAL at 18:05

## 2017-10-11 NOTE — DISCHARGE INSTRUCTIONS
Hyponatremia: Care Instructions  Your Care Instructions  Hyponatremia (say \"ll-lh-jsa-TREE-lucie-uh\") means that you don't have enough sodium in your blood. It can cause nausea, vomiting, and headaches. Or you may not feel hungry. In serious cases, it can cause seizures, a coma, or even death. Hyponatremia is not a disease. It is a problem caused by something else, such as medicines or exercising for a long time in hot weather. You can get hyponatremia if you lose a lot of fluids and then you drink a lot of water or other liquids that don't have much sodium. You can also get it if you have kidney, liver, heart, or other health problems. Treatment is focused on getting your sodium levels back to normal.  Follow-up care is a key part of your treatment and safety. Be sure to make and go to all appointments, and call your doctor if you are having problems. It's also a good idea to know your test results and keep a list of the medicines you take. How can you care for yourself at home? · If your doctor recommends it, drink fluids that have sodium. Sports drinks are a good choice. Or you can eat salty foods. · If your doctor recommends it, limit the amount of water you drink. And limit fluids that are mostly water. These include tea, coffee, and juice. · Take your medicines exactly as prescribed. Call your doctor if you have any problems with your medicine. · Get your sodium levels tested when your doctor tells you to. When should you call for help? Call 911 anytime you think you may need emergency care. For example, call if:  · You have a seizure. · You passed out (lost consciousness). Call your doctor now or seek immediate medical care if:  · You are confused or it is hard to focus. · You have little or no appetite. · You feel sick to your stomach or you vomit. · You have a headache. · You have mood changes.   · You feel more tired than usual.  Watch closely for changes in your health, and be sure to contact your doctor if:  · You do not get better as expected. Where can you learn more? Go to http://sav-susannah.info/. Enter B579 in the search box to learn more about \"Hyponatremia: Care Instructions. \"  Current as of: October 14, 2016  Content Version: 11.3  © 9579-0513 [employer]. Care instructions adapted under license by [employer] (which disclaims liability or warranty for this information). If you have questions about a medical condition or this instruction, always ask your healthcare professional. [place] [address] any warranty or liability for your use of this information. Urinary Tract Infection in Women: Care Instructions  Your Care Instructions    A urinary tract infection, or UTI, is a general term for an infection anywhere between the kidneys and the urethra (where urine comes out). Most UTIs are bladder infections. They often cause pain or burning when you urinate. UTIs are caused by bacteria and can be cured with antibiotics. Be sure to complete your treatment so that the infection goes away. Follow-up care is a key part of your treatment and safety. Be sure to make and go to all appointments, and call your doctor if you are having problems. It's also a good idea to know your test results and keep a list of the medicines you take. How can you care for yourself at home? · Take your antibiotics as directed. Do not stop taking them just because you feel better. You need to take the full course of antibiotics. · Drink extra water and other fluids for the next day or two. This may help wash out the bacteria that are causing the infection. (If you have kidney, heart, or liver disease and have to limit fluids, talk with your doctor before you increase your fluid intake.)  · Avoid drinks that are carbonated or have caffeine. They can irritate the bladder. · Urinate often. Try to empty your bladder each time.   · To relieve pain, take a hot bath or lay a heating pad set on low over your lower belly or genital area. Never go to sleep with a heating pad in place. To prevent UTIs  · Drink plenty of water each day. This helps you urinate often, which clears bacteria from your system. (If you have kidney, heart, or liver disease and have to limit fluids, talk with your doctor before you increase your fluid intake.)  · Urinate when you need to. · Urinate right after you have sex. · Change sanitary pads often. · Avoid douches, bubble baths, feminine hygiene sprays, and other feminine hygiene products that have deodorants. · After going to the bathroom, wipe from front to back. When should you call for help? Call your doctor now or seek immediate medical care if:  · Symptoms such as fever, chills, nausea, or vomiting get worse or appear for the first time. · You have new pain in your back just below your rib cage. This is called flank pain. · There is new blood or pus in your urine. · You have any problems with your antibiotic medicine. Watch closely for changes in your health, and be sure to contact your doctor if:  · You are not getting better after taking an antibiotic for 2 days. · Your symptoms go away but then come back. Where can you learn more? Go to http://sav-susannah.info/. Enter S413 in the search box to learn more about \"Urinary Tract Infection in Women: Care Instructions. \"  Current as of: November 28, 2016  Content Version: 11.3  © 7286-9601 [employer]. Care instructions adapted under license by [employer] (which disclaims liability or warranty for this information). If you have questions about a medical condition or this instruction, always ask your healthcare professional. [place] [address] any warranty or liability for your use of this information. Learning About Urinary Catheter Care to Prevent Infection  What is a urinary catheter?     A urinary catheter is a flexible plastic tube used to drain urine from your bladder when you can't urinate on your own. The catheter allows urine to drain from the bladder into a bag. Two types of drainage bags may be used with a urinary catheter. · A bedside bag is a large bag that you can hang on the side of your bed or on a chair. You can use it overnight or anytime you will be sitting or lying down for a long time. · A leg bag is a small bag that you can use during the day. It is usually attached to your thigh or calf and hidden under your clothes. Having a urinary catheter increases your risk of getting a urinary tract infection. Germs may get on the catheter and cause an infection in your bladder or kidneys. The longer you have a catheter, the more likely it is that you will get an infection. You can help prevent this problem with good hygiene and careful handling of your catheter and drainage bags. How can you help prevent infection? Take care to be clean  · Always wash your hands well before and after you handle your catheter. · Clean the skin around the catheter twice a day using soap and water. Dry with a clean towel afterward. You can shower with your catheter and drainage bag in place unless your doctor told you not to. · When you clean around the catheter, check the surrounding skin for signs of infection. Look for things like pus or irritated, swollen, red, or tender skin around the catheter. Be careful with your drainage bag  · Always keep the drainage bag below the level of your bladder. This will help keep urine from flowing back into your bladder. · Check often to see that urine is flowing through the catheter into the drainage bag. · Empty the drainage bag when it is half full. This will keep it from overflowing or backing up. · When you empty the drainage bag, do not let the tubing or drain spout touch anything. Be careful with your catheter  · Do not unhook the catheter from the drain tube. That could let germs get into the tube. · Make sure that the catheter tubing does not get twisted or kinked. · Do not tug or pull on the catheter. And make sure that the drainage bag does not drag or pull on the catheter. · Do not put powder or lotion on the skin around the catheter. · Talk with your doctor about your options for sexual intercourse while wearing a catheter. How do you empty a urine drainage bag? If your doctor has asked you to keep a record, write down the amount of urine in the bag before you empty it. Wash your hands before and after you touch the bag. 1. Remove the drain spout from its sleeve at the bottom of the drainage bag.  2. Open the valve on the drain spout. Let the urine flow out into the toilet or a container. Be careful not to let the tubing or drain spout touch anything. 3. After you empty the bag, wipe off any liquid on the end of the drain spout. Close the valve. Then put the drain spout back into its sleeve at the bottom of the collection bag. How do you add a bedside bag to a leg bag? Wash your hands before and after you handle the bags. 1. Empty the leg bag attached to the catheter. 2. Put a clean towel under the leg bag.  3. Use an alcohol wipe to clean the tip of the bedside bag. Then connect the bedside bag to the leg bag. How can you clean a bedside drainage bag? Many people clean their bedside bag in the morning if they switch to a leg bag. To clean a bedside drainage ba. Remove the bedside bag from the leg bag.  2. Fill the bag with 2 parts vinegar and 3 parts water. Let it stand for 20 minutes. 3. Empty the bag, and let it air dry. When should you call for help? Call your doctor now or seek immediate medical care if:  · You have symptoms of a urinary infection. These may include:  ¨ Pain or burning when you urinate. ¨ A frequent need to urinate without being able to pass much urine.   ¨ Pain in the flank, which is just below the rib cage and above the waist on either side of the back. ¨ Blood in your urine. ¨ A fever. · Your urine smells bad. · You see large blood clots in your urine. · No urine or very little urine is flowing into the bag for 4 or more hours. Watch closely for changes in your health, and be sure to contact your doctor if:  · The area around the catheter becomes irritated, swollen, red, or tender, or there is pus draining from it. · Urine is leaking from the place where the catheter enters your body. Follow-up care is a key part of your treatment and safety. Be sure to make and go to all appointments, and call your doctor if you are having problems. It's also a good idea to know your test results and keep a list of the medicines you take. Where can you learn more? Go to http://sav-susannah.info/. Enter C910 in the search box to learn more about \"Learning About Urinary Catheter Care to Prevent Infection. \"  Current as of: April 13, 2017  Content Version: 11.3  © 3690-3282 [employer]. Care instructions adapted under license by [employer] (which disclaims liability or warranty for this information). If you have questions about a medical condition or this instruction, always ask your healthcare professional. [address] any warranty or liability for your use of this information.

## 2017-10-11 NOTE — ED TRIAGE NOTES
Patient states that she had her león catheter changed on Saturday. Patient states that she has been leaking and having urethral pain. Patient requesting a catheter change with her catheter from home.

## 2017-10-11 NOTE — ED NOTES
I have reviewed discharge instructions with the patient. The patient verbalized understanding.   Assumed care for discharge only;    here to drive home

## 2017-10-11 NOTE — ED PROVIDER NOTES
[address]  EMERGENCY DEPARTMENT HISTORY AND PHYSICAL EXAM       Date: 10/11/2017   Patient Name: Lashonda Perdomo   YOB: 1950  Medical Record Number: [id number]    History of Presenting Illness     Chief Complaint   Patient presents with    Urinary Catheter Problem        History Provided By:  patient    Additional History: 12:42 PM   Lashonda Perdomo is a 78 y.o. female who presents to the emergency department C/O urinary catheter leaking onset 3 days ago with associated chills, pain where catheter is placed, back pain, and abdominal pain. Pt was seen by [facility] 3 days ago, had UA done, was dx with UTI, and was rx Levaquin and Macrobid. Pt had león catheter changed that day to a 16 gauge. Reports she had previously been seen by [facility] care once a month, but was told she no longer needed their services; however, was provided with 18 gauge to replace the 16 gauge catheter that is leaking. 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 Pt is followed by Dr. Wolf Veliz (PCP). Pt denies fever and any other Sx or complaints.       Primary Care Provider: Anuja Ware MD   Specialist:    Past History     Past Medical History:   Past Medical History:   Diagnosis Date    Anemia NEC     history of blood transfusion    Anxiety     panic attacks    Arthritis     Bipolar 1 disorder ([address]     Chronic back pain     Depression     Elevated diaphragm 5/4/2012    Fatigue     along with weakness    GERD (gastroesophageal reflux disease)     Headache(784.0)     Hypertension     pt denies    Other ill-defined conditions(799.89) swallowing issues, decubitus    Psychiatric disorder     Seizures ([address]     Thromboembolus (HCC)     TIA (transient ischemic attack)         Past Surgical History:   Past Surgical History:   Procedure Laterality Date    COLONOSCOPY N/A 2/21/2017    COLONOSCOPY [facility] ANESTHESIA, PATIENT IS IN ROOM 358 performed by Milagros Miller MD at [facility] ENDOSCOPY    COLONOSCOPY N/A 2/22/2017    COLONOSCOPY performed by Angelic Reardon MD at [facility], COLON, DIAGNOSTIC      over 20 years ago    ENDOSCOPY, COLON, DIAGNOSTIC  9/24/09    HX APPENDECTOMY  11/6/84    HX BACK SURGERY  11/24/98    severe back problems    HX CHOLECYSTECTOMY  11/6/84    HX GASTRIC BYPASS  1995    Patricio    HX OTHER SURGICAL      IVC filter    HX REFRACTIVE SURGERY      HX TUBAL LIGATION  1991        Family History:   Family History   Problem Relation Age of Onset    Heart Disease Mother    [de-identified] Arthritis-rheumatoid Mother     Stroke Father     Hypertension Father     Arthritis-rheumatoid Father         Social History:   Social History   Substance Use Topics    Smoking status: Never Smoker    Smokeless tobacco: Never Used    Alcohol use No        Allergies: Allergies   Allergen Reactions    Ceftriaxone Seizures    Demerol [Meperidine] Unknown (comments)    Seafood [Shellfish Containing Products] Unknown (comments)        Review of Systems   Review of Systems   Constitutional: Positive for chills. Negative for fever. Respiratory: Negative for shortness of breath. Cardiovascular: Negative for chest pain. Gastrointestinal: Positive for abdominal pain. Negative for diarrhea, nausea and vomiting. Genitourinary: Positive for vaginal pain (where catheter is placed). Negative for hematuria.        +león catheter in place that is leaking   Musculoskeletal: Positive for back pain. All other systems reviewed and are negative. Physical Exam  Vitals:    10/11/17 1545 10/11/17 1600 10/11/17 1615 10/11/17 1630   BP: 123/81 130/63 132/77 125/63   Pulse:       Resp:       Temp:       SpO2: 98% 100%  98%   Weight:       Height:           Physical Exam   Constitutional:   Thin & elderly, appears older than states age, in NAD   HENT:   Head: Normocephalic and atraumatic.    Eyes: Conjunctivae and EOM are normal. Pupils are equal, round, and reactive to light.   Neck: Normal range of motion. Neck supple. Cardiovascular: Normal rate and regular rhythm. Pulmonary/Chest: Effort normal and breath sounds normal.   Abdominal: Soft. Bowel sounds are normal. She exhibits no distension. There is no tenderness. There is no rebound and no guarding. +león catheter in place, no obvious leaking noted, urine in bag is clear & yellow   Musculoskeletal: She exhibits no tenderness. Nursing note and vitals reviewed. Diagnostic Study Results     Labs -      Recent Results (from the past 12 hour(s))   CBC WITH AUTOMATED DIFF    Collection Time: 10/11/17  2:00 PM   Result Value Ref Range    WBC 9.6 4.6 - 13.2 K/uL    RBC 4.75 4.20 - 5.30 M/uL    HGB 14.3 12.0 - 16.0 g/dL    HCT 42.0 35.0 - 45.0 %    MCV 88.4 74.0 - 97.0 FL    MCH 30.1 24.0 - 34.0 PG    MCHC 34.0 31.0 - 37.0 g/dL    RDW 15.9 (H) 11.6 - 14.5 %    PLATELET 356 371 - 635 K/uL    MPV 9.0 (L) 9.2 - 11.8 FL    NEUTROPHILS 84 (H) 40 - 73 %    LYMPHOCYTES 11 (L) 21 - 52 %    MONOCYTES 5 3 - 10 %    EOSINOPHILS 0 0 - 5 %    BASOPHILS 0 0 - 2 %    ABS. NEUTROPHILS 8.1 (H) 1.8 - 8.0 K/UL    ABS. LYMPHOCYTES 1.0 0.9 - 3.6 K/UL    ABS. MONOCYTES 0.4 0.05 - 1.2 K/UL    ABS. EOSINOPHILS 0.0 0.0 - 0.4 K/UL    ABS. BASOPHILS 0.0 0.0 - 0.06 K/UL    DF AUTOMATED     METABOLIC PANEL, COMPREHENSIVE    Collection Time: 10/11/17  2:00 PM   Result Value Ref Range    Sodium 134 (L) 136 - 145 mmol/L    Potassium 5.1 3.5 - 5.5 mmol/L    Chloride 96 (L) 100 - 108 mmol/L    CO2 29 21 - 32 mmol/L    Anion gap 9 3.0 - 18 mmol/L    Glucose 117 (H) 74 - 99 mg/dL    BUN 18 7.0 - 18 MG/DL    Creatinine 0.51 (L) 0.6 - 1.3 MG/DL    BUN/Creatinine ratio 35 (H) 12 - 20      GFR est AA >60 >60 ml/min/1.73m2    GFR est non-AA >60 >60 ml/min/1.73m2    Calcium 8.5 8.5 - 10.1 MG/DL    Bilirubin, total 0.3 0.2 - 1.0 MG/DL    ALT (SGPT) 43 13 - 56 U/L    AST (SGOT) 49 (H) 15 - 37 U/L    Alk.  phosphatase 193 (H) 45 - 117 U/L    Protein, total 7.4 6.4 - 8.2 g/dL    Albumin 3.4 3.4 - 5.0 g/dL    Globulin 4.0 2.0 - 4.0 g/dL    A-G Ratio 0.9 0.8 - 1.7     URINALYSIS W/ RFLX MICROSCOPIC    Collection Time: 10/11/17  5:00 PM   Result Value Ref Range    Color YELLOW      Appearance CLOUDY      Specific gravity 1.020 1.005 - 1.030      pH (UA) 5.5 5.0 - 8.0      Protein TRACE (A) NEG mg/dL    Glucose NEGATIVE  NEG mg/dL    Ketone NEGATIVE  NEG mg/dL    Bilirubin NEGATIVE  NEG      Blood TRACE (A) NEG      Urobilinogen 0.2 0.2 - 1.0 EU/dL    Nitrites NEGATIVE  NEG      Leukocyte Esterase MODERATE (A) NEG     URINE MICROSCOPIC ONLY    Collection Time: 10/11/17  5:00 PM   Result Value Ref Range    WBC 4 to 10 0 - 5 /hpf    RBC 0 to 3 0 - 5 /hpf    Epithelial cells 2+ 0 - 5 /lpf    Bacteria 1+ (A) NEG /hpf    Yeast FEW (A) NEG         Radiologic Studies -  The following have been ordered and reviewed:  No orders to display           Medical Decision Making   I am the first provider for this patient. I reviewed the vital signs, available nursing notes, past medical history, past surgical history, family history and social history. Vital Signs-Reviewed the patient's vital signs. Patient Vitals for the past 12 hrs:   Temp Pulse Resp BP SpO2   10/11/17 1630 - - - 125/63 98 %   10/11/17 1615 - - - 132/77 -   10/11/17 1600 - - - 130/63 100 %   10/11/17 1545 - - - 123/81 98 %   10/11/17 1530 - - - (!) 157/112 99 %   10/11/17 1515 - - - 137/80 97 %   10/11/17 1500 - - - 129/76 98 %   10/11/17 1445 - - - 129/70 98 %   10/11/17 1430 - - - 137/74 97 %   10/11/17 1415 - - - 109/76 99 %   10/11/17 1400 - - - (!) 71/54 98 %   10/11/17 1345 - - - 107/68 -   10/11/17 1330 - - - 122/65 98 %   10/11/17 1315 - - - 131/72 98 %   10/11/17 1300 - - - 121/79 98 %   10/11/17 1245 - - - 114/75 100 %   10/11/17 1230 - - - 133/72 98 %   10/11/17 1226 97.9 °F (36.6 °C) 92 18 121/79 99 %       Pulse Oximetry Analysis - Normal 99% on room air     Old Medical Records: Nursing notes. Procedures:   Procedures    ED Course:  12:42 PM  Initial assessment performed. The patients presenting problems have been discussed, and they are in agreement with the care plan formulated and outlined with them. I have encouraged them to ask questions as they arise throughout their visit. Bedside Transfer of Care:  4:59 PM  Patient care will be transferred from Rossi Rowan PA-C to Tech Rolando Guillermo PA-C. Discussed available diagnostic results and care plan at length at beside. Patient and family made aware of provider change. All questions answered. Patient and family voiced understanding. Written by Yeimy Jimenez, ED Scribe, as dictated by Rossi Rowan PA-C. PROGRESS NOTE:   5:40 PM  Pt has been re-examined by [employer] Guillermo. Pt presented for león catheter change as it was leaking. She has Hx of multiple drug-resistant UTIs. Most recent urine culture 4 days ago showed bacteria susceptible to cephalosporins but resistant to Levaquin and macrobid, which she is taking. UA improved but still with leuk esterase and WBCs, so will d/c Levaquin and macrobid and start keflex. Ceftriaxone is listed as allergy but she states having Keflex in the past with no issue. Hyponatremia has improved to near normal of 134. She also request diflucan for yeast infection. Medications Given in the ED:  Medications   butalbital-acetaminophen-caffeine (FIORICET, ESGIC) -40 mg per tablet 1 Tab (1 Tab Oral Given 10/11/17 1805)       Discharge Note:  5:50 PM  Pt has been reexamined. Patient has no new complaints, changes, or physical findings. Care plan outlined and precautions discussed. Results were reviewed with the patient. All medications were reviewed with the patient; will d/c home with keflex and diflucan. All of pt's questions and concerns were addressed. Patient was instructed and agrees to follow up with PCP, as well as to return to the ED upon further deterioration.  Patient is ready to go home.      Diagnosis   Clinical Impression:   1. Chronic indwelling Gotti catheter    2. Hyponatremia, IMPROVED    3. Urinary tract infection associated with indwelling urethral catheter, subsequent encounter    4. Yeast UTI           Follow-up Information     Follow up With Details Comments Contact Info    Adrian Krueger MD Call in 2 days For primary care follow up  [facility] [facility] EMERGENCY DEPT Go to As needed, If symptoms worsen [address] [id number]  [phone]          Current Discharge Medication List      START taking these medications    Details   cephALEXin (KEFLEX) 500 mg capsule Take 1 Cap by mouth four (4) times daily for 7 days. Qty: 28 Cap, Refills: 0      fluconazole (DIFLUCAN) 150 mg tablet Take 1 Tab by mouth now for 1 dose. FDA advises cautious prescribing of oral fluconazole in pregnancy. Qty: 1 Tab, Refills: 0         STOP taking these medications       levoFLOXacin (LEVAQUIN) 750 mg tablet Comments:   Reason for Stopping:         nitrofurantoin, macrocrystal-monohydrate, (MACROBID) 100 mg capsule Comments:   Reason for Stopping:               _______________________________   Attestations: This note is prepared by Louie Daugherty, acting as a Scribe for Jaxon Lr PA-C on 12:42 PM on 10/11/2017. Jaxon Lr PA-C: The scribe's documentation has been prepared under my direction and personally reviewed by me in its entirety. This note is prepared by Sophia Johnson, acting as Scribe for [employer]. [employer]: The scribe's documentation has been prepared under my direction and personally reviewed by me in its entirety.  I confirm that the note above accurately reflects all work, treatment, procedures, and medical decision making performed by me.   _______________________________

## 2017-11-22 ENCOUNTER — APPOINTMENT (OUTPATIENT)
Dept: GENERAL RADIOLOGY | Age: 67
DRG: 698 | End: 2017-11-22
Attending: PHYSICIAN ASSISTANT
Payer: MEDICARE

## 2017-11-22 ENCOUNTER — HOSPITAL ENCOUNTER (INPATIENT)
Age: 67
LOS: 12 days | Discharge: HOME HEALTH | DRG: 698 | End: 2017-12-04
Attending: EMERGENCY MEDICINE | Admitting: INTERNAL MEDICINE
Payer: MEDICARE

## 2017-11-22 DIAGNOSIS — S82.91XA CLOSED FRACTURE OF MULTIPLE BONES OF BOTH LOWER EXTREMITIES, INITIAL ENCOUNTER: Primary | ICD-10-CM

## 2017-11-22 DIAGNOSIS — G82.20 LOWER PARAPLEGIA (HCC): ICD-10-CM

## 2017-11-22 DIAGNOSIS — F31.9 BIPOLAR 1 DISORDER (HCC): Chronic | ICD-10-CM

## 2017-11-22 DIAGNOSIS — S82.92XA CLOSED FRACTURE OF MULTIPLE BONES OF BOTH LOWER EXTREMITIES, INITIAL ENCOUNTER: Primary | ICD-10-CM

## 2017-11-22 DIAGNOSIS — K56.7 ILEUS (HCC): ICD-10-CM

## 2017-11-22 DIAGNOSIS — G89.11 ACUTE PAIN DUE TO INJURY: ICD-10-CM
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
## 2017-11-22 DIAGNOSIS — G89.4 CHRONIC PAIN SYNDROME: ICD-10-CM

## 2017-11-22 DIAGNOSIS — S82.92XA MULTIPLE FRACTURES OF BOTH LOWER EXTREMITIES, CLOSED, INITIAL ENCOUNTER: ICD-10-CM

## 2017-11-22 DIAGNOSIS — G89.29 OTHER CHRONIC PAIN: ICD-10-CM

## 2017-11-22 DIAGNOSIS — S82.91XA MULTIPLE FRACTURES OF BOTH LOWER EXTREMITIES, CLOSED, INITIAL ENCOUNTER: ICD-10-CM

## 2017-11-22 LAB
ANION GAP SERPL CALC-SCNC: 9 MMOL/L (ref 3–18)
APPEARANCE UR: ABNORMAL
BACTERIA URNS QL MICRO: ABNORMAL /HPF
BASOPHILS # BLD: 0 K/UL (ref 0–0.1)
BASOPHILS NFR BLD: 0 % (ref 0–3)
BILIRUB UR QL: NEGATIVE
BUN SERPL-MCNC: 17 MG/DL (ref 7–18)
BUN/CREAT SERPL: 30 (ref 12–20)
CALCIUM SERPL-MCNC: 8.8 MG/DL (ref 8.5–10.1)
CHLORIDE SERPL-SCNC: 93 MMOL/L (ref 100–108)
CO2 SERPL-SCNC: 29 MMOL/L (ref 21–32)
COLOR UR: YELLOW
CREAT SERPL-MCNC: 0.57 MG/DL (ref 0.6–1.3)
DIFFERENTIAL METHOD BLD: ABNORMAL
EOSINOPHIL # BLD: 0 K/UL (ref 0–0.4)
EOSINOPHIL NFR BLD: 0 % (ref 0–5)
EPITH CASTS URNS QL MICRO: ABNORMAL /LPF (ref 0–5)
ERYTHROCYTE [DISTWIDTH] IN BLOOD BY AUTOMATED COUNT: 14.9 % (ref 11.6–14.5)
GLUCOSE SERPL-MCNC: 124 MG/DL (ref 74–99)
GLUCOSE UR STRIP.AUTO-MCNC: NEGATIVE MG/DL
HCT VFR BLD AUTO: 43.2 % (ref 35–45)
HGB BLD-MCNC: 14.9 G/DL (ref 12–16)
HGB UR QL STRIP: ABNORMAL
KETONES UR QL STRIP.AUTO: NEGATIVE MG/DL
LACTATE SERPL-SCNC: 2.2 MMOL/L (ref 0.4–2)
LACTATE SERPL-SCNC: 4.2 MMOL/L (ref 0.4–2)
LEUKOCYTE ESTERASE UR QL STRIP.AUTO: ABNORMAL
LYMPHOCYTES # BLD: 0.5 K/UL (ref 0.8–3.5)
LYMPHOCYTES NFR BLD: 3 % (ref 20–51)
MCH RBC QN AUTO: 29.5 PG (ref 24–34)
MCHC RBC AUTO-ENTMCNC: 34.5 G/DL (ref 31–37)
MCV RBC AUTO: 85.5 FL (ref 74–97)
MONOCYTES # BLD: 0.4 K/UL (ref 0–1)
MONOCYTES NFR BLD: 2 % (ref 2–9)
NEUTS BAND NFR BLD MANUAL: 1 % (ref 0–5)
NEUTS SEG # BLD: 16.9 K/UL (ref 1.8–8)
NEUTS SEG NFR BLD: 94 % (ref 42–75)
NITRITE UR QL STRIP.AUTO: POSITIVE
PH UR STRIP: 5.5 [PH] (ref 5–8)
PLATELET # BLD AUTO: 262 K/UL (ref 135–420)
PMV BLD AUTO: 8.6 FL (ref 9.2–11.8)
POTASSIUM SERPL-SCNC: 5.2 MMOL/L (ref 3.5–5.5)
PROT UR STRIP-MCNC: 30 MG/DL
RBC # BLD AUTO: 5.05 M/UL (ref 4.2–5.3)
RBC #/AREA URNS HPF: ABNORMAL /HPF (ref 0–5)
RBC MORPH BLD: ABNORMAL
SODIUM SERPL-SCNC: 131 MMOL/L (ref 136–145)
SP GR UR REFRACTOMETRY: 1.02 (ref 1–1.03)
UROBILINOGEN UR QL STRIP.AUTO: 0.2 EU/DL (ref 0.2–1)
WBC # BLD AUTO: 18 K/UL (ref 4.6–13.2)
WBC URNS QL MICRO: ABNORMAL /HPF (ref 0–5)
YEAST URNS QL MICRO: ABNORMAL

## 2017-11-22 PROCEDURE — 2W3NX1Z IMMOBILIZATION OF RIGHT UPPER LEG USING SPLINT: ICD-10-PCS | Performed by: EMERGENCY MEDICINE

## 2017-11-22 PROCEDURE — 73552 X-RAY EXAM OF FEMUR 2/>: CPT

## 2017-11-22 PROCEDURE — 87077 CULTURE AEROBIC IDENTIFY: CPT | Performed by: INTERNAL MEDICINE

## 2017-11-22 PROCEDURE — 87086 URINE CULTURE/COLONY COUNT: CPT | Performed by: INTERNAL MEDICINE

## 2017-11-22 PROCEDURE — 77030011256 HC DRSG MEPILEX <16IN NO BORD MOLN -A

## 2017-11-22 PROCEDURE — 85025 COMPLETE CBC W/AUTO DIFF WBC: CPT | Performed by: PHYSICIAN ASSISTANT

## 2017-11-22 PROCEDURE — 74011250636 HC RX REV CODE- 250/636: Performed by: INTERNAL MEDICINE

## 2017-11-22 PROCEDURE — 65270000029 HC RM PRIVATE

## 2017-11-22 PROCEDURE — 87040 BLOOD CULTURE FOR BACTERIA: CPT | Performed by: INTERNAL MEDICINE

## 2017-11-22 PROCEDURE — 80048 BASIC METABOLIC PNL TOTAL CA: CPT | Performed by: EMERGENCY MEDICINE

## 2017-11-22 PROCEDURE — 36415 COLL VENOUS BLD VENIPUNCTURE: CPT | Performed by: INTERNAL MEDICINE

## 2017-11-22 PROCEDURE — 74011250636 HC RX REV CODE- 250/636: Performed by: PHYSICIAN ASSISTANT

## 2017-11-22 PROCEDURE — 96361 HYDRATE IV INFUSION ADD-ON: CPT

## 2017-11-22 PROCEDURE — 73590 X-RAY EXAM OF LOWER LEG: CPT

## 2017-11-22 PROCEDURE — 74011250636 HC RX REV CODE- 250/636: Performed by: HOSPITALIST

## 2017-11-22 PROCEDURE — 73551 X-RAY EXAM OF FEMUR 1: CPT

## 2017-11-22 PROCEDURE — 77030027138 HC INCENT SPIROMETER -A

## 2017-11-22 PROCEDURE — 74011250637 HC RX REV CODE- 250/637: Performed by: INTERNAL MEDICINE

## 2017-11-22 PROCEDURE — 96375 TX/PRO/DX INJ NEW DRUG ADDON: CPT

## 2017-11-22 PROCEDURE — 99285 EMERGENCY DEPT VISIT HI MDM: CPT

## 2017-11-22 PROCEDURE — 83605 ASSAY OF LACTIC ACID: CPT | Performed by: INTERNAL MEDICINE

## 2017-11-22 PROCEDURE — 72170 X-RAY EXAM OF PELVIS: CPT

## 2017-11-22 PROCEDURE — 2W3PX1Z IMMOBILIZATION OF LEFT UPPER LEG USING SPLINT: ICD-10-PCS | Performed by: EMERGENCY MEDICINE

## 2017-11-22 PROCEDURE — 87186 SC STD MICRODIL/AGAR DIL: CPT | Performed by: INTERNAL MEDICINE

## 2017-11-22 PROCEDURE — 96376 TX/PRO/DX INJ SAME DRUG ADON: CPT

## 2017-11-22 PROCEDURE — 81001 URINALYSIS AUTO W/SCOPE: CPT | Performed by: PHYSICIAN ASSISTANT

## 2017-11-22 PROCEDURE — 75810000053 HC SPLINT APPLICATION

## 2017-11-22 PROCEDURE — 74011250637 HC RX REV CODE- 250/637: Performed by: PHYSICIAN ASSISTANT

## 2017-11-22 PROCEDURE — 96374 THER/PROPH/DIAG INJ IV PUSH: CPT

## 2017-11-22 PROCEDURE — 71010 XR CHEST SNGL V: CPT

## 2017-11-22 PROCEDURE — 74011250637 HC RX REV CODE- 250/637: Performed by: HOSPITALIST

## 2017-11-22 PROCEDURE — 96365 THER/PROPH/DIAG IV INF INIT: CPT

## 2017-11-22 RX ORDER — NYSTATIN 100000 U/G
CREAM TOPICAL
COMMUNITY
End: 2017-12-04

## 2017-11-22 RX ORDER — POLYETHYLENE GLYCOL 3350 17 G/17G
17 POWDER, FOR SOLUTION ORAL 2 TIMES DAILY
Status: DISCONTINUED | OUTPATIENT
Start: 2017-11-22 | End: 2017-11-25

## 2017-11-22 RX ORDER — LEVOTHYROXINE SODIUM 75 UG/1
75 TABLET ORAL
Status: DISCONTINUED | OUTPATIENT
Start: 2017-11-23 | End: 2017-12-04 | Stop reason: HOSPADM

## 2017-11-22 RX ORDER — LEVETIRACETAM 500 MG/1
1000 TABLET ORAL 2 TIMES DAILY
Status: DISCONTINUED | OUTPATIENT
Start: 2017-11-22 | End: 2017-11-30

## 2017-11-22 RX ORDER — MISOPROSTOL 100 UG/1
100 TABLET ORAL 2 TIMES DAILY
COMMUNITY
End: 2018-04-08

## 2017-11-22 RX ORDER — LEVOFLOXACIN 5 MG/ML
750 INJECTION, SOLUTION INTRAVENOUS EVERY 24 HOURS
Status: DISCONTINUED | OUTPATIENT
Start: 2017-11-22 | End: 2017-11-26

## 2017-11-22 RX ORDER — PANTOPRAZOLE SODIUM 40 MG/1
40 TABLET, DELAYED RELEASE ORAL 2 TIMES DAILY
Status: DISCONTINUED | OUTPATIENT
Start: 2017-11-22 | End: 2017-12-04 | Stop reason: HOSPADM

## 2017-11-22 RX ORDER — OLANZAPINE 2.5 MG/1
7.5 TABLET ORAL
Status: DISCONTINUED | OUTPATIENT
Start: 2017-11-22 | End: 2017-12-04 | Stop reason: HOSPADM

## 2017-11-22 RX ORDER — DULOXETIN HYDROCHLORIDE 30 MG/1
30 CAPSULE, DELAYED RELEASE ORAL 2 TIMES DAILY
Status: DISCONTINUED | OUTPATIENT
Start: 2017-11-22 | End: 2017-12-04 | Stop reason: HOSPADM

## 2017-11-22 RX ORDER — POTASSIUM CHLORIDE 750 MG/1
10 TABLET, EXTENDED RELEASE ORAL 2 TIMES DAILY
Status: DISCONTINUED | OUTPATIENT
Start: 2017-11-22 | End: 2017-11-26

## 2017-11-22 RX ORDER — LEVOTHYROXINE SODIUM 75 UG/1
75 TABLET ORAL
COMMUNITY

## 2017-11-22 RX ORDER — CEFDINIR 300 MG/1
300 CAPSULE ORAL 2 TIMES DAILY
COMMUNITY
Start: 2017-11-17 | End: 2017-12-04

## 2017-11-22 RX ORDER — MORPHINE SULFATE 2 MG/ML
4 INJECTION, SOLUTION INTRAMUSCULAR; INTRAVENOUS
Status: DISCONTINUED | OUTPATIENT
Start: 2017-11-22 | End: 2017-11-22

## 2017-11-22 RX ORDER — SODIUM CHLORIDE 0.9 % (FLUSH) 0.9 %
5-10 SYRINGE (ML) INJECTION AS NEEDED
Status: DISCONTINUED | OUTPATIENT
Start: 2017-11-22 | End: 2017-12-04 | Stop reason: HOSPADM

## 2017-11-22 RX ORDER — DULOXETIN HYDROCHLORIDE 30 MG/1
30 CAPSULE, DELAYED RELEASE ORAL 2 TIMES DAILY
COMMUNITY

## 2017-11-22 RX ORDER — LEVETIRACETAM 750 MG/1
750 TABLET ORAL 2 TIMES DAILY
COMMUNITY
End: 2017-11-22

## 2017-11-22 RX ORDER — ACETAMINOPHEN 325 MG/1
650 TABLET ORAL
Status: DISCONTINUED | OUTPATIENT
Start: 2017-11-22 | End: 2017-11-24

## 2017-11-22 RX ORDER — POTASSIUM CHLORIDE 20 MEQ/1
10 TABLET, EXTENDED RELEASE ORAL 2 TIMES DAILY
COMMUNITY
End: 2017-12-04

## 2017-11-22 RX ORDER — MUPIROCIN CALCIUM 20 MG/G
CREAM TOPICAL DAILY
COMMUNITY
End: 2017-11-22

## 2017-11-22 RX ORDER — PREDNISONE 5 MG/1
5 TABLET ORAL DAILY
Status: DISCONTINUED | OUTPATIENT
Start: 2017-11-23 | End: 2017-12-04 | Stop reason: HOSPADM

## 2017-11-22 RX ORDER — MORPHINE SULFATE 2 MG/ML
4 INJECTION, SOLUTION INTRAMUSCULAR; INTRAVENOUS
Status: COMPLETED | OUTPATIENT
Start: 2017-11-22 | End: 2017-11-22

## 2017-11-22 RX ORDER — LANOLIN ALCOHOL/MO/W.PET/CERES
325 CREAM (GRAM) TOPICAL
Status: DISCONTINUED | OUTPATIENT
Start: 2017-11-23 | End: 2017-11-28

## 2017-11-22 RX ORDER — LEVETIRACETAM 500 MG/1
750 TABLET ORAL 2 TIMES DAILY
COMMUNITY

## 2017-11-22 RX ORDER — BENZONATATE 100 MG/1
100 CAPSULE ORAL
COMMUNITY
End: 2017-11-22

## 2017-11-22 RX ORDER — LANOLIN ALCOHOL/MO/W.PET/CERES
325 CREAM (GRAM) TOPICAL
COMMUNITY

## 2017-11-22 RX ORDER — DOCUSATE SODIUM 100 MG/1
100 CAPSULE, LIQUID FILLED ORAL
Status: DISCONTINUED | OUTPATIENT
Start: 2017-11-22 | End: 2017-11-24

## 2017-11-22 RX ORDER — DOCUSATE SODIUM 100 MG/1
100 CAPSULE, LIQUID FILLED ORAL
COMMUNITY
End: 2017-12-04

## 2017-11-22 RX ORDER — HYDROMORPHONE HYDROCHLORIDE 2 MG/1
2 TABLET ORAL
Status: DISCONTINUED | OUTPATIENT
Start: 2017-11-22 | End: 2017-11-23

## 2017-11-22 RX ORDER — BUTALBITAL, ACETAMINOPHEN AND CAFFEINE 50; 325; 40 MG/1; MG/1; MG/1
1 TABLET ORAL
COMMUNITY

## 2017-11-22 RX ORDER — NYSTATIN 100000 [USP'U]/ML
1 SUSPENSION ORAL
COMMUNITY

## 2017-11-22 RX ORDER — MORPHINE SULFATE 2 MG/ML
4 INJECTION, SOLUTION INTRAMUSCULAR; INTRAVENOUS
Status: DISCONTINUED | OUTPATIENT
Start: 2017-11-22 | End: 2017-11-22 | Stop reason: CLARIF

## 2017-11-22 RX ORDER — ACETAMINOPHEN 325 MG/1
650 TABLET ORAL
Status: COMPLETED | OUTPATIENT
Start: 2017-11-22 | End: 2017-11-22

## 2017-11-22 RX ORDER — DULOXETIN HYDROCHLORIDE 30 MG/1
60 CAPSULE, DELAYED RELEASE ORAL DAILY
COMMUNITY
End: 2017-11-22 | Stop reason: CLARIF

## 2017-11-22 RX ORDER — MORPHINE SULFATE 15 MG/1
15 TABLET ORAL
COMMUNITY
End: 2017-12-04

## 2017-11-22 RX ORDER — FENTANYL CITRATE 50 UG/ML
50 INJECTION, SOLUTION INTRAMUSCULAR; INTRAVENOUS
Status: COMPLETED | OUTPATIENT
Start: 2017-11-22 | End: 2017-11-22

## 2017-11-22 RX ORDER — SODIUM CHLORIDE 9 MG/ML
75 INJECTION, SOLUTION INTRAVENOUS CONTINUOUS
Status: DISCONTINUED | OUTPATIENT
Start: 2017-11-22 | End: 2017-11-29

## 2017-11-22 RX ORDER — LACTULOSE 10 G/10G
10 SOLUTION ORAL 2 TIMES DAILY
COMMUNITY

## 2017-11-22 RX ORDER — PANTOPRAZOLE SODIUM 40 MG/1
40 TABLET, DELAYED RELEASE ORAL 2 TIMES DAILY
COMMUNITY

## 2017-11-22 RX ORDER — MORPHINE SULFATE 10 MG/ML
4 INJECTION, SOLUTION INTRAMUSCULAR; INTRAVENOUS
Status: DISCONTINUED | OUTPATIENT
Start: 2017-11-22 | End: 2017-11-22

## 2017-11-22 RX ORDER — ONDANSETRON 4 MG/1
4 TABLET, ORALLY DISINTEGRATING ORAL
Status: COMPLETED | OUTPATIENT
Start: 2017-11-22 | End: 2017-11-22

## 2017-11-22 RX ORDER — CYCLOBENZAPRINE HCL 10 MG
10 TABLET ORAL
COMMUNITY

## 2017-11-22 RX ORDER — ROPINIROLE 0.25 MG/1
0.75 TABLET, FILM COATED ORAL
Status: DISCONTINUED | OUTPATIENT
Start: 2017-11-22 | End: 2017-12-04 | Stop reason: HOSPADM

## 2017-11-22 RX ORDER — ONDANSETRON 2 MG/ML
4 INJECTION INTRAMUSCULAR; INTRAVENOUS
Status: DISCONTINUED | OUTPATIENT
Start: 2017-11-22 | End: 2017-12-04 | Stop reason: HOSPADM

## 2017-11-22 RX ORDER — FLUCONAZOLE 150 MG/1
150 TABLET ORAL AS NEEDED
COMMUNITY
End: 2017-11-22

## 2017-11-22 RX ORDER — GABAPENTIN 300 MG/1
300 CAPSULE ORAL 3 TIMES DAILY
Status: DISCONTINUED | OUTPATIENT
Start: 2017-11-22 | End: 2017-11-26

## 2017-11-22 RX ORDER — LACOSAMIDE 100 MG/1
200 TABLET ORAL 2 TIMES DAILY
Status: DISCONTINUED | OUTPATIENT
Start: 2017-11-22 | End: 2017-12-04 | Stop reason: HOSPADM

## 2017-11-22 RX ORDER — NYSTATIN 100000 U/G
OINTMENT TOPICAL
COMMUNITY
End: 2017-11-22

## 2017-11-22 RX ORDER — ASPIRIN 325 MG
1 TABLET, DELAYED RELEASE (ENTERIC COATED) ORAL
COMMUNITY

## 2017-11-22 RX ORDER — FENTANYL CITRATE 50 UG/ML
50 INJECTION, SOLUTION INTRAMUSCULAR; INTRAVENOUS ONCE
Status: COMPLETED | OUTPATIENT
Start: 2017-11-22 | End: 2017-11-22

## 2017-11-22 RX ORDER — MISOPROSTOL 100 UG/1
100 TABLET ORAL 2 TIMES DAILY
Status: DISCONTINUED | OUTPATIENT
Start: 2017-11-22 | End: 2017-12-04 | Stop reason: HOSPADM

## 2017-11-22 RX ADMIN — GABAPENTIN 300 MG: 300 CAPSULE ORAL at 22:58

## 2017-11-22 RX ADMIN — ONDANSETRON 4 MG: 4 TABLET, ORALLY DISINTEGRATING ORAL at 13:54

## 2017-11-22 RX ADMIN — SODIUM CHLORIDE 125 ML/HR: 900 INJECTION, SOLUTION INTRAVENOUS at 23:41

## 2017-11-22 RX ADMIN — SODIUM CHLORIDE 1000 ML: 900 INJECTION, SOLUTION INTRAVENOUS at 20:55

## 2017-11-22 RX ADMIN — FENTANYL CITRATE 50 MCG: 50 INJECTION, SOLUTION INTRAMUSCULAR; INTRAVENOUS at 13:54

## 2017-11-22 RX ADMIN — PANTOPRAZOLE SODIUM 40 MG: 40 TABLET, DELAYED RELEASE ORAL at 20:56

## 2017-11-22 RX ADMIN — SODIUM CHLORIDE 1000 ML: 900 INJECTION, SOLUTION INTRAVENOUS at 16:40

## 2017-11-22 RX ADMIN — MORPHINE SULFATE 4 MG: 2 INJECTION, SOLUTION INTRAMUSCULAR; INTRAVENOUS at 14:50

## 2017-11-22 RX ADMIN — POTASSIUM CHLORIDE 10 MEQ: 10 TABLET, EXTENDED RELEASE ORAL at 20:56

## 2017-11-22 RX ADMIN — MISOPROSTOL 100 MCG: 100 TABLET ORAL at 20:56

## 2017-11-22 RX ADMIN — LACOSAMIDE 200 MG: 100 TABLET, FILM COATED ORAL at 20:56

## 2017-11-22 RX ADMIN — LEVOFLOXACIN 750 MG: 5 INJECTION, SOLUTION INTRAVENOUS at 18:36

## 2017-11-22 RX ADMIN — Medication 10 ML: at 21:02

## 2017-11-22 RX ADMIN — MIRABEGRON 50 MG: 25 TABLET, FILM COATED, EXTENDED RELEASE ORAL at 23:17

## 2017-11-22 RX ADMIN — LACTULOSE 10 G: 20 SOLUTION ORAL at 20:55

## 2017-11-22 RX ADMIN — OLANZAPINE 7.5 MG: 2.5 TABLET ORAL at 22:57

## 2017-11-22 RX ADMIN — LEVETIRACETAM 1000 MG: 500 TABLET ORAL at 20:56

## 2017-11-22 RX ADMIN — ROPINIROLE HYDROCHLORIDE 0.75 MG: 0.25 TABLET, FILM COATED ORAL at 22:57

## 2017-11-22 RX ADMIN — ACETAMINOPHEN 650 MG: 325 TABLET ORAL at 23:41

## 2017-11-22 RX ADMIN — Medication 2 SPRAY: at 23:17

## 2017-11-22 RX ADMIN — POLYETHYLENE GLYCOL 3350 17 G: 17 POWDER, FOR SOLUTION ORAL at 20:55

## 2017-11-22 RX ADMIN — FENTANYL CITRATE 50 MCG: 50 INJECTION, SOLUTION INTRAMUSCULAR; INTRAVENOUS at 16:09

## 2017-11-22 RX ADMIN — HYDROMORPHONE HYDROCHLORIDE 2 MG: 2 TABLET ORAL at 20:56

## 2017-11-22 RX ADMIN — ACETAMINOPHEN 650 MG: 325 TABLET ORAL at 16:09

## 2017-11-22 RX ADMIN — MORPHINE SULFATE 4 MG: 2 INJECTION, SOLUTION INTRAMUSCULAR; INTRAVENOUS at 18:33

## 2017-11-22 RX ADMIN — SODIUM CHLORIDE 1000 ML: 900 INJECTION, SOLUTION INTRAVENOUS at 22:18

## 2017-11-22 RX ADMIN — DULOXETINE HYDROCHLORIDE 30 MG: 30 CAPSULE, DELAYED RELEASE ORAL at 20:56

## 2017-11-22 NOTE — H&P
[address] [place] [address]  ROUTINE HISTORY AND PHYSICAL    Name:  Sg Krause  MR#:  [id number]  :  1950  Account #:  [de-identified]  Date of Adm:  2017      PRIMARY CARE PHYSICIAN:   Dr. Garret Dang. CHIEF COMPLAINT:  Fall. HISTORY OF PRESENT ILLNESS:   The patient is a patient very well-  known to the hospital who has been in and out of here quite a bit over  the past few years. She presents with lower extremity pain after a fall. It seems that she was sitting on the edge of the bed and lost her  balance and fell forward. She fell onto her legs. She is essentially  bedbound at baseline. She also has a chronic indwelling urinary  catheter. She had significant pain upon falling to the ground. She was  brought into the ER for evaluation. In the emergency department she  had a number of x-rays. She has a left femur fracture. There is also a  right angulated fracture of the distal femoral neck. A pelvic x-ray was  completed. There was no pelvic fracture. She has a fracture of the left tibia. She has a possible displaced  fracture of the right tibia. In the ER she has been given pain  medications. Orthopedics was consulted and the case was discussed. They want to put her in splints overnight. Tomorrow they are going to discuss whether or not surgery makes  sense. In the ER, she has received some pain medication that has  been helpful, though she still has some pain. While in the ER her labs were taken. She was incidentally noted to  have a leukocytosis with a predominance of neutrophils. A urinalysis was checked and that demonstrated bacteria, lots of white  blood cells, positive nitrates and leukocyte esterase. She herself  denies fever, chills, malaise and urinary symptoms. It should be noted  that she does have a history of recurrent urinary tract infections that  are presumably secondary to her chronic indwelling Gotti.   At the very  least she is chronically colonized, if not infected. She was given a liter of fluid in the emergency department. Lactic acid  has been ordered and is pending. She has had some tachycardia that  we relate is secondary to pain. She has not had any hypotension. There is no evidence of end-organ failure. As a precaution we have  ordered Levaquin. Based on the available information, she does not  qualify for the severe sepsis or septic shock fluid challenge. Again,  blood pressure is perfectly normal right now. PAST MEDICAL HISTORY:  History of seizure, chronic pain, chronic  indwelling Gotti catheter, urinary tract infections, chronic back and hip  pain, bipolar disorder, history of narcosis, presumably secondary to  pain medications, GERD, chronic constipation. SOCIAL HISTORY:  No alcohol, drugs or tobacco.    FAMILY HISTORY: Reviewed, noncontributory. OUTPATIENT MEDICATIONS  1. Cymbalta 60 mg twice a day. 2. Synthroid 125 daily. 3. Baclofen 10 mg twice a day as needed. 4. Claritin 1 tablet daily as needed. 5. Vimpat 200 mg twice a day. 6. Keppra 500 mg twice a day. 7. Amitiza 8 micrograms twice a day. 8. Magnesium oxide 500 mg daily. 9. Myrbetriq 50 mg at night. 10. Prednisone is 5 mg daily. 11. Protonix 20 mg twice daily. 12. Requip 0.75 at night. 13. Zyprexa 7.5 mg at night. MEDICATION ALLERGIES  1. CEFTRIAXONE SEIZURES. 2. DEMEROL. 3. SEAFOOD, REACTION NOT SPECIFIED. REVIEW OF SYSTEMS  CONSTITUTIONAL:  No fever or chills. HEENT:  No headache, no blurred vision. CARDIOVASCULAR:  No chest pain or palpitations. RESPIRATORY:  No cough or shortness of breath. GASTROINTESTINAL:  No nausea or vomiting, no abdominal pain, no  blood in stool. GENITOURINARY:  No dysuria. Chronic indwelling urinary catheter. MUSCULOSKELETAL:  Extensive lower extremity pain secondary to  her fall.     PHYSICAL EXAMINATION  VITAL SIGNS:  Blood pressure 121/70, pulse 108, respiratory 14, O2  saturation 98%, temperature 99.7. GENERAL:  A fairly comfortable appearing, considering everything,  good historian, awake, alert, very sharp mentally. HEENT:  PERRLA, EOMI, NCAP. HEART:  S1, S2, regular rate and rhythm. LUNGS:  Clear to auscultation bilaterally. ABDOMEN:  Soft, nontender, nondistended. EXTREMITIES:  Mild flexion contracture both knees. Inversion  contracture left ankle. Generalized tenderness to both lower extremities  without obvious deformities. Distal pulses at the dorsalis pedis, 2+  bilaterally. PSYCH:  Normal affect. LABORATORY RESULTS:  White blood cell count 18,000, hemoglobin  14.9, platelets 407. Sodium 131, potassium 5.2, BUN 17, creatinine  0.57. Urinalysis 81 to 90 white blood cells, 2+ bacteria, 1+ yeast.  Positive for nitrates and leukocyte esterase, small blood, turbid, cloudy. X-rays as detailed in HPI.     and EK beats per minute, sinus rhythm, PVCs, left anterior  fascicular block and complete right bundle branch block. Possible  lateral infarcts sited on or before August 10, 2017, prolonged QT,  abnormal EKG. Echocardiogram 2012:  Normal left ventricular systolic function. ASSESSMENT/PLAN:  This is a 15-year-old female who was doing  quite well and feeling at her baseline who had the misfortune of having  a fall while sitting on the edge of the bed. Regrettably she sustained  multiple lower extremity fractures. We have been asked to bring her in  to the hospital for continuation of her care. It should be noted that she does have an elevated white blood cell  count and her urinalysis is abnormal.  She has excellent blood  pressure. She is a little tachycardia, though at least after 4 fractures of  the lower extremities, along with intense pain, I think she is entitled to  be tachycardic. She has no urinary symptoms per se, though the  possibility of urinary tract infection always exists.   As she chronically  uses a urinary catheter, she will always manifest some bacteria and  show colonization. To be on the safe side, we are going to cover her with antibiotics and  send a urine culture. She certainly does not have severe sepsis or  septic shock. She does not need to be fluid 30 mL/kg fluid bolus. She has received 1000 mL of fluid. I will hydrate her gently overnight. Naturally, if any of this changes or the lactic acid comes back elevated  we can reevaluate our plan. Orthopedics is seeing her. We will have to see what they decide is best  for these fractures. We will work to control her pain overnight. DVT  prevention. DISPOSITION:  Dependent upon response to therapy.             Camden Shea M.D.    Carol Escalera / RADHA  D:  11/22/2017   17:44  T:  11/22/2017   18:38  Job #:  [id number]

## 2017-11-22 NOTE — PROGRESS NOTES
Case discussed with ED PA and Dr. Saran Leung. Patient has distal pulses present according to ED PA. Dr. Saran Leung recommends posterior long leg splints. He will discuss surgery with the patient in the am and possible conservative treatment including bracing.

## 2017-11-22 NOTE — PROGRESS NOTES
TRANSFER - IN REPORT:    Verbal report received from brian Ellington rn(name) on Lisa Estimable  being received from ED(unit) for routine progression of care      Report consisted of patients Situation, Background, Assessment and   Recommendations(SBAR). Information from the following report(s) SBAR, Intake/Output, MAR and Recent Results was reviewed with the receiving nurse. Opportunity for questions and clarification was provided. Assessment completed upon patients arrival to unit and care assumed.

## 2017-11-22 NOTE — ROUTINE PROCESS
TRANSFER - OUT REPORT:    Verbal report given to [facility] RN on Aaliyah Ramesh  being transferred to Medical bed 311 for routine progression of care       Report consisted of patients Situation, Background, Assessment and   Recommendations(SBAR). Information from the following report(s) SBAR, Kardex, ED Summary, [facility] and Recent Results was reviewed with the receiving nurse. Lines:   Peripheral IV 11/22/17 Left Forearm (Active)   Site Assessment Clean, dry, & intact 11/22/2017  2:25 PM   Phlebitis Assessment 0 11/22/2017  2:25 PM   Infiltration Assessment 0 11/22/2017  2:25 PM   Dressing Status Clean, dry, & intact 11/22/2017  2:25 PM   Dressing Type Transparent 11/22/2017  2:25 PM   Hub Color/Line Status Blue 11/22/2017  2:25 PM        Opportunity for questions and clarification was provided.       Patient transported with:   Absolute Antibody

## 2017-11-22 NOTE — ED TRIAGE NOTES
Pt to ED via EMS for eval of back pain and right leg pain after slipping out of bed this morning. Pt is paraplegic and was doing stretching with assistant, sitting on side of bed and slipped off side of bed.

## 2017-11-22 NOTE — IP AVS SNAPSHOT
[facility] 
 
 
 [address] [facility] [address] 
[phone] Patient: Chon Gibson MRN: [id number] DGN:[id number] About your hospitalization You were admitted on:  November 22, 2017 You last received care in the:  [address] [place] [address] You were discharged on:  December 4, 2017 Why you were hospitalized Your primary diagnosis was:  Multiple Fractures Of Both Lower Extremities, Closed, Initial Encounter Your diagnoses also included:  Uti (Urinary Tract Infection), Seizure Disorder (Hcc), Severe Protein-Calorie Malnutrition (Hcc), Depression, Bipolar 1 Disorder (Hcc), Chronic Pain, Bedridden, Sepsis (Hcc), Acute Pain Due To Injury, Ileus (Hcc), Acute Respiratory Distress, Therapeutic Opioid-Induced Constipation (Oic), Tachycardia, Sbo (Small Bowel Obstruction) Things You Need To Do (next 8 weeks) [facility] with [facility] Where:  [facility] [phone] Follow up with [facility] to continue managing your healthcare needs. Where:  [phone] Follow up with Gamaliel Wetzel MD in 3 day(s) Phone:  [phone] Where:  [address] [facility], [address] [place] [id number] Follow up with orthopedics  in 3 day(s) Follow up with urology  in 3 day(s) Friday Dec 08, 2017 Follow up with Karl Brandt MD  
Follow up appointment scheduled for December 8, 2017 at 11:15 p.m. Phone:  [phone] Where:  [address], [address] [facility] Discharge Orders None A check ebonie indicates which time of day the medication should be taken. My Medications STOP taking these medications   
 cefdinir 300 mg capsule Commonly known as:  OMNICEF  
   
  
 COLACE 100 mg capsule Generic drug:  docusate sodium DIFLUCAN 150 mg tablet Generic drug:  fluconazole MIRALAX 17 gram packet Generic drug:  polyethylene glycol  
   
  
 morphine IR 15 mg tablet Commonly known as:  MS IR  
   
  
 potassium chloride 20 mEq tablet Commonly known as:  K-DUR, KLOR-CON  
   
  
  
TAKE these medications as instructed Instructions Each Dose to Equal  
 Morning Noon Evening Bedtime  
 baclofen 10 mg tablet Commonly known as:  LIORESAL Your last dose was: Your next dose is: Take 10 mg by mouth three (3) times daily. 10 mg  
    
   
   
   
  
 butalbital-acetaminophen-caffeine -40 mg per tablet Commonly known as:  Sheria Loser Your last dose was: Your next dose is: Take 1 Tab by mouth every four (4) hours as needed for Pain. 1 Tab CLARITIN-D 24 HOUR  mg per tablet Generic drug:  loratadine-pseudoephedrine Your last dose was: Your next dose is: Take 1 Tab by mouth daily. 1 Tab  
    
   
   
   
  
 clotrimazole 1 % vaginal cream  
Commonly known as:  Tildon Quiver Your last dose was: Your next dose is: Insert 1 Applicator into vagina nightly as needed for Itching. For ABX induced yeast infection 1 Applicator CRANBERRY EXTRACT PO Your last dose was: Your next dose is: Take 1 Cap by mouth two (2) times a day. For UTI prevention Non-formulary medication. Pt to supply if ordered for admission. 1 Cap  
    
   
   
   
  
 cyclobenzaprine 10 mg tablet Commonly known as:  FLEXERIL Your last dose was: Your next dose is: Take 10 mg by mouth nightly. 10 mg  
    
   
   
   
  
 CYMBALTA 30 mg capsule Generic drug:  DULoxetine Your last dose was: Your next dose is: Take 30 mg by mouth two (2) times a day. 30 mg  
    
   
   
   
  
 dilTIAZem  mg ER capsule Commonly known as:  CARDIZEM CD Start taking on:  12/5/2017 Your last dose was: Your next dose is: Take 1 Cap by mouth daily. 180 mg  
    
   
   
   
  
 DULCOLAX (BISACODYL) 10 mg suppository Generic drug:  bisacodyl Your last dose was: Your next dose is: Insert 10 mg into rectum daily. 10 mg  
    
   
   
   
  
 fentaNYL 12 mcg/hr patch Commonly known as:  Fiona Collado Start taking on:  12/6/2017 Your last dose was: Your next dose is:    
   
   
 1 Patch by TransDERmal route every seventy-two (72) hours. Max Daily Amount: 1 Patch. 1 Patch  
    
   
   
   
  
 ferrous sulfate 325 mg (65 mg iron) tablet Your last dose was: Your next dose is: Take 325 mg by mouth Daily (before breakfast). 325 mg  
    
   
   
   
  
 gabapentin 300 mg capsule Commonly known as:  NEURONTIN Your last dose was: Your next dose is: Take 300 mg by mouth three (3) times daily. 300 mg GLUCOSAMINE CHONDROITIN MAXSTR PO Your last dose was: Your next dose is: Take 1 Tab by mouth three (3) times daily. 1 Tab KEPPRA 500 mg tablet Generic drug:  levETIRAcetam  
   
Your last dose was: Your next dose is: Take 1,000 mg by mouth two (2) times a day. 1000 mg  
    
   
   
   
  
 lacosamide 200 mg Tab tablet Commonly known as:  VIMPAT Your last dose was: Your next dose is: Take 1 Tab by mouth two (2) times a day. Max Daily Amount: 400 mg. Indications: PARTIAL EPILEPSY TREATMENT ADJUNCT  
 200 mg  
    
   
   
   
  
 lactulose 10 gram packet Commonly known as:  Robert Muñoz Your last dose was: Your next dose is: Take 10 g by mouth two (2) times a day. 10 g  
    
   
   
   
  
 lubiPROStone 8 mcg capsule Commonly known as:  Shad Lucero Your last dose was: Your next dose is: Take 1 Cap by mouth two (2) times daily (with meals). 8 mcg  
    
   
   
   
  
 magnesium oxide 500 mg Tab Your last dose was: Your next dose is: Take 500 mg by mouth daily (with lunch). 500 mg  
    
   
   
   
  
 miSOPROStol 100 mcg tablet Commonly known as:  CYTOTEC Your last dose was: Your next dose is: Take 100 mcg by mouth two (2) times a day. 100 mcg MYRBETRIQ 50 mg ER tablet Generic drug:  mirabegron ER Your last dose was: Your next dose is: Take 50 mg by mouth nightly. Takes at 2300  
 50 mg  
    
   
   
   
  
 nystatin 100,000 unit/mL suspension Commonly known as:  MYCOSTATIN Your last dose was: Your next dose is: Take 1 tsp by mouth four (4) times daily as needed. swish and spit 1 tsp OTHER(NON-FORMULARY) Your last dose was: Your next dose is: Take 1 Cap by mouth daily (with lunch). Indications: Bariatric Multi Formula 1 Cap  
    
   
   
   
  
 oxyCODONE IR 5 mg immediate release tablet Commonly known as:  Elridrolando Rodriguez Your last dose was: Your next dose is: Take 1 Tab by mouth every four (4) hours as needed. Max Daily Amount: 30 mg.  
 5 mg  
    
   
   
   
  
 pantoprazole 40 mg tablet Commonly known as:  PROTONIX Your last dose was: Your next dose is: Take 40 mg by mouth two (2) times a day. 40 mg  
    
   
   
   
  
 predniSONE 5 mg tablet Commonly known as:  Alinda Suzette Your last dose was: Your next dose is: Take 5 mg by mouth daily. 5 mg REQUIP 0.25 mg tablet Generic drug:  rOPINIRole Your last dose was: Your next dose is: Take 0.75 mg by mouth nightly. Takes at 2300  
 0.75 mg SYNTHROID 75 mcg tablet Generic drug:  levothyroxine Your last dose was: Your next dose is: Take 75 mcg by mouth Daily (before breakfast). 75 mcg VENELEX  mg/gram ointment Generic drug:  balsam peru-castor oil Your last dose was: Your next dose is:    
   
   
 Apply  to affected area as needed. NON FORMULARY MEDICATION PATIENT SUPPLIED HOME MEDICATION  
     
   
   
   
  
 VITAMIN C 500 mg tablet Generic drug:  ascorbic acid (vitamin C) Your last dose was: Your next dose is: Take 500 mg by mouth daily. 500 mg  
    
   
   
   
  
 VITAMIN D2 50,000 unit capsule Generic drug:  ergocalciferol Your last dose was: Your next dose is: Take 50,000 Units by mouth Every Friday. [address] Units VOLTAREN 1 % Gel Generic drug:  diclofenac Your last dose was: Your next dose is:    
   
   
 Apply 2 g to affected area four (4) times daily. Non-formulary medication. Pt to supply if ordered for admission. 2 g  
    
   
   
   
  
 ZyPREXA 7.5 mg tablet Generic drug:  OLANZapine Your last dose was: Your next dose is: Take 7.5 mg by mouth nightly. 7.5 mg Where to Get Your Medications These medications were sent to [facility] [address], [address] [facility] Phone:  [phone]  
  dilTIAZem  mg ER capsule Information on where to get these meds will be given to you by the nurse or doctor. ! Ask your nurse or doctor about these medications  
  fentaNYL 12 mcg/hr patch  
 oxyCODONE IR 5 mg immediate release tablet Discharge Instructions DISCHARGE SUMMARY from Nurse PATIENT INSTRUCTIONS: 
 
 
F-face looks uneven A-arms unable to move or move unevenly S-speech slurred or non-existent T-time-call 911 as soon as signs and symptoms begin-DO NOT go Back to bed or wait to see if you get better-TIME IS BRAIN. Warning Signs of HEART ATTACK Call 911 if you have these symptoms: 
? Chest discomfort. Most heart attacks involve discomfort in the center of the chest that lasts more than a few minutes, or that goes away and comes back. It can feel like uncomfortable pressure, squeezing, fullness, or pain. ? Discomfort in other areas of the upper body. Symptoms can include pain or discomfort in one or both arms, the back, neck, jaw, or stomach. ? Shortness of breath with or without chest discomfort. ? Other signs may include breaking out in a cold sweat, nausea, or lightheadedness. Don't wait more than five minutes to call [address]! Fast action can save your life. Calling 911 is almost always the fastest way to get lifesaving treatment. Emergency Medical Services staff can begin treatment when they arrive  up to an hour sooner than if someone gets to the hospital by car. The discharge information has been reviewed with the patient. The patient verbalized understanding. Discharge medications reviewed with the patient and appropriate educational materials and side effects teaching were provided. ___________________________________________________________________________________________________________________________________ MyChart Announcement We are excited to announce that we are making your provider's discharge notes available to you in [email or web address].   You will see these notes when they are completed and signed by the physician that discharged you from your recent hospital stay. If you have any questions or concerns about any information you see in [facility], please call the Health Information Department where you were seen or reach out to your Primary Care Provider for more information about your plan of care. Introducing [facility]! Erlinda Norman introduces [facility] patient portal. Now you can access parts of your medical record, email your doctor's office, and request medication refills online. 1. In your internet browser, go to [email or web address] 2. Click on the First Time User? Click Here link in the Sign In box. You will see the New Member Sign Up page. 3. Enter your [facility] Access Code exactly as it appears below. You will not need to use this code after youve completed the sign-up process. If you do not sign up before the expiration date, you must request a new code. · [facility] Access Code: [email or web address] Expires: 3/4/2018  3:02 PM 
 
4. Enter the last four digits of your Social Security Number (xxxx) and Date of Birth (mm/dd/yyyy) as indicated and click Submit. You will be taken to the next sign-up page. 5. Create a [facility] ID. This will be your [facility] login ID and cannot be changed, so think of one that is secure and easy to remember. 6. Create a [facility] password. You can change your password at any time. 7. Enter your Password Reset Question and Answer. This can be used at a later time if you forget your password. 8. Enter your e-mail address. You will receive e-mail notification when new information is available in [address] 9. Click Sign Up. You can now view and download portions of your medical record. 10. Click the Download Summary menu link to download a portable copy of your medical information. If you have questions, please visit the Frequently Asked Questions section of the [facility] website. Remember, [facility] is NOT to be used for urgent needs. For medical emergencies, dial 911. Now available from your iPhone and Android! Providers Seen During Your Hospitalization Provider Specialty Primary office phone Nicole Presley MD Emergency Medicine [phone] Randi Posey MD Internal Medicine [phone] Sahra Olguin MD [facility] [phone] Immunizations Administered for This Admission Name Date Influenza Vaccine (Quad) PF 11/30/2017 Your Primary Care Physician (PCP) Primary Care Physician Office Phone Office Fax Katie Ray [phone] You are allergic to the following Allergen Reactions Ceftriaxone Seizures Demerol (Meperidine) Unknown (comments) Seafood (Shellfish Containing Products) Unknown (comments) Recent Documentation Height Weight BMI OB Status Smoking Status 1.68 m 70.5 kg 24.99 kg/m2 Postmenopausal Never Smoker Emergency Contacts Name Discharge Info Relation Home Work Mobile RoseTuilo DAVID DISCHARGE CAREGIVER [3] Spouse [3] [phone] Patient Belongings The following personal items are in your possession at time of discharge: 
  Dental Appliances: None  Visual Aid: Glasses      Home Medications: None   Jewelry: None  Clothing: None    Other Valuables: Purse, Other (comment) (journal) Please provide this summary of care documentation to your next provider. Signatures-by signing, you are acknowledging that this After Visit Summary has been reviewed with you and you have received a copy. Patient Signature:  ____________________________________________________________ Date:  ____________________________________________________________  
  
Manish Fleming Provider Signature:  ____________________________________________________________ Date:  ____________________________________________________________

## 2017-11-22 NOTE — ED NOTES
Attempted to call report to floor - RN unable to take report at this time - floor states RN will call ED back.

## 2017-11-22 NOTE — ED NOTES
Pt  arrived to pt bedside - dinner tray provided to patient - call bell within reach - BLE splints in place - no distress noted.

## 2017-11-22 NOTE — PROGRESS NOTES
Admission Medication Reconciliation has been performed on this ED patient consisting of interview of the patient regarding their PTA Home Medication List, Allergies and PMH as well as obtaining outpatient pharmacy information. Interviewed patient who was a good historian and is health literate. Patient did  provide a written list of home medications and PAML updated and list scanned into EPIC. List had several comissions that were discovered during our discussion. Patient's outpatient pharmacy is [facility]  Smoking status is denies  Alcohol use denies  Illicit drug use denies  Patient ABX use within the past 30 days = currently cefdinir started last Friday,  Has patient received any antineoplastics in the past 30 days? na  Has patient received any radiation treatments in the past 45 days? na      Medication Compliance Issues and/or Medication Concerns:   Pt states she is currently experiencing vaginal and topical yeast infection sx due to current ABX. Pt states she needs to use her Voltaren gel several times a day, she is aware it is non formulary and willing to supply from home.   Confirmed current duloxetine, synthroid and Keppra dose w/ Wade Keller 69 Pharmacist  [phone]

## 2017-11-22 NOTE — ED PROVIDER NOTES
[address]  EMERGENCY DEPARTMENT HISTORY AND PHYSICAL EXAM       Date: 11/22/2017   Patient Name: Matthew Moeller   YOB: 1950  Medical Record Number: [id number]    History of Presenting Illness     Chief Complaint   Patient presents with    Fall        History Provided By:  patient    Additional History: 12:36 PM   Matthew Moeller is a 78 y.o. female with PMHx paraplegic who presents to the emergency department via EMS C/O fall onset 2 hours ago. Associated sxs include right knee pain, left hip pain, pelvic pain, right lower leg pain. Reports she was sitting up on the edge of the bed when she slid down, fell forward, and rolled over. No LOC or head trauma. Pt is paraplegic and bed bound s/p coma after status epilepticus. Hhistory of non-operable right hip fracture. 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 Pt has mild contractures of her hips and legs. Pt uses a bar to help her sit up. Pt is not on any pain medication. Pt's  is her primary caretaker. Pt states she was last prescribed Morphine in August. She has had some left over but she has been taking intermittently for chronic pain but her visiting doctor will no longer prescribe it and wants her to see pain management which she has not yet been able to see. Pmhx of depression, anxiety, back pain, anemia, arthritis, GERD, seizures, bipolar, TIA, HTN, and thromboembolus. Pshx of colonoscopy, appendectomy, cholecystectomy, tubal ligation, and gastric bypass. Denies chest pain, abdominal pain, flank pain, headache, light-headedness and any other sxs or complaints.      Primary Care Provider: Clifton Gayle MD   Specialist:    Past History     Past Medical History:   Past Medical History:   Diagnosis Date    Anemia NEC     history of blood transfusion    Anxiety     panic attacks    Arthritis     Bipolar 1 disorder ([facility] [address]     Chronic back pain     Depression     Elevated diaphragm 5/4/2012    Fatigue     along with weakness    GERD (gastroesophageal reflux disease)     Headache(784.0)     Hypertension     pt denies    Other ill-defined conditions(799.89) swallowing issues, decubitus    Psychiatric disorder     Seizures (HCC)     Thromboembolus ([facility] [address]     TIA (transient ischemic attack)         Past Surgical History:   Past Surgical History:   Procedure Laterality Date    COLONOSCOPY N/A 2/21/2017    COLONOSCOPY [facility] ANESTHESIA, PATIENT IS IN ROOM 358 performed by Josh Chaney MD at [address] COLONOSCOPY N/A 2/22/2017    COLONOSCOPY performed by Josh Chaney MD at [facility], COLON, DIAGNOSTIC      over 20 years ago    ENDOSCOPY, COLON, DIAGNOSTIC  9/24/09    HX APPENDECTOMY  11/6/84    HX BACK SURGERY  11/24/98    severe back problems    HX CHOLECYSTECTOMY  11/6/84    HX GASTRIC BYPASS  1995    Patricio    HX OTHER SURGICAL      IVC filter    HX REFRACTIVE SURGERY      HX TUBAL LIGATION  1991        Family History:   Family History   Problem Relation Age of Onset    Heart Disease Mother    [facility] Arthritis-rheumatoid Mother     Stroke Father     Hypertension Father     Arthritis-rheumatoid Father         Social History:   Social History   Substance Use Topics    Smoking status: Never Smoker    Smokeless tobacco: Never Used    Alcohol use No        Allergies: Allergies   Allergen Reactions    Ceftriaxone Seizures    Demerol [Meperidine] Unknown (comments)    Seafood [Shellfish Containing Products] Unknown (comments)        Review of Systems   Review of Systems   Cardiovascular: Negative for chest pain. Gastrointestinal: Negative for abdominal pain. Genitourinary: Positive for pelvic pain. Negative for flank pain. Musculoskeletal: Positive for arthralgias (hip and knee pain), back pain and neck pain. Negative for myalgias. Neurological: Negative for light-headedness and headaches.    All other systems reviewed and are negative. Physical Exam  Vitals:    11/22/17 1430 11/22/17 1500 11/22/17 1600 11/22/17 1630   BP: 120/67 115/65 92/65 104/61   Pulse: (!) 102 (!) 111 (!) 109 (!) 106   Resp: 15 14 19 17   Temp:       SpO2: 97% 98% 98% 98%       Physical Exam   Nursing note and vitals reviewed. Vital signs and nursing notes reviewed. CONSTITUTIONAL: Alert. Chronically ill-appearing; overweight; in no apparent distress. HEAD: Normocephalic; atraumatic. EYES: PERRL; Conjunctiva clear. ENT: Moist mucus membranes. NECK: Supple; FROM without difficulty, non-tender. CV: Normal S1, S2; no murmurs, rubs, or gallops. No chest wall tenderness. RESPIRATORY: Normal chest excursion with respiration; breath sounds clear and equal bilaterally; no wheezes, rhonchi, or rales. GI: Normal bowel sounds; non-distended; non-tender. BACK:  No evidence of trauma or deformity. Non-tender to palpation. FROM without difficulty. Negative straight leg raise bilaterally. EXT: lower extremity paraplegia with some sensation; chronic mild flexion contracture of both knees and inversion contracture left ankle; +generalized tenderness to both LE without obvious deformity; difficult to localize pain. 2+DP pulses. SKIN: Normal for age and race; warm; dry; good turgor;reported pea-size sacral decubitus ulcer, could not examine due to pain. NEURO: A & O x3. PSYCH:  Mood and affect appropriate.        Diagnostic Study Results     Labs -      Recent Results (from the past 12 hour(s))   CBC WITH AUTOMATED DIFF    Collection Time: 11/22/17  2:25 PM   Result Value Ref Range    WBC 18.0 (H) 4.6 - 13.2 K/uL    RBC 5.05 4.20 - 5.30 M/uL    HGB 14.9 12.0 - 16.0 g/dL    HCT 43.2 35.0 - 45.0 %    MCV 85.5 74.0 - 97.0 FL    MCH 29.5 24.0 - 34.0 PG    MCHC 34.5 31.0 - 37.0 g/dL    RDW 14.9 (H) 11.6 - 14.5 %    PLATELET 071 138 - 668 K/uL    MPV 8.6 (L) 9.2 - 11.8 FL    NEUTROPHILS 94 (H) 42 - 75 %    BAND NEUTROPHILS 1 0 - 5 %    LYMPHOCYTES 3 (L) 20 - 51 %    MONOCYTES 2 2 - 9 %    EOSINOPHILS 0 0 - 5 %    BASOPHILS 0 0 - 3 %    ABS. NEUTROPHILS 16.9 (H) 1.8 - 8.0 K/UL    ABS. LYMPHOCYTES 0.5 (L) 0.8 - 3.5 K/UL    ABS. MONOCYTES 0.4 0 - 1.0 K/UL    ABS. EOSINOPHILS 0.0 0.0 - 0.4 K/UL    ABS. BASOPHILS 0.0 0.0 - 0.1 K/UL    RBC COMMENTS NORMOCYTIC, NORMOCHROMIC      DF MANUAL     URINALYSIS W/ RFLX MICROSCOPIC    Collection Time: 11/22/17  3:15 PM   Result Value Ref Range    Color YELLOW      Appearance TURBID      Specific gravity 1.023 1.005 - 1.030      pH (UA) 5.5 5.0 - 8.0      Protein 30 (A) NEG mg/dL    Glucose NEGATIVE  NEG mg/dL    Ketone NEGATIVE  NEG mg/dL    Bilirubin NEGATIVE  NEG      Blood SMALL (A) NEG      Urobilinogen 0.2 0.2 - 1.0 EU/dL    Nitrites POSITIVE (A) NEG      Leukocyte Esterase LARGE (A) NEG     METABOLIC PANEL, BASIC    Collection Time: 11/22/17  4:30 PM   Result Value Ref Range    Sodium 131 (L) 136 - 145 mmol/L    Potassium 5.2 3.5 - 5.5 mmol/L    Chloride 93 (L) 100 - 108 mmol/L    CO2 29 21 - 32 mmol/L    Anion gap 9 3.0 - 18 mmol/L    Glucose 124 (H) 74 - 99 mg/dL    BUN 17 7.0 - 18 MG/DL    Creatinine 0.57 (L) 0.6 - 1.3 MG/DL    BUN/Creatinine ratio 30 (H) 12 - 20      GFR est AA >60 >60 ml/min/1.73m2    GFR est non-AA >60 >60 ml/min/1.73m2    Calcium 8.8 8.5 - 10.1 MG/DL       Radiologic Studies -  The following have been ordered and reviewed:  XR CHEST SNGL V   Final Result   IMPRESSION:     1. No acute cardiopulmonary process. As read by the radiologist.    XR TIB/FIB LT   Final Result   IMPRESSION:     1. Osteopenia. Distal femoral diaphyseal to metadiaphyseal minimally comminuted,  displaced and angulated fracture deformity. 2. Remaining osseous structures are intact. As read by the radiologist.    XR FEMUR LT 2 V   Final Result   IMPRESSION:     1.  Distal femoral diaphyseal to metadiaphyseal minimally comminuted displaced  fracture deformity     As read by the radiologist.    XR FEMUR RT 1 V   Final Result      XR TIB/FIB RT 1 V   Final Result   IMPRESSION:     Unable to optimally position the patient due to condition. Probable nondisplaced  fracture of the distal tibia. Fracture of the calcaneal tuberosity, with  sclerosis at the fracture line, possibly chronic, or possibly acute with  impaction.     As read by the radiologist.    XR PELV 1 OR 2 V   Final Result   IMPRESSION:     1. Stable exam. Osteopenia. Chronic bilateral superior lateral hip fracture  dislocations. No evidence of an acute fracture or dislocation. As read by the radiologist.        Medical Decision Making   I am the first provider for this patient. I reviewed the vital signs, available nursing notes, past medical history, past surgical history, family history and social history. Vital Signs-Reviewed the patient's vital signs. Patient Vitals for the past 12 hrs:   Temp Pulse Resp BP SpO2   11/22/17 1630 - (!) 106 17 104/61 98 %   11/22/17 1600 - (!) 109 19 92/65 98 %   11/22/17 1500 - (!) 111 14 115/65 98 %   11/22/17 1430 - (!) 102 15 120/67 97 %   11/22/17 1359 - (!) 101 14 112/77 96 %   11/22/17 1300 - 91 16 124/53 -   11/22/17 1238 99.7 °F (37.6 °C) 93 20 104/58 100 %       Pulse Oximetry Analysis - Normal 100% on RA     Old Medical Records: Nursing notes. Procedures:   Splint, Long Leg  Date/Time: 11/22/2017 5:11 PM  Performed by: Julio Martinez by: Mary Lou Alba     Consent:     Consent obtained:  Verbal    Consent given by:  Patient    Risks discussed:  Numbness, pain, discoloration and swelling  Pre-procedure details:     Sensation:  Normal  Procedure details:     Laterality:  Left    Location:  Leg    Leg:  L upper leg    Splint type:  Long leg (posterior)    Supplies:  Ortho-Glass  Post-procedure details:     Pain:  Unchanged    Sensation:  Normal    Skin color:  Pink, 2+ DP pulse    Patient tolerance of procedure:   Tolerated well, no immediate complications  Splint, Long Leg  Date/Time: 11/22/2017 5:13 PM  Performed by: Shay Manriquez by: Stella Baez     Consent:     Consent obtained:  Verbal    Consent given by:  Patient    Risks discussed:  Discoloration, numbness, pain and swelling  Pre-procedure details:     Sensation:  Normal  Procedure details:     Laterality:  Right    Location:  Leg    Leg:  R upper leg    Splint type:  Long leg (posterior)    Supplies:  Ortho-Glass  Post-procedure details:     Pain:  Unchanged    Sensation:  Normal    Skin color:  Pink, 2+ DP pulses    Patient tolerance of procedure: Tolerated well, no immediate complications        ED Course:  12:36 PM  Initial assessment performed. The patients presenting problems have been discussed, and they are in agreement with the care plan formulated and outlined with them. I have encouraged them to ask questions as they arise throughout their visit. 2:11 PM Discussed patient's history, exam, and available diagnostics results with Roldan German, [facility] Kyler Rao, orthopedic, who agree with admission to medicine. He will discuss with Ana Chan MD (Orthopedic Surgeon) and will consult. 2:52 PM Roldan German called back and states they will see pt tomorrow and will determine the need for surgery. He asks that we place pt in posterior long leg splints with a lot of padding and admit to medicine. 2:54 PM Discussed patient's history, exam, and available diagnostics results with Vibha Diggs MD, internal medicine, who agree with admission of pt. I rechecked pt's pulses at this time as well, 2+DP pulses bilaterally.      Medications Given in the ED:  Medications   sodium chloride 0.9 % bolus infusion 1,000 mL (1,000 mL IntraVENous New Bag 11/22/17 1640)   fentaNYL citrate (PF) injection 50 mcg (50 mcg IntraNASal Given 11/22/17 1354)   ondansetron (ZOFRAN ODT) tablet 4 mg (4 mg Oral Given 11/22/17 1354)   morphine injection 4 mg (4 mg IntraVENous Given 11/22/17 1450)   acetaminophen (TYLENOL) tablet 650 mg (650 mg Oral Given 11/22/17 1609) fentaNYL citrate (PF) injection 50 mcg (50 mcg IntraVENous Given 11/22/17 1609)       2:54 PM Patient is being admitted to the hospital by Josefina Busby MD, to medical. The results of their tests and reasons for their admission have been discussed with them and/or available family. They convey agreement and understanding for the need to be admitted and for their admission diagnosis. Procedure Note - Splint Assessement:  5:14 PM  Performed by: Richa Bejarano PA-C  Splint to RLE and LLE assessed. Neurovascularly intact. The procedure took 1-15 minutes, and pt tolerated well. Written by Lizzeth Champion ED Scribe, as dictated by Richa Bejarano PA-C.    CONDITIONS ON ADMISSION:  Sepsis is not present at the time of admission. Deep Vein Thrombosis is not present at the time of admission. Thrombosis is not present at the time of admission. Urinary Tract Infection is possible at the time of admission. Pneumonia is not present at the time of admission. MRSA is not present at the time of admission. Wound infection is not present at the time of admission. Pressure Ulcer is reportedly present at the time of admission. Diagnosis   Clinical Impression:   1. Closed fracture of multiple bones of both lower extremities, initial encounter    2. Fall, initial encounter    3. Lower paraplegia ([facility] [address]         _______________________________   Attestations: This note is prepared by Nancy Dean and Jamel Barney, acting as a Scribe for Richa Bejarano PA-C on 12:33 PM on 11/22/2017 . Richa Bejarano PA-C: The scribe's documentation has been prepared under my direction and personally reviewed by me in its entirety.   _______________________________

## 2017-11-23 PROBLEM — G89.29 CHRONIC PAIN: Status: ACTIVE | Noted: 2017-11-23

## 2017-11-23 PROBLEM — Z74.01 BEDRIDDEN: Status: ACTIVE | Noted: 2017-11-23

## 2017-11-23 LAB
ANION GAP SERPL CALC-SCNC: 5 MMOL/L (ref 3–18)
BUN SERPL-MCNC: 13 MG/DL (ref 7–18)
BUN/CREAT SERPL: 22 (ref 12–20)
CALCIUM SERPL-MCNC: 8.1 MG/DL (ref 8.5–10.1)
CHLORIDE SERPL-SCNC: 98 MMOL/L (ref 100–108)
CO2 SERPL-SCNC: 29 MMOL/L (ref 21–32)
CREAT SERPL-MCNC: 0.58 MG/DL (ref 0.6–1.3)
ERYTHROCYTE [DISTWIDTH] IN BLOOD BY AUTOMATED COUNT: 14.8 % (ref 11.6–14.5)
GLUCOSE SERPL-MCNC: 116 MG/DL (ref 74–99)
HCT VFR BLD AUTO: 30.8 % (ref 35–45)
HGB BLD-MCNC: 10.3 G/DL (ref 12–16)
LACTATE SERPL-SCNC: 1.8 MMOL/L (ref 0.4–2)
LACTATE SERPL-SCNC: 2.1 MMOL/L (ref 0.4–2)
LACTATE SERPL-SCNC: 2.2 MMOL/L (ref 0.4–2)
MCH RBC QN AUTO: 28.7 PG (ref 24–34)
MCHC RBC AUTO-ENTMCNC: 33.4 G/DL (ref 31–37)
MCV RBC AUTO: 85.8 FL (ref 74–97)
PLATELET # BLD AUTO: 223 K/UL (ref 135–420)
PMV BLD AUTO: 8.8 FL (ref 9.2–11.8)
POTASSIUM SERPL-SCNC: 5.2 MMOL/L (ref 3.5–5.5)
RBC # BLD AUTO: 3.59 M/UL (ref 4.2–5.3)
SODIUM SERPL-SCNC: 132 MMOL/L (ref 136–145)
WBC # BLD AUTO: 8.3 K/UL (ref 4.6–13.2)

## 2017-11-23 PROCEDURE — 74011250636 HC RX REV CODE- 250/636: Performed by: HOSPITALIST

## 2017-11-23 PROCEDURE — 74011250636 HC RX REV CODE- 250/636: Performed by: INTERNAL MEDICINE

## 2017-11-23 PROCEDURE — 83605 ASSAY OF LACTIC ACID: CPT | Performed by: HOSPITALIST

## 2017-11-23 PROCEDURE — 36415 COLL VENOUS BLD VENIPUNCTURE: CPT | Performed by: HOSPITALIST

## 2017-11-23 PROCEDURE — 80048 BASIC METABOLIC PNL TOTAL CA: CPT | Performed by: INTERNAL MEDICINE

## 2017-11-23 PROCEDURE — 74011250637 HC RX REV CODE- 250/637: Performed by: FAMILY MEDICINE

## 2017-11-23 PROCEDURE — 85027 COMPLETE CBC AUTOMATED: CPT | Performed by: INTERNAL MEDICINE

## 2017-11-23 PROCEDURE — 65270000029 HC RM PRIVATE

## 2017-11-23 PROCEDURE — 74011250637 HC RX REV CODE- 250/637: Performed by: INTERNAL MEDICINE

## 2017-11-23 PROCEDURE — 74011250637 HC RX REV CODE- 250/637: Performed by: HOSPITALIST

## 2017-11-23 PROCEDURE — 74011636637 HC RX REV CODE- 636/637: Performed by: INTERNAL MEDICINE

## 2017-11-23 RX ORDER — HYDROCODONE BITARTRATE AND ACETAMINOPHEN 5; 325 MG/1; MG/1
1 TABLET ORAL
Status: DISCONTINUED | OUTPATIENT
Start: 2017-11-23 | End: 2017-11-24

## 2017-11-23 RX ORDER — HYDROMORPHONE HYDROCHLORIDE 2 MG/1
1 TABLET ORAL
Status: DISCONTINUED | OUTPATIENT
Start: 2017-11-23 | End: 2017-11-24

## 2017-11-23 RX ADMIN — LEVETIRACETAM 1000 MG: 500 TABLET ORAL at 22:00

## 2017-11-23 RX ADMIN — GABAPENTIN 300 MG: 300 CAPSULE ORAL at 16:07

## 2017-11-23 RX ADMIN — ONDANSETRON HYDROCHLORIDE 4 MG: 2 INJECTION INTRAMUSCULAR; INTRAVENOUS at 17:50

## 2017-11-23 RX ADMIN — OLANZAPINE 7.5 MG: 2.5 TABLET ORAL at 22:00

## 2017-11-23 RX ADMIN — PREDNISONE 5 MG: 5 TABLET ORAL at 08:57

## 2017-11-23 RX ADMIN — PANTOPRAZOLE SODIUM 40 MG: 40 TABLET, DELAYED RELEASE ORAL at 22:00

## 2017-11-23 RX ADMIN — ROPINIROLE HYDROCHLORIDE 0.75 MG: 0.25 TABLET, FILM COATED ORAL at 22:49

## 2017-11-23 RX ADMIN — DULOXETINE HYDROCHLORIDE 30 MG: 30 CAPSULE, DELAYED RELEASE ORAL at 08:57

## 2017-11-23 RX ADMIN — ONDANSETRON HYDROCHLORIDE 4 MG: 2 INJECTION INTRAMUSCULAR; INTRAVENOUS at 14:26

## 2017-11-23 RX ADMIN — HYDROCODONE BITARTRATE AND ACETAMINOPHEN 1 TABLET: 5; 325 TABLET ORAL at 20:05

## 2017-11-23 RX ADMIN — SODIUM CHLORIDE 125 ML/HR: 900 INJECTION, SOLUTION INTRAVENOUS at 12:10

## 2017-11-23 RX ADMIN — SODIUM CHLORIDE 125 ML/HR: 900 INJECTION, SOLUTION INTRAVENOUS at 02:18

## 2017-11-23 RX ADMIN — LEVOFLOXACIN 750 MG: 5 INJECTION, SOLUTION INTRAVENOUS at 17:50

## 2017-11-23 RX ADMIN — LACTULOSE 10 G: 20 SOLUTION ORAL at 08:56

## 2017-11-23 RX ADMIN — PANTOPRAZOLE SODIUM 40 MG: 40 TABLET, DELAYED RELEASE ORAL at 08:57

## 2017-11-23 RX ADMIN — GABAPENTIN 300 MG: 300 CAPSULE ORAL at 08:57

## 2017-11-23 RX ADMIN — ACETAMINOPHEN 650 MG: 325 TABLET ORAL at 07:49

## 2017-11-23 RX ADMIN — HYDROMORPHONE HYDROCHLORIDE 2 MG: 2 TABLET ORAL at 07:07

## 2017-11-23 RX ADMIN — HYDROMORPHONE HYDROCHLORIDE 2 MG: 2 TABLET ORAL at 12:11

## 2017-11-23 RX ADMIN — MIRABEGRON 50 MG: 25 TABLET, FILM COATED, EXTENDED RELEASE ORAL at 23:00

## 2017-11-23 RX ADMIN — SODIUM CHLORIDE 125 ML/HR: 900 INJECTION, SOLUTION INTRAVENOUS at 22:50

## 2017-11-23 RX ADMIN — MISOPROSTOL 100 MCG: 100 TABLET ORAL at 08:57

## 2017-11-23 RX ADMIN — HYDROCODONE BITARTRATE AND ACETAMINOPHEN 1 TABLET: 5; 325 TABLET ORAL at 16:07

## 2017-11-23 RX ADMIN — DULOXETINE HYDROCHLORIDE 30 MG: 30 CAPSULE, DELAYED RELEASE ORAL at 22:00

## 2017-11-23 RX ADMIN — GABAPENTIN 300 MG: 300 CAPSULE ORAL at 22:25

## 2017-11-23 RX ADMIN — HYDROMORPHONE HYDROCHLORIDE 2 MG: 2 TABLET ORAL at 02:08

## 2017-11-23 RX ADMIN — FERROUS SULFATE TAB 325 MG (65 MG ELEMENTAL FE) 325 MG: 325 (65 FE) TAB at 07:07

## 2017-11-23 RX ADMIN — LEVETIRACETAM 1000 MG: 500 TABLET ORAL at 08:56

## 2017-11-23 RX ADMIN — POTASSIUM CHLORIDE 10 MEQ: 10 TABLET, EXTENDED RELEASE ORAL at 22:00

## 2017-11-23 RX ADMIN — LEVOTHYROXINE SODIUM 75 MCG: 75 TABLET ORAL at 07:07

## 2017-11-23 RX ADMIN — POTASSIUM CHLORIDE 10 MEQ: 10 TABLET, EXTENDED RELEASE ORAL at 08:57

## 2017-11-23 RX ADMIN — MISOPROSTOL 100 MCG: 100 TABLET ORAL at 22:00

## 2017-11-23 RX ADMIN — LACOSAMIDE 200 MG: 100 TABLET, FILM COATED ORAL at 22:00

## 2017-11-23 RX ADMIN — LACOSAMIDE 200 MG: 100 TABLET, FILM COATED ORAL at 08:57

## 2017-11-23 RX ADMIN — HYDROMORPHONE HYDROCHLORIDE 1 MG: 2 TABLET ORAL at 17:52

## 2017-11-23 RX ADMIN — ONDANSETRON HYDROCHLORIDE 4 MG: 2 INJECTION INTRAMUSCULAR; INTRAVENOUS at 22:00

## 2017-11-23 RX ADMIN — POLYETHYLENE GLYCOL 3350 17 G: 17 POWDER, FOR SOLUTION ORAL at 08:56

## 2017-11-23 NOTE — PROGRESS NOTES
Consult Note    Patient: Letty Gan               Sex: female          DOA: 11/22/2017         YOB: 1950      Age:  78 y.o.        LOS:  LOS: 1 day              HPI:     Letty Gan is a 78 y.o. female who has been seen for displaced right distal femur fracture, right tibia fracture, left distal femur fracture and chronic bilateral hip dislocations/ fracture. She reports she fell out of her chair yesterday and had pain. She has had chronic pain in her hips and was seen by Dr. Albino Castañeda in the office for the chronic hip injury. She has new onset of pain in the knees since the recent fall. She has a UTI and is currently being treated by Medicine for this. 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    Fatigue     along with weakness    GERD (gastroesophageal reflux disease)     Headache(784.0)     Hypertension     pt denies    Other ill-defined conditions(799.89) swallowing issues, decubitus    Psychiatric disorder     Seizures (HCC)     Thromboembolus ([facility] [address]     TIA (transient ischemic attack)        Past Surgical History:   Procedure Laterality Date    COLONOSCOPY N/A 2/21/2017    COLONOSCOPY [facility] ANESTHESIA, PATIENT IS IN ROOM 358 performed by Lillian Meadows MD at [address]  N/A 2/22/2017    COLONOSCOPY performed by Lillian Meadows MD at [address] [facility], DIAGNOSTIC      over 20 years ago    ENDOSCOPY, COLON, DIAGNOSTIC  9/24/09    HX APPENDECTOMY  11/6/84    HX BACK SURGERY  11/24/98    severe back problems    HX CHOLECYSTECTOMY  11/6/84    HX GASTRIC BYPASS  1995    Patricio    HX OTHER SURGICAL      IVC filter    HX REFRACTIVE SURGERY      [address]       Family History   Problem Relation Age of Onset    Heart Disease Mother    [facility] Arthritis-rheumatoid Mother     Stroke Father     Hypertension Father     Arthritis-rheumatoid Father        Social History     Social History    Marital status:      Spouse name: N/A    Number of children: N/A    Years of education: N/A     Social History Main Topics    Smoking status: Never Smoker    Smokeless tobacco: Never Used    Alcohol use No    Drug use: No    Sexual activity: Not Asked     Other Topics Concern    None     Social History Narrative       Prior to Admission medications    Medication Sig Start Date End Date Taking? Authorizing Provider   butalbital-acetaminophen-caffeine (FIORICET, ESGIC) -40 mg per tablet Take 1 Tab by mouth every four (4) hours as needed for Pain. Yes Giovani Rose MD   miSOPROStol (CYTOTEC) 100 mcg tablet Take 100 mcg by mouth two (2) times a day. Yes Giovani Rose MD   cyclobenzaprine (FLEXERIL) 10 mg tablet Take 10 mg by mouth nightly. Yes Giovani Rose MD   lactulose (KRISTALOSE) 10 gram packet Take 10 g by mouth two (2) times a day. Yes Giovani Rose MD   clotrimazole (MYCELEX) 1 % vaginal cream Insert 1 Applicator into vagina nightly as needed for Itching. For ABX induced yeast infection   Yes Historical Provider   DULoxetine (CYMBALTA) 30 mg capsule Take 30 mg by mouth two (2) times a day. Yes Historical Provider   levETIRAcetam (KEPPRA) 500 mg tablet Take 1,000 mg by mouth two (2) times a day. Yes Historical Provider   levothyroxine (SYNTHROID) 75 mcg tablet Take 75 mcg by mouth Daily (before breakfast). Yes Historical Provider   nystatin (MYCOSTATIN) topical cream Apply  to affected area two (2) times daily as needed for Skin Irritation. Under breasts for topical yeast infection/irritation   Yes Historical Provider   pantoprazole (PROTONIX) 40 mg tablet Take 40 mg by mouth two (2) times a day.    Yes Historical Provider   potassium chloride (K-DUR, KLOR-CON) 20 mEq tablet Take 10 mEq by mouth two (2) times a day. Yes Historical Provider   docusate sodium (COLACE) 100 mg capsule Take 100 mg by mouth two (2) times daily as needed for Constipation. Yes Historical Provider   morphine IR (MS IR) 15 mg tablet Take 15 mg by mouth every eight (8) hours as needed for Pain. Yes Historical Provider   nystatin (MYCOSTATIN) 100,000 unit/mL suspension Take 1 tsp by mouth four (4) times daily as needed. swish and spit   Yes Historical Provider   CRANBERRY FRUIT EXTRACT (CRANBERRY EXTRACT PO) Take 1 Cap by mouth two (2) times a day. For UTI prevention  Non-formulary medication. Pt to supply if ordered for admission. Yes Historical Provider   GLUC VARGAS/CHONDRO VARGAS A/VIT C/MN (GLUCOSAMINE CHONDROITIN MAXSTR PO) Take 1 Tab by mouth three (3) times daily. Yes Historical Provider   ferrous sulfate 325 mg (65 mg iron) tablet Take 325 mg by mouth Daily (before breakfast). Yes Historical Provider   cefdinir (OMNICEF) 300 mg capsule Take 300 mg by mouth two (2) times a day. 11/17/17 11/24/17 Yes Historical Provider   polyethylene glycol (MIRALAX) 17 gram packet Take 17 g by mouth two (2) times a day. Yes Historical Provider   baclofen (LIORESAL) 10 mg tablet Take 10 mg by mouth three (3) times daily. Yes Phys Other, MD   balsam [facility]  mg/gram ointment Apply  to affected area as needed. NON FORMULARY MEDICATION  PATIENT SUPPLIED HOME MEDICATION   Yes Giovani Rose MD   diclofenac (VOLTAREN) 1 % gel Apply 2 g to affected area four (4) times daily. Non-formulary medication. Pt to supply if ordered for admission. Yes Giovani Rose MD   ergocalciferol (VITAMIN D2) 50,000 unit capsule Take 50,000 Units by mouth Every Friday. Yes Giovani Rose MD   lacosamide (VIMPAT) 200 mg tab tablet Take 1 Tab by mouth two (2) times a day. Max Daily Amount: 400 mg.  Indications: PARTIAL EPILEPSY TREATMENT ADJUNCT 7/23/17  Yes Tawanna Current, DO   lubiPROStone (AMITIZA) 8 mcg capsule Take 1 Cap by mouth two (2) times daily (with meals). 3/1/17  Yes Juvenal Robbins MD   loratadine-pseudoephedrine (CLARITIN-D 24 HOUR)  mg per tablet Take 1 Tab by mouth daily. Yes Historical Provider   OTHER,NON-FORMULARY, Take 1 Cap by mouth daily (with lunch). Indications: Bariatric Multi Formula   Yes Historical Provider   magnesium oxide 500 mg tab Take 500 mg by mouth daily (with lunch). Yes Historical Provider   ascorbic acid, vitamin C, (VITAMIN C) 500 mg tablet Take 500 mg by mouth daily. Yes Historical Provider   mirabegron ER (MYRBETRIQ) 50 mg ER tablet Take 50 mg by mouth nightly. Takes at 2300   Yes Historical Provider   rOPINIRole (REQUIP) 0.25 mg tablet Take 0.75 mg by mouth nightly. Takes at 2300   Yes Historical Provider   predniSONE (DELTASONE) 5 mg tablet Take 5 mg by mouth daily. Yes Historical Provider   gabapentin (NEURONTIN) 300 mg capsule Take 300 mg by mouth three (3) times daily. Yes Historical Provider   OLANZapine (ZYPREXA) 7.5 mg tablet Take 7.5 mg by mouth nightly. Yes Historical Provider   bisacodyl (DULCOLAX, BISACODYL,) 10 mg suppository Insert 10 mg into rectum daily. Giovani Rose MD       Allergies   Allergen Reactions    Ceftriaxone Seizures    Demerol [Meperidine] Unknown (comments)    Seafood [Shellfish Containing Products] Unknown (comments)       Review of Systems  Pertinent items are noted in the History of Present Illness. Physical Exam:      Visit Vitals    /61 (BP 1 Location: Right arm, BP Patient Position: At rest)    Pulse 87    Temp 98.2 °F (36.8 °C)    Resp 16    Wt 65.1 kg (143 lb 9.6 oz)    SpO2 97%    BMI 23.18 kg/m2       Physical Exam:  Patient has pain to palpation of the bilateral knees and hips. Not markedly tender at right tibia. She is in chronic windswept deformity of the bilateral lower extremities. She has good DP, PT pulses and brisk capillary refill bilaterally.   She has no other orthopaedic complaints of the upper extremities, spine or thorax. Splints are maintained to the bilateral lower extremities and there appear to be no open wounds or tenting of the skin with gentle evaluation. Labs Reviewed:  Xrays 11/22/17     Left Femur:  1. Distal femoral diaphyseal to metadiaphyseal minimally comminuted displaced  fracture deformity . Right Femur  Suboptimal positioning due to patient condition. Comminuted, displaced,  angulated fracture of the distal femoral diametaphysis. Chronic superior and  slight lateral subluxation of the right femoral head. Pelvis  1. Stable exam. Osteopenia. Chronic bilateral superior lateral hip fracture  dislocations. No evidence of an acute fracture or dislocation. Left Tib/Fib  1. Osteopenia. Distal femoral diaphyseal to metadiaphyseal minimally comminuted,  displaced and angulated fracture deformity. 2. Remaining osseous structures are intact. Right Tib/Fib  Unable to optimally position the patient due to condition. Probable nondisplaced  fracture of the distal tibia. Fracture of the calcaneal tuberosity, with  sclerosis at the fracture line, possibly chronic, or possibly acute with  impaction. Assessment/Plan     Active Problems:    UTI (urinary tract infection) (6/1/2012)      Seizure disorder ([facility] [address] (6/24/2012)      Multiple fractures of both lower extremities, closed, initial encounter (11/22/2017)      Dr. Fabrizio Hardwick discussed the fractures in detail with the patient. Risks associated with surgical intervention and conservative treatment were reviewed in detail. Patient would like to be conservative regarding treatment as per recommendation by Dr. Fabrizio Hardwick.  prosthetics, possibly [facility] has been consulted for bilateral leg braces. Patient is to be NWB. Patient should follow up in the office with Dr. Fabrizio Hardwick in 2 weeks. Possible pain management referral outpatient.   DVT prophylaxis and pain medications as per primary team.

## 2017-11-23 NOTE — PROGRESS NOTES
Paged per rn, reported lactic acid over 4.2 tachycardia/ leukocytosis , uti. According to the protocol, cvp can not be measured on the floor-only ICU    Start sepsis protocol, 2 L ns bolus, lactic acid Q4h.  Already on abx and blood cx obtained

## 2017-11-23 NOTE — PROGRESS NOTES
PT    Physical Therapy Screening:  Services are indicated and therapy order is required after pt has received bilateral LE braces due to R femur fx, R tibia fx, and L femur fx. An InBasket screening referral was triggered for physical therapy based on results obtained during the nursing admission assessment. The patients chart was reviewed and the patient is appropriate for a skilled therapy evaluation. Please order a consult for physical therapy if you are in agreement and would like an evaluation to be completed. Thank you.     Erik Lieberman PT, DPT

## 2017-11-23 NOTE — ROUTINE PROCESS
Bedside and Verbal shift change report given to GABBY Isbell (oncoming nurse) by Leanna Cheek RN   (offgoing nurse). Report included the following information SBAR, Kardex, Intake/Output, MAR, Recent Results and Cardiac Rhythm NSR.

## 2017-11-23 NOTE — PROGRESS NOTES
Hospitalist Progress Note    Patient: Siddhartha Kaminski MRN: [id number]  CSN: [id number]    YOB: 1950  Age: 78 y.o. Sex: female    DOA: 11/22/2017 LOS:  LOS: 1 day            Assessment/Plan     Active Problems:    UTI (urinary tract infection) (6/1/2012)      Severe protein-calorie malnutrition ([facility] [address] (6/1/2012)      Bipolar 1 disorder ([facility] [address] (5/3/2012)      Seizure disorder ([facility] [address] (6/24/2012)      Depression (6/24/2012)      Multiple fractures of both lower extremities, closed, initial encounter (11/22/2017)      Chronic pain (11/23/2017)      Bedridden (11/23/2017)    Multiple lower extremity fractures: Seen and f/u by Dr. Michelle Woodall. Pt states \" No surgery\"    UTI with chronic cath: On Levaquin, f/u blood Cx and UCX, on myrbetriq    Anemia: Chronic. Monitor H/H    Seizure: Continue home dose of keppra, Vimpat    Chronic pain: states\" Dilaudid is too strong\", decrease the dose, add norco prn    Bipolar: Continue Zyprexa     DVT/GI prophylaxis    Poor prognosis    Full code    Remain hospital care      CC:  Multiple lower extremity fractures, UTI, Anemia, seizure, bipolar, chronic pain          Subjective:     Pt was seen and examined with the nurse in the morning round. \" I am in pain! Dilaudid is too strong and makes me shaking\"      Review of systems  General: No fevers or chills. Cardiovascular: No chest pain or pressure. No palpitations. Pulmonary: No cough, SOB  Gastrointestinal: No nausea, vomiting. Objective:      Visit Vitals    /65 (BP 1 Location: Right arm, BP Patient Position: At rest)    Pulse 96    Temp 98 °F (36.7 °C)    Resp 16    Wt 65.1 kg (143 lb 9.6 oz)    SpO2 100%    BMI 23.18 kg/m2       Physical Exam:    Gen: NAD, chronic ill appearing. Heent:  MMM, NC, AT. Cor: s1s2 RRR. No MRG. PMI mid 5th intercostal space. Resp:  CTA b/l. No w/r/r. Nml effort and diaphragmatic excursion. Abd:  NT ND.  BS positive. No rebound or guarding.   No masses. Ext: Bilateral LE braces      Intake and Output:  Current Shift:  11/23 0701 - 11/23 1900  In: 1620 [I.V.:1069]  Out: -   Last three shifts:  11/21 1901 - 11/23 0700  In: 9694 [P.O.:831; I.V.:2000]  Out: 600 [Urine:600]    Labs: Results:       Chemistry Recent Labs      11/23/17 0134 11/22/17   1630   GLU  116*  124*   NA  132*  131*   K  5.2  5.2   CL  98*  93*   CO2  29  29   BUN  13  17   CREA  0.58*  0.57*   CA  8.1*  8.8   AGAP  5  9   BUCR  22*  30*      CBC w/Diff Recent Labs      11/23/17 0134 11/22/17   1425   WBC  8.3  18.0*   RBC  3.59*  5.05   HGB  10.3*  14.9   HCT  30.8*  43.2   PLT  223  262   GRANS   --   94*   LYMPH   --   3*   EOS   --   0      Cardiac Enzymes No results for input(s): CPK, CKND1, PAOLA in the last 72 hours. No lab exists for component: CKRMB, TROIP   Coagulation No results for input(s): PTP, INR, APTT in the last 72 hours. No lab exists for component: INREXT, INREXT    Lipid Panel No results found for: CHOL, CHOLPOCT, CHOLX, CHLST, CHOLV, 765756, HDL, LDL, LDLC, DLDLP, 540618, VLDLC, VLDL, TGLX, TRIGL, TRIGP, TGLPOCT, CHHD, CHHDX   BNP No results for input(s): BNPP in the last 72 hours. Liver Enzymes No results for input(s): TP, ALB, TBIL, AP, SGOT, GPT in the last 72 hours.     No lab exists for component: DBIL   Thyroid Studies Lab Results   Component Value Date/Time    TSH 17.30 08/27/2017 02:00 AM        Procedures/imaging: see electronic medical records for all procedures/Xrays and details which were not copied into this note but were reviewed prior to creation of Plan      Medications Reviewed  Charisse Darling MD

## 2017-11-23 NOTE — PROGRESS NOTES
1945- Call received from lab. Pt has critical lactic acid of 2.2.  2000- Dr. Yinka Maharaj paged. 2030- No call received. Second page placed. 2032- Spoke with Dr. Yinka Maharaj and made her aware of critical lactic acid and pt's c/o feeling \"jittery\" when she had morphnie. Vital signs provided, blood cultures and ABX completed. Orders placed by Dr. Yinka Maharaj.   2125- Spoke with Dr. Yinka Maharaj and given pt updates. No need to transfer pt as pt does not need vasopressors. Telephone orders for remote telemetry. 46- Dr. Yinka Maharaj notified of lactic acid 2.2. No new orders received. Shift summary: Pt calm and cooperative. Critical lactic acid 4.2, improved to 2.2. Dr. Yinka Maharaj aware. C/O severe bilateral leg pain relieved with PO dilaudid. Pt continues to rate her pain a 10/10, but states the dilaudid helps to take the edge off. Headache relieved with tylenol. On remote tele, NSR. \"Pea-sized\" sacral wound present on admission. Applied proshield and mepilex dressing. Offered to turn and reposition, pt declined as this is too painful. Gotti drained cloudy latanya urine.      Patient Vitals for the past 12 hrs:   Temp Pulse Resp BP SpO2   11/23/17 0323 97.6 °F (36.4 °C) 88 16 106/58 96 %   11/23/17 0035 98.4 °F (36.9 °C) 98 16 115/65 98 %   11/22/17 2219 97.7 °F (36.5 °C) 97 16 131/65 100 %   11/22/17 2134 97.6 °F (36.4 °C) 93 16 131/70 100 %   11/22/17 2035 97.9 °F (36.6 °C) (!) 102 16 118/60 100 %   11/22/17 1915 98.4 °F (36.9 °C) (!) 104 16 115/75 100 %

## 2017-11-24 PROBLEM — G89.11 ACUTE PAIN DUE TO INJURY: Status: ACTIVE | Noted: 2017-11-24

## 2017-11-24 PROBLEM — A41.9 SEPSIS (HCC): Status: ACTIVE | Noted: 2017-11-24

## 2017-11-24 LAB — GLUCOSE BLD STRIP.AUTO-MCNC: 98 MG/DL (ref 70–110)

## 2017-11-24 PROCEDURE — 74011250636 HC RX REV CODE- 250/636: Performed by: HOSPITALIST

## 2017-11-24 PROCEDURE — 74011000258 HC RX REV CODE- 258: Performed by: INTERNAL MEDICINE

## 2017-11-24 PROCEDURE — 76450000000

## 2017-11-24 PROCEDURE — 65270000029 HC RM PRIVATE

## 2017-11-24 PROCEDURE — 77030027138 HC INCENT SPIROMETER -A

## 2017-11-24 PROCEDURE — 74011636637 HC RX REV CODE- 636/637: Performed by: INTERNAL MEDICINE

## 2017-11-24 PROCEDURE — 74011250636 HC RX REV CODE- 250/636: Performed by: INTERNAL MEDICINE

## 2017-11-24 PROCEDURE — 74011250637 HC RX REV CODE- 250/637: Performed by: FAMILY MEDICINE

## 2017-11-24 PROCEDURE — 74011250637 HC RX REV CODE- 250/637: Performed by: INTERNAL MEDICINE

## 2017-11-24 PROCEDURE — 82962 GLUCOSE BLOOD TEST: CPT

## 2017-11-24 RX ORDER — OXYCODONE HYDROCHLORIDE 5 MG/1
5 TABLET ORAL
Status: DISCONTINUED | OUTPATIENT
Start: 2017-11-24 | End: 2017-12-04 | Stop reason: HOSPADM

## 2017-11-24 RX ORDER — FENTANYL 12.5 UG/1
1 PATCH TRANSDERMAL
Status: DISCONTINUED | OUTPATIENT
Start: 2017-11-24 | End: 2017-12-04 | Stop reason: HOSPADM

## 2017-11-24 RX ORDER — ACETAMINOPHEN 325 MG/1
650 TABLET ORAL
Status: DISCONTINUED | OUTPATIENT
Start: 2017-11-24 | End: 2017-12-04 | Stop reason: HOSPADM

## 2017-11-24 RX ORDER — SENNOSIDES 8.6 MG/1
2 TABLET ORAL
Status: DISCONTINUED | OUTPATIENT
Start: 2017-11-24 | End: 2017-11-25

## 2017-11-24 RX ADMIN — HYDROMORPHONE HYDROCHLORIDE 1 MG: 2 TABLET ORAL at 09:31

## 2017-11-24 RX ADMIN — FERROUS SULFATE TAB 325 MG (65 MG ELEMENTAL FE) 325 MG: 325 (65 FE) TAB at 07:10

## 2017-11-24 RX ADMIN — ACETAMINOPHEN 650 MG: 325 TABLET ORAL at 17:20

## 2017-11-24 RX ADMIN — LACTULOSE 10 G: 20 SOLUTION ORAL at 09:27

## 2017-11-24 RX ADMIN — ONDANSETRON HYDROCHLORIDE 4 MG: 2 INJECTION INTRAMUSCULAR; INTRAVENOUS at 03:07

## 2017-11-24 RX ADMIN — GABAPENTIN 300 MG: 300 CAPSULE ORAL at 17:20

## 2017-11-24 RX ADMIN — PREDNISONE 5 MG: 5 TABLET ORAL at 09:30

## 2017-11-24 RX ADMIN — OXYCODONE HYDROCHLORIDE 5 MG: 5 TABLET ORAL at 19:51

## 2017-11-24 RX ADMIN — PANTOPRAZOLE SODIUM 40 MG: 40 TABLET, DELAYED RELEASE ORAL at 09:29

## 2017-11-24 RX ADMIN — ONDANSETRON HYDROCHLORIDE 4 MG: 2 INJECTION INTRAMUSCULAR; INTRAVENOUS at 18:03

## 2017-11-24 RX ADMIN — LACOSAMIDE 200 MG: 100 TABLET, FILM COATED ORAL at 09:28

## 2017-11-24 RX ADMIN — MISOPROSTOL 100 MCG: 100 TABLET ORAL at 21:17

## 2017-11-24 RX ADMIN — MIRABEGRON 50 MG: 25 TABLET, FILM COATED, EXTENDED RELEASE ORAL at 23:13

## 2017-11-24 RX ADMIN — POLYETHYLENE GLYCOL 3350 17 G: 17 POWDER, FOR SOLUTION ORAL at 21:16

## 2017-11-24 RX ADMIN — PANTOPRAZOLE SODIUM 40 MG: 40 TABLET, DELAYED RELEASE ORAL at 21:17

## 2017-11-24 RX ADMIN — HYDROCODONE BITARTRATE AND ACETAMINOPHEN 1 TABLET: 5; 325 TABLET ORAL at 07:09

## 2017-11-24 RX ADMIN — POLYETHYLENE GLYCOL 3350 17 G: 17 POWDER, FOR SOLUTION ORAL at 09:34

## 2017-11-24 RX ADMIN — LACOSAMIDE 200 MG: 100 TABLET, FILM COATED ORAL at 21:17

## 2017-11-24 RX ADMIN — GABAPENTIN 300 MG: 300 CAPSULE ORAL at 21:17

## 2017-11-24 RX ADMIN — OLANZAPINE 7.5 MG: 2.5 TABLET ORAL at 21:17

## 2017-11-24 RX ADMIN — GABAPENTIN 300 MG: 300 CAPSULE ORAL at 09:30

## 2017-11-24 RX ADMIN — ACETAMINOPHEN 650 MG: 325 TABLET ORAL at 21:21

## 2017-11-24 RX ADMIN — HYDROMORPHONE HYDROCHLORIDE 1 MG: 2 TABLET ORAL at 05:14

## 2017-11-24 RX ADMIN — POTASSIUM CHLORIDE 10 MEQ: 10 TABLET, EXTENDED RELEASE ORAL at 09:30

## 2017-11-24 RX ADMIN — DULOXETINE HYDROCHLORIDE 30 MG: 30 CAPSULE, DELAYED RELEASE ORAL at 21:17

## 2017-11-24 RX ADMIN — SENNOSIDES 17.2 MG: 8.6 TABLET, FILM COATED ORAL at 21:17

## 2017-11-24 RX ADMIN — ONDANSETRON HYDROCHLORIDE 4 MG: 2 INJECTION INTRAMUSCULAR; INTRAVENOUS at 13:47

## 2017-11-24 RX ADMIN — DULOXETINE HYDROCHLORIDE 30 MG: 30 CAPSULE, DELAYED RELEASE ORAL at 09:30

## 2017-11-24 RX ADMIN — HYDROCODONE BITARTRATE AND ACETAMINOPHEN 1 TABLET: 5; 325 TABLET ORAL at 13:47

## 2017-11-24 RX ADMIN — LEVETIRACETAM 1000 MG: 500 TABLET ORAL at 21:17

## 2017-11-24 RX ADMIN — MISOPROSTOL 100 MCG: 100 TABLET ORAL at 09:28

## 2017-11-24 RX ADMIN — POTASSIUM CHLORIDE 10 MEQ: 10 TABLET, EXTENDED RELEASE ORAL at 21:17

## 2017-11-24 RX ADMIN — ROPINIROLE HYDROCHLORIDE 0.75 MG: 0.25 TABLET, FILM COATED ORAL at 21:17

## 2017-11-24 RX ADMIN — SODIUM CHLORIDE 125 ML/HR: 900 INJECTION, SOLUTION INTRAVENOUS at 23:14

## 2017-11-24 RX ADMIN — LEVOFLOXACIN 750 MG: 5 INJECTION, SOLUTION INTRAVENOUS at 17:22

## 2017-11-24 RX ADMIN — LACTULOSE 10 G: 20 SOLUTION ORAL at 21:16

## 2017-11-24 RX ADMIN — LEVOTHYROXINE SODIUM 75 MCG: 75 TABLET ORAL at 07:09

## 2017-11-24 RX ADMIN — PROMETHAZINE HYDROCHLORIDE 25 MG: 25 INJECTION, SOLUTION INTRAMUSCULAR; INTRAVENOUS at 20:32

## 2017-11-24 RX ADMIN — HYDROCODONE BITARTRATE AND ACETAMINOPHEN 1 TABLET: 5; 325 TABLET ORAL at 03:06

## 2017-11-24 RX ADMIN — LEVETIRACETAM 1000 MG: 500 TABLET ORAL at 09:30

## 2017-11-24 RX ADMIN — ONDANSETRON HYDROCHLORIDE 4 MG: 2 INJECTION INTRAMUSCULAR; INTRAVENOUS at 09:49

## 2017-11-24 NOTE — PROGRESS NOTES
Consult Note    Patient: Letty Gan               Sex: female          DOA: 11/22/2017         YOB: 1950      Age:  78 y.o.        LOS:  LOS: 2 days              HPI:     Letty Gan is a 78 y.o. female who has been seen for displaced right distal femur fracture, right tibia fracture, left distal femur fracture and chronic bilateral hip dislocations/ fracture. She reports she fell out of her chair yesterday and had pain. She has had chronic pain in her hips and was seen by Dr. Albino Castañeda in the office for the chronic hip injury. She has new onset of pain in the knees since the recent fall. She has a UTI and is currently being treated by Medicine for this. 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    Fatigue     along with weakness    GERD (gastroesophageal reflux disease)     Headache(784.0)     Hypertension     pt denies    Other ill-defined conditions(799.89) swallowing issues, decubitus    Psychiatric disorder     Seizures (HCC)     Thromboembolus ([facility] [address]     TIA (transient ischemic attack)        Past Surgical History:   Procedure Laterality Date    COLONOSCOPY N/A 2/21/2017    COLONOSCOPY [facility] ANESTHESIA, PATIENT IS IN ROOM 358 performed by Lillian Meadows MD at [address]  N/A 2/22/2017    COLONOSCOPY performed by Lillian Meadows MD at [address] [facility], DIAGNOSTIC      over 20 years ago    ENDOSCOPY, COLON, DIAGNOSTIC  9/24/09    HX APPENDECTOMY  11/6/84    HX BACK SURGERY  11/24/98    severe back problems    HX CHOLECYSTECTOMY  11/6/84    HX GASTRIC BYPASS  1995    Patricio    HX OTHER SURGICAL      IVC filter    HX REFRACTIVE SURGERY      [facility] History   Problem Relation Age of Onset    Heart Disease Mother    Lisa Quezada Arthritis-rheumatoid Mother     Stroke Father     Hypertension Father     Arthritis-rheumatoid Father        Social History     Social History    Marital status:      Spouse name: N/A    Number of children: N/A    Years of education: N/A     Social History Main Topics    Smoking status: Never Smoker    Smokeless tobacco: Never Used    Alcohol use No    Drug use: No    Sexual activity: Not Asked     Other Topics Concern    None     Social History Narrative       Prior to Admission medications    Medication Sig Start Date End Date Taking? Authorizing Provider   butalbital-acetaminophen-caffeine (FIORICET, ESGIC) -40 mg per tablet Take 1 Tab by mouth every four (4) hours as needed for Pain. Yes Giovani Rose MD   miSOPROStol (CYTOTEC) 100 mcg tablet Take 100 mcg by mouth two (2) times a day. Yes Giovani Rose MD   cyclobenzaprine (FLEXERIL) 10 mg tablet Take 10 mg by mouth nightly. Yes Giovani Rose MD   lactulose (KRISTALOSE) 10 gram packet Take 10 g by mouth two (2) times a day. Yes Giovani Roes MD   clotrimazole (MYCELEX) 1 % vaginal cream Insert 1 Applicator into vagina nightly as needed for Itching. For ABX induced yeast infection   Yes Historical Provider   DULoxetine (CYMBALTA) 30 mg capsule Take 30 mg by mouth two (2) times a day. Yes Historical Provider   levETIRAcetam (KEPPRA) 500 mg tablet Take 1,000 mg by mouth two (2) times a day. Yes Historical Provider   levothyroxine (SYNTHROID) 75 mcg tablet Take 75 mcg by mouth Daily (before breakfast). Yes Historical Provider   nystatin (MYCOSTATIN) topical cream Apply  to affected area two (2) times daily as needed for Skin Irritation. Under breasts for topical yeast infection/irritation   Yes Historical Provider   pantoprazole (PROTONIX) 40 mg tablet Take 40 mg by mouth two (2) times a day.    Yes Historical Provider   potassium chloride (K-DUR, KLOR-CON) 20 mEq tablet Take 10 mEq by mouth two (2) times a day. Yes Historical Provider   docusate sodium (COLACE) 100 mg capsule Take 100 mg by mouth two (2) times daily as needed for Constipation. Yes Historical Provider   morphine IR (MS IR) 15 mg tablet Take 15 mg by mouth every eight (8) hours as needed for Pain. Yes Historical Provider   nystatin (MYCOSTATIN) 100,000 unit/mL suspension Take 1 tsp by mouth four (4) times daily as needed. swish and spit   Yes Historical Provider   CRANBERRY FRUIT EXTRACT (CRANBERRY EXTRACT PO) Take 1 Cap by mouth two (2) times a day. For UTI prevention  Non-formulary medication. Pt to supply if ordered for admission. Yes Historical Provider   GLUC VARGAS/CHONDRO VARGAS A/VIT C/MN (GLUCOSAMINE CHONDROITIN MAXSTR PO) Take 1 Tab by mouth three (3) times daily. Yes Historical Provider   ferrous sulfate 325 mg (65 mg iron) tablet Take 325 mg by mouth Daily (before breakfast). Yes Historical Provider   cefdinir (OMNICEF) 300 mg capsule Take 300 mg by mouth two (2) times a day. 11/17/17 11/24/17 Yes Historical Provider   polyethylene glycol (MIRALAX) 17 gram packet Take 17 g by mouth two (2) times a day. Yes Historical Provider   baclofen (LIORESAL) 10 mg tablet Take 10 mg by mouth three (3) times daily. Yes Phys Other, MD   [facility]als[facility])  mg/gram ointment Apply  to affected area as needed. NON FORMULARY MEDICATION  PATIENT SUPPLIED HOME MEDICATION   Yes Giovani Rose MD   diclofenac (VOLTAREN) 1 % gel Apply 2 g to affected area four (4) times daily. Non-formulary medication. Pt to supply if ordered for admission. Yes Giovani Rose MD   ergocalciferol (VITAMIN D2) 50,000 unit capsule Take 50,000 Units by mouth Every Friday. Yes Giovani Rose MD   lacosamide (VIMPAT) 200 mg tab tablet Take 1 Tab by mouth two (2) times a day. Max Daily Amount: 400 mg.  Indications: PARTIAL EPILEPSY TREATMENT ADJUNCT 7/23/17  Yes Ash Gamino DO   lubiPROStone (AMITIZA) 8 mcg capsule Take 1 Cap by mouth two (2) times daily (with meals). 3/1/17  Yes Wale aBrnes MD   loratadine-pseudoephedrine (CLARITIN-D 24 HOUR)  mg per tablet Take 1 Tab by mouth daily. Yes Historical Provider   OTHER,NON-FORMULARY, Take 1 Cap by mouth daily (with lunch). Indications: Bariatric Multi Formula   Yes Historical Provider   magnesium oxide 500 mg tab Take 500 mg by mouth daily (with lunch). Yes Historical Provider   ascorbic acid, vitamin C, (VITAMIN C) 500 mg tablet Take 500 mg by mouth daily. Yes Historical Provider   mirabegron ER (MYRBETRIQ) 50 mg ER tablet Take 50 mg by mouth nightly. Takes at 2300   Yes Historical Provider   rOPINIRole (REQUIP) 0.25 mg tablet Take 0.75 mg by mouth nightly. Takes at 2300   Yes Historical Provider   predniSONE (DELTASONE) 5 mg tablet Take 5 mg by mouth daily. Yes Historical Provider   gabapentin (NEURONTIN) 300 mg capsule Take 300 mg by mouth three (3) times daily. Yes Historical Provider   OLANZapine (ZYPREXA) 7.5 mg tablet Take 7.5 mg by mouth nightly. Yes Historical Provider   bisacodyl (DULCOLAX, BISACODYL,) 10 mg suppository Insert 10 mg into rectum daily. Phys MD Milton       Allergies   Allergen Reactions    Ceftriaxone Seizures    Demerol [Meperidine] Unknown (comments)    Seafood [Shellfish Containing Products] Unknown (comments)       Review of Systems  Pertinent items are noted in the History of Present Illness. Physical Exam:      Visit Vitals    /45 (BP 1 Location: Right arm, BP Patient Position: At rest)    Pulse 83    Temp 98.2 °F (36.8 °C)    Resp 17    Wt 69.1 kg (152 lb 6.4 oz)    SpO2 100%    BMI 24.6 kg/m2       Physical Exam:  Patient has pain to palpation of the bilateral knees and hips. Not markedly tender at right tibia. She is in chronic windswept deformity of the bilateral lower extremities. She has good DP, PT pulses and brisk capillary refill bilaterally.   She has no other orthopaedic complaints of the upper extremities, spine or thorax. Splints are maintained to the bilateral lower extremities and there appear to be no open wounds or tenting of the skin with gentle evaluation. Labs Reviewed:  Xrays 11/22/17     Left Femur:  1. Distal femoral diaphyseal to metadiaphyseal minimally comminuted displaced  fracture deformity . Right Femur  Suboptimal positioning due to patient condition. Comminuted, displaced,  angulated fracture of the distal femoral diametaphysis. Chronic superior and  slight lateral subluxation of the right femoral head. Pelvis  1. Stable exam. Osteopenia. Chronic bilateral superior lateral hip fracture  dislocations. No evidence of an acute fracture or dislocation. Left Tib/Fib  1. Osteopenia. Distal femoral diaphyseal to metadiaphyseal minimally comminuted,  displaced and angulated fracture deformity. 2. Remaining osseous structures are intact. Right Tib/Fib  Unable to optimally position the patient due to condition. Probable nondisplaced  fracture of the distal tibia. Fracture of the calcaneal tuberosity, with  sclerosis at the fracture line, possibly chronic, or possibly acute with  impaction. Assessment/Plan     Principal Problem:    Multiple fractures of both lower extremities, closed, initial encounter (11/22/2017)    Active Problems:    Bipolar 1 disorder ([facility] [address] (5/3/2012)      UTI (urinary tract infection) (6/1/2012)      Severe protein-calorie malnutrition ([address] (6/1/2012)      Seizure disorder ([facility] [address] (6/24/2012)      Depression (6/24/2012)      Chronic pain (11/23/2017)      Bedridden (11/23/2017)      Dr. Tawana Dacosta discussed the fractures in detail with the patient. Risks associated with surgical intervention and conservative treatment were reviewed in detail. Patient would like to be conservative regarding treatment as per recommendation by Dr. Tawana Dacosta. Discussed with [facility] yesterday regarding bilateral leg braces. Patient is to be NWB.   Patient should follow up in the office with Dr. Padmini Vicente in 2 weeks. Possible pain management referral outpatient. DVT prophylaxis and pain medications as per primary team.  [facility] is coming today to size braces. Patient is able to be discharged from ortho perspective if [facility] is able to provide splints in outpatient follow up. Patient must maintain splints currently on legs at all times.

## 2017-11-24 NOTE — PROGRESS NOTES
Shift Summary:  Patient required alternating Norco and Dilaudid po q 2 hrs for pain to BLE rating at 9-10/10. Provided relief to where patient would sleep for couple hours than requesting more pain medication. Patient stated that pain management is sufficient due to the understanding that patient has multiple fractures to BLE, and does not want to add more due to possible constipating effects. Patient also required Zofran for nausea x 1 overnight.     Patient Vitals for the past 12 hrs:   Temp Pulse Resp BP SpO2   11/24/17 0747 98.2 °F (36.8 °C) 83 17 104/45 100 %   11/24/17 0349 98 °F (36.7 °C) 86 16 102/51 100 %   11/23/17 2317 98.4 °F (36.9 °C) 95 16 102/51 98 %

## 2017-11-24 NOTE — PROGRESS NOTES
Hospitalist Progress Note    Patient: Fredy Horn MRN: [id number]  CSN: [id number]    YOB: 1950  Age: 78 y.o. Sex: female    DOA: 11/22/2017 LOS:  LOS: 2 days            Assessment/Plan     Principal Problem:    Multiple fractures of both lower extremities, closed, initial encounter (11/22/2017)    Active Problems:    UTI (urinary tract infection) (6/1/2012)      Severe protein-calorie malnutrition ([address] (6/1/2012)      Bipolar 1 disorder ([facility] [address] (5/3/2012)      Seizure disorder ([facility] [address] (6/24/2012)      Depression (6/24/2012)      Chronic pain (11/23/2017)      Bedridden (11/23/2017)      Sepsis ([facility] [address] (11/24/2017)    Sepsis/UTI with chronic cath: sepsis bundle, On Levaquin, f/u blood Cx and UCX, on myrbetriq       Multiple lower extremity fractures: Seen and f/u by Dr. Calvin Washington. Pt states \" No surgery\"     Anemia: Chronic. Monitor H/H     Seizure: Continue home dose of keppra, Vimpat     Chronic pain: Still in pain. Readjust the pain regimen.      Bipolar: Continue Zyprexa      Incentive spirometry. DVT/GI prophylaxis     Poor prognosis     Full code. Poor life quality, will consult to palliative care     Remain hospital care    Dispo: SNF in 1-2 days        CC:  Multiple lower extremity fractures, UTI, Anemia, seizure, bipolar, chronic pain          Subjective:     Pt was seen and examined with the nurse in the morning round. \" I am still in pain\"    Review of systems  General: No fevers or chills. Cardiovascular: No chest pain or pressure. No palpitations. Pulmonary: No cough, SOB  Gastrointestinal: No nausea, vomiting. Objective:      Visit Vitals    /45 (BP 1 Location: Right arm, BP Patient Position: At rest)    Pulse 83    Temp 98.2 °F (36.8 °C)    Resp 17    Wt 69.1 kg (152 lb 6.4 oz)    SpO2 100%    BMI 24.6 kg/m2       Physical Exam:    Gen: NAD, depressed. Heent:  MMM, NC, AT. Cor: s1s2 RRR. No MRG. PMI mid 5th intercostal space. Resp:  CTA b/l.   No w/r/r.  Nml effort and diaphragmatic excursion. Abd:  NT ND.  BS positive. No rebound or guarding. No masses. Ext: Bilateral LE braces      Intake and Output:  Current Shift:  11/24 0701 - 11/24 1900  In: -   Out: 900 [Urine:900]  Last three shifts:  11/22 1901 - 11/24 0700  In: 6392 [P.O.:1651; I.V.:3930]  Out: 6150 [ZPHIA:4888]    Labs: Results:       Chemistry Recent Labs      11/23/17 0134 11/22/17   1630   GLU  116*  124*   NA  132*  131*   K  5.2  5.2   CL  98*  93*   CO2  29  29   BUN  13  17   CREA  0.58*  0.57*   CA  8.1*  8.8   AGAP  5  9   BUCR  22*  30*      CBC w/Diff Recent Labs      11/23/17 0134 11/22/17   1425   WBC  8.3  18.0*   RBC  3.59*  5.05   HGB  10.3*  14.9   HCT  30.8*  43.2   PLT  223  262   GRANS   --   94*   LYMPH   --   3*   EOS   --   0      Cardiac Enzymes No results for input(s): CPK, CKND1, PAOLA in the last 72 hours. No lab exists for component: CKRMB, TROIP   Coagulation No results for input(s): PTP, INR, APTT in the last 72 hours. No lab exists for component: INREXT, INREXT    Lipid Panel No results found for: CHOL, CHOLPOCT, CHOLX, CHLST, CHOLV, 695601, HDL, LDL, LDLC, DLDLP, 766824, VLDLC, VLDL, TGLX, TRIGL, TRIGP, TGLPOCT, CHHD, CHHDX   BNP No results for input(s): BNPP in the last 72 hours. Liver Enzymes No results for input(s): TP, ALB, TBIL, AP, SGOT, GPT in the last 72 hours.     No lab exists for component: DBIL   Thyroid Studies Lab Results   Component Value Date/Time    TSH 17.30 08/27/2017 02:00 AM        Procedures/imaging: see electronic medical records for all procedures/Xrays and details which were not copied into this note but were reviewed prior to creation of Plan      Medications Reviewed  Oneida Carter MD

## 2017-11-24 NOTE — CDMP QUERY
Please clarify if this patient is being treated/managed for:    =>Sepsis and is d/t uti  ( poa)  =>Other Explanation of clinical findings  =>Unable to Determine (no explanation of clinical findings)    The medical record reflects the following:    Risk: indwelling león    Clinical Indicators: wbc 18. neutrophils 94, lactic acid 4.2, tachycardia, uti. Treatment: levaquin    Please clarify and document your clinical opinion in the progress notes and discharge summary including the definitive and/or presumptive diagnosis, (suspected or probable), related to the above clinical findings. Please include clinical findings supporting your diagnosis. If you DECLINE this query or would like to communicate with [facility], please utilize the \"[employer] message box\" at the TOP of the Progress Note on the right.       Thank you,    Radha Mosquera RN [facility] [phone]

## 2017-11-24 NOTE — CDMP QUERY
Please clarify if this patient is being treated/managed for:    => UTI due to indwelling catheter  =>Other Explanation of clinical findings  =>Unable to Determine (no explanation of clinical findings)    The medical record reflects the following:    Risk:indwell león catheter    Clinical Indicators:UTI , indwelling catheter    Treatment: levaquin     Please clarify and document your clinical opinion in the progress notes and discharge summary including the definitive and/or presumptive diagnosis, (suspected or probable), related to the above clinical findings. Please include clinical findings supporting your diagnosis. If you DECLINE this query or would like to communicate with [facility], please utilize the \"[facility] message box\" at the TOP of the Progress Note on the right.       Thank you,  Fred Zazueta RN -[facility] [phone]

## 2017-11-24 NOTE — ROUTINE PROCESS
Bedside and Verbal shift change report given to KATHI Ashby RN (oncoming nurse) by Calvin Mijares (offgoing nurse). Report included the following information SBAR, Kardex, Intake/Output and MAR.

## 2017-11-24 NOTE — CONSULTS
[facility]: [phone] ([phone])  formerly [facility]: [phone]   [facility]: [phone]    Patient Name: Hortensia Beach  YOB: 1950    Date of Initial Consult: 11/24/17  Reason for Consult: symptom management/care planning  Requesting Provider: Dr. Madelin Vazquez   Primary Care Physician: Jonelle Zhang MD      SUMMARY:   Hortensia Beach is a 78y.o. year old with a complex past history admitted for assessment of multiple bilat LE fractures following a fall from bed. Medora to be a poor surgical candidate and she is not interested. Past history includes bipolar disorder with at least one involuntary committal to [facility] and transfer to psychiatry hospital in 2012, chronic back pain previously followed by pain management, seizure disorder, and functional paraplegia of LEs leaving her wheelchair/bed bound. S/P bariatric surgery w/ dumping syndrome and nutritional failure to thrive. Had been enrolled in hospice in [phone] for this, but gradually weaning of seizure meds resulted in improvement and hospice graduation. Long term patient of Dr. Aranza Ruvalcaba, has also been in pain management and most recently followed by [place] visiting physicians(Dr. Debbie Lamar). They are insisting she return to pain management and have not been prescribing opioids. Previously was on ms IR15 mg tid and fioricet w/ codeine prn. Also has a history of chronic constipation and recurring episodes of SBO.      PALLIATIVE DIAGNOSES:     Patient Active Problem List   Diagnosis Code    Bipolar 1 disorder ([address] F31.9    Decubitus ulcer of sacral area L89.159    UTI (urinary tract infection) N39.0    Severe protein-calorie malnutrition ([address] E43    Seizure disorder ([address] G40.909    Depression F32.9    Hypotension, unspecified I95.9    Personal history of DVT (deep vein thrombosis) Z86.718    Hypomagnesemia E83.42    Unspecified constipation K59.00    Seizure ([address] R56.9    Overdose T50.901A    Narcosis R40.1    Multiple fractures of both lower extremities, closed, initial encounter S82.91XA, S82.92XA    Chronic pain G89.29    Bedridden Z74.01    Sepsis (HCC) A41.9    Acute pain due to injury- multiple LE fractures G89.11     1. PLAN:   1. Met with patient in her room. She was alert and oriented providing reliable summary of her known history. Felt her mood had been doing pretty well until this happened, psych meds were helpful. She knows her care will be more of a challenge with this event. When asked about her previous hospitalizations in 2012 indicating she is a DNR she says she still feels that way and does not wish to undergo CPR. She already has an AMD with living will on file. She is anxious to complete a DDNR because she is worried her  will not respect her wishes in regard to this. DDNR completed and placed on chart. Questions answered. Recommend: Cont low dose fentanyl patch monitoring closely for side effects. D/c hydrocodone and replace w/ oxycodone q 4 hr prn, giving APAP 650 mg qid. Add bowel regimen with senna qhs. Colace has no demonstrated efficacy in managing opioid induced constipation. Suspect rehab potential minimal and placement with subsequent care will prove a challenge. 2. Initial consult note routed to primary continuity provider  3.  Communicated plan of care with: Palliative IDT       GOALS OF CARE:   Limited curative, control of symptoms    Advance Care Planning 11/24/2017   Patient's Healthcare Decision Maker is: Named in scanned ACP document   Primary Decision Maker Name Maicol Casey   Primary Decision Maker Phone Number [phone]   Primary Decision Maker Relationship to Patient Spouse   Secondary Decision Maker Name Mary Ann Paredes Phone Number [phone]   Confirm Advance Directive Yes, on file   Patient Would Like to Complete Advance Directive -   Does the patient have other document types Do Not Resuscitate           HISTORY:     History obtained from: patient    CHIEF COMPLAINT: see summary    HPI/SUBJECTIVE:    The patient is:   [x] Verbal and participatory  [] Non-participatory due to:        Clinical Pain Assessment (nonverbal scale for nonverbal patients): Pain: 10    [++++ Clinical Pain Assessment++++]  [++++Pain Severity++++]: Pain: 10  [++++Pain Character++++]: sharp  [++++Pain Duration++++]: continuous  [++++Pain Effect++++]:   [++++Pain Factors++++]:   [++++Pain Frequency++++]:   [++++Pain Location++++]: legs  [++++ Clinical Pain Assessment++++]         FUNCTIONAL ASSESSMENT:     Palliative Performance Scale (PPS):  PPS: 40       PSYCHOSOCIAL/SPIRITUAL SCREENING:     Advance Care Planning:  Advance Care Planning 11/24/2017   Patient's Healthcare Decision Maker is: Named in scanned ACP document   Primary Decision Maker Name Tonny Garrido   Primary Decision Maker Phone Number [phone]   Primary Decision Maker Relationship to Patient Spouse   Secondary Decision Maker Name Mary Ann 6957 Phone Number [phone]   Confirm Advance Directive Yes, on file   Patient Would Like to Complete Advance Directive -   Does the patient have other document types Do Not Resuscitate        Any spiritual / Scientology concerns:  [] Yes /  [x] No    Caregiver Burnout:  [] Yes /  [x] No /  [] No Caregiver Present      Anticipatory grief assessment:   [x] Normal  / [] Maladaptive       ESAS Anxiety: Anxiety: 5    ESAS Depression: Depression: 6        REVIEW OF SYSTEMS:     Positive and pertinent negative findings in ROS are noted above in HPI. The following systems were [x] reviewed / [] unable to be reviewed as noted in HPI  Other findings are noted below. Systems: constitutional, ears/nose/mouth/throat, respiratory, gastrointestinal, genitourinary, musculoskeletal, integumentary, neurologic, psychiatric, endocrine. Positive findings noted below.   Modified ESAS Completed by: provider Fatigue: 5 Drowsiness: 5   Depression: 6 Pain: 10   Anxiety: 5 Nausea: 0   Anorexia: 2 Dyspnea: 0     Constipation: Yes     Stool Occurrence(s): 0        PHYSICAL EXAM:     Wt Readings from Last 3 Encounters:   11/23/17 69.1 kg (152 lb 6.4 oz)   10/11/17 67.1 kg (148 lb)   10/07/17 68.5 kg (151 lb)     Blood pressure 121/56, pulse 100, temperature 98.2 °F (36.8 °C), resp. rate 18, weight 69.1 kg (152 lb 6.4 oz), SpO2 98 %.   Pain:  Pain Scale 1: Numeric (0 - 10)  Pain Intensity 1: 10  Pain Onset 1: slipped off side of bed while doing exercises with assistant this morning  Pain Location 1: Leg  Pain Orientation 1: Left, Right  Pain Description 1: Aching  Pain Intervention(s) 1: Medication (see MAR)  Last bowel movement:     Constitutional: ill appearing, older than stated age  Eyes: pupils equal, anicteric  ENMT: no nasal discharge, moist mucous membranes  Cardiovascular: regular rhythm, distal pulses intact  Respiratory: breathing not labored, symmetric  Gastrointestinal: soft non-tender, +bowel sounds  Musculoskeletal: LE contractures and abnormal rotations  Skin: warm, dry  Neurologic: following commands, moving all extremities  Psychiatric: full affect, no hallucinations  Other:       HISTORY:     Principal Problem:    Multiple fractures of both lower extremities, closed, initial encounter (11/22/2017)    Active Problems:    Bipolar 1 disorder ([facility] (5/3/2012)      UTI (urinary tract infection) (6/1/2012)      Severe protein-calorie malnutrition ([address] (6/1/2012)      Seizure disorder ([facility] (6/24/2012)      Depression (6/24/2012)      Chronic pain (11/23/2017)      Bedridden (11/23/2017)      Sepsis ([address] (11/24/2017)      Acute pain due to injury- multiple LE fractures (11/24/2017)      Past Medical History:   Diagnosis Date    Acute pain due to injury- multiple LE fractures 11/24/2017    Anemia NEC     history of blood transfusion    Anxiety     panic attacks    Arthritis     Bipolar 1 disorder ([address]     Chronic back pain     Depression     Elevated diaphragm 5/4/2012    Fatigue     along with weakness    GERD (gastroesophageal reflux disease)     Headache(784.0)     Hypertension     pt denies    Other ill-defined conditions(799.89) swallowing issues, decubitus    Psychiatric disorder     Seizures (HCC)     Thromboembolus (HCC)     TIA (transient ischemic attack)       Past Surgical History:   Procedure Laterality Date    COLONOSCOPY N/A 2/21/2017    COLONOSCOPY [facility] ANESTHESIA, PATIENT IS IN ROOM 358 performed by Lillian Meadows MD at [address] COLONOSCOPY N/A 2/22/2017    COLONOSCOPY performed by Lillian Meadows MD at [address], COLON, DIAGNOSTIC      over 20 years ago    ENDOSCOPY, COLON, DIAGNOSTIC  9/24/09    HX APPENDECTOMY  11/6/84    HX BACK SURGERY  11/24/98    severe back problems    HX CHOLECYSTECTOMY  11/6/84    HX GASTRIC BYPASS  1995    Patricio    HX OTHER SURGICAL      IVC filter    HX REFRACTIVE SURGERY      HX TUBAL LIGATION  1991      Family History   Problem Relation Age of Onset    Heart Disease Mother    Dewain Factor Mother     Stroke Father     Hypertension Father     Arthritis-rheumatoid Father      History reviewed, no pertinent family history.   Social History   Substance Use Topics    Smoking status: Never Smoker    Smokeless tobacco: Never Used    Alcohol use No     Allergies   Allergen Reactions    Ceftriaxone Seizures    Demerol [Meperidine] Unknown (comments)    Seafood [Shellfish Containing Products] Unknown (comments)      Current Facility-Administered Medications   Medication Dose Route Frequency    fentaNYL (DURAGESIC) 12 mcg/hr patch 1 Patch  1 Patch TransDERmal Q72H    [START ON 11/25/2017] multivitamin, tx-iron-ca-min (THERA-M w/ IRON) tablet 1 Tab  1 Tab Oral DAILY    acetaminophen (TYLENOL) tablet 650 mg  650 mg Oral AC&HS    oxyCODONE IR (ROXICODONE) tablet 5 mg  5 mg Oral Q4H PRN    senna (SENOKOT) tablet 17.2 mg  2 Tab Oral QHS    influenza vaccine 2017-18 (3 yrs+)(PF) (FLUZONE QUAD/FLUARIX QUAD) injection 0.5 mL  0.5 mL IntraMUSCular PRIOR TO DISCHARGE    sodium chloride (NS) flush 5-10 mL  5-10 mL IntraVENous PRN    levoFLOXacin (LEVAQUIN) 750 mg in D5W IVPB  750 mg IntraVENous Q24H    ondansetron (ZOFRAN) injection 4 mg  4 mg IntraVENous Q4H PRN    DULoxetine (CYMBALTA) capsule 30 mg  30 mg Oral BID    ferrous sulfate tablet 325 mg  325 mg Oral ACB    levETIRAcetam (KEPPRA) tablet 1,000 mg  1,000 mg Oral BID    lactulose (CHRONULAC) solution 10 g  10 g Oral BID    levothyroxine (SYNTHROID) tablet 75 mcg  75 mcg Oral ACB    miSOPROStol (CYTOTEC) tablet 100 mcg  100 mcg Oral BID    pantoprazole (PROTONIX) tablet 40 mg  40 mg Oral BID    potassium chloride (KLOR-CON) tablet 10 mEq  10 mEq Oral BID    polyethylene glycol (MIRALAX) packet 17 g  17 g Oral BID    lacosamide (VIMPAT) tablet 200 mg  200 mg Oral BID    predniSONE (DELTASONE) tablet 5 mg  5 mg Oral DAILY    rOPINIRole (REQUIP) tablet 0.75 mg  0.75 mg Oral QHS    mirabegron ER (MYRBETRIQ) tablet 50 mg  50 mg Oral QHS    gabapentin (NEURONTIN) capsule 300 mg  300 mg Oral TID    OLANZapine (ZyPREXA) tablet 7.5 mg  7.5 mg Oral QHS    0.9% sodium chloride infusion  125 mL/hr IntraVENous CONTINUOUS    sodium chloride (OCEAN) 0.65 % nasal spray 2 Spray  2 Spray Both Nostrils Q2H PRN        LAB AND IMAGING FINDINGS:     Lab Results   Component Value Date/Time    WBC 8.3 11/23/2017 01:34 AM    HGB 10.3 11/23/2017 01:34 AM    PLATELET 134 48/67/0281 01:34 AM     Lab Results   Component Value Date/Time    Sodium 132 11/23/2017 01:34 AM    Potassium 5.2 11/23/2017 01:34 AM    Chloride 98 11/23/2017 01:34 AM    CO2 29 11/23/2017 01:34 AM    BUN 13 11/23/2017 01:34 AM    Creatinine 0.58 11/23/2017 01:34 AM    Calcium 8.1 11/23/2017 01:34 AM    Magnesium 2.1 08/27/2017 02:00 AM    Phosphorus 3.1 09/20/2012 06:15 AM      Lab Results   Component Value Date/Time    AST (SGOT) 49 10/11/2017 02:00 PM    Alk.  phosphatase 193 10/11/2017 02:00 PM    Protein, total 7.4 10/11/2017 02:00 PM    Albumin 3.4 10/11/2017 02:00 PM    Globulin 4.0 10/11/2017 02:00 PM     Lab Results   Component Value Date/Time    INR 0.9 08/27/2017 02:00 AM    Prothrombin time 11.2 08/27/2017 02:00 AM    aPTT 30.6 07/22/2017 05:00 AM      No results found for: IRON, FE, TIBC, IBCT, PSAT, FERR   No results found for: PH, PCO2, PO2  No components found for: [place]   Lab Results   Component Value Date/Time    CK 85 08/27/2017 02:00 AM    CK - MB 2.7 08/27/2017 02:00 AM              Total time: 70 min  Counseling / coordination time, spent as noted above: 50  > 50% counseling / coordination        Angelita Kirk MD  Palliative Medicine

## 2017-11-24 NOTE — PROGRESS NOTES
INITIAL NUTRITION ASSESSMENT     RECOMMENDATIONS/PLAN:   Will order MVI+M to aid in meeting 100% DRIs, prevent further skin breakdown  Monitor PO intakes, encourage >75% to meet estimated needs  Monitor need for oral supplements should PO intakes <75%   Monitor labs/lytes, fluid status, bowel function, weight    REASON FOR ASSESSMENT:     [x]  RN Referral:    [x] MST score >/=2  Malnutrition Screening Tool (MST):  Recently Lost Weight Without Trying: Yes  If Yes, How Much Weight Loss: 2-13 lbs  Eating Poorly Due to Decreased Appetite: Yes  MST Score: 2    [] Enteral/Parenteral Nutrition PTA   [] Pregnant (Not in Labor):  [] Gestational DM     [] Multigestation   [] Pressure Ulcer/Wound Care needs    NUTRITION ASSESSMENT:   Client History: 78 yrs old Female admitted with multiple lower extremity fractures. Pt paraplegia per MD note.  No surgery per pt per MD note     PMHx: seizure, chronic pain, UTI with chronic león, chronic back and hip pain, GERD, chronic constipation  Cultural/Pent[employer]ostal Food Preferences: None Identified    FOOD/NUTRITION HISTORY  Diet History: pt reports wt loss and decreased appetite per MD note  Food Allergies: seafood/shellfish     Pertinent PTA Medications: synthroid, protonix, KCL, colace, cranberry, glucosamine, ferrous sulfate, miralax, vitamin D2, ascrobic acid 500mg    NUTRITION INTAKE   Diet Order:  regular    Average PO Intake:       Patient Vitals for the past 100 hrs:   % Diet Eaten   11/24/17 0659 0 %   11/23/17 1424 100 %   11/23/17 0930 100 %      Pertinent Medications:  [x] Reviewed; lactulose, synthroid, protonix, miralax, KCL  Electrolyte Replacement Protocol: []K  []Mg  []PO4    Insulin:  [] SSI  [] Pre-meal   []  Basal   [] Drip  [] None  Pt expected to meet estimated nutrient needs through next review:          [x]  Yes     ANTHROPOMETRICS  Height: 66 inches      Weight: 69.1 kg (152 lb 6.4 oz)    BMI: 24.6kg/m^2  -  normal weight (18.5%-24.9% BMI)        Weight change: approx wt loss of 4kg since 2/2017 (9 months) not c/w recent significant wt loss                                 Comparison to Reference Standards:  IBW: 130 lbs      %IBW: 117%      AdjBW: N/A kg    NUTRITION-FOCUSED PHYSICAL ASSESSMENT  Skin: pt reports bed sore the size of a pea    GI: 11/23    BIOCHEMICAL DATA & MEDICAL TESTS  Pertinent Labs:  [x] Reviewed; Na-132    NUTRITION PRESCRIPTION  Calories: 1932 kcal/day based on 28kcal/kg  Protein: 1.2 g/day based on 83 g/kg  CHO: 242 g/day based on 50% of total energy  Fluid: 1932 ml/day based on 1 kcal/ml      NUTRITION DIAGNOSES:   1. Increased energy needs related to wound healing as evidenced by pt report of bed sore, hx of bed sore. NUTRITION INTERVENTIONS:   INTERVENTIONS:        GOALS:  1. Vitamin/minerals: Will order MVI+M 1. Prevent further skin breakdown throughout the next 5 days     LEARNING NEEDS (Diet, Supplementation, Food/Nutrient-Drug Interaction):   [x] None Identified  [] Inpatient education provided/documented    [] Identified and patient:  [] Declined     [] Was not appropriate/indicated  NUTRITION MONITORING /EVALUATION:   Follow PO intake  Monitor wt  Monitor renal labs, electrolytes, fluid status  Monitor for additional supplement needs    [] Participated in Interdisciplinary Rounds  [x] [facility] Reviewed/Documented  DISCHARGE NUTRITION RECOMMENDATIONS ADDRESSED:     [x] Yes- recommended regular    [] To be determined closer to discharge    NUTRITION RISK:     [x]  At risk                     []  Not currently at risk     Will follow-up per policy.   Pepe Roberts, [address] [place] [address]  PAGER:  [phone]

## 2017-11-24 NOTE — PROGRESS NOTES
Pt admitted due to a fall resulting in a displaced right distal femur fracture, right tibia fx, left distal femur fx and chronic bilateral hip dislocations/fx. Pt with a history of History of seizure, chronic pain, chronic indwelling Gotti catheter, urinary tract infections, chronic back and hip pain, bipolar disorder, history of narcosis, presumably secondary to pain medications, GERD, chronic constipation and is bedridden (paraplegic). Pt with an ortho consult and bilateral leg braces have been recommended. Pt is currently NWB. Met with pt at bedside to discuss plan of care. Pt has indicated she is current with [facility], however would like to see if [facility] or [facility] would be options post discharge. CMS has been notified to assist.  CM to continue to follow. Anticipate pt will transition home with [facility] vs inpt rehab Monday. CM to continue to follow and assist.     Readmission Risk Assessment:     Moderate Risk and MSSP/Good Help ACO patients    RRAT Score:  13-20    Initial Assessment: [facility] vs inpt rehab     Emergency Contact:  Spouse (see face sheet)    Pertinent Medical Hx:     See clinical     PCP/Specialists:  Alexys Pulido      Community Services:   [facility]    DME:      Hospital bed; wheel chair    Moderate Risk Care Transition Plan:  1. Evaluate for [facility] or [facility], SNF, acute rehab, community care coordination of resources. 2. Involve patient/caregiver in assessment, planning, education and implement of intervention. 3. CM daily patient care huddles/interdisciplinary rounds. 4. PCP/Specialist appointment within 5  7 days made prior to discharge. 5. Facilitate transportation and logistics for follow-up appointments. 6. Medication reconciliation [id number]  7. Formal handoff between hospital provider and post-acute provider to transition patient  Handoff to [facility] Nurse Navigator or PCP practice. Care Management Interventions  PCP Verified by CM:  Yes  Mode of Transport at Discharge: BLS  Transition of Care Consult (CM Consult): Discharge Planning  Health Maintenance Reviewed: Yes  Physical Therapy Consult: Yes  Occupational Therapy Consult: Yes  Current Support Network: Lives with Spouse  Confirm Follow Up Transport: Family

## 2017-11-25 LAB
ANION GAP SERPL CALC-SCNC: 3 MMOL/L (ref 3–18)
BACTERIA SPEC CULT: ABNORMAL
BACTERIA SPEC CULT: ABNORMAL
BASOPHILS # BLD: 0 K/UL (ref 0–0.06)
BASOPHILS NFR BLD: 0 % (ref 0–2)
BUN SERPL-MCNC: 5 MG/DL (ref 7–18)
BUN/CREAT SERPL: 13 (ref 12–20)
CALCIUM SERPL-MCNC: 7.9 MG/DL (ref 8.5–10.1)
CHLORIDE SERPL-SCNC: 98 MMOL/L (ref 100–108)
CO2 SERPL-SCNC: 31 MMOL/L (ref 21–32)
CREAT SERPL-MCNC: 0.4 MG/DL (ref 0.6–1.3)
DIFFERENTIAL METHOD BLD: ABNORMAL
EOSINOPHIL # BLD: 0.4 K/UL (ref 0–0.4)
EOSINOPHIL NFR BLD: 5 % (ref 0–5)
ERYTHROCYTE [DISTWIDTH] IN BLOOD BY AUTOMATED COUNT: 15.5 % (ref 11.6–14.5)
GLUCOSE SERPL-MCNC: 92 MG/DL (ref 74–99)
HCT VFR BLD AUTO: 23.6 % (ref 35–45)
HGB BLD-MCNC: 7.8 G/DL (ref 12–16)
LYMPHOCYTES # BLD: 1.9 K/UL (ref 0.9–3.6)
LYMPHOCYTES NFR BLD: 26 % (ref 21–52)
MCH RBC QN AUTO: 29.4 PG (ref 24–34)
MCHC RBC AUTO-ENTMCNC: 33.1 G/DL (ref 31–37)
MCV RBC AUTO: 89.1 FL (ref 74–97)
MONOCYTES # BLD: 0.6 K/UL (ref 0.05–1.2)
MONOCYTES NFR BLD: 9 % (ref 3–10)
NEUTS SEG # BLD: 4.3 K/UL (ref 1.8–8)
NEUTS SEG NFR BLD: 60 % (ref 40–73)
PLATELET # BLD AUTO: 172 K/UL (ref 135–420)
PMV BLD AUTO: 8.5 FL (ref 9.2–11.8)
POTASSIUM SERPL-SCNC: 5 MMOL/L (ref 3.5–5.5)
RBC # BLD AUTO: 2.65 M/UL (ref 4.2–5.3)
SERVICE CMNT-IMP: ABNORMAL
SODIUM SERPL-SCNC: 132 MMOL/L (ref 136–145)
WBC # BLD AUTO: 7.2 K/UL (ref 4.6–13.2)

## 2017-11-25 PROCEDURE — 74011250637 HC RX REV CODE- 250/637: Performed by: INTERNAL MEDICINE

## 2017-11-25 PROCEDURE — 74011000250 HC RX REV CODE- 250: Performed by: HOSPITALIST

## 2017-11-25 PROCEDURE — 74011250636 HC RX REV CODE- 250/636: Performed by: HOSPITALIST

## 2017-11-25 PROCEDURE — 74011250637 HC RX REV CODE- 250/637: Performed by: FAMILY MEDICINE

## 2017-11-25 PROCEDURE — 74011250636 HC RX REV CODE- 250/636: Performed by: INTERNAL MEDICINE

## 2017-11-25 PROCEDURE — 65270000029 HC RM PRIVATE

## 2017-11-25 PROCEDURE — 74011636637 HC RX REV CODE- 636/637: Performed by: INTERNAL MEDICINE

## 2017-11-25 PROCEDURE — 85025 COMPLETE CBC W/AUTO DIFF WBC: CPT | Performed by: FAMILY MEDICINE

## 2017-11-25 PROCEDURE — 36415 COLL VENOUS BLD VENIPUNCTURE: CPT | Performed by: FAMILY MEDICINE

## 2017-11-25 PROCEDURE — 80048 BASIC METABOLIC PNL TOTAL CA: CPT | Performed by: FAMILY MEDICINE

## 2017-11-25 PROCEDURE — 74011000258 HC RX REV CODE- 258: Performed by: HOSPITALIST

## 2017-11-25 RX ORDER — ENOXAPARIN SODIUM 100 MG/ML
40 INJECTION SUBCUTANEOUS DAILY
Status: DISCONTINUED | OUTPATIENT
Start: 2017-11-25 | End: 2017-11-27

## 2017-11-25 RX ADMIN — DULOXETINE HYDROCHLORIDE 30 MG: 30 CAPSULE, DELAYED RELEASE ORAL at 21:35

## 2017-11-25 RX ADMIN — ENOXAPARIN SODIUM 40 MG: 40 INJECTION SUBCUTANEOUS at 21:36

## 2017-11-25 RX ADMIN — GABAPENTIN 300 MG: 300 CAPSULE ORAL at 10:13

## 2017-11-25 RX ADMIN — ACETAMINOPHEN 650 MG: 325 TABLET ORAL at 15:54

## 2017-11-25 RX ADMIN — OLANZAPINE 7.5 MG: 2.5 TABLET ORAL at 21:35

## 2017-11-25 RX ADMIN — OXYCODONE HYDROCHLORIDE 5 MG: 5 TABLET ORAL at 15:54

## 2017-11-25 RX ADMIN — FERROUS SULFATE TAB 325 MG (65 MG ELEMENTAL FE) 325 MG: 325 (65 FE) TAB at 06:38

## 2017-11-25 RX ADMIN — PANTOPRAZOLE SODIUM 40 MG: 40 TABLET, DELAYED RELEASE ORAL at 21:36

## 2017-11-25 RX ADMIN — OXYCODONE HYDROCHLORIDE 5 MG: 5 TABLET ORAL at 06:38

## 2017-11-25 RX ADMIN — MULTIPLE VITAMINS W/ MINERALS TAB 1 TABLET: TAB at 10:14

## 2017-11-25 RX ADMIN — OXYCODONE HYDROCHLORIDE 5 MG: 5 TABLET ORAL at 10:20

## 2017-11-25 RX ADMIN — ACETAMINOPHEN 650 MG: 325 TABLET ORAL at 11:56

## 2017-11-25 RX ADMIN — PANTOPRAZOLE SODIUM 40 MG: 40 TABLET, DELAYED RELEASE ORAL at 10:14

## 2017-11-25 RX ADMIN — LEVOFLOXACIN 750 MG: 5 INJECTION, SOLUTION INTRAVENOUS at 18:05

## 2017-11-25 RX ADMIN — LACOSAMIDE 200 MG: 100 TABLET, FILM COATED ORAL at 10:13

## 2017-11-25 RX ADMIN — DULOXETINE HYDROCHLORIDE 30 MG: 30 CAPSULE, DELAYED RELEASE ORAL at 10:14

## 2017-11-25 RX ADMIN — MISOPROSTOL 100 MCG: 100 TABLET ORAL at 10:13

## 2017-11-25 RX ADMIN — MISOPROSTOL 100 MCG: 100 TABLET ORAL at 21:35

## 2017-11-25 RX ADMIN — POTASSIUM CHLORIDE 10 MEQ: 10 TABLET, EXTENDED RELEASE ORAL at 21:35

## 2017-11-25 RX ADMIN — ACETAMINOPHEN 650 MG: 325 TABLET ORAL at 21:00

## 2017-11-25 RX ADMIN — MEROPENEM 500 MG: 500 INJECTION, POWDER, FOR SOLUTION INTRAVENOUS at 15:13

## 2017-11-25 RX ADMIN — GABAPENTIN 300 MG: 300 CAPSULE ORAL at 21:35

## 2017-11-25 RX ADMIN — ACETAMINOPHEN 650 MG: 325 TABLET ORAL at 06:37

## 2017-11-25 RX ADMIN — MIRABEGRON 50 MG: 25 TABLET, FILM COATED, EXTENDED RELEASE ORAL at 23:52

## 2017-11-25 RX ADMIN — SODIUM CHLORIDE 125 ML/HR: 900 INJECTION, SOLUTION INTRAVENOUS at 06:40

## 2017-11-25 RX ADMIN — OXYCODONE HYDROCHLORIDE 5 MG: 5 TABLET ORAL at 19:55

## 2017-11-25 RX ADMIN — LEVETIRACETAM 1000 MG: 500 TABLET ORAL at 21:35

## 2017-11-25 RX ADMIN — LEVOTHYROXINE SODIUM 75 MCG: 75 TABLET ORAL at 06:38

## 2017-11-25 RX ADMIN — MEROPENEM 500 MG: 500 INJECTION, POWDER, FOR SOLUTION INTRAVENOUS at 23:00

## 2017-11-25 RX ADMIN — ROPINIROLE HYDROCHLORIDE 0.75 MG: 0.25 TABLET, FILM COATED ORAL at 21:35

## 2017-11-25 RX ADMIN — POTASSIUM CHLORIDE 10 MEQ: 10 TABLET, EXTENDED RELEASE ORAL at 10:13

## 2017-11-25 RX ADMIN — GABAPENTIN 300 MG: 300 CAPSULE ORAL at 15:54

## 2017-11-25 RX ADMIN — PREDNISONE 5 MG: 5 TABLET ORAL at 10:14

## 2017-11-25 RX ADMIN — LACOSAMIDE 200 MG: 100 TABLET, FILM COATED ORAL at 21:36

## 2017-11-25 RX ADMIN — LACTULOSE 10 G: 20 SOLUTION ORAL at 10:17

## 2017-11-25 RX ADMIN — LEVETIRACETAM 1000 MG: 500 TABLET ORAL at 10:14

## 2017-11-25 RX ADMIN — OXYCODONE HYDROCHLORIDE 5 MG: 5 TABLET ORAL at 01:16

## 2017-11-25 NOTE — PROGRESS NOTES
2015- Pt throwing up, Zofran not helping. Pt requesting something stronger for nausea/vomiting. Paged and spoke with Dr. Lorene De La Cruz. Who ordered Phenergan 25mg IV QH PRN. Shift Summary- Pt medicated for nausea and pain during the night.       Patient Vitals for the past 12 hrs:   Temp Pulse Resp BP SpO2   11/25/17 0414 97.6 °F (36.4 °C) 82 18 104/53 95 %   11/24/17 2308 98.3 °F (36.8 °C) 99 17 122/54 96 %   11/24/17 1924 98.7 °F (37.1 °C) 97 18 128/59 97 %

## 2017-11-25 NOTE — ROUTINE PROCESS
Bedside shift change report given to Mone Terry RN (oncoming nurse) by Heidi eRy RN (offgoing nurse). Report included the following information SBAR, Kardex, MAR, Recent Results and Alarm Parameters .  NSR on tele

## 2017-11-25 NOTE — ROUTINE PROCESS
Bedside and Verbal shift change report given to JOSE ALEJANDRO Jane RN (oncoming nurse) by [employer] RN (offgoing nurse). Report included the following information SBAR, Kardex, Intake/Output and MAR.

## 2017-11-25 NOTE — PROGRESS NOTES
1282- Assumed care of patient at this time. Report received from Josh Jimenes., RN. Patient in bed, resting quietly. Call bell left within reach. 1020 Patient in this time, AM medications tolerated well. A/O x 4. Lungs clear, abdomen soft and non-distended. Bowel sounds active, 20 G IV to right AC, capped and 22 LFA, infusing without difficulty. No signs of phlebitis or infiltration noted. Skin warm ,dry with mepilex to sacral area for healing decubitus. Soft splints to BLE, pulses present and palpable. Gotti catheter in place, draining without difficulty. Patient on tele box #40, NSR. Pain 10/10. PRN Oxycodone 5 mg PO pain medication administered at this time. Patient has been educated on side effects. Side effect education sheets have been provided. Bed placed in lowest position, call bell within reach. 5- Dr. Héctor Benitez contacted at this time to inform about patients multiple bowel movements, attempted to collected for stool specimen, however stool is not runny, but remains a loose consistency. Dr. Héctor Benitez gave order to discontinue senna, Miralax and lactulose at this time. 1554- Patient lying in bed sleep, states Pain 10/10. PRN Oxycodone 5 mg PO pain medication administered at this time. Patient has been educated on side effects. Side effect education sheets have been provided. 200- Dr. Héctor Benitez paged to ask about DVT Prophylaxis    1907- Dr. Ray Muñoz returned call, order given for Lovenox 30 mg daily, orders entered. Shift Summary: Patient's pain controlled this shift with use of PRN medication. IV remains intact. Splints to bilateral LE's changed after patient was fitted for braces by [address] [place] [address]. On-coming nurse informed to monitor skin to upper right thigh for redness associated with splint. Splinting material in med room if new splint needs to be applied.

## 2017-11-25 NOTE — PROGRESS NOTES
Problem: Falls - Risk of  Goal: *Absence of Falls  Document Quique Fall Risk and appropriate interventions in the flowsheet.    Outcome: Progressing Towards Goal  Fall Risk Interventions:  Mobility Interventions: PT Consult for mobility concerns    Mentation Interventions: Evaluate medications/consider consulting pharmacy, More frequent rounding, Reorient patient    Medication Interventions: Evaluate medications/consider consulting pharmacy    Elimination Interventions: Call light in reach, Toileting schedule/hourly rounds    History of Falls Interventions: Door open when patient unattended, Evaluate medications/consider consulting pharmacy

## 2017-11-25 NOTE — PROGRESS NOTES
Hospitalist Progress Note-critical care note     Patient: Lisa Estimjammie MRN: [id number]  CSN: [id number]    YOB: 1950  Age: 78 y.o. Sex: female    DOA: 11/22/2017 LOS:  LOS: 3 days            Chief complaint: uti, fracture , hyponatremia     Assessment/Plan         Hospital Problems  Date Reviewed: 11/23/2017          Codes Class Noted POA    Sepsis ([facility] ICD-10-CM: A41.9  ICD-9-CM: 038.9, 995.91  11/24/2017 Unknown        Acute pain due to injury- multiple LE fractures ICD-10-CM: G89.11  ICD-9-CM: 338.11  11/24/2017 Unknown        Chronic pain ICD-10-CM: G89.29  ICD-9-CM: 338.29  11/23/2017 Unknown        Bedridden ICD-10-CM: Z74.01  ICD-9-CM: V49.84  11/23/2017 Unknown        * (Principal)Multiple fractures of both lower extremities, closed, initial encounter ICD-10-CM: S82.91XA, S82.92XA  ICD-9-CM: 828.0  11/22/2017 Unknown        Seizure disorder ([facility] ICD-10-CM: G40.909  ICD-9-CM: 345.90  6/24/2012 Yes        Depression ICD-10-CM: F32.9  ICD-9-CM: 504  6/24/2012 Yes        UTI (urinary tract infection) ICD-10-CM: N39.0  ICD-9-CM: 599.0  6/1/2012 Yes        Severe protein-calorie malnutrition ([facility] ICD-10-CM: E43  ICD-9-CM: 262  6/1/2012 Yes        Bipolar 1 disorder (HCC) (Chronic) ICD-10-CM: F31.9  ICD-9-CM: 296.7  5/3/2012 Yes            1 Sepsis/UTI with chronic cath  leukocytosis resolved    sepsis bundle, On Levaquin,   f/u blood Cx No growth so far   UCX: ENTEROBACTER CLOACAE -sensitive to levaquine, PROTEUS MIRABILIS -levaquine resistant   Will d/c levaquin, change to meropenem, allergic to rocephin. Discussed with pharmacist        2Multiple lower extremity fractures:   Seen and f/u by Dr. Corrie Felix. Pt states \" No surgery\", will have cast today    need aggressive incentive spirometry   3 Anemia: Chronic. Monitor H/H      4 5 Seizure:  Seizure precaution,  Continue home dose of keppra, Vimpat      5 Chronic pain: Still in pain.  Readjust the pain regimen.      6 Bipolar: Continue Zyprexa       7. Hyponatremia   Will continue ns infusion    subjective: still having pain       Review of systems:    General: No fevers or chills. Cardiovascular: No chest pain or pressure. No palpitations. Pulmonary: No shortness of breath. Gastrointestinal: No nausea, vomiting. Msk: pain in legs    Vital signs/Intake and Output:  Visit Vitals    /56 (BP 1 Location: Right arm, BP Patient Position: At rest)    Pulse 86    Temp 98 °F (36.7 °C)    Resp 18    Wt 70.3 kg (155 lb)    SpO2 92%    BMI 25.02 kg/m2     Current Shift:  11/25 0701 - 11/25 1900  In: 480 [P.O.:480]  Out: 1650 [NCQQL:0556]  Last three shifts:  11/23 1901 - 11/25 0700  In: 2598.9 [P.O.:240; I.V.:2358.9]  Out: 8550 [Urine:8550]    Physical Exam:  General: WD, WN. Alert, cooperative, no acute distress    HEENT: NC, Atraumatic. PERRLA, anicteric sclerae. Lungs: CTA Bilaterally. No Wheezing/Rhonchi/Rales. Heart:  Regular  rhythm,  No murmur, No Rubs, No Gallops  Abdomen: Soft, Non distended, Non tender.  +Bowel sounds,   Extremities: No c/c, bilateral leg wrapped with ace bandage  Psych:   Not anxious or agitated. Neurologic:  No acute neurological deficit. Labs: Results:       Chemistry Recent Labs      11/25/17 0446 11/23/17 0134 11/22/17   1630   GLU  92  116*  124*   NA  132*  132*  131*   K  5.0  5.2  5.2   CL  98*  98*  93*   CO2  31  29  29   BUN  5*  13  17   CREA  0.40*  0.58*  0.57*   CA  7.9*  8.1*  8.8   AGAP  3  5  9   BUCR  13  22*  30*      CBC w/Diff Recent Labs      11/25/17   0446  11/23/17   0134  11/22/17   1425   WBC  7.2  8.3  18.0*   RBC  2.65*  3.59*  5.05   HGB  7.8*  10.3*  14.9   HCT  23.6*  30.8*  43.2   PLT  172  223  262   GRANS  60   --   94*   LYMPH  26   --   3*   EOS  5   --   0      Cardiac Enzymes No results for input(s): CPK, CKND1, PAOLA in the last 72 hours.     No lab exists for component: CKRMB, TROIP   Coagulation No results for input(s): PTP, INR, APTT in the last 72 hours. No lab exists for component: INREXT, INREXT    Lipid Panel No results found for: CHOL, CHOLPOCT, CHOLX, CHLST, CHOLV, 731155, HDL, LDL, LDLC, DLDLP, 439729, VLDLC, VLDL, TGLX, TRIGL, TRIGP, TGLPOCT, CHHD, CHHDX   BNP No results for input(s): BNPP in the last 72 hours. Liver Enzymes No results for input(s): TP, ALB, TBIL, AP, SGOT, GPT in the last 72 hours.     No lab exists for component: DBIL   Thyroid Studies Lab Results   Component Value Date/Time    TSH 17.30 08/27/2017 02:00 AM        Procedures/imaging: see electronic medical records for all procedures/Xrays and details which were not copied into this note but were reviewed prior to creation of Jeny Rivers MD

## 2017-11-25 NOTE — PROGRESS NOTES
Occupational Therapy Screening:  Services are NOT indicated at this time. [facility] therapy based on results obtained during the nursing admission assessment. The patients chart was reviewed and the patient is not appropriate for a skilled therapy evaluation at this time. Patient admitted with multiple BLE fx's s/p fall from w/c. Pt well-known to this OT from previous admissions. Pt has been bedbound w/ limited upright sitting. Pt previously declines OT on past admissions stating she has a system and A/E d/t prolonged deficits >5 years. No skilled OT indicated at this time as pt is not surgical candidate and had poor upright tolerance at baseline. Thank you.   Madiha Chavez, OTR/L

## 2017-11-25 NOTE — ROUTINE PROCESS
Bedside and Verbal shift change report given to KATHI Brandt RN (oncoming nurse) by Merrill Lombard RN (offgoing nurse). Report included the following information SBAR, Kardex, Intake/Output and MAR.

## 2017-11-25 NOTE — PROGRESS NOTES
0938-Alert and oriented x 4. Lungs CTA. BS active x 4 quads. Splints with ace wraps to bilateral legs. Gotti in place draining clear, latanya urine. Patient laying almost flat in bed d/t fracture to bilateral legs and c/o pain when head is higher. Dilaudid given for c/o pain rated 10/10. Zofran given for nausea. 1015-Pain decreased to 9/10.    1347-Norco given for c/o pain rated 10/10. Zofran given for nausea. 1430-Pain decreased to 9/10.    1803-Zofran given for nausea. 1951-Dilaudid given for c/o pain rated 10/10.

## 2017-11-25 NOTE — PROGRESS NOTES
Consult Note    Patient: Jean Mckeon               Sex: female          DOA: 11/22/2017         YOB: 1950      Age:  78 y.o.        LOS:  LOS: 3 days              HPI:     Jean Mckeon is a 78 y.o. female who has been seen for displaced right distal femur fracture, right tibia fracture, left distal femur fracture and chronic bilateral hip dislocations/ fracture. She reports she fell out of her chair yesterday and had pain. She has had chronic pain in her hips and was seen by Dr. An Gallo in the office for the chronic hip injury. She has new onset of pain in the knees since the recent fall. She has a UTI and is currently being treated by Medicine for this. 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      Past Medical History:   Diagnosis Date    Acute pain due to injury- multiple LE fractures 11/24/2017    Anemia NEC     history of blood transfusion    Anxiety     panic attacks    Arthritis     Bipolar 1 disorder (HCC)     Chronic back pain     Depression     Elevated diaphragm 5/4/2012    Fatigue     along with weakness    GERD (gastroesophageal reflux disease)     Headache(784.0)     Hypertension     pt denies    Other ill-defined conditions(799.89) swallowing issues, decubitus    Psychiatric disorder     Seizures ([address]     Thromboembolus ([address]     TIA (transient ischemic attack)        Past Surgical History:   Procedure Laterality Date    COLONOSCOPY N/A 2/21/2017    COLONOSCOPY [facility] ANESTHESIA, PATIENT IS IN ROOM 358 performed by Kay Antony MD at [address]  N/A 2/22/2017    COLONOSCOPY performed by aKy Antony MD at [address], DIAGNOSTIC      over 20 years ago    ENDOSCOPY, COLON, DIAGNOSTIC  9/24/09    HX APPENDECTOMY  11/6/84    HX BACK SURGERY  11/24/98    severe back problems    HX CHOLECYSTECTOMY  11/6/84    HX GASTRIC BYPASS  1995    Patricio    HX OTHER SURGICAL      IVC filter    HX REFRACTIVE SURGERY      HX TUBAL LIGATION  1991       Family History   Problem Relation Age of Onset    Heart Disease Mother    [facility] Arthritis-rheumatoid Mother     Stroke Father     Hypertension Father     Arthritis-rheumatoid Father        Social History     Social History    Marital status:      Spouse name: N/A    Number of children: N/A    Years of education: N/A     Social History Main Topics    Smoking status: Never Smoker    Smokeless tobacco: Never Used    Alcohol use No    Drug use: No    Sexual activity: Not Asked     Other Topics Concern    None     Social History Narrative       Prior to Admission medications    Medication Sig Start Date End Date Taking? Authorizing Provider   butalbital-acetaminophen-caffeine (FIORICET, ESGIC) -40 mg per tablet Take 1 Tab by mouth every four (4) hours as needed for Pain. Yes Giovani Rose MD   miSOPROStol (CYTOTEC) 100 mcg tablet Take 100 mcg by mouth two (2) times a day. Yes Giovani Rose MD   cyclobenzaprine (FLEXERIL) 10 mg tablet Take 10 mg by mouth nightly. Yes Giovani Rose MD   lactulose (KRISTALOSE) 10 gram packet Take 10 g by mouth two (2) times a day. Yes Giovani Rose MD   clotrimazole (MYCELEX) 1 % vaginal cream Insert 1 Applicator into vagina nightly as needed for Itching. For ABX induced yeast infection   Yes Historical Provider   DULoxetine (CYMBALTA) 30 mg capsule Take 30 mg by mouth two (2) times a day. Yes Historical Provider   levETIRAcetam (KEPPRA) 500 mg tablet Take 1,000 mg by mouth two (2) times a day. Yes Historical Provider   levothyroxine (SYNTHROID) 75 mcg tablet Take 75 mcg by mouth Daily (before breakfast). Yes Historical Provider   nystatin (MYCOSTATIN) topical cream Apply  to affected area two (2) times daily as needed for Skin Irritation. Under breasts for topical yeast infection/irritation   Yes Historical Provider   pantoprazole (PROTONIX) 40 mg tablet Take 40 mg by mouth two (2) times a day.    Yes Historical Provider   potassium chloride (K-DUR, KLOR-CON) 20 mEq tablet Take 10 mEq by mouth two (2) times a day. Yes Historical Provider   docusate sodium (COLACE) 100 mg capsule Take 100 mg by mouth two (2) times daily as needed for Constipation. Yes Historical Provider   morphine IR (MS IR) 15 mg tablet Take 15 mg by mouth every eight (8) hours as needed for Pain. Yes Historical Provider   nystatin (MYCOSTATIN) 100,000 unit/mL suspension Take 1 tsp by mouth four (4) times daily as needed. swish and spit   Yes Historical Provider   CRANBERRY FRUIT EXTRACT (CRANBERRY EXTRACT PO) Take 1 Cap by mouth two (2) times a day. For UTI prevention  Non-formulary medication. Pt to supply if ordered for admission. Yes Historical Provider   GLUC VARGAS/CHONDRO VARGAS A/VIT C/MN (GLUCOSAMINE CHONDROITIN MAXSTR PO) Take 1 Tab by mouth three (3) times daily. Yes Historical Provider   ferrous sulfate 325 mg (65 mg iron) tablet Take 325 mg by mouth Daily (before breakfast). Yes Historical Provider   cefdinir (OMNICEF) 300 mg capsule Take 300 mg by mouth two (2) times a day. 11/17/17 11/24/17 Yes Historical Provider   polyethylene glycol (MIRALAX) 17 gram packet Take 17 g by mouth two (2) times a day. Yes Historical Provider   baclofen (LIORESAL) 10 mg tablet Take 10 mg by mouth three (3) times daily. Yes Phys Other, MD   [facility]alsam [facility])  mg/gram ointment Apply  to affected area as needed. NON FORMULARY MEDICATION  PATIENT SUPPLIED HOME MEDICATION   Yes Giovani Rose MD   diclofenac (VOLTAREN) 1 % gel Apply 2 g to affected area four (4) times daily. Non-formulary medication. Pt to supply if ordered for admission. Yes Giovani Rose MD   ergocalciferol (VITAMIN D2) 50,000 unit capsule Take 50,000 Units by mouth Every Friday. Yes Giovani Rose MD   lacosamide (VIMPAT) 200 mg tab tablet Take 1 Tab by mouth two (2) times a day. Max Daily Amount: 400 mg.  Indications: PARTIAL EPILEPSY TREATMENT ADJUNCT 7/23/17 Yes Millicent Parisi,    lubiPROStone (AMITIZA) 8 mcg capsule Take 1 Cap by mouth two (2) times daily (with meals). 3/1/17  Yes Yajaira Colunga MD   loratadine-pseudoephedrine (CLARITIN-D 24 HOUR)  mg per tablet Take 1 Tab by mouth daily. Yes Historical Provider   OTHER,NON-FORMULARY, Take 1 Cap by mouth daily (with lunch). Indications: Bariatric Multi Formula   Yes Historical Provider   magnesium oxide 500 mg tab Take 500 mg by mouth daily (with lunch). Yes Historical Provider   ascorbic acid, vitamin C, (VITAMIN C) 500 mg tablet Take 500 mg by mouth daily. Yes Historical Provider   mirabegron ER (MYRBETRIQ) 50 mg ER tablet Take 50 mg by mouth nightly. Takes at 2300   Yes Historical Provider   rOPINIRole (REQUIP) 0.25 mg tablet Take 0.75 mg by mouth nightly. Takes at 2300   Yes Historical Provider   predniSONE (DELTASONE) 5 mg tablet Take 5 mg by mouth daily. Yes Historical Provider   gabapentin (NEURONTIN) 300 mg capsule Take 300 mg by mouth three (3) times daily. Yes Historical Provider   OLANZapine (ZYPREXA) 7.5 mg tablet Take 7.5 mg by mouth nightly. Yes Historical Provider   bisacodyl (DULCOLAX, BISACODYL,) 10 mg suppository Insert 10 mg into rectum daily. Phys MD Milton       Allergies   Allergen Reactions    Ceftriaxone Seizures    Demerol [Meperidine] Unknown (comments)    Seafood [Shellfish Containing Products] Unknown (comments)       Review of Systems  Pertinent items are noted in the History of Present Illness. Physical Exam:      Visit Vitals    /53 (BP 1 Location: Right arm, BP Patient Position: At rest)    Pulse 82    Temp 97.6 °F (36.4 °C)    Resp 18    Wt 70.3 kg (155 lb)    SpO2 95%    BMI 25.02 kg/m2       Physical Exam:  Patient has pain to palpation of the bilateral knees and hips. Not markedly tender at right tibia. She is in chronic windswept deformity of the bilateral lower extremities.   She has good DP, PT pulses and brisk capillary refill bilaterally. She has no other orthopaedic complaints of the upper extremities, spine or thorax. Splints are maintained to the bilateral lower extremities and there appear to be no open wounds or tenting of the skin with gentle evaluation. Labs Reviewed:  Xrays 11/22/17     Left Femur:  1. Distal femoral diaphyseal to metadiaphyseal minimally comminuted displaced  fracture deformity . Right Femur  Suboptimal positioning due to patient condition. Comminuted, displaced,  angulated fracture of the distal femoral diametaphysis. Chronic superior and  slight lateral subluxation of the right femoral head. Pelvis  1. Stable exam. Osteopenia. Chronic bilateral superior lateral hip fracture  dislocations. No evidence of an acute fracture or dislocation. Left Tib/Fib  1. Osteopenia. Distal femoral diaphyseal to metadiaphyseal minimally comminuted,  displaced and angulated fracture deformity. 2. Remaining osseous structures are intact. Right Tib/Fib  Unable to optimally position the patient due to condition. Probable nondisplaced  fracture of the distal tibia. Fracture of the calcaneal tuberosity, with  sclerosis at the fracture line, possibly chronic, or possibly acute with  impaction. Assessment/Plan     Principal Problem:    Multiple fractures of both lower extremities, closed, initial encounter (11/22/2017)    Active Problems:    Bipolar 1 disorder ([address] (5/3/2012)      UTI (urinary tract infection) (6/1/2012)      Severe protein-calorie malnutrition ([address] (6/1/2012)      Seizure disorder ([address] (6/24/2012)      Depression (6/24/2012)      Chronic pain (11/23/2017)      Bedridden (11/23/2017)      Sepsis ([address] (11/24/2017)      Acute pain due to injury- multiple LE fractures (11/24/2017)      Dr. Macey Mccormick discussed the fractures in detail with the patient. Risks associated with surgical intervention and conservative treatment were reviewed in detail.   Patient would like to be conservative regarding treatment as per recommendation by Dr. Jojo Amezquita. [facility] rep evaluated patient yesterday. Plan is for casting today for splints to be made. Patient is to be NWB. Patient should follow up in the office with Dr. Jojo Amezquita in 2 weeks. Possible pain management referral outpatient. DVT prophylaxis and pain medications as per primary team.  Patient is able to be discharged from ortho perspective if [facility] is able to provide splints in outpatient follow up. Patient must maintain splints currently on legs at all times. The plan was discussed with Dr. Jojo Amezquita.

## 2017-11-26 ENCOUNTER — APPOINTMENT (OUTPATIENT)
Dept: GENERAL RADIOLOGY | Age: 67
DRG: 698 | End: 2017-11-26
Attending: INTERNAL MEDICINE
Payer: MEDICARE

## 2017-11-26 LAB
ARTERIAL PATENCY WRIST A: YES
BASE EXCESS BLD CALC-SCNC: 7 MMOL/L
BDY SITE: ABNORMAL
D DIMER PPP FEU-MCNC: 3.34 UG/ML(FEU)
GAS FLOW.O2 O2 DELIVERY SYS: ABNORMAL L/MIN
GAS FLOW.O2 SETTING OXYMISER: 3 L/M
GLUCOSE BLD STRIP.AUTO-MCNC: 158 MG/DL (ref 70–110)
HCO3 BLD-SCNC: 32.5 MMOL/L (ref 22–26)
HCT VFR BLD AUTO: 30.8 % (ref 35–45)
HGB BLD-MCNC: 9.9 G/DL (ref 12–16)
MAGNESIUM SERPL-MCNC: 2.1 MG/DL (ref 1.6–2.6)
PCO2 BLD: 53.9 MMHG (ref 35–45)
PH BLD: 7.39 [PH] (ref 7.35–7.45)
PO2 BLD: 95 MMHG (ref 80–100)
SAO2 % BLD: 97 % (ref 92–97)
SERVICE CMNT-IMP: ABNORMAL
SPECIMEN TYPE: ABNORMAL
TOTAL RESP. RATE, ITRR: 16

## 2017-11-26 PROCEDURE — 36415 COLL VENOUS BLD VENIPUNCTURE: CPT | Performed by: HOSPITALIST

## 2017-11-26 PROCEDURE — 74011250636 HC RX REV CODE- 250/636: Performed by: HOSPITALIST

## 2017-11-26 PROCEDURE — 83735 ASSAY OF MAGNESIUM: CPT | Performed by: HOSPITALIST

## 2017-11-26 PROCEDURE — 82803 BLOOD GASES ANY COMBINATION: CPT

## 2017-11-26 PROCEDURE — 36600 WITHDRAWAL OF ARTERIAL BLOOD: CPT

## 2017-11-26 PROCEDURE — 85018 HEMOGLOBIN: CPT | Performed by: HOSPITALIST

## 2017-11-26 PROCEDURE — 74011250636 HC RX REV CODE- 250/636: Performed by: INTERNAL MEDICINE

## 2017-11-26 PROCEDURE — 93005 ELECTROCARDIOGRAM TRACING: CPT

## 2017-11-26 PROCEDURE — 85379 FIBRIN DEGRADATION QUANT: CPT | Performed by: INTERNAL MEDICINE

## 2017-11-26 PROCEDURE — 74011000250 HC RX REV CODE- 250: Performed by: HOSPITALIST

## 2017-11-26 PROCEDURE — 74011000258 HC RX REV CODE- 258: Performed by: HOSPITALIST

## 2017-11-26 PROCEDURE — 65270000029 HC RM PRIVATE

## 2017-11-26 PROCEDURE — 74011636637 HC RX REV CODE- 636/637: Performed by: INTERNAL MEDICINE

## 2017-11-26 PROCEDURE — 74011250637 HC RX REV CODE- 250/637: Performed by: FAMILY MEDICINE

## 2017-11-26 PROCEDURE — 82962 GLUCOSE BLOOD TEST: CPT

## 2017-11-26 PROCEDURE — 74011000258 HC RX REV CODE- 258: Performed by: INTERNAL MEDICINE

## 2017-11-26 PROCEDURE — 74011250637 HC RX REV CODE- 250/637: Performed by: INTERNAL MEDICINE

## 2017-11-26 PROCEDURE — 71010 XR CHEST PORT: CPT

## 2017-11-26 PROCEDURE — 74000 XR ABD (KUB): CPT

## 2017-11-26 RX ORDER — DOCUSATE SODIUM 100 MG/1
100 CAPSULE, LIQUID FILLED ORAL 2 TIMES DAILY
Status: DISCONTINUED | OUTPATIENT
Start: 2017-11-26 | End: 2017-11-27

## 2017-11-26 RX ORDER — GABAPENTIN 400 MG/1
400 CAPSULE ORAL 3 TIMES DAILY
Status: DISCONTINUED | OUTPATIENT
Start: 2017-11-26 | End: 2017-12-04 | Stop reason: HOSPADM

## 2017-11-26 RX ADMIN — MISOPROSTOL 100 MCG: 100 TABLET ORAL at 09:02

## 2017-11-26 RX ADMIN — MEROPENEM 500 MG: 500 INJECTION, POWDER, FOR SOLUTION INTRAVENOUS at 07:09

## 2017-11-26 RX ADMIN — PREDNISONE 5 MG: 5 TABLET ORAL at 09:02

## 2017-11-26 RX ADMIN — POTASSIUM CHLORIDE 10 MEQ: 10 TABLET, EXTENDED RELEASE ORAL at 09:03

## 2017-11-26 RX ADMIN — OXYCODONE HYDROCHLORIDE 5 MG: 5 TABLET ORAL at 03:59

## 2017-11-26 RX ADMIN — MULTIPLE VITAMINS W/ MINERALS TAB 1 TABLET: TAB at 09:03

## 2017-11-26 RX ADMIN — LEVETIRACETAM 1000 MG: 500 TABLET ORAL at 09:02

## 2017-11-26 RX ADMIN — ONDANSETRON HYDROCHLORIDE 4 MG: 2 INJECTION INTRAMUSCULAR; INTRAVENOUS at 19:01

## 2017-11-26 RX ADMIN — PROMETHAZINE HYDROCHLORIDE 25 MG: 25 INJECTION, SOLUTION INTRAMUSCULAR; INTRAVENOUS at 20:29

## 2017-11-26 RX ADMIN — ACETAMINOPHEN 325 MG: 325 TABLET ORAL at 13:30

## 2017-11-26 RX ADMIN — GABAPENTIN 300 MG: 300 CAPSULE ORAL at 09:03

## 2017-11-26 RX ADMIN — PANTOPRAZOLE SODIUM 40 MG: 40 TABLET, DELAYED RELEASE ORAL at 09:02

## 2017-11-26 RX ADMIN — ACETAMINOPHEN 650 MG: 325 TABLET ORAL at 07:09

## 2017-11-26 RX ADMIN — ONDANSETRON HYDROCHLORIDE 4 MG: 2 INJECTION INTRAMUSCULAR; INTRAVENOUS at 13:56

## 2017-11-26 RX ADMIN — FERROUS SULFATE TAB 325 MG (65 MG ELEMENTAL FE) 325 MG: 325 (65 FE) TAB at 07:09

## 2017-11-26 RX ADMIN — ENOXAPARIN SODIUM 40 MG: 40 INJECTION SUBCUTANEOUS at 20:51

## 2017-11-26 RX ADMIN — MEROPENEM 500 MG: 500 INJECTION, POWDER, FOR SOLUTION INTRAVENOUS at 16:55

## 2017-11-26 RX ADMIN — OXYCODONE HYDROCHLORIDE 5 MG: 5 TABLET ORAL at 09:00

## 2017-11-26 RX ADMIN — LEVOTHYROXINE SODIUM 75 MCG: 75 TABLET ORAL at 07:09

## 2017-11-26 RX ADMIN — LACOSAMIDE 200 MG: 100 TABLET, FILM COATED ORAL at 09:02

## 2017-11-26 RX ADMIN — DULOXETINE HYDROCHLORIDE 30 MG: 30 CAPSULE, DELAYED RELEASE ORAL at 09:03

## 2017-11-26 NOTE — PROGRESS NOTES
Hospitalist Progress Note-critical care note     Patient: Kishore Garcia MRN: [id number]  CSN: [id number]    YOB: 1950  Age: 78 y.o. Sex: female    DOA: 11/22/2017 LOS:  LOS: 4 days            Chief complaint: uti, fracture , hyponatremia     Assessment/Plan         Hospital Problems  Date Reviewed: 11/23/2017          Codes Class Noted POA    Sepsis ([facility] ICD-10-CM: A41.9  ICD-9-CM: 038.9, 995.91  11/24/2017 Unknown        Acute pain due to injury- multiple LE fractures ICD-10-CM: G89.11  ICD-9-CM: 338.11  11/24/2017 Unknown        Chronic pain ICD-10-CM: G89.29  ICD-9-CM: 338.29  11/23/2017 Unknown        Bedridden ICD-10-CM: Z74.01  ICD-9-CM: V49.84  11/23/2017 Unknown        * (Principal)Multiple fractures of both lower extremities, closed, initial encounter ICD-10-CM: S82.91XA, S82.92XA  ICD-9-CM: 828.0  11/22/2017 Unknown        Seizure disorder ([facility] ICD-10-CM: G40.909  ICD-9-CM: 345.90  6/24/2012 Yes        Depression ICD-10-CM: F32.9  ICD-9-CM: 309  6/24/2012 Yes        UTI (urinary tract infection) ICD-10-CM: N39.0  ICD-9-CM: 599.0  6/1/2012 Yes        Severe protein-calorie malnutrition ([facility] ICD-10-CM: E43  ICD-9-CM: 262  6/1/2012 Yes        Bipolar 1 disorder (HCC) (Chronic) ICD-10-CM: F31.9  ICD-9-CM: 296.7  5/3/2012 Yes            1 Sepsis/UTI with chronic catheter -complicated UTI   leukocytosis resolved    f/u blood Cx No growth so far   UCX: ENTEROBACTER CLOACAE -sensitive to levaquine, PROTEUS MIRABILIS -levaquine resistant   Tolerated merrem well     2 Multiple lower extremity fractures:   Seen and f/u by Dr. Tess Wyman. Pt states \" No surgery\",     need aggressive incentive spirometry     3 Anemia: Chronic. Will recheck it       4 5 Seizure:  Seizure precaution,  Continue home dose of keppra, Vimpat      5 Chronic pain:   Pain all over , will increase neurotin Readjust the pain regimen.      6 Bipolar: Continue Zyprexa       7.  Hyponatremia   Will continue ns infusion, will low the rate to prevent fluid overload   subjective: pain all over   Nurse: diarrhea better     Will d/c K supplement, increase gabapentin, low iv  fluid rate   Review of systems:    General: No fevers or chills. Pain all over   Cardiovascular: No chest pain or pressure. No palpitations. Pulmonary: No shortness of breath. Gastrointestinal: No nausea, vomiting. Vital signs/Intake and Output:  Visit Vitals    /52 (BP 1 Location: Right arm, BP Patient Position: At rest)    Pulse 95    Temp 97.6 °F (36.4 °C)    Resp 16    Ht 5' 6.14\" (1.68 m)    Wt 77.1 kg (169 lb 14.4 oz)    SpO2 96%    BMI 27.3 kg/m2     Current Shift:  11/26 0701 - 11/26 1900  In: 5527 [I.V.:1050]  Out: 200 [Urine:200]  Last three shifts:  11/24 1901 - 11/26 0700  In: 1977.9 [P.O.:480; I.V.:1497.9]  Out: 0029 [Urine:6850]    Physical Exam:  General: WD, WN. Alert, cooperative, no acute distress    HEENT: NC, Atraumatic. PERRLA, anicteric sclerae. Lungs: CTA Bilaterally. No Wheezing/Rhonchi/Rales. Heart:  Regular  rhythm,  No murmur, No Rubs, No Gallops  Abdomen: Soft, Non distended, Non tender.  +Bowel sounds,   Extremities: No c/c, bilateral leg wrapped with ace bandage  Psych:   Not anxious or agitated. Neurologic:  No acute neurological deficit. Labs: Results:       Chemistry Recent Labs      11/25/17   0446   GLU  92   NA  132*   K  5.0   CL  98*   CO2  31   BUN  5*   CREA  0.40*   CA  7.9*   AGAP  3   BUCR  13      CBC w/Diff Recent Labs      11/25/17   0446   WBC  7.2   RBC  2.65*   HGB  7.8*   HCT  23.6*   PLT  172   GRANS  60   LYMPH  26   EOS  5      Cardiac Enzymes No results for input(s): CPK, CKND1, PAOLA in the last 72 hours. No lab exists for component: CKRMB, TROIP   Coagulation No results for input(s): PTP, INR, APTT in the last 72 hours.     No lab exists for component: INREXT, INREXT    Lipid Panel No results found for: CHOL, CHOLPOCT, CHOLX, CHLST, CHOLV, 963079, HDL, LDL, LDLC, DLDLP, 017276, VLDLC, VLDL, TGLX, TRIGL, TRIGP, TGLPOCT, CHHD, CHHDX   BNP No results for input(s): BNPP in the last 72 hours. Liver Enzymes No results for input(s): TP, ALB, TBIL, AP, SGOT, GPT in the last 72 hours.     No lab exists for component: DBIL   Thyroid Studies Lab Results   Component Value Date/Time    TSH 17.30 08/27/2017 02:00 AM        Procedures/imaging: see electronic medical records for all procedures/Xrays and details which were not copied into this note but were reviewed prior to creation of Derick Chi MD

## 2017-11-26 NOTE — PROGRESS NOTES
Consult Note    Patient: Letty Gan               Sex: female          DOA: 11/22/2017         YOB: 1950      Age:  78 y.o.        LOS:  LOS: 4 days              HPI:     Letty Gan is a 78 y.o. female who has been seen for displaced right distal femur fracture, right tibia fracture, left distal femur fracture and chronic bilateral hip dislocations/ fracture. She reports she fell out of her chair yesterday and had pain. She has had chronic pain in her hips and was seen by Dr. Albino Castañeda in the office for the chronic hip injury. She has new onset of pain in the knees since the recent fall. She has a UTI and is currently being treated by Medicine for this. 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      Past Medical History:   Diagnosis Date    Acute pain due to injury- multiple LE fractures 11/24/2017    Anemia NEC     history of blood transfusion    Anxiety     panic attacks    Arthritis     Bipolar 1 disorder (HCC)     Chronic back pain     Depression     Elevated diaphragm 5/4/2012    Fatigue     along with weakness    GERD (gastroesophageal reflux disease)     Headache(784.0)     Hypertension     pt denies    Other ill-defined conditions(799.89) swallowing issues, decubitus    Psychiatric disorder     Seizures ([address]     Thromboembolus ([address]     TIA (transient ischemic attack)        Past Surgical History:   Procedure Laterality Date    COLONOSCOPY N/A 2/21/2017    COLONOSCOPY [employer] ANESTHESIA, PATIENT IS IN ROOM 358 performed by Lillian Meadows MD at [address]  N/A 2/22/2017    COLONOSCOPY performed by Lillian Meadows MD at [address] [facility], DIAGNOSTIC      over 20 years ago    ENDOSCOPY, COLON, DIAGNOSTIC  9/24/09    HX APPENDECTOMY  11/6/84    HX BACK SURGERY  11/24/98    severe back problems    HX CHOLECYSTECTOMY  11/6/84    HX GASTRIC BYPASS  1995    Patricio    HX OTHER SURGICAL      IVC filter    HX REFRACTIVE SURGERY      HX TUBAL LIGATION  1991       Family History   Problem Relation Age of Onset    Heart Disease Mother    Anabel Gordillo Arthritis-rheumatoid Mother     Stroke Father     Hypertension Father     Arthritis-rheumatoid Father        Social History     Social History    Marital status:      Spouse name: N/A    Number of children: N/A    Years of education: N/A     Social History Main Topics    Smoking status: Never Smoker    Smokeless tobacco: Never Used    Alcohol use No    Drug use: No    Sexual activity: Not Asked     Other Topics Concern    None     Social History Narrative       Prior to Admission medications    Medication Sig Start Date End Date Taking? Authorizing Provider   butalbital-acetaminophen-caffeine (FIORICET, ESGIC) -40 mg per tablet Take 1 Tab by mouth every four (4) hours as needed for Pain. Yes Giovani Rose MD   miSOPROStol (CYTOTEC) 100 mcg tablet Take 100 mcg by mouth two (2) times a day. Yes Giovani Rose MD   cyclobenzaprine (FLEXERIL) 10 mg tablet Take 10 mg by mouth nightly. Yes Giovani Rose MD   lactulose (KRISTALOSE) 10 gram packet Take 10 g by mouth two (2) times a day. Yes Giovani Rose MD   clotrimazole (MYCELEX) 1 % vaginal cream Insert 1 Applicator into vagina nightly as needed for Itching. For ABX induced yeast infection   Yes Historical Provider   DULoxetine (CYMBALTA) 30 mg capsule Take 30 mg by mouth two (2) times a day. Yes Historical Provider   levETIRAcetam (KEPPRA) 500 mg tablet Take 1,000 mg by mouth two (2) times a day. Yes Historical Provider   levothyroxine (SYNTHROID) 75 mcg tablet Take 75 mcg by mouth Daily (before breakfast). Yes Historical Provider   nystatin (MYCOSTATIN) topical cream Apply  to affected area two (2) times daily as needed for Skin Irritation. Under breasts for topical yeast infection/irritation   Yes Historical Provider   pantoprazole (PROTONIX) 40 mg tablet Take 40 mg by mouth two (2) times a day.    Yes Historical Provider   potassium chloride (K-DUR, KLOR-CON) 20 mEq tablet Take 10 mEq by mouth two (2) times a day. Yes Historical Provider   docusate sodium (COLACE) 100 mg capsule Take 100 mg by mouth two (2) times daily as needed for Constipation. Yes Historical Provider   morphine IR (MS IR) 15 mg tablet Take 15 mg by mouth every eight (8) hours as needed for Pain. Yes Historical Provider   nystatin (MYCOSTATIN) 100,000 unit/mL suspension Take 1 tsp by mouth four (4) times daily as needed. swish and spit   Yes Historical Provider   CRANBERRY FRUIT EXTRACT (CRANBERRY EXTRACT PO) Take 1 Cap by mouth two (2) times a day. For UTI prevention  Non-formulary medication. Pt to supply if ordered for admission. Yes Historical Provider   GLUC VARGAS/CHONDRO VARGAS A/VIT C/MN (GLUCOSAMINE CHONDROITIN MAXSTR PO) Take 1 Tab by mouth three (3) times daily. Yes Historical Provider   ferrous sulfate 325 mg (65 mg iron) tablet Take 325 mg by mouth Daily (before breakfast). Yes Historical Provider   polyethylene glycol (MIRALAX) 17 gram packet Take 17 g by mouth two (2) times a day. Yes Historical Provider   baclofen (LIORESAL) 10 mg tablet Take 10 mg by mouth three (3) times daily. Yes Phys Other, MD   balsam [facility])  mg/gram ointment Apply  to affected area as needed. NON FORMULARY MEDICATION  PATIENT SUPPLIED HOME MEDICATION   Yes Giovani Rose MD   diclofenac (VOLTAREN) 1 % gel Apply 2 g to affected area four (4) times daily. Non-formulary medication. Pt to supply if ordered for admission. Yes Giovani Rose MD   ergocalciferol (VITAMIN D2) 50,000 unit capsule Take 50,000 Units by mouth Every Friday. Yes Giovani Rose MD   lacosamide (VIMPAT) 200 mg tab tablet Take 1 Tab by mouth two (2) times a day. Max Daily Amount: 400 mg.  Indications: PARTIAL EPILEPSY TREATMENT ADJUNCT 7/23/17  Yes Eunice Dial DO   lubiPROStone (AMITIZA) 8 mcg capsule Take 1 Cap by mouth two (2) times daily (with meals). 3/1/17  Yes Wale Barnes MD   loratadine-pseudoephedrine (CLARITIN-D 24 HOUR)  mg per tablet Take 1 Tab by mouth daily. Yes Historical Provider   OTHER,NON-FORMULARY, Take 1 Cap by mouth daily (with lunch). Indications: Bariatric Multi Formula   Yes Historical Provider   magnesium oxide 500 mg tab Take 500 mg by mouth daily (with lunch). Yes Historical Provider   ascorbic acid, vitamin C, (VITAMIN C) 500 mg tablet Take 500 mg by mouth daily. Yes Historical Provider   mirabegron ER (MYRBETRIQ) 50 mg ER tablet Take 50 mg by mouth nightly. Takes at 2300   Yes Historical Provider   rOPINIRole (REQUIP) 0.25 mg tablet Take 0.75 mg by mouth nightly. Takes at 2300   Yes Historical Provider   predniSONE (DELTASONE) 5 mg tablet Take 5 mg by mouth daily. Yes Historical Provider   gabapentin (NEURONTIN) 300 mg capsule Take 300 mg by mouth three (3) times daily. Yes Historical Provider   OLANZapine (ZYPREXA) 7.5 mg tablet Take 7.5 mg by mouth nightly. Yes Historical Provider   bisacodyl (DULCOLAX, BISACODYL,) 10 mg suppository Insert 10 mg into rectum daily. Phys Milton, MD       Allergies   Allergen Reactions    Ceftriaxone Seizures    Demerol [Meperidine] Unknown (comments)    Seafood [Shellfish Containing Products] Unknown (comments)       Review of Systems  Pertinent items are noted in the History of Present Illness. Physical Exam:      Visit Vitals    BP (!) 121/94    Pulse 100    Temp 97.9 °F (36.6 °C)    Resp 17    Ht 5' 6.14\" (1.68 m)    Wt 77.1 kg (169 lb 14.4 oz)    SpO2 98%    BMI 27.3 kg/m2       Physical Exam:  Patient has pain to palpation of the bilateral knees and hips. Not markedly tender at right tibia. She is in chronic windswept deformity of the bilateral lower extremities. She has good DP, PT pulses and brisk capillary refill bilaterally. She has no other orthopaedic complaints of the upper extremities, spine or thorax.   Splints are maintained to the bilateral lower extremities and there appear to be no open wounds or tenting of the skin with gentle evaluation. Labs Reviewed:  Xrays 11/22/17     Left Femur:  1. Distal femoral diaphyseal to metadiaphyseal minimally comminuted displaced  fracture deformity . Right Femur  Suboptimal positioning due to patient condition. Comminuted, displaced,  angulated fracture of the distal femoral diametaphysis. Chronic superior and  slight lateral subluxation of the right femoral head. Pelvis  1. Stable exam. Osteopenia. Chronic bilateral superior lateral hip fracture  dislocations. No evidence of an acute fracture or dislocation. Left Tib/Fib  1. Osteopenia. Distal femoral diaphyseal to metadiaphyseal minimally comminuted,  displaced and angulated fracture deformity. 2. Remaining osseous structures are intact. Right Tib/Fib  Unable to optimally position the patient due to condition. Probable nondisplaced  fracture of the distal tibia. Fracture of the calcaneal tuberosity, with  sclerosis at the fracture line, possibly chronic, or possibly acute with  impaction. Assessment/Plan     Principal Problem:    Multiple fractures of both lower extremities, closed, initial encounter (11/22/2017)    Active Problems:    Bipolar 1 disorder ([address] (5/3/2012)      UTI (urinary tract infection) (6/1/2012)      Severe protein-calorie malnutrition ([address] (6/1/2012)      Seizure disorder ([address] (6/24/2012)      Depression (6/24/2012)      Chronic pain (11/23/2017)      Bedridden (11/23/2017)      Sepsis ([address] (11/24/2017)      Acute pain due to injury- multiple LE fractures (11/24/2017)      Dr. Gregorio Varghese discussed the fractures in detail with the patient. Risks associated with surgical intervention and conservative treatment were reviewed in detail. Patient would like to be conservative regarding treatment as per recommendation by Dr. Gregorio Varghese. [facility] rep evaluated patient yesterday.   Patient fitted for splints yesterday, possibility that braces come in today as per patient. Patient is to be NWB. Patient should follow up in the office with Dr. Oscar Connolly in 2 weeks. Possible pain management referral outpatient. DVT prophylaxis and pain medications as per primary team.  Patient is able to be discharged from ortho perspective if [facility] is able to provide splints in outpatient follow up. Patient must maintain splints currently on legs at all times. The plan was discussed with Dr. Oscar Connolly.

## 2017-11-26 NOTE — PROGRESS NOTES
Assumed care at this time. Assessment completed in flowsheet. Pt is alert and oriented x 4. Denies SOB and chest pain. Pt lungs clear bilaterally. Capillary refill more than 3 seconds. Pt denies numbness. and states tingling BLLE. Stated pain  10/10. Pt has RT AC 20g and LT FA 22g. Pt has Mepilex dressing to the sacrum. Pt has a chronic catheter in place. Pt encouraged to continue use of IS, Pt verbalized understanding. Call light and possessions within reach. Bed is in the lowest position. Will continue to monitor. Shift summary  Pt is alert and oriented x 4. Pt had uneventful shift. Pt is paraplegic and voiding sufficient amounts in león cathether. Pain controlled by PRN medication. Plan for pt to discharge to in-patient rehab awaiting leg braces.

## 2017-11-26 NOTE — PROGRESS NOTES
Pharmacy Dosing Services: Lovenox    The following medication: Lovenox 30 mg sc q24h was automatically dose-adjusted to Lovenox 40 mg sc q24h per [facility] P&T Committee Protocol, with respect to renal function. Pt Weight:   Wt Readings from Last 1 Encounters:   11/24/17 70.3 kg (155 lb)     Serum Creatinine Lab Results   Component Value Date/Time    Creatinine 0.40 11/25/2017 04:46 AM       Creatinine Clearance Estimated Creatinine Clearance: 130.2 mL/min (based on Cr of 0.4). BUN Lab Results   Component Value Date/Time    BUN 5 11/25/2017 04:46 AM         Pharmacy to continue to monitor patient daily. Will make dosage adjustments based upon changing renal function. Signed Suri Memos.  Contact information: [phone]

## 2017-11-26 NOTE — ROUTINE PROCESS
Bedside and Verbal shift change report given to Lisset Neal RN by Darline Munoz RN.  Report included the following information SBAR, Kardex, OR Summary, Intake/Output and MAR

## 2017-11-26 NOTE — ROUTINE PROCESS
Bedside and Verbal shift change report given to Blanco Proctor RN (oncoming nurse) by Thao Beíntez RN   (offgoing nurse). Report included the following information SBAR, Kardex, Intake/Output, MAR and Recent Results.

## 2017-11-26 NOTE — PROGRESS NOTES
Pt sleeping. No distress noted, BLE with splint    0430 incontinent of stool, jhony care done, repositioned pt. Pain med given and wet washcloth provided as requested    0630 shift summary: pt is DNR, on bedrest. Hx of fall with fracture to BLE, splint and ace wrap D/I. Gotti cath draining to clear yellow urine. Mepilex to sacral wound D/I, incontinent once of loose stool, jhony care noted with some redness to groin area - proshield applied. Pain controlled by Roxicodone po. No surgery planned at this time - pt refused.  Being followed by Ortho, Palliative and Wound care

## 2017-11-26 NOTE — PROGRESS NOTES
1728  Nurse informed Dr Wesly Baker that pt is complaining of constipation. Abdomen is distended. Dr Wesly Baker stated she will order colace at this time pt exp diarrhea yesterday enema is not needed at this time.  Telephone order with readback for colace

## 2017-11-27 ENCOUNTER — APPOINTMENT (OUTPATIENT)
Dept: NUCLEAR MEDICINE | Age: 67
DRG: 698 | End: 2017-11-27
Attending: INTERNAL MEDICINE
Payer: MEDICARE

## 2017-11-27 ENCOUNTER — APPOINTMENT (OUTPATIENT)
Dept: CT IMAGING | Age: 67
DRG: 698 | End: 2017-11-27
Attending: INTERNAL MEDICINE
Payer: MEDICARE

## 2017-11-27 ENCOUNTER — APPOINTMENT (OUTPATIENT)
Dept: GENERAL RADIOLOGY | Age: 67
DRG: 698 | End: 2017-11-27
Attending: HOSPITALIST
Payer: MEDICARE

## 2017-11-27 PROBLEM — K59.03 THERAPEUTIC OPIOID-INDUCED CONSTIPATION (OIC): Status: ACTIVE | Noted: 2017-11-27

## 2017-11-27 PROBLEM — R06.03 ACUTE RESPIRATORY DISTRESS: Status: ACTIVE | Noted: 2017-11-27

## 2017-11-27 PROBLEM — R00.0 TACHYCARDIA: Status: ACTIVE | Noted: 2017-11-27

## 2017-11-27 PROBLEM — K56.7 ILEUS (HCC): Status: ACTIVE | Noted: 2017-11-27

## 2017-11-27 PROBLEM — T40.2X5A THERAPEUTIC OPIOID-INDUCED CONSTIPATION (OIC): Status: ACTIVE | Noted: 2017-11-27

## 2017-11-27 LAB
ANION GAP SERPL CALC-SCNC: 6 MMOL/L (ref 3–18)
BUN SERPL-MCNC: 11 MG/DL (ref 7–18)
BUN/CREAT SERPL: 19 (ref 12–20)
CALCIUM SERPL-MCNC: 8.8 MG/DL (ref 8.5–10.1)
CHLORIDE SERPL-SCNC: 95 MMOL/L (ref 100–108)
CK MB CFR SERPL CALC: 2.7 % (ref 0–4)
CK MB SERPL-MCNC: 2.2 NG/ML (ref 5–25)
CK SERPL-CCNC: 81 U/L (ref 26–192)
CO2 SERPL-SCNC: 33 MMOL/L (ref 21–32)
CREAT SERPL-MCNC: 0.57 MG/DL (ref 0.6–1.3)
GLUCOSE SERPL-MCNC: 144 MG/DL (ref 74–99)
MAGNESIUM SERPL-MCNC: 1.8 MG/DL (ref 1.6–2.6)
POTASSIUM SERPL-SCNC: 4.3 MMOL/L (ref 3.5–5.5)
SODIUM SERPL-SCNC: 134 MMOL/L (ref 136–145)
TROPONIN I SERPL-MCNC: <0.02 NG/ML (ref 0–0.06)

## 2017-11-27 PROCEDURE — 36415 COLL VENOUS BLD VENIPUNCTURE: CPT | Performed by: HOSPITALIST

## 2017-11-27 PROCEDURE — 74011250637 HC RX REV CODE- 250/637: Performed by: FAMILY MEDICINE

## 2017-11-27 PROCEDURE — 82550 ASSAY OF CK (CPK): CPT | Performed by: HOSPITALIST

## 2017-11-27 PROCEDURE — 65660000000 HC RM CCU STEPDOWN

## 2017-11-27 PROCEDURE — 74011250636 HC RX REV CODE- 250/636: Performed by: INTERNAL MEDICINE

## 2017-11-27 PROCEDURE — 77010033678 HC OXYGEN DAILY

## 2017-11-27 PROCEDURE — 74011250637 HC RX REV CODE- 250/637: Performed by: HOSPITALIST

## 2017-11-27 PROCEDURE — 74011000250 HC RX REV CODE- 250: Performed by: HOSPITALIST

## 2017-11-27 PROCEDURE — A9540 TC99M MAA: HCPCS

## 2017-11-27 PROCEDURE — 74011000258 HC RX REV CODE- 258: Performed by: HOSPITALIST

## 2017-11-27 PROCEDURE — 74011250637 HC RX REV CODE- 250/637: Performed by: INTERNAL MEDICINE

## 2017-11-27 PROCEDURE — 74011636637 HC RX REV CODE- 636/637: Performed by: INTERNAL MEDICINE

## 2017-11-27 PROCEDURE — 74011250636 HC RX REV CODE- 250/636: Performed by: HOSPITALIST

## 2017-11-27 PROCEDURE — 0D9670Z DRAINAGE OF STOMACH WITH DRAINAGE DEVICE, VIA NATURAL OR ARTIFICIAL OPENING: ICD-10-PCS | Performed by: HOSPITALIST

## 2017-11-27 PROCEDURE — 83735 ASSAY OF MAGNESIUM: CPT | Performed by: HOSPITALIST

## 2017-11-27 PROCEDURE — 80048 BASIC METABOLIC PNL TOTAL CA: CPT | Performed by: HOSPITALIST

## 2017-11-27 PROCEDURE — 71020 XR CHEST AP LAT: CPT

## 2017-11-27 PROCEDURE — 74011250637 HC RX REV CODE- 250/637: Performed by: SURGERY

## 2017-11-27 RX ORDER — NALOXONE HYDROCHLORIDE 0.4 MG/ML
0.4 INJECTION, SOLUTION INTRAMUSCULAR; INTRAVENOUS; SUBCUTANEOUS
Status: DISCONTINUED | OUTPATIENT
Start: 2017-11-27 | End: 2017-12-04 | Stop reason: HOSPADM

## 2017-11-27 RX ORDER — ENOXAPARIN SODIUM 100 MG/ML
40 INJECTION SUBCUTANEOUS EVERY 24 HOURS
Status: DISCONTINUED | OUTPATIENT
Start: 2017-11-28 | End: 2017-12-04 | Stop reason: HOSPADM

## 2017-11-27 RX ORDER — POLYETHYLENE GLYCOL 3350 17 G/17G
17 POWDER, FOR SOLUTION ORAL 2 TIMES DAILY
Status: DISCONTINUED | OUTPATIENT
Start: 2017-11-27 | End: 2017-12-01

## 2017-11-27 RX ORDER — SENNOSIDES 8.6 MG/1
2 TABLET ORAL
Status: DISCONTINUED | OUTPATIENT
Start: 2017-11-27 | End: 2017-12-01

## 2017-11-27 RX ORDER — MAGNESIUM SULFATE HEPTAHYDRATE 40 MG/ML
2 INJECTION, SOLUTION INTRAVENOUS ONCE
Status: COMPLETED | OUTPATIENT
Start: 2017-11-27 | End: 2017-11-27

## 2017-11-27 RX ORDER — FACIAL-BODY WIPES
10 EACH TOPICAL DAILY PRN
Status: DISCONTINUED | OUTPATIENT
Start: 2017-11-27 | End: 2017-12-04 | Stop reason: HOSPADM

## 2017-11-27 RX ORDER — ENOXAPARIN SODIUM 100 MG/ML
1 INJECTION SUBCUTANEOUS EVERY 12 HOURS
Status: DISCONTINUED | OUTPATIENT
Start: 2017-11-27 | End: 2017-11-27

## 2017-11-27 RX ORDER — DILTIAZEM HYDROCHLORIDE 5 MG/ML
5 INJECTION INTRAVENOUS ONCE
Status: COMPLETED | OUTPATIENT
Start: 2017-11-27 | End: 2017-11-27

## 2017-11-27 RX ORDER — METOPROLOL TARTRATE 5 MG/5ML
2.5 INJECTION INTRAVENOUS
Status: DISCONTINUED | OUTPATIENT
Start: 2017-11-27 | End: 2017-12-04 | Stop reason: HOSPADM

## 2017-11-27 RX ADMIN — ACETAMINOPHEN 650 MG: 325 TABLET ORAL at 21:24

## 2017-11-27 RX ADMIN — OLANZAPINE 7.5 MG: 2.5 TABLET ORAL at 01:53

## 2017-11-27 RX ADMIN — DOCUSATE SODIUM 100 MG: 100 CAPSULE, LIQUID FILLED ORAL at 11:05

## 2017-11-27 RX ADMIN — SODIUM CHLORIDE 5 MG/HR: 900 INJECTION, SOLUTION INTRAVENOUS at 18:01

## 2017-11-27 RX ADMIN — ACETAMINOPHEN 650 MG: 325 TABLET ORAL at 17:29

## 2017-11-27 RX ADMIN — GABAPENTIN 400 MG: 400 CAPSULE ORAL at 11:06

## 2017-11-27 RX ADMIN — LACOSAMIDE 200 MG: 100 TABLET, FILM COATED ORAL at 01:53

## 2017-11-27 RX ADMIN — MEROPENEM 500 MG: 500 INJECTION, POWDER, FOR SOLUTION INTRAVENOUS at 00:30

## 2017-11-27 RX ADMIN — GABAPENTIN 400 MG: 400 CAPSULE ORAL at 15:33

## 2017-11-27 RX ADMIN — LEVETIRACETAM 1000 MG: 500 TABLET ORAL at 11:05

## 2017-11-27 RX ADMIN — MIRABEGRON 50 MG: 25 TABLET, FILM COATED, EXTENDED RELEASE ORAL at 01:52

## 2017-11-27 RX ADMIN — NALOXEGOL OXALATE 25 MG: 25 TABLET, FILM COATED ORAL at 18:11

## 2017-11-27 RX ADMIN — MISOPROSTOL 100 MCG: 100 TABLET ORAL at 21:24

## 2017-11-27 RX ADMIN — PREDNISONE 5 MG: 5 TABLET ORAL at 11:06

## 2017-11-27 RX ADMIN — LEVOTHYROXINE SODIUM 75 MCG: 75 TABLET ORAL at 11:06

## 2017-11-27 RX ADMIN — ONDANSETRON HYDROCHLORIDE 4 MG: 2 INJECTION INTRAMUSCULAR; INTRAVENOUS at 09:03

## 2017-11-27 RX ADMIN — MEROPENEM 500 MG: 500 INJECTION, POWDER, FOR SOLUTION INTRAVENOUS at 06:14

## 2017-11-27 RX ADMIN — OXYCODONE HYDROCHLORIDE 5 MG: 5 TABLET ORAL at 17:29

## 2017-11-27 RX ADMIN — MAGNESIUM SULFATE HEPTAHYDRATE 2 G: 40 INJECTION, SOLUTION INTRAVENOUS at 15:33

## 2017-11-27 RX ADMIN — PANTOPRAZOLE SODIUM 40 MG: 40 TABLET, DELAYED RELEASE ORAL at 01:53

## 2017-11-27 RX ADMIN — MISOPROSTOL 100 MCG: 100 TABLET ORAL at 11:06

## 2017-11-27 RX ADMIN — BISACODYL 10 MG: 10 SUPPOSITORY RECTAL at 15:33

## 2017-11-27 RX ADMIN — GABAPENTIN 400 MG: 400 CAPSULE ORAL at 01:53

## 2017-11-27 RX ADMIN — LEVETIRACETAM 1000 MG: 500 TABLET ORAL at 21:24

## 2017-11-27 RX ADMIN — ENOXAPARIN SODIUM 70 MG: 40 INJECTION SUBCUTANEOUS at 09:18

## 2017-11-27 RX ADMIN — DILTIAZEM HYDROCHLORIDE 5 MG: 5 INJECTION INTRAVENOUS at 15:32

## 2017-11-27 RX ADMIN — PANTOPRAZOLE SODIUM 40 MG: 40 TABLET, DELAYED RELEASE ORAL at 21:24

## 2017-11-27 RX ADMIN — LACOSAMIDE 200 MG: 100 TABLET, FILM COATED ORAL at 11:05

## 2017-11-27 RX ADMIN — MISOPROSTOL 100 MCG: 100 TABLET ORAL at 01:52

## 2017-11-27 RX ADMIN — ACETAMINOPHEN 650 MG: 325 TABLET ORAL at 01:53

## 2017-11-27 RX ADMIN — DULOXETINE HYDROCHLORIDE 30 MG: 30 CAPSULE, DELAYED RELEASE ORAL at 01:52

## 2017-11-27 RX ADMIN — ACETAMINOPHEN 650 MG: 325 TABLET ORAL at 11:30

## 2017-11-27 RX ADMIN — OXYCODONE HYDROCHLORIDE 5 MG: 5 TABLET ORAL at 11:06

## 2017-11-27 RX ADMIN — MEROPENEM 500 MG: 500 INJECTION, POWDER, FOR SOLUTION INTRAVENOUS at 15:33

## 2017-11-27 RX ADMIN — OLANZAPINE 7.5 MG: 2.5 TABLET ORAL at 21:24

## 2017-11-27 RX ADMIN — ROPINIROLE HYDROCHLORIDE 0.75 MG: 0.25 TABLET, FILM COATED ORAL at 21:23

## 2017-11-27 RX ADMIN — PANTOPRAZOLE SODIUM 40 MG: 40 TABLET, DELAYED RELEASE ORAL at 11:06

## 2017-11-27 RX ADMIN — LEVETIRACETAM 1000 MG: 500 TABLET ORAL at 00:30

## 2017-11-27 RX ADMIN — SODIUM CHLORIDE 75 ML/HR: 900 INJECTION, SOLUTION INTRAVENOUS at 12:40

## 2017-11-27 RX ADMIN — GABAPENTIN 400 MG: 400 CAPSULE ORAL at 21:25

## 2017-11-27 RX ADMIN — DULOXETINE HYDROCHLORIDE 30 MG: 30 CAPSULE, DELAYED RELEASE ORAL at 11:06

## 2017-11-27 RX ADMIN — LACOSAMIDE 200 MG: 100 TABLET, FILM COATED ORAL at 21:24

## 2017-11-27 RX ADMIN — SENNOSIDES 17.2 MG: 8.6 TABLET, FILM COATED ORAL at 21:24

## 2017-11-27 RX ADMIN — FERROUS SULFATE TAB 325 MG (65 MG ELEMENTAL FE) 325 MG: 325 (65 FE) TAB at 11:06

## 2017-11-27 RX ADMIN — POLYETHYLENE GLYCOL 3350 17 G: 17 POWDER, FOR SOLUTION ORAL at 21:22

## 2017-11-27 RX ADMIN — OXYCODONE HYDROCHLORIDE 5 MG: 5 TABLET ORAL at 21:24

## 2017-11-27 RX ADMIN — POLYETHYLENE GLYCOL 3350 17 G: 17 POWDER, FOR SOLUTION ORAL at 15:32

## 2017-11-27 RX ADMIN — DULOXETINE HYDROCHLORIDE 30 MG: 30 CAPSULE, DELAYED RELEASE ORAL at 21:24

## 2017-11-27 RX ADMIN — MULTIPLE VITAMINS W/ MINERALS TAB 1 TABLET: TAB at 11:06

## 2017-11-27 RX ADMIN — ROPINIROLE HYDROCHLORIDE 0.75 MG: 0.25 TABLET, FILM COATED ORAL at 01:53

## 2017-11-27 RX ADMIN — DOCUSATE SODIUM 100 MG: 100 CAPSULE, LIQUID FILLED ORAL at 01:55

## 2017-11-27 NOTE — PROGRESS NOTES
Hospitalist Progress Note-critical care note     Patient: Kayla Hooper MRN: [id number]  CSN: [id number]    YOB: 1950  Age: 78 y.o. Sex: female    DOA: 11/22/2017 LOS:  LOS: 5 days            Chief complaint: uti, fracture , ileus, acute respiratory distress , tachycardia     Assessment/Plan         Hospital Problems  Date Reviewed: 11/23/2017          Codes Class Noted POA    Ileus ([facility] ICD-10-CM: K56.7  ICD-9-CM: 560.1  11/27/2017 Unknown        Acute respiratory distress ICD-10-CM: R06.03  ICD-9-CM: 518.82  11/27/2017 Unknown        Sepsis ([facility] ICD-10-CM: A41.9  ICD-9-CM: 038.9, 995.91  11/24/2017 Unknown        Acute pain due to injury- multiple LE fractures ICD-10-CM: G89.11  ICD-9-CM: 338.11  11/24/2017 Unknown        Chronic pain ICD-10-CM: G89.29  ICD-9-CM: 338.29  11/23/2017 Unknown        Bedridden ICD-10-CM: Z74.01  ICD-9-CM: V49.84  11/23/2017 Unknown        * (Principal)Multiple fractures of both lower extremities, closed, initial encounter ICD-10-CM: S82.91XA, S82.92XA  ICD-9-CM: 828.0  11/22/2017 Unknown        Seizure disorder ([facility] ICD-10-CM: G40.909  ICD-9-CM: 345.90  6/24/2012 Yes        Depression ICD-10-CM: F32.9  ICD-9-CM: 557  6/24/2012 Yes        UTI (urinary tract infection) ICD-10-CM: N39.0  ICD-9-CM: 599.0  6/1/2012 Yes        Severe protein-calorie malnutrition ([facility] ICD-10-CM: E43  ICD-9-CM: 783  6/1/2012 Yes        Bipolar 1 disorder (HCC) (Chronic) ICD-10-CM: F31.9  ICD-9-CM: 296.7  5/3/2012 Yes            1. Acute respiratory distress   Stat v/q scan  Low probability for pulmonary embolus   will change to lovenox to dvt prophylasix dose  Due to pressure from abdomen   2. Ileus     Will have ng tube insert and suction to decrease pressure from abdomen   Case discussed with Dr. Aiden Dowell surgery  He will see pt . Case discussed with Dr. Jerry Otero and he recommended to have ct abdomen and pelvic        3.   Sepsis/UTI with chronic catheter -complicated UTI leukocytosis resolved    f/u blood Cx No growth so far   UCX: ENTEROBACTER CLOACAE -sensitive to levaquine, PROTEUS MIRABILIS -levaquine resistant   Continue merrem well     2 Multiple lower extremity fractures:   Seen and f/u by Dr. Fabian Fajardo. Pt states \" No surgery\",        3 Anemia: Chronic. H/h stable       5 Seizure:  Seizure precaution,  Continue home dose of keppra, Vimpat      5 Chronic pain:   Pain all over, continue pain control      6 Bipolar: Continue Zyprexa       7. Hyponatremia   Will continue ns infusion, will low the rate to prevent fluid overload  8 tachycardia   Due to the pressure from abdomen   Will give cardizem for rate control, need decrease pressure from abdomen    and cardizem gtt for rate control   Will have ce and echo        subjective: sob -better    Nurse: will have v/q scan, Bibiana Orr (RN) states; pt and  want to change to full code-recommend to call palliative care which has been on board since admission. Pt was [facility] to [facility] desat . AM. Plan  case discussed with RN: sonia (3S) and ANGY Jeronimos ) several times about the detail pain and they indicated a verbal understanding. Palliative care discussed with pt and remained dnr/I. Pt was visited later afternoon, she looks better than AM. O2 down to 4 L.      75 total min's spent on patient care including >50% on counseling/coordinating care. Discussed the above assessments. Plan discussed with ANGY Nunez again. Review of systems:    General: No fevers or chills. Pain all over   Cardiovascular: No chest pain or pressure. No palpitations. Pulmonary: shortness of breath better . Gastrointestinal: No nausea, vomiting. Vital signs/Intake and Output:  Visit Vitals    BP (!) 152/113 (BP 1 Location: Right arm, BP Patient Position: At rest;Supine; Head of bed elevated (Comment degrees))    Pulse (!) 105    Temp 97.6 °F (36.4 °C)    Resp 18    Ht 5' 6.14\" (1.68 m)    Wt 74.8 kg (164 lb 14.5 oz)    SpO2 96%    BMI 26.5 kg/m2     Current Shift:     Last three shifts:  11/25 1901 - 11/27 0700  In: 9143 [P.O.:720; I.V.:2721]  Out: 1895 [Urine:1825]    Physical Exam:  General: WD, WN. Alert, cooperative, no acute distress    HEENT: NC, Atraumatic. PERRLA, anicteric sclerae. Lungs: CTA Bilaterally. No Wheezing/Rhonchi/Rales. Heart:  Tachy ,  No murmur, No Rubs, No Gallops  Abdomen: Soft, distended, Non tender. No  Bowel sounds,   Extremities: No c/c, bilateral leg wrapped with ace bandage  Psych:   Not anxious or agitated. Neurologic:  No acute neurological deficit. Labs: Results:       Chemistry Recent Labs      11/27/17   0830  11/25/17   0446   GLU  144*  92   NA  134*  132*   K  4.3  5.0   CL  95*  98*   CO2  33*  31   BUN  11  5*   CREA  0.57*  0.40*   CA  8.8  7.9*   AGAP  6  3   BUCR  19  13      CBC w/Diff Recent Labs      11/26/17   0930  11/25/17   0446   WBC   --   7.2   RBC   --   2.65*   HGB  9.9*  7.8*   HCT  30.8*  23.6*   PLT   --   172   GRANS   --   60   LYMPH   --   26   EOS   --   5      Cardiac Enzymes No results for input(s): CPK, CKND1, PAOLA in the last 72 hours. No lab exists for component: CKRMB, TROIP   Coagulation No results for input(s): PTP, INR, APTT in the last 72 hours. No lab exists for component: INREXT, INREXT    Lipid Panel No results found for: CHOL, CHOLPOCT, CHOLX, CHLST, CHOLV, 100018, HDL, LDL, LDLC, DLDLP, 066899, VLDLC, VLDL, TGLX, TRIGL, TRIGP, TGLPOCT, CHHD, CHHDX   BNP No results for input(s): BNPP in the last 72 hours. Liver Enzymes No results for input(s): TP, ALB, TBIL, AP, SGOT, GPT in the last 72 hours.     No lab exists for component: DBIL   Thyroid Studies Lab Results   Component Value Date/Time    TSH 17.30 08/27/2017 02:00 AM        Procedures/imaging: see electronic medical records for all procedures/Xrays and details which were not copied into this note but were reviewed prior to creation of [facility], [place]

## 2017-11-27 NOTE — PROGRESS NOTES
Contacted by nursing to evaluate patient admitted with bilateral lower extremity fractures, and sepsis with a urologic source now with hypoxia and altered mental status. O: On arrival the patient O2 sats were 100% on 5L. . Temp 94.5-98 degrees. /105. Last CXR was 22 Nov and showed no acute disease. Pulmonary exam was unremarkable. Patient was tachycardic and was intact neurologically but drowsy; oriented x 3. Patient had been recently given phenergan @ 2030. A/P:   Increased oxygen requirement:  -Get chest x-ray and ABG  -Order EKG to access tachycardia  -Patient with bilateral lower extremity fractures putting at risk for PE as with as fat embolism. Check D-dimer; CTA for elevated level. Altered mental status:  -No focal neurologic symptoms. Patient has multiple reasons to have altered mental status including sepsis and is on multiple medications that can cause it most recently/notably phenergan. Hypoxia can also cause it and will be worked up as above. Monitor.

## 2017-11-27 NOTE — PROGRESS NOTES
conducted a Follow up consultation and Spiritual Assessment for Aaliyah Ramesh, who is a 78 y.o.,female. Continued the relationship of care and support. Listened empathically. Offered prayer and assurance of continued prayer on patients behalf. Plan:  Chaplains will continue to follow and will provide pastoral care as needed or requested.  recommends bedside caregivers page the  on duty if patient shows signs of acute spiritual or emotional distress. Father Lakshmi Bell M.Div. /  [phone] - OfficeBurbank Hospitalplain conducted a Follow up consultation and Spiritual Assessment for Aaliyah Ramesh, who is a 78 y.o.,female. Continued the relationship of care and support. Listened empathically. Offered prayer and assurance of continued prayer on patients behalf. Plan:  Chaplains will continue to follow and will provide pastoral care as needed or requested.  recommends bedside caregivers page the  on duty if patient shows signs of acute spiritual or emotional distress. BRIAN TovarDiv.   [facility] - Office

## 2017-11-27 NOTE — PROGRESS NOTES
Patient was visited by AVIVA. Volunteer followed up with patient and/or family and reported no needs to this . Chaplains will continue to follow and will provide pastoral care as needed or requested. [address], M.Div.   Board Certified   [phone] - Office

## 2017-11-27 NOTE — PROGRESS NOTES
Palliative Medicine Progress Note  [facility]: [phone] ([phone])  [facility]: [address] [place] [address] Way: [phone]    Patient Name: Susannah Mathur  YOB: 1950    Date of Initial Consult: 11/24/17  Reason for Consult: symptom management/care planning  Requesting Provider: Dr. Lalit Mcmahon   Primary Care Physician: Ally Harris MD      SUMMARY:   Susannah Mathur is a 78y.o. year old with a complex past history admitted for assessment of multiple bilat LE fractures following a fall from bed. [place] to be a poor surgical candidate and she is not interested. Past history includes bipolar disorder with at least one involuntary committal to [facility] and transfer to psychiatry hospital in 2012, chronic back pain previously followed by pain management, seizure disorder, and functional paraplegia of LEs leaving her wheelchair/bed bound. S/P bariatric surgery w/ dumping syndrome and nutritional failure to thrive. Had been enrolled in hospice in [id number] for this, but gradually weaning of seizure meds resulted in improvement and hospice graduation. Long term patient of Dr. Savannah Hooker, has also been in pain management and most recently followed by [place] visiting physicians(Dr. Barclay). They are insisting she return to pain management and have not been prescribing opioids. Previously was on ms IR15 mg tid and fioricet w/ codeine prn. Also has a history of chronic constipation and recurring episodes of SBO.    11/27/17: Predictably rough weekend. Decreased LOC, likely partly due to fentanyl, increased O2 requirement w/ + d dimer, but VQ scan low probability. Abdomen increasingly distended and bloated. This has been a recurring problem for her,  states they have been able to manage it at home for several years w/ daily PEG and bisacodyl suppository. No nausea. Pain in in back and feet, legs when they are moved.      PALLIATIVE DIAGNOSES:     Patient Active Problem List   Diagnosis Code  Bipolar 1 disorder ([facility]) F31.9    Decubitus ulcer of sacral area L89.159    UTI (urinary tract infection) N39.0    Severe protein-calorie malnutrition ([facility]) E43    Seizure disorder ([facility]) G40.909    Depression F32.9    Hypotension, unspecified I95.9    Personal history of DVT (deep vein thrombosis) Z86.718    Hypomagnesemia E83.42    Unspecified constipation K59.00    Seizure ([facility]) R56.9    Overdose T50.901A    Narcosis R40.1    Multiple fractures of both lower extremities, closed, initial encounter S82.91XA, S82.92XA    Chronic pain G89.29    Bedridden Z74.01    Sepsis ([facility]) A41.9    Acute pain due to injury- multiple LE fractures G89.11    Ileus ([facility]) K56.7    Acute respiratory distress R06.03     1. PLAN:   1. Met with patient in her room. She was alert and oriented providing reliable summary of her known history. Felt her mood had been doing pretty well until this happened, psych meds were helpful. She knows her care will be more of a challenge with this event. When asked about her previous hospitalizations in 2012 indicating she is a DNR she says she still feels that way and does not wish to undergo CPR. She already has an AMD with living will on file. She is anxious to complete a DDNR because she is worried her  will not respect her wishes in regard to this. DDNR completed and placed on chart. Questions answered. Recommend: Cont low dose fentanyl patch monitoring closely for side effects. D/c hydrocodone and replace w/ oxycodone q 4 hr prn, giving APAP 650 mg qid. Add bowel regimen with senna qhs. Colace has no demonstrated efficacy in managing opioid induced constipation. Suspect rehab potential minimal and placement with subsequent care will prove a challenge. 11/27/17: They would like to try PEG and suppository, but understand she might need an NG if symptoms worsen. Cont current pain regimen, has not required much in way of breakthrough oxycodone.   with questions about DNR status. Explained distinction between treatment and CPR decisions and that Ms Kurtis eMndoza may still have all forms of aggressive care including ICU interventions and intubation. Questions answered. Patient and  both feel Wesley Wong is still an accurate reflection of her wishes on the subject. 2. Initial consult note routed to primary continuity provider  3.  Communicated plan of care with: Palliative IDT       GOALS OF CARE:   Limited curative, control of symptoms    Advance Care Planning 11/24/2017   Patient's Healthcare Decision Maker is: Named in scanned ACP document   Primary Decision Maker Name Yanna Murry   Primary Decision Maker Phone Number [phone]   Primary Decision Maker Relationship to Patient Spouse   Secondary Decision Maker Name Mary Ann Fam57 Phone Number [phone]   Confirm Advance Directive Yes, on file   Patient Would Like to Complete Advance Directive -   Does the patient have other document types Do Not Resuscitate           HISTORY:     History obtained from: patient    CHIEF COMPLAINT: see summary    HPI/SUBJECTIVE:    The patient is:   [x] Verbal and participatory  [] Non-participatory due to:        Clinical Pain Assessment (nonverbal scale for nonverbal patients): Pain: 7    [++++ Clinical Pain Assessment++++]  [++++Pain Severity++++]: Pain: 7  [++++Pain Character++++]: sharp  [++++Pain Duration++++]: continuous  [++++Pain Effect++++]:   [++++Pain Factors++++]:   [++++Pain Frequency++++]:   [++++Pain Location++++]: legs  [++++ Clinical Pain Assessment++++]         FUNCTIONAL ASSESSMENT:     Palliative Performance Scale (PPS):  PPS: 40       PSYCHOSOCIAL/SPIRITUAL SCREENING:     Advance Care Planning:  Advance Care Planning 11/24/2017   Patient's Healthcare Decision Maker is: Named in scanned ACP document   Primary Decision Maker Name Yanna Murry   Primary Decision Maker Phone Number [phone]   Primary Decision Maker Relationship to Patient Spouse   Secondary Decision Maker Name Mary Ann Paredes Phone Number [phone]   Confirm Advance Directive Yes, on file   Patient Would Like to Complete Advance Directive -   Does the patient have other document types Do Not Resuscitate        Any spiritual / Rastafari concerns:  [] Yes /  [x] No    Caregiver Burnout:  [] Yes /  [x] No /  [] No Caregiver Present      Anticipatory grief assessment:   [x] Normal  / [] Maladaptive       ESAS Anxiety: Anxiety: 0    ESAS Depression: Depression: 4        REVIEW OF SYSTEMS:     Positive and pertinent negative findings in ROS are noted above in HPI. The following systems were [x] reviewed / [] unable to be reviewed as noted in HPI  Other findings are noted below. Systems: constitutional, ears/nose/mouth/throat, respiratory, gastrointestinal, genitourinary, musculoskeletal, integumentary, neurologic, psychiatric, endocrine. Positive findings noted below. Modified ESAS Completed by: provider   Fatigue: 8 Drowsiness: 6   Depression: 4 Pain: 7   Anxiety: 0 Nausea: 3   Anorexia: 7 Dyspnea: 0     Constipation: No     Stool Occurrence(s): 0        PHYSICAL EXAM:     Wt Readings from Last 3 Encounters:   11/27/17 74.8 kg (164 lb 14.5 oz)   10/11/17 67.1 kg (148 lb)   10/07/17 68.5 kg (151 lb)     Blood pressure (!) 152/113, pulse (!) 105, temperature 97.6 °F (36.4 °C), resp. rate 18, height 5' 6.14\" (1.68 m), weight 74.8 kg (164 lb 14.5 oz), SpO2 96 %.   Pain:  Pain Scale 1: Numeric (0 - 10)  Pain Intensity 1: 0  Pain Onset 1: chronic  Pain Location 1: Back  Pain Orientation 1: Left, Right  Pain Description 1: Aching  Pain Intervention(s) 1: Medication (see MAR)  Last bowel movement:     Constitutional: ill appearing, older than stated age  Eyes: pupils equal, anicteric  ENMT: no nasal discharge, moist mucous membranes  Cardiovascular: regular rhythm, distal pulses intact  Respiratory: breathing not labored, symmetric  Gastrointestinal: soft non-tender, +bowel sounds  Musculoskeletal: LE contractures and abnormal rotations  Skin: warm, dry  Neurologic: following commands, moving all extremities. Drowsy, but easily aroused. Psychiatric: full affect, no hallucinations  Other:       HISTORY:     Principal Problem:    Multiple fractures of both lower extremities, closed, initial encounter (11/22/2017)    Active Problems:    Bipolar 1 disorder ([address] (5/3/2012)      UTI (urinary tract infection) (6/1/2012)      Severe protein-calorie malnutrition ([address] (6/1/2012)      Seizure disorder ([address] (6/24/2012)      Depression (6/24/2012)      Chronic pain (11/23/2017)      Bedridden (11/23/2017)      Sepsis ([address] (11/24/2017)      Acute pain due to injury- multiple LE fractures (11/24/2017)      Ileus ([address] (11/27/2017)      Acute respiratory distress (11/27/2017)      Past Medical History:   Diagnosis Date    Acute pain due to injury- multiple LE fractures 11/24/2017    Anemia NEC     history of blood transfusion    Anxiety     panic attacks    Arthritis     Bipolar 1 disorder (HCC)     Chronic back pain     Depression     Elevated diaphragm 5/4/2012    Fatigue     along with weakness    GERD (gastroesophageal reflux disease)     Headache(784.0)     Hypertension     pt denies    Other ill-defined conditions(799.89) swallowing issues, decubitus    Psychiatric disorder     Seizures (HCC)     Thromboembolus ([address]     TIA (transient ischemic attack)       Past Surgical History:   Procedure Laterality Date    COLONOSCOPY N/A 2/21/2017    COLONOSCOPY [facility] ANESTHESIA, PATIENT IS IN ROOM 358 performed by Laura Vasquez MD at [address]  N/A 2/22/2017    COLONOSCOPY performed by Laura Vasquez MD at [facility], COLON, DIAGNOSTIC      over 20 years ago    ENDOSCOPY, COLON, DIAGNOSTIC  9/24/09    HX APPENDECTOMY  11/6/84    HX BACK SURGERY  11/24/98    severe back problems    HX CHOLECYSTECTOMY  11/6/84    HX GASTRIC BYPASS  1995    Patricio    HX OTHER SURGICAL      IVC filter    HX REFRACTIVE SURGERY      HX TUBAL LIGATION  1991      Family History   Problem Relation Age of Onset    Heart Disease Mother     Arthritis-rheumatoid Mother     Stroke Father     Hypertension Father     Arthritis-rheumatoid Father      History reviewed, no pertinent family history.   Social History   Substance Use Topics    Smoking status: Never Smoker    Smokeless tobacco: Never Used    Alcohol use No     Allergies   Allergen Reactions    Ceftriaxone Seizures    Demerol [Meperidine] Unknown (comments)    Seafood [Shellfish Containing Products] Unknown (comments)      Current Facility-Administered Medications   Medication Dose Route Frequency    [START ON 11/28/2017] enoxaparin (LOVENOX) injection 40 mg  40 mg SubCUTAneous Q24H    metoprolol (LOPRESSOR) injection 2.5 mg  2.5 mg IntraVENous Q6H PRN    dilTIAZem (CARDIZEM) injection 5 mg  5 mg IntraVENous ONCE    magnesium sulfate 2 g/50 ml IVPB (premix or compounded)  2 g IntraVENous ONCE    polyethylene glycol (MIRALAX) packet 17 g  17 g Oral BID    bisacodyl (DULCOLAX) suppository 10 mg  10 mg Rectal DAILY PRN    gabapentin (NEURONTIN) capsule 400 mg  400 mg Oral TID    meropenem (MERREM) 500 mg in sterile water (preservative free) 10 mL IV syringe  500 mg IntraVENous Q8H    fentaNYL (DURAGESIC) 12 mcg/hr patch 1 Patch  1 Patch TransDERmal Q72H    multivitamin, tx-iron-ca-min (THERA-M w/ IRON) tablet 1 Tab  1 Tab Oral DAILY    acetaminophen (TYLENOL) tablet 650 mg  650 mg Oral AC&HS    oxyCODONE IR (ROXICODONE) tablet 5 mg  5 mg Oral Q4H PRN    promethazine (PHENERGAN) 25 mg in 0.9% sodium chloride 50 mL IVPB  25 mg IntraVENous Q6H PRN    influenza vaccine 2017-18 (3 yrs+)(PF) (FLUZONE QUAD/FLUARIX QUAD) injection 0.5 mL  0.5 mL IntraMUSCular PRIOR TO DISCHARGE    sodium chloride (NS) flush 5-10 mL  5-10 mL IntraVENous PRN    ondansetron (ZOFRAN) injection 4 mg 4 mg IntraVENous Q4H PRN    DULoxetine (CYMBALTA) capsule 30 mg  30 mg Oral BID    ferrous sulfate tablet 325 mg  325 mg Oral ACB    levETIRAcetam (KEPPRA) tablet 1,000 mg  1,000 mg Oral BID    levothyroxine (SYNTHROID) tablet 75 mcg  75 mcg Oral ACB    miSOPROStol (CYTOTEC) tablet 100 mcg  100 mcg Oral BID    pantoprazole (PROTONIX) tablet 40 mg  40 mg Oral BID    lacosamide (VIMPAT) tablet 200 mg  200 mg Oral BID    predniSONE (DELTASONE) tablet 5 mg  5 mg Oral DAILY    rOPINIRole (REQUIP) tablet 0.75 mg  0.75 mg Oral QHS    mirabegron ER (MYRBETRIQ) tablet 50 mg  50 mg Oral QHS    OLANZapine (ZyPREXA) tablet 7.5 mg  7.5 mg Oral QHS    0.9% sodium chloride infusion  75 mL/hr IntraVENous CONTINUOUS    sodium chloride (OCEAN) 0.65 % nasal spray 2 Spray  2 Spray Both Nostrils Q2H PRN        LAB AND IMAGING FINDINGS:     Lab Results   Component Value Date/Time    WBC 7.2 11/25/2017 04:46 AM    HGB 9.9 11/26/2017 09:30 AM    PLATELET 615 31/81/2238 04:46 AM     Lab Results   Component Value Date/Time    Sodium 134 11/27/2017 08:30 AM    Potassium 4.3 11/27/2017 08:30 AM    Chloride 95 11/27/2017 08:30 AM    CO2 33 11/27/2017 08:30 AM    BUN 11 11/27/2017 08:30 AM    Creatinine 0.57 11/27/2017 08:30 AM    Calcium 8.8 11/27/2017 08:30 AM    Magnesium 1.8 11/27/2017 08:30 AM    Phosphorus 3.1 09/20/2012 06:15 AM      Lab Results   Component Value Date/Time    AST (SGOT) 49 10/11/2017 02:00 PM    Alk.  phosphatase 193 10/11/2017 02:00 PM    Protein, total 7.4 10/11/2017 02:00 PM    Albumin 3.4 10/11/2017 02:00 PM    Globulin 4.0 10/11/2017 02:00 PM     Lab Results   Component Value Date/Time    INR 0.9 08/27/2017 02:00 AM    Prothrombin time 11.2 08/27/2017 02:00 AM    aPTT 30.6 07/22/2017 05:00 AM      No results found for: IRON, FE, TIBC, IBCT, PSAT, FERR   No results found for: PH, PCO2, PO2  No components found for: [place]   Lab Results   Component Value Date/Time    CK 85 08/27/2017 02:00 AM    CK - MB 2.7 08/27/2017 02:00 AM              Total time: 70 min  Counseling / coordination time, spent as noted above: 50  > 50% counseling / coordination        Douglas Moulton MD  Palliative Medicine

## 2017-11-27 NOTE — PROGRESS NOTES
1930 - Bedside report received from Ayad Del Castillo RN. Patient in bed. Pain 2/10.     2050 - Patient in bed at this time. IV to RAC and L FA  intact and patent. Dressing to BLE CDI. Drsg to sacrum CDI. + CMS. Pt A & O x 4. LS clear, on RA. Abdomen soft, NT and ND. + BS to all 4 quadrants. Gotti patent with cloudy Yellow urine. Denies nausea. Pain 2/10. Call light within reach. 2033-Phenergan given. Potential side effects explained to patient, patient verbalizes understanding, opportunities for questions provided. Patient stable, No apparent distress at this time, bed in locked position, call bell and phone within reach. Low temps noted, but pt c/o feeling warm, does not want more blankets. CNA to give her a bath and  re-check temp after, rectally per the supervisor. 2105. Rectal temp at 98.0, Pt;s however now disoriented after phenergan done infusing. Will page  MD come and see pt. Jayda Monroe in to see pt, . See new order for portable CXR, MD put in more orders for blood gases, D Dmer and an EKG. MD said to keep an eye on pt and to titrate O2. Pt on 5 L now. Sats at high 90s    2300-Results of EKG, ABGs read to Dr Hyacinth Marques. MD says pt has some undiagnosed COPD. MD also made aware of  recommendations by Radiologist for Abd. XR as pt's abd is very distended. Order put in, will need to take pt down for XR. RT put pt on 3 L of O2, sats at high 90s. 2345-Pt taken down for abd XR    0005-Pt back from XR.    0200-HS pills given late, pt has been nauseous. 5718-O-Nfnns at 3.34. Paged Dr Hyacinth Marques. 0245-Talked to Dr Hyacinth Marques Md aware of abd XR and EKG results, and the elevated D-Dimer. Order obtaied for CT angio. Pt needs a 20 G IV in the [facility]. Oumou Caban RN will try. Pt also made NPO    0400-1st RN attempted IV restart x2 but was unsuccessful. 0405- 2 other RNs in there now trying. 4424-5 RNs attempted IV restart, one managed a 24G for IVF. Called the ED, somebody will come and try.  In the mean time Greg Frank RN  got a 24 G to L thumb.    0500-One ED Tech, mello and an RN tried to restart IV, no success, not sure how mqny times they tried. Pt has been stuck more than 6 times. Will talk to CT staff for other options. 0509-Paged Dr Marlena Allred as pt has been stuck more than 6 times for the IV without success. CT tech suggested to ask the Md if he wants a VQ scan which will not need a 20 G IV. Awaiting MD call. 0515-Talked to Dr Marlena Allred MD said to go ahead and order a VQ scan. Order put in immediately. 0520-Informed the supervisor of the need for [facility] med to come in and do the VQ scan. Gave me the name of the on Call staff and the number to call. Paged immediately. Awaiting call from Kyle Oliver. [address] 2nd time. 3746- call back received from Kyle Oliver, said day people will be in within the hour for the VQ scan. Kyle Oliver made aware that it was a stat order for an elevated D-Dimer and Md wanted it done urgently, said they cannot get get here any faster. Nursing supervisor made aware of Best's response. Per Kyle Oliver, a 24 G IV will do for the scan. 5174-Talked to Jame Carter in [facility], about the stat order for VQ scan. Damaris Barrow said they will call the floor when ready. Day RN, Yoan Ann to f/up. See notes for details of shift events. Pt still drowsy, but arousable,oriented to self.  O2 sats at 100 on 3 L,No distress

## 2017-11-27 NOTE — PROGRESS NOTES
D/c plan not finalized  If patient is going to go to rehab she will need pt and ot consults with notes.

## 2017-11-27 NOTE — ROUTINE PROCESS
Bedside and Verbal shift change report given to Skylar De Souza (oncoming nurse) by Trey Bryan    (offgoing nurse). Report included the following information SBAR, Kardex, Intake/Output, MAR, Recent Results and Cardiac Rhythm Sinus Tachy.

## 2017-11-27 NOTE — ROUTINE PROCESS
Bedside and Verbal shift change report given to Juve Jean RN by Amaury Aparicio. Report included the following information SBAR, Kardex, OR Summary, Intake/Output and MAR.

## 2017-11-27 NOTE — CONSULTS
[address]    Name:  Delroy Deleon  MR#:  [id number]  :  1950  Account #:  [de-identified]  Date of Adm:  2017  Date of Consultation:  2017      CHIEF COMPLAINT: Abdominal distention with elevation of the right  hemidiaphragm. HISTORY OF PRESENT ILLNESS: This is a 69-year-old female with a  history of morbid obesity, having undergone previous gastric bypass  surgery, at this time has marked elevation of the right hemidiaphragm  and tachypneic. She is also tachycardic. The patient was thought to be  having complications associated with abdominal distention. She has a history of bilateral tib-fib fractures. History of morbid obesity  and bedridden state, history of tremendous narcotic use to control her  chronic pain. INCOMPLETE, REPORT ENDS HERE.         MD ZION Mckeon / Celia Chan  D:  2017   16:02  T:  2017   16:24  Job #:  [id number]

## 2017-11-27 NOTE — PROGRESS NOTES
Pt with abdomina distension secondary to severe obstipation as part of Narcotic use. Recommend  NG tube to lo continuous suction,  CT scan of abd/pelvis r/o gut injury or fluid collection,  [place] or med of this nature. We will follow with you.   # [id number]

## 2017-11-27 NOTE — PROGRESS NOTES
Pt with orders to transfer to tele. Notified CM on this unit with pt update. CM to continue to follow.

## 2017-11-27 NOTE — PROGRESS NOTES
Consult Note    Patient: Juan Aleman               Sex: female          DOA: 11/22/2017         YOB: 1950      Age:  78 y.o.        LOS:  LOS: 5 days              HPI:     Juan Aleman is a 78 y.o. female who has been seen for displaced right distal femur fracture, right tibia fracture, left distal femur fracture and chronic bilateral hip dislocations/ fracture. She reports she fell out of her chair yesterday and had pain. She has had chronic pain in her hips and was seen by Dr. Najma Aaron in the office for the chronic hip injury. Patient has had altered mental status overnight and is currently being worked up for this. She is able to respond to my questions presently and does report pain. She states she is \"very tired. \"    Past Medical History:   Diagnosis Date    Acute pain due to injury- multiple LE fractures 11/24/2017    Anemia NEC     history of blood transfusion    Anxiety     panic attacks    Arthritis     Bipolar 1 disorder (HCC)     Chronic back pain     Depression     Elevated diaphragm 5/4/2012    Fatigue     along with weakness    GERD (gastroesophageal reflux disease)     Headache(784.0)     Hypertension     pt denies    Other ill-defined conditions(799.89) swallowing issues, decubitus    Psychiatric disorder     Seizures (HCC)     Thromboembolus ([facility] [address]     TIA (transient ischemic attack)        Past Surgical History:   Procedure Laterality Date    COLONOSCOPY N/A 2/21/2017    COLONOSCOPY [facility] ANESTHESIA, PATIENT IS IN ROOM 358 performed by Cam Zhang MD at [address]  N/A 2/22/2017    COLONOSCOPY performed by Cam Zhang MD at [address] [facility], DIAGNOSTIC      over 20 years ago    ENDOSCOPY, COLON, DIAGNOSTIC  9/24/09    HX APPENDECTOMY  11/6/84    HX BACK SURGERY  11/24/98    severe back problems    HX CHOLECYSTECTOMY  11/6/84    HX GASTRIC BYPASS  1995    Patricio    HX OTHER SURGICAL      IVC filter    HX REFRACTIVE SURGERY      HX TUBAL LIGATION  1991       Family History   Problem Relation Age of Onset    Heart Disease Mother    Teagan Youngblood Arthritis-rheumatoid Mother     Stroke Father     Hypertension Father     Arthritis-rheumatoid Father        Social History     Social History    Marital status:      Spouse name: N/A    Number of children: N/A    Years of education: N/A     Social History Main Topics    Smoking status: Never Smoker    Smokeless tobacco: Never Used    Alcohol use No    Drug use: No    Sexual activity: Not Asked     Other Topics Concern    None     Social History Narrative       Prior to Admission medications    Medication Sig Start Date End Date Taking? Authorizing Provider   butalbital-acetaminophen-caffeine (FIORICET, ESGIC) -40 mg per tablet Take 1 Tab by mouth every four (4) hours as needed for Pain. Yes Giovani Rose MD   miSOPROStol (CYTOTEC) 100 mcg tablet Take 100 mcg by mouth two (2) times a day. Yes Giovani Rose MD   cyclobenzaprine (FLEXERIL) 10 mg tablet Take 10 mg by mouth nightly. Yes Giovnai Rose MD   lactulose (KRISTALOSE) 10 gram packet Take 10 g by mouth two (2) times a day. Yes Giovani Rose MD   clotrimazole (MYCELEX) 1 % vaginal cream Insert 1 Applicator into vagina nightly as needed for Itching. For ABX induced yeast infection   Yes Historical Provider   DULoxetine (CYMBALTA) 30 mg capsule Take 30 mg by mouth two (2) times a day. Yes Historical Provider   levETIRAcetam (KEPPRA) 500 mg tablet Take 1,000 mg by mouth two (2) times a day. Yes Historical Provider   levothyroxine (SYNTHROID) 75 mcg tablet Take 75 mcg by mouth Daily (before breakfast). Yes Historical Provider   nystatin (MYCOSTATIN) topical cream Apply  to affected area two (2) times daily as needed for Skin Irritation. Under breasts for topical yeast infection/irritation   Yes Historical Provider   pantoprazole (PROTONIX) 40 mg tablet Take 40 mg by mouth two (2) times a day.    Yes Historical Provider   potassium chloride (K-DUR, KLOR-CON) 20 mEq tablet Take 10 mEq by mouth two (2) times a day. Yes Historical Provider   docusate sodium (COLACE) 100 mg capsule Take 100 mg by mouth two (2) times daily as needed for Constipation. Yes Historical Provider   morphine IR (MS IR) 15 mg tablet Take 15 mg by mouth every eight (8) hours as needed for Pain. Yes Historical Provider   nystatin (MYCOSTATIN) 100,000 unit/mL suspension Take 1 tsp by mouth four (4) times daily as needed. swish and spit   Yes Historical Provider   CRANBERRY FRUIT EXTRACT (CRANBERRY EXTRACT PO) Take 1 Cap by mouth two (2) times a day. For UTI prevention  Non-formulary medication. Pt to supply if ordered for admission. Yes Historical Provider   GLUC VARGAS/CHONDRO VARGAS A/VIT C/MN (GLUCOSAMINE CHONDROITIN MAXSTR PO) Take 1 Tab by mouth three (3) times daily. Yes Historical Provider   ferrous sulfate 325 mg (65 mg iron) tablet Take 325 mg by mouth Daily (before breakfast). Yes Historical Provider   polyethylene glycol (MIRALAX) 17 gram packet Take 17 g by mouth two (2) times a day. Yes Historical Provider   baclofen (LIORESAL) 10 mg tablet Take 10 mg by mouth three (3) times daily. Yes Phys Other, MD   [facility]alsam [facility])  mg/gram ointment Apply  to affected area as needed. NON FORMULARY MEDICATION  PATIENT SUPPLIED HOME MEDICATION   Yes Giovani Rose MD   diclofenac (VOLTAREN) 1 % gel Apply 2 g to affected area four (4) times daily. Non-formulary medication. Pt to supply if ordered for admission. Yes Giovani Rose MD   ergocalciferol (VITAMIN D2) 50,000 unit capsule Take 50,000 Units by mouth Every Friday. Yes Giovani Rose MD   lacosamide (VIMPAT) 200 mg tab tablet Take 1 Tab by mouth two (2) times a day. Max Daily Amount: 400 mg.  Indications: PARTIAL EPILEPSY TREATMENT ADJUNCT 7/23/17  Yes Dara Andraed DO   lubiPROStone (AMITIZA) 8 mcg capsule Take 1 Cap by mouth two (2) times daily (with meals). 3/1/17  Yes Magui Guthrie MD   loratadine-pseudoephedrine (CLARITIN-D 24 HOUR)  mg per tablet Take 1 Tab by mouth daily. Yes Historical Provider   OTHER,NON-FORMULARY, Take 1 Cap by mouth daily (with lunch). Indications: Bariatric Multi Formula   Yes Historical Provider   magnesium oxide 500 mg tab Take 500 mg by mouth daily (with lunch). Yes Historical Provider   ascorbic acid, vitamin C, (VITAMIN C) 500 mg tablet Take 500 mg by mouth daily. Yes Historical Provider   mirabegron ER (MYRBETRIQ) 50 mg ER tablet Take 50 mg by mouth nightly. Takes at 2300   Yes Historical Provider   rOPINIRole (REQUIP) 0.25 mg tablet Take 0.75 mg by mouth nightly. Takes at 2300   Yes Historical Provider   predniSONE (DELTASONE) 5 mg tablet Take 5 mg by mouth daily. Yes Historical Provider   gabapentin (NEURONTIN) 300 mg capsule Take 300 mg by mouth three (3) times daily. Yes Historical Provider   OLANZapine (ZYPREXA) 7.5 mg tablet Take 7.5 mg by mouth nightly. Yes Historical Provider   bisacodyl (DULCOLAX, BISACODYL,) 10 mg suppository Insert 10 mg into rectum daily. Giovani Rose MD       Allergies   Allergen Reactions    Ceftriaxone Seizures    Demerol [Meperidine] Unknown (comments)    Seafood [Shellfish Containing Products] Unknown (comments)       Review of Systems  Pertinent items are noted in the History of Present Illness. Physical Exam:      Visit Vitals    /84    Pulse (!) 110    Temp 98.1 °F (36.7 °C)    Resp 14    Ht 5' 6.14\" (1.68 m)    Wt 77.1 kg (169 lb 14.4 oz)    SpO2 100%    BMI 27.3 kg/m2       Physical Exam:  Patient has pain to palpation of the bilateral knees and hips. Not markedly tender at right tibia. She is in chronic windswept deformity of the bilateral lower extremities. She has good DP, PT pulses and brisk capillary refill bilaterally. She has no other orthopaedic complaints of the upper extremities, spine or thorax.   Splints are maintained to the bilateral lower extremities and there appear to be no open wounds or tenting of the skin with gentle evaluation. Labs Reviewed:  Xrays 11/22/17     Left Femur:  1. Distal femoral diaphyseal to metadiaphyseal minimally comminuted displaced  fracture deformity . Right Femur  Suboptimal positioning due to patient condition. Comminuted, displaced,  angulated fracture of the distal femoral diametaphysis. Chronic superior and  slight lateral subluxation of the right femoral head. Pelvis  1. Stable exam. Osteopenia. Chronic bilateral superior lateral hip fracture  dislocations. No evidence of an acute fracture or dislocation. Left Tib/Fib  1. Osteopenia. Distal femoral diaphyseal to metadiaphyseal minimally comminuted,  displaced and angulated fracture deformity. 2. Remaining osseous structures are intact. Right Tib/Fib  Unable to optimally position the patient due to condition. Probable nondisplaced  fracture of the distal tibia. Fracture of the calcaneal tuberosity, with  sclerosis at the fracture line, possibly chronic, or possibly acute with  impaction. Assessment/Plan     Principal Problem:    Multiple fractures of both lower extremities, closed, initial encounter (11/22/2017)    Active Problems:    Bipolar 1 disorder ([address] (5/3/2012)      UTI (urinary tract infection) (6/1/2012)      Severe protein-calorie malnutrition ([address] (6/1/2012)      Seizure disorder ([address] (6/24/2012)      Depression (6/24/2012)      Chronic pain (11/23/2017)      Bedridden (11/23/2017)      Sepsis ([address] (11/24/2017)      Acute pain due to injury- multiple LE fractures (11/24/2017)      Dr. Bryson De Jesus discussed the fractures in detail with the patient. Risks associated with surgical intervention and conservative treatment were reviewed in detail. Patient would like to be conservative regarding treatment as per recommendation by Dr. Bryson De Jesus. Awaiting splints from [facility]. Patient is to be NWB.   Patient should follow up in the office with Dr. Fabrizio Hardwick in 2 weeks. Possible pain management referral outpatient. DVT prophylaxis and pain medications as per primary team.      The plan was discussed with Dr. Fabrizio Hardwick.

## 2017-11-28 LAB
ANION GAP SERPL CALC-SCNC: 5 MMOL/L (ref 3–18)
BACTERIA SPEC CULT: NORMAL
BUN SERPL-MCNC: 10 MG/DL (ref 7–18)
BUN/CREAT SERPL: 24 (ref 12–20)
CALCIUM SERPL-MCNC: 8.3 MG/DL (ref 8.5–10.1)
CHLORIDE SERPL-SCNC: 95 MMOL/L (ref 100–108)
CO2 SERPL-SCNC: 32 MMOL/L (ref 21–32)
CREAT SERPL-MCNC: 0.41 MG/DL (ref 0.6–1.3)
GLUCOSE BLD STRIP.AUTO-MCNC: 110 MG/DL (ref 70–110)
GLUCOSE SERPL-MCNC: 119 MG/DL (ref 74–99)
MAGNESIUM SERPL-MCNC: 2 MG/DL (ref 1.6–2.6)
POTASSIUM SERPL-SCNC: 3.9 MMOL/L (ref 3.5–5.5)
SERVICE CMNT-IMP: NORMAL
SODIUM SERPL-SCNC: 132 MMOL/L (ref 136–145)

## 2017-11-28 PROCEDURE — 36415 COLL VENOUS BLD VENIPUNCTURE: CPT | Performed by: HOSPITALIST

## 2017-11-28 PROCEDURE — 74011250636 HC RX REV CODE- 250/636: Performed by: INTERNAL MEDICINE

## 2017-11-28 PROCEDURE — 74011250637 HC RX REV CODE- 250/637: Performed by: SURGERY

## 2017-11-28 PROCEDURE — 74011250636 HC RX REV CODE- 250/636: Performed by: HOSPITALIST

## 2017-11-28 PROCEDURE — 82962 GLUCOSE BLOOD TEST: CPT

## 2017-11-28 PROCEDURE — 65660000000 HC RM CCU STEPDOWN

## 2017-11-28 PROCEDURE — 74011250637 HC RX REV CODE- 250/637: Performed by: INTERNAL MEDICINE

## 2017-11-28 PROCEDURE — 74011000258 HC RX REV CODE- 258: Performed by: HOSPITALIST

## 2017-11-28 PROCEDURE — 83735 ASSAY OF MAGNESIUM: CPT | Performed by: HOSPITALIST

## 2017-11-28 PROCEDURE — 93306 TTE W/DOPPLER COMPLETE: CPT

## 2017-11-28 PROCEDURE — 80048 BASIC METABOLIC PNL TOTAL CA: CPT | Performed by: HOSPITALIST

## 2017-11-28 PROCEDURE — 74011636320 HC RX REV CODE- 636/320: Performed by: HOSPITALIST

## 2017-11-28 PROCEDURE — 74011250637 HC RX REV CODE- 250/637: Performed by: FAMILY MEDICINE

## 2017-11-28 PROCEDURE — 74011636637 HC RX REV CODE- 636/637: Performed by: INTERNAL MEDICINE

## 2017-11-28 PROCEDURE — 74011250637 HC RX REV CODE- 250/637: Performed by: HOSPITALIST

## 2017-11-28 PROCEDURE — 74011000250 HC RX REV CODE- 250: Performed by: HOSPITALIST

## 2017-11-28 RX ORDER — LUBIPROSTONE 8 UG/1
8 CAPSULE, GELATIN COATED ORAL 2 TIMES DAILY WITH MEALS
Status: DISCONTINUED | OUTPATIENT
Start: 2017-11-28 | End: 2017-12-04 | Stop reason: HOSPADM

## 2017-11-28 RX ADMIN — GABAPENTIN 400 MG: 400 CAPSULE ORAL at 15:43

## 2017-11-28 RX ADMIN — LACOSAMIDE 200 MG: 100 TABLET, FILM COATED ORAL at 09:52

## 2017-11-28 RX ADMIN — ONDANSETRON HYDROCHLORIDE 4 MG: 2 INJECTION INTRAMUSCULAR; INTRAVENOUS at 10:20

## 2017-11-28 RX ADMIN — ACETAMINOPHEN 650 MG: 325 TABLET ORAL at 17:07

## 2017-11-28 RX ADMIN — FERROUS SULFATE TAB 325 MG (65 MG ELEMENTAL FE) 325 MG: 325 (65 FE) TAB at 06:54

## 2017-11-28 RX ADMIN — PREDNISONE 5 MG: 5 TABLET ORAL at 09:52

## 2017-11-28 RX ADMIN — MISOPROSTOL 100 MCG: 100 TABLET ORAL at 09:52

## 2017-11-28 RX ADMIN — PANTOPRAZOLE SODIUM 40 MG: 40 TABLET, DELAYED RELEASE ORAL at 20:20

## 2017-11-28 RX ADMIN — ROPINIROLE HYDROCHLORIDE 0.75 MG: 0.25 TABLET, FILM COATED ORAL at 23:29

## 2017-11-28 RX ADMIN — POLYETHYLENE GLYCOL 3350 17 G: 17 POWDER, FOR SOLUTION ORAL at 09:53

## 2017-11-28 RX ADMIN — MULTIPLE VITAMINS W/ MINERALS TAB 1 TABLET: TAB at 09:52

## 2017-11-28 RX ADMIN — LEVETIRACETAM 1000 MG: 500 TABLET ORAL at 20:19

## 2017-11-28 RX ADMIN — OLANZAPINE 7.5 MG: 2.5 TABLET ORAL at 23:29

## 2017-11-28 RX ADMIN — MEROPENEM 500 MG: 500 INJECTION, POWDER, FOR SOLUTION INTRAVENOUS at 23:28

## 2017-11-28 RX ADMIN — LUBIPROSTONE 8 MCG: 8 CAPSULE, GELATIN COATED ORAL at 17:07

## 2017-11-28 RX ADMIN — DULOXETINE HYDROCHLORIDE 30 MG: 30 CAPSULE, DELAYED RELEASE ORAL at 20:20

## 2017-11-28 RX ADMIN — OXYCODONE HYDROCHLORIDE 5 MG: 5 TABLET ORAL at 06:54

## 2017-11-28 RX ADMIN — MISOPROSTOL 100 MCG: 100 TABLET ORAL at 20:20

## 2017-11-28 RX ADMIN — ACETAMINOPHEN 650 MG: 325 TABLET ORAL at 23:29

## 2017-11-28 RX ADMIN — ACETAMINOPHEN 650 MG: 325 TABLET ORAL at 06:53

## 2017-11-28 RX ADMIN — DULOXETINE HYDROCHLORIDE 30 MG: 30 CAPSULE, DELAYED RELEASE ORAL at 09:52

## 2017-11-28 RX ADMIN — LACOSAMIDE 200 MG: 100 TABLET, FILM COATED ORAL at 20:20

## 2017-11-28 RX ADMIN — MEROPENEM 500 MG: 500 INJECTION, POWDER, FOR SOLUTION INTRAVENOUS at 06:53

## 2017-11-28 RX ADMIN — PANTOPRAZOLE SODIUM 40 MG: 40 TABLET, DELAYED RELEASE ORAL at 09:52

## 2017-11-28 RX ADMIN — OXYCODONE HYDROCHLORIDE 5 MG: 5 TABLET ORAL at 20:20

## 2017-11-28 RX ADMIN — OXYCODONE HYDROCHLORIDE 5 MG: 5 TABLET ORAL at 11:24

## 2017-11-28 RX ADMIN — GABAPENTIN 400 MG: 400 CAPSULE ORAL at 23:29

## 2017-11-28 RX ADMIN — ONDANSETRON HYDROCHLORIDE 4 MG: 2 INJECTION INTRAMUSCULAR; INTRAVENOUS at 15:10

## 2017-11-28 RX ADMIN — MEROPENEM 500 MG: 500 INJECTION, POWDER, FOR SOLUTION INTRAVENOUS at 01:15

## 2017-11-28 RX ADMIN — SODIUM CHLORIDE 5 MG/HR: 900 INJECTION, SOLUTION INTRAVENOUS at 09:51

## 2017-11-28 RX ADMIN — SENNOSIDES 17.2 MG: 8.6 TABLET, FILM COATED ORAL at 23:29

## 2017-11-28 RX ADMIN — ENOXAPARIN SODIUM 40 MG: 40 INJECTION SUBCUTANEOUS at 09:53

## 2017-11-28 RX ADMIN — LEVOTHYROXINE SODIUM 75 MCG: 75 TABLET ORAL at 06:53

## 2017-11-28 RX ADMIN — ACETAMINOPHEN 650 MG: 325 TABLET ORAL at 11:28

## 2017-11-28 RX ADMIN — DIATRIZOATE MEGLUMINE AND DIATRIZOATE SODIUM 30 ML: 660; 100 LIQUID ORAL; RECTAL at 13:25

## 2017-11-28 RX ADMIN — MEROPENEM 500 MG: 500 INJECTION, POWDER, FOR SOLUTION INTRAVENOUS at 15:05

## 2017-11-28 RX ADMIN — LEVETIRACETAM 1000 MG: 500 TABLET ORAL at 09:52

## 2017-11-28 RX ADMIN — NALOXEGOL OXALATE 25 MG: 25 TABLET, FILM COATED ORAL at 10:05

## 2017-11-28 RX ADMIN — MIRABEGRON 50 MG: 25 TABLET, FILM COATED, EXTENDED RELEASE ORAL at 23:39

## 2017-11-28 RX ADMIN — LUBIPROSTONE 8 MCG: 8 CAPSULE, GELATIN COATED ORAL at 10:05

## 2017-11-28 RX ADMIN — GABAPENTIN 400 MG: 400 CAPSULE ORAL at 09:52

## 2017-11-28 NOTE — PROGRESS NOTES
Pt had small amount of stool and passing some flatus.   Abd somewhat softer today than yesterday  Continue Movantik until pt begins to have bms  Awaiting CT scan results

## 2017-11-28 NOTE — PROGRESS NOTES
2124 Evening medications administered. Patient medicated for pain. Patient reporting abdominal pain and nausea while taking medications. Taking multiple sips of water between each pill. Patient reminded that she can only have sips with medications d/t abdominal distention and ileus and drinking too much may be causing her further discomfort. Advised to sip Miralax beverage slowly. 2157 Pain reassessment. Patient sleeping in NAD.    0115 vitals assessed. Patient resting in NAD.    0521 Assessed vitals. Patient requesting water and reporting abdominal cramps. Reminded patient that the physicians are attempting to assess her abdominal distention and that she is NPO except medications and too much water may result in increased abdominal pain while we are not currently able to decompress her bowels. Patient reminded that she has morning medications that she is able to sip water with at 0630. Patient agreeable to waiting until 0630 for water and pain medication as well.    3343 Radiology tech present with contrast. Considering patient is already experiencing cramps and distention with failure to place NG with multiple attempts yesterday as well as difficult transfer with limited staff assistance at this hour, we will differ to day shift for follow up to avoid further distending her abdomen without immediate ability to decompress. CT was ordered at 1500 yesterday prior to attempted NG placement. 3846 Bedside and Verbal shift change report given to ANGY Guallpa (oncoming nurse) by Rashid Godfrey RN (offgoing nurse). Report included the following information SBAR, Kardex and MAR.

## 2017-11-28 NOTE — PROGRESS NOTES
Palliative Medicine Progress Note  DR. [facility]: [phone])  Formerly [facility]: [address] [place] [address] Way: [phone]    Patient Name: Lisa Jones  YOB: 1950    Date of Initial Consult: 11/24/17  Reason for Consult: symptom management/care planning  Requesting Provider: Dr. Abdullahi Mathur   Primary Care Physician: Cipriano Beyer MD      SUMMARY:   Lisa Jones is a 78y.o. year old with a complex past history admitted for assessment of multiple bilat LE fractures following a fall from bed. [place] to be a poor surgical candidate and she is not interested. Past history includes bipolar disorder with at least one involuntary committal to [facility] and transfer to psychiatry hospital in 2012, chronic back pain previously followed by pain management, seizure disorder, and functional paraplegia of LEs leaving her wheelchair/bed bound. S/P bariatric surgery w/ dumping syndrome and nutritional failure to thrive. Had been enrolled in hospice in [id number] for this, but gradually weaning of seizure meds resulted in improvement and hospice graduation. Long term patient of Dr. Mirna Kennedy, has also been in pain management and most recently followed by [place] visiting physicians(Dr. Barclay). They are insisting she return to pain management and have not been prescribing opioids. Previously was on ms IR15 mg tid and fioricet w/ codeine prn. Also has a history of chronic constipation and recurring episodes of SBO.    11/27/17: Predictably rough weekend. Decreased LOC, likely partly due to fentanyl, increased O2 requirement w/ + d dimer, but VQ scan low probability. Abdomen increasingly distended and bloated. This has been a recurring problem for her,  states they have been able to manage it at home for several years w/ daily PEG and bisacodyl suppository. No nausea. Pain in in back and feet, legs when they are moved. 11/28/17: Ileus unimproved w/ suppository/PEG x 2.  Attempt to place NG unsuccessful. No nausea, but bloating discomfort. PALLIATIVE DIAGNOSES:     Patient Active Problem List   Diagnosis Code    Bipolar 1 disorder ([facility] F31.9    Decubitus ulcer of sacral area L89.159    UTI (urinary tract infection) N39.0    Severe protein-calorie malnutrition ([facility] E43    Seizure disorder ([facility] G40.909    Depression F32.9    Hypotension, unspecified I95.9    Personal history of DVT (deep vein thrombosis) Z86.718    Hypomagnesemia E83.42    Unspecified constipation K59.00    Seizure ([facility] R56.9    Overdose T50.901A    Narcosis R40.1    Multiple fractures of both lower extremities, closed, initial encounter S82.91XA, S82.92XA    Chronic pain G89.29    Bedridden Z74.01    Sepsis (HCC) A41.9    Acute pain due to injury- multiple LE fractures G89.11    Ileus ([facility]) K56.7    Acute respiratory distress R06.03    Therapeutic opioid-induced constipation (OIC) K59.03, T40.2X5A    Tachycardia R00.0     1. PLAN:   1. Met with patient in her room. She was alert and oriented providing reliable summary of her known history. Felt her mood had been doing pretty well until this happened, psych meds were helpful. She knows her care will be more of a challenge with this event. When asked about her previous hospitalizations in 2012 indicating she is a DNR she says she still feels that way and does not wish to undergo CPR. She already has an AMD with living will on file. She is anxious to complete a DDNR because she is worried her  will not respect her wishes in regard to this. DDNR completed and placed on chart. Questions answered. Recommend: Cont low dose fentanyl patch monitoring closely for side effects. D/c hydrocodone and replace w/ oxycodone q 4 hr prn, giving APAP 650 mg qid. Add bowel regimen with senna qhs. Colace has no demonstrated efficacy in managing opioid induced constipation.  Suspect rehab potential minimal and placement with subsequent care will prove a challenge. 11/27/17: They would like to try PEG and suppository, but understand she might need an NG if symptoms worsen. Cont current pain regimen, has not required much in way of breakthrough oxycodone.  with questions about DNR status. Explained distinction between treatment and CPR decisions and that Ms Agustina Simpson may still have all forms of aggressive care including ICU interventions and intubation. Questions answered. Patient and  both feel [facility] is still an accurate reflection of her wishes on the subject. 11/28/17: Predictable problems w/ GI motility. Methylnaltrexone might prove more effective in counteracting opioid contribution to ileus. 2. Initial consult note routed to primary continuity provider  3.  Communicated plan of care with: Palliative IDT       GOALS OF CARE:   Limited curative, control of symptoms    Advance Care Planning 11/24/2017   Patient's Healthcare Decision Maker is: Named in scanned ACP document   Primary Decision Maker Name Sheila Pat   Primary Decision Maker Phone Number [phone]   Primary Decision Maker Relationship to Patient Spouse   Secondary Decision Maker Name Mary Ann Paredes Phone Number [phone]   Confirm Advance Directive Yes, on file   Patient Would Like to Complete Advance Directive -   Does the patient have other document types Do Not Resuscitate           HISTORY:     History obtained from: patient    CHIEF COMPLAINT: see summary    HPI/SUBJECTIVE:    The patient is:   [x] Verbal and participatory  [] Non-participatory due to:        Clinical Pain Assessment (nonverbal scale for nonverbal patients): Pain: 7    [++++ Clinical Pain Assessment++++]  [++++Pain Severity++++]: Pain: 7  [++++Pain Character++++]: sharp  [++++Pain Duration++++]: continuous  [++++Pain Effect++++]:   [++++Pain Factors++++]:   [++++Pain Frequency++++]:   [++++Pain Location++++]: legs  [++++ Clinical Pain Assessment++++]         FUNCTIONAL ASSESSMENT:     Palliative Performance Scale (PPS):  PPS: 40       PSYCHOSOCIAL/SPIRITUAL SCREENING:     Advance Care Planning:  Advance Care Planning 11/24/2017   Patient's Healthcare Decision Maker is: Named in scanned ACP document   Primary Decision Maker Name Perfecto Mascorro   Primary Decision Maker Phone Number [phone]   Primary Decision Maker Relationship to Patient Spouse   Secondary Decision Maker Name Mary Ann Paredes Phone Number [phone]   Confirm Advance Directive Yes, on file   Patient Would Like to Complete Advance Directive -   Does the patient have other document types Do Not Resuscitate        Any spiritual / Lutheran concerns:  [] Yes /  [x] No    Caregiver Burnout:  [] Yes /  [x] No /  [] No Caregiver Present      Anticipatory grief assessment:   [x] Normal  / [] Maladaptive       ESAS Anxiety: Anxiety: 0    ESAS Depression: Depression: 4        REVIEW OF SYSTEMS:     Positive and pertinent negative findings in ROS are noted above in HPI. The following systems were [x] reviewed / [] unable to be reviewed as noted in HPI  Other findings are noted below. Systems: constitutional, ears/nose/mouth/throat, respiratory, gastrointestinal, genitourinary, musculoskeletal, integumentary, neurologic, psychiatric, endocrine. Positive findings noted below. Modified ESAS Completed by: provider   Fatigue: 8 Drowsiness: 6   Depression: 4 Pain: 7   Anxiety: 0 Nausea: 3   Anorexia: 7 Dyspnea: 0     Constipation: No     Stool Occurrence(s): 0        PHYSICAL EXAM:     Wt Readings from Last 3 Encounters:   11/28/17 71.8 kg (158 lb 3.2 oz)   10/11/17 67.1 kg (148 lb)   10/07/17 68.5 kg (151 lb)     Blood pressure 148/89, pulse 67, temperature 98.7 °F (37.1 °C), resp. rate 17, height 5' 6.14\" (1.68 m), weight 71.8 kg (158 lb 3.2 oz), SpO2 98 %.   Pain:  Pain Scale 1: Numeric (0 - 10)  Pain Intensity 1: 10  Pain Onset 1: chronic  Pain Location 1: Back, Leg  Pain Orientation 1: Left, Right  Pain Description 1: Aching  Pain Intervention(s) 1: Medication (see MAR)  Last bowel movement:     Constitutional: ill appearing, older than stated age  Eyes: pupils equal, anicteric  ENMT: no nasal discharge, moist mucous membranes  Cardiovascular: regular rhythm, distal pulses intact  Respiratory: breathing not labored, symmetric  Gastrointestinal: soft non-tender, +bowel sounds  Musculoskeletal: LE contractures and abnormal rotations  Skin: warm, dry  Neurologic: following commands, moving all extremities. Drowsy, but easily aroused. Psychiatric: full affect, no hallucinations  Other:       HISTORY:     Principal Problem:    Multiple fractures of both lower extremities, closed, initial encounter (11/22/2017)    Active Problems:    Bipolar 1 disorder ([address] (5/3/2012)      UTI (urinary tract infection) (6/1/2012)      Severe protein-calorie malnutrition ([address] (6/1/2012)      Seizure disorder ([address] (6/24/2012)      Depression (6/24/2012)      Chronic pain (11/23/2017)      Bedridden (11/23/2017)      Sepsis ([address] (11/24/2017)      Acute pain due to injury- multiple LE fractures (11/24/2017)      Ileus ([address] (11/27/2017)      Acute respiratory distress (11/27/2017)      Therapeutic opioid-induced constipation (OIC) (11/27/2017)      Tachycardia (11/27/2017)      Past Medical History:   Diagnosis Date    Acute pain due to injury- multiple LE fractures 11/24/2017    Anemia NEC     history of blood transfusion    Anxiety     panic attacks    Arthritis     Bipolar 1 disorder (HCC)     Chronic back pain     Depression     Elevated diaphragm 5/4/2012    Fatigue     along with weakness    GERD (gastroesophageal reflux disease)     Headache(784.0)     Hypertension     pt denies    Other ill-defined conditions(799.89) swallowing issues, decubitus    Psychiatric disorder     Seizures (HCC)     Therapeutic opioid-induced constipation (OIC) 11/27/2017    Thromboembolus ([address]     TIA (transient ischemic attack)       Past Surgical History:   Procedure Laterality Date    COLONOSCOPY N/A 2/21/2017    COLONOSCOPY [facility] ANESTHESIA, PATIENT IS IN ROOM 358 performed by Josh Chaney MD at [address] COLONOSCOPY N/A 2/22/2017    COLONOSCOPY performed by Josh Chaney MD at [facility], COLON, DIAGNOSTIC      over 20 years ago    ENDOSCOPY, COLON, DIAGNOSTIC  9/24/09    HX APPENDECTOMY  11/6/84    HX BACK SURGERY  11/24/98    severe back problems    HX CHOLECYSTECTOMY  11/6/84    HX GASTRIC BYPASS  1995    Patricio    HX OTHER SURGICAL      IVC filter    HX REFRACTIVE SURGERY      HX TUBAL LIGATION  1991      Family History   Problem Relation Age of Onset    Heart Disease Mother     Arthritis-rheumatoid Mother     Stroke Father     Hypertension Father     Arthritis-rheumatoid Father      History reviewed, no pertinent family history.   Social History   Substance Use Topics    Smoking status: Never Smoker    Smokeless tobacco: Never Used    Alcohol use No     Allergies   Allergen Reactions    Ceftriaxone Seizures    Demerol [Meperidine] Unknown (comments)    Seafood [Shellfish Containing Products] Unknown (comments)      Current Facility-Administered Medications   Medication Dose Route Frequency    lubiPROStone (AMITIZA) capsule 8 mcg  8 mcg Oral BID WITH MEALS    enoxaparin (LOVENOX) injection 40 mg  40 mg SubCUTAneous Q24H    metoprolol (LOPRESSOR) injection 2.5 mg  2.5 mg IntraVENous Q6H PRN    polyethylene glycol (MIRALAX) packet 17 g  17 g Oral BID    bisacodyl (DULCOLAX) suppository 10 mg  10 mg Rectal DAILY PRN    senna (SENOKOT) tablet 17.2 mg  2 Tab Oral QHS    naloxone (NARCAN) injection 0.4 mg  0.4 mg IntraVENous EVERY 2 MINUTES AS NEEDED    dilTIAZem (CARDIZEM) 100 mg in 0.9% sodium chloride (MBP/ADV) 100 mL infusion  5 mg/hr IntraVENous CONTINUOUS    phenol throat spray (CHLORASEPTIC) 1 Spray  1 Spray Oral PRN    naloxegol (MOVANTIK) tablet 25 mg 25 mg Oral ACB    gabapentin (NEURONTIN) capsule 400 mg  400 mg Oral TID    meropenem (MERREM) 500 mg in sterile water (preservative free) 10 mL IV syringe  500 mg IntraVENous Q8H    fentaNYL (DURAGESIC) 12 mcg/hr patch 1 Patch  1 Patch TransDERmal Q72H    multivitamin, tx-iron-ca-min (THERA-M w/ IRON) tablet 1 Tab  1 Tab Oral DAILY    acetaminophen (TYLENOL) tablet 650 mg  650 mg Oral AC&HS    oxyCODONE IR (ROXICODONE) tablet 5 mg  5 mg Oral Q4H PRN    promethazine (PHENERGAN) 25 mg in 0.9% sodium chloride 50 mL IVPB  25 mg IntraVENous Q6H PRN    influenza vaccine 2017-18 (3 yrs+)(PF) (FLUZONE QUAD/FLUARIX QUAD) injection 0.5 mL  0.5 mL IntraMUSCular PRIOR TO DISCHARGE    sodium chloride (NS) flush 5-10 mL  5-10 mL IntraVENous PRN    ondansetron (ZOFRAN) injection 4 mg  4 mg IntraVENous Q4H PRN    DULoxetine (CYMBALTA) capsule 30 mg  30 mg Oral BID    levETIRAcetam (KEPPRA) tablet 1,000 mg  1,000 mg Oral BID    levothyroxine (SYNTHROID) tablet 75 mcg  75 mcg Oral ACB    miSOPROStol (CYTOTEC) tablet 100 mcg  100 mcg Oral BID    pantoprazole (PROTONIX) tablet 40 mg  40 mg Oral BID    lacosamide (VIMPAT) tablet 200 mg  200 mg Oral BID    predniSONE (DELTASONE) tablet 5 mg  5 mg Oral DAILY    rOPINIRole (REQUIP) tablet 0.75 mg  0.75 mg Oral QHS    mirabegron ER (MYRBETRIQ) tablet 50 mg  50 mg Oral QHS    OLANZapine (ZyPREXA) tablet 7.5 mg  7.5 mg Oral QHS    0.9% sodium chloride infusion  75 mL/hr IntraVENous CONTINUOUS    sodium chloride (OCEAN) 0.65 % nasal spray 2 Spray  2 Spray Both Nostrils Q2H PRN        LAB AND IMAGING FINDINGS:     Lab Results   Component Value Date/Time    WBC 7.2 11/25/2017 04:46 AM    HGB 9.9 11/26/2017 09:30 AM    PLATELET 961 37/27/3388 04:46 AM     Lab Results   Component Value Date/Time    Sodium 132 11/28/2017 03:32 AM    Potassium 3.9 11/28/2017 03:32 AM    Chloride 95 11/28/2017 03:32 AM    CO2 32 11/28/2017 03:32 AM    BUN 10 11/28/2017 03:32 AM    Creatinine 0.41 11/28/2017 03:32 AM    Calcium 8.3 11/28/2017 03:32 AM    Magnesium 2.0 11/28/2017 03:32 AM    Phosphorus 3.1 09/20/2012 06:15 AM      Lab Results   Component Value Date/Time    AST (SGOT) 49 10/11/2017 02:00 PM    Alk.  phosphatase 193 10/11/2017 02:00 PM    Protein, total 7.4 10/11/2017 02:00 PM    Albumin 3.4 10/11/2017 02:00 PM    Globulin 4.0 10/11/2017 02:00 PM     Lab Results   Component Value Date/Time    INR 0.9 08/27/2017 02:00 AM    Prothrombin time 11.2 08/27/2017 02:00 AM    aPTT 30.6 07/22/2017 05:00 AM      No results found for: IRON, FE, TIBC, IBCT, PSAT, FERR   No results found for: PH, PCO2, PO2  No components found for: [place]   Lab Results   Component Value Date/Time    CK 81 11/27/2017 02:46 PM    CK - MB 2.2 11/27/2017 02:46 PM              Total time: 15 min  Counseling / coordination time, spent as noted above: 12  > 50% counseling / coordination        Douglas Moulton MD  Palliative Medicine

## 2017-11-28 NOTE — PROGRESS NOTES
D/c plan not finalized yet  Met wit patient and IDR team at bedside. Patient remains on cardizem drip. Patient needs cat scan wound care consult. Dr. Dottie Olivia wants to hold off on PT. Patient wants to go to rehab when d/c however, she will need pt consult. Patient is aware of this. CM was also informed that the braces for her had been ordered. Also patient informed cm she does not want to go to any facility named [facility]. However will wait until she is stable enough to have pt do eval but for continuity of care will send out to rehabs in area.    Cm will be available and assist  In safe transition of care

## 2017-11-28 NOTE — PROGRESS NOTES
Hospitalist Progress Note-critical care note     Patient: Susannah Mathur MRN: [id number]  CSN: [id number]    YOB: 1950  Age: 78 y.o. Sex: female    DOA: 11/22/2017 LOS:  LOS: 6 days            Chief complaint: uti, fracture , ileus, acute respiratory distress , tachycardia     Assessment/Plan         Hospital Problems  Date Reviewed: 11/23/2017          Codes Class Noted POA    Ileus ([facility] ICD-10-CM: K56.7  ICD-9-CM: 560.1  11/27/2017 Unknown        Acute respiratory distress ICD-10-CM: R06.03  ICD-9-CM: 518.82  11/27/2017 Unknown        Therapeutic opioid-induced constipation (OIC) ICD-10-CM: K59.03, T40.2X5A  ICD-9-CM: 564.09, E935.2  11/27/2017 Unknown        Tachycardia ICD-10-CM: R00.0  ICD-9-CM: 785.0  11/27/2017 Unknown        Sepsis ([facility] ICD-10-CM: A41.9  ICD-9-CM: 038.9, 995.91  11/24/2017 Unknown        Acute pain due to injury- multiple LE fractures ICD-10-CM: G89.11  ICD-9-CM: 338.11  11/24/2017 Unknown        Chronic pain ICD-10-CM: G89.29  ICD-9-CM: 338.29  11/23/2017 Unknown        Bedridden ICD-10-CM: Z74.01  ICD-9-CM: V49.84  11/23/2017 Unknown        * (Principal)Multiple fractures of both lower extremities, closed, initial encounter ICD-10-CM: S82.91XA, S82.92XA  ICD-9-CM: 828.0  11/22/2017 Unknown        Seizure disorder ([facility] ICD-10-CM: G40.909  ICD-9-CM: 345.90  6/24/2012 Yes        Depression ICD-10-CM: F32.9  ICD-9-CM: 614  6/24/2012 Yes        UTI (urinary tract infection) ICD-10-CM: N39.0  ICD-9-CM: 599.0  6/1/2012 Yes        Severe protein-calorie malnutrition ([facility] ICD-10-CM: E43  ICD-9-CM: 209  6/1/2012 Yes        Bipolar 1 disorder (HCC) (Chronic) ICD-10-CM: F31.9  ICD-9-CM: 296.7  5/3/2012 Yes            1. Acute respiratory distress   Stat v/q scan  Low probability for pulmonary embolus  Due to pressure from abdomen   2. Ileus   Ct scan ordered, not performed.  Ng tube insertion /suction ordered, not performed   Dr. Stanton Navarro surgery  Still not passing gas    due to bedridden and narcotic use   Add lubiprostone, d/c iron pills      3   Sepsis/UTI with chronic catheter -complicated UTI   leukocytosis resolved    f/u blood Cx No growth so far   UCX: ENTEROBACTER CLOACAE -, PROTEUS MIRABILIS    Continue merrem     2 Multiple lower extremity fractures:  Not a surgery candidate, will have brace   Ortho on board   3 Anemia: Chronic. H/h stable , will d.c iron due to ileus and severe constipation       5 Seizure:  Seizure precaution,  Continue home dose of keppra, Vimpat      5 Chronic pain:   Pain all over, continue pain control      6 Bipolar: Continue Zyprexa       7. Hyponatremia   Continue iv ns   8 tachycardia   Controlled per cardizem gtt for rate control,  ce wnl,    echo done, results pending        subjective:no bm and no passing gas     Poor prognosis, palliative care on board , remained dnr/i     Review of systems:    General: No fevers or chills. Pain all over   Cardiovascular: No chest pain or pressure. No palpitations. Pulmonary: shortness of breath better . Gastrointestinal: No nausea, vomiting. No abdomen pain       Vital signs/Intake and Output:  Visit Vitals    BP (!) 148/91 (BP 1 Location: Right arm, BP Patient Position: At rest)    Pulse 94    Temp 99 °F (37.2 °C)    Resp 17    Ht 5' 6.14\" (1.68 m)    Wt 71.8 kg (158 lb 3.2 oz)    SpO2 98%    BMI 25.43 kg/m2     Current Shift:     Last three shifts:  11/26 1901 - 11/28 0700  In: 3772 [P.O.:120; I.V.:1671]  Out: 0986 [Urine:1050]    Physical Exam:  General: WD, WN. Alert, cooperative, no acute distress    HEENT: NC, Atraumatic. PERRLA, anicteric sclerae. Lungs: CTA Bilaterally. No Wheezing/Rhonchi/Rales. Heart:  rrr,  No murmur, No Rubs, No Gallops  Abdomen: Soft, distended, Non tender. No  Bowel sounds,   Extremities: No c/c, bilateral leg wrapped with ace bandage  Psych:   Not anxious or agitated. Neurologic:  No acute neurological deficit.              Labs: Results:       Chemistry Recent Labs      11/28/17   0332  11/27/17   0830   GLU  119*  144*   NA  132*  134*   K  3.9  4.3   CL  95*  95*   CO2  32  33*   BUN  10  11   CREA  0.41*  0.57*   CA  8.3*  8.8   AGAP  5  6   BUCR  24*  19      CBC w/Diff Recent Labs      11/26/17   0930   HGB  9.9*   HCT  30.8*      Cardiac Enzymes Recent Labs      11/27/17   1446   CPK  81   CKND1  2.7      Coagulation No results for input(s): PTP, INR, APTT in the last 72 hours. No lab exists for component: INREXT, INREXT    Lipid Panel No results found for: CHOL, CHOLPOCT, CHOLX, CHLST, CHOLV, 932506, HDL, LDL, LDLC, DLDLP, 092678, VLDLC, VLDL, TGLX, TRIGL, TRIGP, TGLPOCT, CHHD, CHHDX   BNP No results for input(s): BNPP in the last 72 hours. Liver Enzymes No results for input(s): TP, ALB, TBIL, AP, SGOT, GPT in the last 72 hours.     No lab exists for component: DBIL   Thyroid Studies Lab Results   Component Value Date/Time    TSH 17.30 08/27/2017 02:00 AM        Procedures/imaging: see electronic medical records for all procedures/Xrays and details which were not copied into this note but were reviewed prior to creation of Nondangelina Ascencio MD

## 2017-11-28 NOTE — PROGRESS NOTES
conducted a Follow up consultation and Spiritual Assessment for Shiranadeen Benjamin, who is a 78 y.o.,female. Continued the relationship of care and support. Listened empathically. Offered prayer and assurance of continued prayer on patients behalf. Plan:  Chaplains will continue to follow and will provide pastoral care as needed or requested.  recommends bedside caregivers page the  on duty if patient shows signs of acute spiritual or emotional distress. Father INDIRA Pabon.Div.   [address] [facility] - Office

## 2017-11-28 NOTE — PROGRESS NOTES
Went to speak with Felecia Jurado, RN [address]  could not place NG tube in patient. Patient is bed ridden with pressure sores.  Awaiting consultation with Dr Oksana Henry

## 2017-11-28 NOTE — PROGRESS NOTES
Patient was visited by AVIVA. Volunteer followed up with patient and/or family and reported no needs to this . Chaplains will continue to follow and will provide pastoral care as needed or requested. Rev.  [facility]  [phone]

## 2017-11-28 NOTE — PROGRESS NOTES
Bedside and Verbal shift change report given to Ping Salvador RN (oncoming nurse) by Jhony Harding RN (offgoing nurse). Report included the following information SBAR, Kardex, Procedure Summary, Intake/Output, MAR and Recent Results. Transfer from medical floor. Lethargic, oriented. Vss, afebrile. Sinus Tachy on telemetry; diltiazem gtt started. Consulted GI; unable to place NGT, difficult advancement. x2 RN attempts. Dr. Lydia Maher notified along with Dr. Brianda Luz. No further orders for placement, continue giving meds PO. Will be readdressed in the morning. CT ordered for the morning with oral contrast. Abdomen is distended. LBM 11/26. Worked up for PE, VQ scan result was negative, continue lovenox regiment. Gotti patent.

## 2017-11-28 NOTE — PROGRESS NOTES
TRANSFER - IN REPORT:    Verbal report received from ANGY Gregorio (name) on Charmayne Phalen  being received from 3S (unit) for change in patient condition(increased HR, oxygen requirements, and altered mental status.)     Report consisted of patients Situation, Background, Assessment and   Recommendations(SBAR). Information from the following report(s) SBAR, Kardex, Procedure Summary, Intake/Output, MAR and Recent Results was reviewed with the receiving nurse. Opportunity for questions and clarification was provided. Assessment completed upon patients arrival to unit and care assumed.

## 2017-11-29 ENCOUNTER — APPOINTMENT (OUTPATIENT)
Dept: CT IMAGING | Age: 67
DRG: 698 | End: 2017-11-29
Attending: HOSPITALIST
Payer: MEDICARE

## 2017-11-29 ENCOUNTER — APPOINTMENT (OUTPATIENT)
Dept: GENERAL RADIOLOGY | Age: 67
DRG: 698 | End: 2017-11-29
Attending: HOSPITALIST
Payer: MEDICARE

## 2017-11-29 PROBLEM — K56.609 SBO (SMALL BOWEL OBSTRUCTION) (HCC): Status: ACTIVE | Noted: 2017-11-29

## 2017-11-29 LAB
ANION GAP SERPL CALC-SCNC: 9 MMOL/L (ref 3–18)
BUN SERPL-MCNC: 10 MG/DL (ref 7–18)
BUN/CREAT SERPL: 29 (ref 12–20)
CALCIUM SERPL-MCNC: 8 MG/DL (ref 8.5–10.1)
CHLORIDE SERPL-SCNC: 88 MMOL/L (ref 100–108)
CO2 SERPL-SCNC: 32 MMOL/L (ref 21–32)
CREAT SERPL-MCNC: 0.35 MG/DL (ref 0.6–1.3)
GLUCOSE BLD STRIP.AUTO-MCNC: 92 MG/DL (ref 70–110)
GLUCOSE SERPL-MCNC: 102 MG/DL (ref 74–99)
MAGNESIUM SERPL-MCNC: 1.8 MG/DL (ref 1.6–2.6)
POTASSIUM SERPL-SCNC: 3.7 MMOL/L (ref 3.5–5.5)
SODIUM SERPL-SCNC: 129 MMOL/L (ref 136–145)

## 2017-11-29 PROCEDURE — 65660000000 HC RM CCU STEPDOWN

## 2017-11-29 PROCEDURE — 74011250637 HC RX REV CODE- 250/637: Performed by: INTERNAL MEDICINE

## 2017-11-29 PROCEDURE — 82962 GLUCOSE BLOOD TEST: CPT

## 2017-11-29 PROCEDURE — 77030033263 HC DRSG MEPILEX 16-48IN BORD MOLN -B

## 2017-11-29 PROCEDURE — 74011636637 HC RX REV CODE- 636/637: Performed by: INTERNAL MEDICINE

## 2017-11-29 PROCEDURE — 74011000250 HC RX REV CODE- 250: Performed by: HOSPITALIST

## 2017-11-29 PROCEDURE — 80048 BASIC METABOLIC PNL TOTAL CA: CPT | Performed by: HOSPITALIST

## 2017-11-29 PROCEDURE — 36415 COLL VENOUS BLD VENIPUNCTURE: CPT | Performed by: HOSPITALIST

## 2017-11-29 PROCEDURE — 74011250637 HC RX REV CODE- 250/637: Performed by: SURGERY

## 2017-11-29 PROCEDURE — 74011250637 HC RX REV CODE- 250/637: Performed by: FAMILY MEDICINE

## 2017-11-29 PROCEDURE — 74011250637 HC RX REV CODE- 250/637: Performed by: HOSPITALIST

## 2017-11-29 PROCEDURE — 74011000258 HC RX REV CODE- 258: Performed by: HOSPITALIST

## 2017-11-29 PROCEDURE — 74177 CT ABD & PELVIS W/CONTRAST: CPT

## 2017-11-29 PROCEDURE — 74011250636 HC RX REV CODE- 250/636: Performed by: HOSPITALIST

## 2017-11-29 PROCEDURE — 74000 XR ABD (KUB): CPT

## 2017-11-29 PROCEDURE — 74011636320 HC RX REV CODE- 636/320: Performed by: INTERNAL MEDICINE

## 2017-11-29 PROCEDURE — 83735 ASSAY OF MAGNESIUM: CPT | Performed by: HOSPITALIST

## 2017-11-29 RX ORDER — DEXTROSE, SODIUM CHLORIDE, AND POTASSIUM CHLORIDE 5; .9; .15 G/100ML; G/100ML; G/100ML
75 INJECTION INTRAVENOUS CONTINUOUS
Status: DISCONTINUED | OUTPATIENT
Start: 2017-11-29 | End: 2017-12-01

## 2017-11-29 RX ADMIN — LACOSAMIDE 200 MG: 100 TABLET, FILM COATED ORAL at 22:19

## 2017-11-29 RX ADMIN — DEXTROSE MONOHYDRATE, SODIUM CHLORIDE, AND POTASSIUM CHLORIDE 75 ML/HR: 50; 9; 1.49 INJECTION, SOLUTION INTRAVENOUS at 16:00

## 2017-11-29 RX ADMIN — MEROPENEM 500 MG: 500 INJECTION, POWDER, FOR SOLUTION INTRAVENOUS at 07:09

## 2017-11-29 RX ADMIN — ACETAMINOPHEN 650 MG: 325 TABLET ORAL at 07:08

## 2017-11-29 RX ADMIN — GABAPENTIN 400 MG: 400 CAPSULE ORAL at 10:04

## 2017-11-29 RX ADMIN — MULTIPLE VITAMINS W/ MINERALS TAB 1 TABLET: TAB at 10:04

## 2017-11-29 RX ADMIN — LACOSAMIDE 200 MG: 100 TABLET, FILM COATED ORAL at 10:04

## 2017-11-29 RX ADMIN — PANTOPRAZOLE SODIUM 40 MG: 40 TABLET, DELAYED RELEASE ORAL at 22:19

## 2017-11-29 RX ADMIN — LEVOTHYROXINE SODIUM 75 MCG: 75 TABLET ORAL at 07:09

## 2017-11-29 RX ADMIN — MISOPROSTOL 100 MCG: 100 TABLET ORAL at 10:05

## 2017-11-29 RX ADMIN — MEROPENEM 500 MG: 500 INJECTION, POWDER, FOR SOLUTION INTRAVENOUS at 17:14

## 2017-11-29 RX ADMIN — SODIUM CHLORIDE 5 MG/HR: 900 INJECTION, SOLUTION INTRAVENOUS at 06:20

## 2017-11-29 RX ADMIN — POLYETHYLENE GLYCOL 3350 17 G: 17 POWDER, FOR SOLUTION ORAL at 22:19

## 2017-11-29 RX ADMIN — LEVETIRACETAM 1000 MG: 500 TABLET ORAL at 10:04

## 2017-11-29 RX ADMIN — PREDNISONE 5 MG: 5 TABLET ORAL at 10:03

## 2017-11-29 RX ADMIN — SENNOSIDES 17.2 MG: 8.6 TABLET, FILM COATED ORAL at 22:19

## 2017-11-29 RX ADMIN — ROPINIROLE HYDROCHLORIDE 0.75 MG: 0.25 TABLET, FILM COATED ORAL at 22:17

## 2017-11-29 RX ADMIN — LUBIPROSTONE 8 MCG: 8 CAPSULE, GELATIN COATED ORAL at 10:03

## 2017-11-29 RX ADMIN — OXYCODONE HYDROCHLORIDE 5 MG: 5 TABLET ORAL at 06:18

## 2017-11-29 RX ADMIN — ACETAMINOPHEN 650 MG: 325 TABLET ORAL at 22:18

## 2017-11-29 RX ADMIN — ENOXAPARIN SODIUM 40 MG: 40 INJECTION SUBCUTANEOUS at 10:03

## 2017-11-29 RX ADMIN — OXYCODONE HYDROCHLORIDE 5 MG: 5 TABLET ORAL at 15:14

## 2017-11-29 RX ADMIN — DULOXETINE HYDROCHLORIDE 30 MG: 30 CAPSULE, DELAYED RELEASE ORAL at 22:19

## 2017-11-29 RX ADMIN — DULOXETINE HYDROCHLORIDE 30 MG: 30 CAPSULE, DELAYED RELEASE ORAL at 10:04

## 2017-11-29 RX ADMIN — MISOPROSTOL 100 MCG: 100 TABLET ORAL at 22:19

## 2017-11-29 RX ADMIN — LEVETIRACETAM 1000 MG: 500 TABLET ORAL at 22:19

## 2017-11-29 RX ADMIN — NALOXEGOL OXALATE 25 MG: 25 TABLET, FILM COATED ORAL at 07:11

## 2017-11-29 RX ADMIN — OLANZAPINE 7.5 MG: 2.5 TABLET ORAL at 22:18

## 2017-11-29 RX ADMIN — IOPAMIDOL 100 ML: 612 INJECTION, SOLUTION INTRAVENOUS at 00:44

## 2017-11-29 RX ADMIN — PANTOPRAZOLE SODIUM 40 MG: 40 TABLET, DELAYED RELEASE ORAL at 10:05

## 2017-11-29 RX ADMIN — GABAPENTIN 400 MG: 400 CAPSULE ORAL at 22:18

## 2017-11-29 NOTE — ADVANCED PRACTICE NURSE
Entered the patient's room to insert NG tube. The patient declined. She requests enema first and will consider NG tube afterwards. Reported to primary nurse.

## 2017-11-29 NOTE — PROGRESS NOTES
Problem: Falls - Risk of  Goal: *Absence of Falls  Document Quique Fall Risk and appropriate interventions in the flowsheet. Outcome: Progressing Towards Goal  Fall Risk Interventions:  Mobility Interventions: Bed/chair exit alarm, Utilize gait belt for transfers/ambulation    Mentation Interventions:  Toileting rounds, Bed/chair exit alarm    Medication Interventions: Patient to call before getting OOB, Bed/chair exit alarm    Elimination Interventions: Call light in reach, Toilet paper/wipes in reach, Patient to call for help with toileting needs    History of Falls Interventions: Bed/chair exit alarm, Door open when patient unattended, Room close to nurse's station, Utilize gait belt for transfer/ambulation

## 2017-11-29 NOTE — PROGRESS NOTES
NUTRITION FOLLOW-UP    RECOMMENDATIONS/PLAN:   - Adv diet per MD  - Avoid prolonged NPO diet order  - If diet cannot be adv nutrition support is recommended  Monitor labs/lytes, diet adv, skin integrity, wt, fluid status, BM    NUTRITION ASSESSMENT:   Client Update: 78 yrs old Female with UTI & AMS overnight. Admitted for multiple lower extremity fractures. Noted acute respiratory distress and tachycardia per MD.CT order due to abdominal distention. Noted ileus per MD note      FOOD/NUTRITION INTAKE   Diet Order:  NPO   Supplements: n/a  Food Allergies: Seafood-Shellfish  Average PO Intake:      Patient Vitals for the past 100 hrs:   % Diet Eaten   11/28/17 1900 0 %   11/27/17 0624 0 %   11/26/17 2340 0 %   11/26/17 0920 100 %   11/25/17 0826 100 %      Pertinent Medications:  [x] Reviewed; dulcolax, vimpat, keppra, synthroid, MVI, movantik, protonix, miralax, deltasone, senokot  Insulin:  []SSI  []Pre-meal   []Basal    []Drip  [x]None  Cultural/Samaritan Food Preferences: None Identified    BIOCHEMICAL DATA & MEDICAL TESTS  Pertinent Labs:  [x] Reviewed; Na-129, Ca-8.0  ANTHROPOMETRICS  Height: 5' 6.14\" (168 cm)       Weight: 78.9 kg (174 lb)         BMI: 28 kg/m^2 overweight (25.0%-29.9% BMI)   Adm Weight: 152 lbs                Weight change:+22lbs since admission I/o net -9.0L since admission   Adjusted Body Weight: n/a     NUTRITION-FOCUSED PHYSICAL ASSESSMENT  Skin: Pressure Injury to sacrum       GI: +BM11/28/17 per general surgery MD note    NUTRITION PRESCRIPTION  Calories: 1932 kcal/day based on 28kcal/kg  Protein: 1.2 g/day based on 83 g/kg  CHO: 242 g/day based on 50% of total energy  Fluid: 1932 ml/day based on 1 kcal/ml                    NUTRITION DIAGNOSES:   1. Increased energy needs related to wound healing as evidenced by pt report of bed sore, hx of bed sore. NUTRITION INTERVENTIONS:   INTERVENTIONS:        GOALS:  1. Other:Adv diet per MD 1.  Adv diet per MD by next review 3 days LEARNING NEEDS (Diet, Supplementation, Food/Nutrient-Drug Interaction):   [x] None Identified   [] Education provided/documented      Identified and patient: [] Declined   [] Was not appropriate/indicated        NUTRITION MONITORING /EVALUATION:   Monitor wt  Monitor renal labs, electrolytes, fluid status     Previous Recommendations Implemented: yes        Previous Goals Met:  no -      [] Participated in Interdisciplinary Rounds    [x] Interdisciplinary Care Plan Reviewed    DISCHARGE NUTRITION RECOMMENDATIONS ADDRESSED:      [x] To be determined closer to discharge    NUTRITION RISK:           [x] At risk                        [] Not currently at risk        Will follow-up per policy.   Naif Burkett, WENDY  PAGER:  [phone]

## 2017-11-29 NOTE — PROGRESS NOTES
Assumed care of pt. Pt is alert no sign of distress. Call light within reach. Bed in lowest position. Pt to have NG tube placed and enema for constipation. Braces are coming from [facility] per Candie ECHEVERRIA's note. 1519: Pt refused to wear braces. Pt stated this to DR. Cailin Guajardo by this RN. 1700: NG tube with suction shows bile being suctioned out. More bile comes when patient is turned. Soap Suds Enema has been completed. No BM as of yet most of enema has been expelled from rectum. 1730: pt refused to have splints placed back on. Due to extreme pain caused by trying to get braces on pt.  1830: Notified night RN of status with the braces and splints.

## 2017-11-29 NOTE — WOUND CARE
Wound care team in for assessment. Patient has a healing pressure injury to sacrum area. Continued pressure injury preventions in place.  Will continue to follow as needed

## 2017-11-29 NOTE — PROGRESS NOTES
Palliative Medicine Progress Note  [facility]: [phone] [phone])  Formerly [facility]: [address] [place] [address] Way: [phone]    Patient Name: Juan Aleman  YOB: 1950    Date of Initial Consult: 11/24/17  Reason for Consult: symptom management/care planning  Requesting Provider: Dr. Kvng Medina   Primary Care Physician: Aaliyah Metcalf MD      SUMMARY:   Juan Aleman is a 78y.o. year old with a complex past history admitted for assessment of multiple bilat LE fractures following a fall from bed. [place] to be a poor surgical candidate and she is not interested. Past history includes bipolar disorder with at least one involuntary committal to [facility] and transfer to psychiatry hospital in 2012, chronic back pain previously followed by pain management, seizure disorder, and functional paraplegia of LEs leaving her wheelchair/bed bound. S/P bariatric surgery w/ dumping syndrome and nutritional failure to thrive. Had been enrolled in hospice in [phone] for this, but gradually weaning of seizure meds resulted in improvement and hospice graduation. Long term patient of Dr. Jose Ahn, has also been in pain management and most recently followed by [address] [facility] visiting physicians(Dr. Barclay). They are insisting she return to pain management and have not been prescribing opioids. Previously was on ms IR15 mg tid and fioricet w/ codeine prn. Also has a history of chronic constipation and recurring episodes of SBO.    11/27/17: Predictably rough weekend. Decreased LOC, likely partly due to fentanyl, increased O2 requirement w/ + d dimer, but VQ scan low probability. Abdomen increasingly distended and bloated. This has been a recurring problem for her,  states they have been able to manage it at home for several years w/ daily PEG and bisacodyl suppository. No nausea. Pain in in back and feet, legs when they are moved. 11/28/17: Ileus unimproved w/ suppository/PEG x 2.  Attempt to place NG unsuccessful. No nausea, but bloating discomfort. 11/29/17: Rested last night, but no change in abdominal distention. Not passing gas. More alert, pain at baseline. Taking her pills. PALLIATIVE DIAGNOSES:     Patient Active Problem List   Diagnosis Code    Bipolar 1 disorder ([facility] F31.9    Decubitus ulcer of sacral area L89.159    UTI (urinary tract infection) N39.0    Severe protein-calorie malnutrition ([facility] E43    Seizure disorder ([facility] G40.909    Depression F32.9    Hypotension, unspecified I95.9    Personal history of DVT (deep vein thrombosis) Z86.718    Hypomagnesemia E83.42    Unspecified constipation K59.00    Seizure ([facility] R56.9    Overdose T50.901A    Narcosis R40.1    Multiple fractures of both lower extremities, closed, initial encounter S82.91XA, S82.92XA    Chronic pain G89.29    Bedridden Z74.01    Sepsis ([facility]) A41.9    Acute pain due to injury- multiple LE fractures G89.11    Ileus ([facility]) K56.7    Acute respiratory distress R06.03    Therapeutic opioid-induced constipation (OIC) K59.03, T40.2X5A    Tachycardia R00.0     1. PLAN:   1. Met with patient in her room. She was alert and oriented providing reliable summary of her known history. Felt her mood had been doing pretty well until this happened, psych meds were helpful. She knows her care will be more of a challenge with this event. When asked about her previous hospitalizations in 2012 indicating she is a DNR she says she still feels that way and does not wish to undergo CPR. She already has an AMD with living will on file. She is anxious to complete a DDNR because she is worried her  will not respect her wishes in regard to this. DDNR completed and placed on chart. Questions answered. Recommend: Cont low dose fentanyl patch monitoring closely for side effects. D/c hydrocodone and replace w/ oxycodone q 4 hr prn, giving APAP 650 mg qid. Add bowel regimen with senna qhs.  Colace has no demonstrated efficacy in managing opioid induced constipation. Suspect rehab potential minimal and placement with subsequent care will prove a challenge. 11/27/17: They would like to try PEG and suppository, but understand she might need an NG if symptoms worsen. Cont current pain regimen, has not required much in way of breakthrough oxycodone.  with questions about DNR status. Explained distinction between treatment and CPR decisions and that Ms Shawanda Landry may still have all forms of aggressive care including ICU interventions and intubation. Questions answered. Patient and  both feel Alice Hoang is still an accurate reflection of her wishes on the subject. 11/28/17: Predictable problems w/ GI motility. Methylnaltrexone might prove more effective in counteracting opioid contribution to ileus. 11/29/17: On two agents for opioid induced constipation w/ no effect so far. Not realistic to stop her pain meds given nature of her injuries. Her combination of problems presage a poor outcome. 2. Initial consult note routed to primary continuity provider  3.  Communicated plan of care with: Palliative IDT       GOALS OF CARE:   Limited curative, control of symptoms    Advance Care Planning 11/24/2017   Patient's Healthcare Decision Maker is: Named in scanned ACP document   Primary Decision Maker Name Beatris Alexandre   Primary Decision Maker Phone Number [phone]   Primary Decision Maker Relationship to Patient Spouse   Secondary Decision Maker Name Mary Ann Paredes Phone Number [phone]   Confirm Advance Directive Yes, on file   Patient Would Like to Complete Advance Directive -   Does the patient have other document types Do Not Resuscitate           HISTORY:     History obtained from: patient    CHIEF COMPLAINT: see summary    HPI/SUBJECTIVE:    The patient is:   [x] Verbal and participatory  [] Non-participatory due to:        Clinical Pain Assessment (nonverbal scale for nonverbal patients): Pain: 7    [++++ Clinical Pain Assessment++++]  [++++Pain Severity++++]: Pain: 7  [++++Pain Character++++]: sharp  [++++Pain Duration++++]: continuous  [++++Pain Effect++++]:   [++++Pain Factors++++]:   [++++Pain Frequency++++]:   [++++Pain Location++++]: legs  [++++ Clinical Pain Assessment++++]         FUNCTIONAL ASSESSMENT:     Palliative Performance Scale (PPS):  PPS: 40       PSYCHOSOCIAL/SPIRITUAL SCREENING:     Advance Care Planning:  Advance Care Planning 11/24/2017   Patient's Healthcare Decision Maker is: Named in scanned ACP document   Primary Decision Maker Name Letty Silva   Primary Decision Maker Phone Number [phone]   Primary Decision Maker Relationship to Patient Spouse   Secondary Decision Maker Name Mary Ann Fam57 Phone Number [phone]   Confirm Advance Directive Yes, on file   Patient Would Like to Complete Advance Directive -   Does the patient have other document types Do Not Resuscitate        Any spiritual / Restorationist concerns:  [] Yes /  [x] No    Caregiver Burnout:  [] Yes /  [x] No /  [] No Caregiver Present      Anticipatory grief assessment:   [x] Normal  / [] Maladaptive       ESAS Anxiety: Anxiety: 0    ESAS Depression: Depression: 4        REVIEW OF SYSTEMS:     Positive and pertinent negative findings in ROS are noted above in HPI. The following systems were [x] reviewed / [] unable to be reviewed as noted in HPI  Other findings are noted below. Systems: constitutional, ears/nose/mouth/throat, respiratory, gastrointestinal, genitourinary, musculoskeletal, integumentary, neurologic, psychiatric, endocrine. Positive findings noted below.   Modified ESAS Completed by: provider   Fatigue: 8 Drowsiness: 6   Depression: 4 Pain: 7   Anxiety: 0 Nausea: 3   Anorexia: 7 Dyspnea: 0     Constipation: No     Stool Occurrence(s): 0        PHYSICAL EXAM:     Wt Readings from Last 3 Encounters:   11/29/17 78.9 kg (174 lb)   10/11/17 67.1 kg (148 lb) 10/07/17 68.5 kg (151 lb)     Blood pressure 136/81, pulse 90, temperature 98.1 °F (36.7 °C), resp. rate 14, height 5' 6.14\" (1.68 m), weight 78.9 kg (174 lb), SpO2 95 %. Pain:  Pain Scale 1: Numeric (0 - 10)  Pain Intensity 1: 0  Pain Onset 1: chronic  Pain Location 1: Leg, Back  Pain Orientation 1: Right  Pain Description 1: Sharp, Stabbing  Pain Intervention(s) 1: Medication (see MAR)  Last bowel movement:     Constitutional: ill appearing, older than stated age  Eyes: pupils equal, anicteric  ENMT: no nasal discharge, moist mucous membranes  Cardiovascular: regular rhythm, distal pulses intact  Respiratory: breathing not labored, symmetric  Gastrointestinal: soft non-tender, +bowel sounds  Musculoskeletal: LE contractures and abnormal rotations  Skin: warm, dry  Neurologic: following commands, moving all extremities. Drowsy, but easily aroused. Psychiatric: full affect, no hallucinations  Other:       HISTORY:     Principal Problem:    Multiple fractures of both lower extremities, closed, initial encounter (11/22/2017)    Active Problems:    Bipolar 1 disorder ([address] (5/3/2012)      UTI (urinary tract infection) (6/1/2012)      Severe protein-calorie malnutrition ([address] (6/1/2012)      Seizure disorder ([address] (6/24/2012)      Depression (6/24/2012)      Chronic pain (11/23/2017)      Bedridden (11/23/2017)      Sepsis ([address] (11/24/2017)      Acute pain due to injury- multiple LE fractures (11/24/2017)      Ileus ([address] (11/27/2017)      Acute respiratory distress (11/27/2017)      Therapeutic opioid-induced constipation (OIC) (11/27/2017)      Tachycardia (11/27/2017)      Past Medical History:   Diagnosis Date    Acute pain due to injury- multiple LE fractures 11/24/2017    Anemia NEC     history of blood transfusion    Anxiety     panic attacks    Arthritis     Bipolar 1 disorder (HCC)     Chronic back pain     Depression     Elevated diaphragm 5/4/2012    Fatigue     along with weakness    GERD (gastroesophageal reflux disease)     Headache(784.0)     Hypertension     pt denies    Other ill-defined conditions(799.89) swallowing issues, decubitus    Psychiatric disorder     Seizures (HCC)     Therapeutic opioid-induced constipation (OIC) 11/27/2017    Thromboembolus ([address]     TIA (transient ischemic attack)       Past Surgical History:   Procedure Laterality Date    COLONOSCOPY N/A 2/21/2017    COLONOSCOPY [facility] ANESTHESIA, PATIENT IS IN ROOM 358 performed by Burt Peguero MD at [address] COLONOSCOPY N/A 2/22/2017    COLONOSCOPY performed by Burt Peguero MD at [facility], COLON, DIAGNOSTIC      over 20 years ago    ENDOSCOPY, COLON, DIAGNOSTIC  9/24/09    HX APPENDECTOMY  11/6/84    HX BACK SURGERY  11/24/98    severe back problems    HX CHOLECYSTECTOMY  11/6/84    HX GASTRIC BYPASS  1995    Pat[place]    HX OTHER SURGICAL      IVC filter    HX REFRACTIVE SURGERY      HX TUBAL LIGATION  1991      Family History   Problem Relation Age of Onset    Heart Disease Mother    [place] Mother     Stroke Father     Hypertension Father     Arthritis-rheumatoid Father      History reviewed, no pertinent family history.   Social History   Substance Use Topics    Smoking status: Never Smoker    Smokeless tobacco: Never Used    Alcohol use No     Allergies   Allergen Reactions    Ceftriaxone Seizures    Demerol [Meperidine] Unknown (comments)    Seafood [Shellfish Containing Products] Unknown (comments)      Current Facility-Administered Medications   Medication Dose Route Frequency    lubiPROStone (AMITIZA) capsule 8 mcg  8 mcg Oral BID WITH MEALS    enoxaparin (LOVENOX) injection 40 mg  40 mg SubCUTAneous Q24H    metoprolol (LOPRESSOR) injection 2.5 mg  2.5 mg IntraVENous Q6H PRN    polyethylene glycol (MIRALAX) packet 17 g  17 g Oral BID    bisacodyl (DULCOLAX) suppository 10 mg  10 mg Rectal DAILY PRN    senna (SENOKOT) tablet 17.2 mg  2 Tab Oral QHS    naloxone (NARCAN) injection 0.4 mg  0.4 mg IntraVENous EVERY 2 MINUTES AS NEEDED    dilTIAZem (CARDIZEM) 100 mg in 0.9% sodium chloride (MBP/ADV) 100 mL infusion  5 mg/hr IntraVENous CONTINUOUS    phenol throat spray (CHLORASEPTIC) 1 Spray  1 Spray Oral PRN    naloxegol (MOVANTIK) tablet 25 mg  25 mg Oral ACB    gabapentin (NEURONTIN) capsule 400 mg  400 mg Oral TID    meropenem (MERREM) 500 mg in sterile water (preservative free) 10 mL IV syringe  500 mg IntraVENous Q8H    fentaNYL (DURAGESIC) 12 mcg/hr patch 1 Patch  1 Patch TransDERmal Q72H    multivitamin, tx-iron-ca-min (THERA-M w/ IRON) tablet 1 Tab  1 Tab Oral DAILY    acetaminophen (TYLENOL) tablet 650 mg  650 mg Oral AC&HS    oxyCODONE IR (ROXICODONE) tablet 5 mg  5 mg Oral Q4H PRN    promethazine (PHENERGAN) 25 mg in 0.9% sodium chloride 50 mL IVPB  25 mg IntraVENous Q6H PRN    influenza vaccine 2017-18 (3 yrs+)(PF) (FLUZONE QUAD/FLUARIX QUAD) injection 0.5 mL  0.5 mL IntraMUSCular PRIOR TO DISCHARGE    sodium chloride (NS) flush 5-10 mL  5-10 mL IntraVENous PRN    ondansetron (ZOFRAN) injection 4 mg  4 mg IntraVENous Q4H PRN    DULoxetine (CYMBALTA) capsule 30 mg  30 mg Oral BID    levETIRAcetam (KEPPRA) tablet 1,000 mg  1,000 mg Oral BID    levothyroxine (SYNTHROID) tablet 75 mcg  75 mcg Oral ACB    miSOPROStol (CYTOTEC) tablet 100 mcg  100 mcg Oral BID    pantoprazole (PROTONIX) tablet 40 mg  40 mg Oral BID    lacosamide (VIMPAT) tablet 200 mg  200 mg Oral BID    predniSONE (DELTASONE) tablet 5 mg  5 mg Oral DAILY    rOPINIRole (REQUIP) tablet 0.75 mg  0.75 mg Oral QHS    mirabegron ER (MYRBETRIQ) tablet 50 mg  50 mg Oral QHS    OLANZapine (ZyPREXA) tablet 7.5 mg  7.5 mg Oral QHS    0.9% sodium chloride infusion  75 mL/hr IntraVENous CONTINUOUS    sodium chloride (OCEAN) 0.65 % nasal spray 2 Spray  2 Spray Both Nostrils Q2H PRN        LAB AND IMAGING FINDINGS:     Lab Results   Component Value Date/Time    WBC 7.2 11/25/2017 04:46 AM    HGB 9.9 11/26/2017 09:30 AM    PLATELET 966 81/95/6117 04:46 AM     Lab Results   Component Value Date/Time    Sodium 129 11/29/2017 02:33 AM    Potassium 3.7 11/29/2017 02:33 AM    Chloride 88 11/29/2017 02:33 AM    CO2 32 11/29/2017 02:33 AM    BUN 10 11/29/2017 02:33 AM    Creatinine 0.35 11/29/2017 02:33 AM    Calcium 8.0 11/29/2017 02:33 AM    Magnesium 1.8 11/29/2017 02:33 AM    Phosphorus 3.1 09/20/2012 06:15 AM      Lab Results   Component Value Date/Time    AST (SGOT) 49 10/11/2017 02:00 PM    Alk.  phosphatase 193 10/11/2017 02:00 PM    Protein, total 7.4 10/11/2017 02:00 PM    Albumin 3.4 10/11/2017 02:00 PM    Globulin 4.0 10/11/2017 02:00 PM     Lab Results   Component Value Date/Time    INR 0.9 08/27/2017 02:00 AM    Prothrombin time 11.2 08/27/2017 02:00 AM    aPTT 30.6 07/22/2017 05:00 AM      No results found for: IRON, FE, TIBC, IBCT, PSAT, FERR   No results found for: PH, PCO2, PO2  No components found for: [place]   Lab Results   Component Value Date/Time    CK 81 11/27/2017 02:46 PM    CK - MB 2.2 11/27/2017 02:46 PM              Total time: 15 min  Counseling / coordination time, spent as noted above: 12  > 50% counseling / coordination        Lynn Ahumada MD  Palliative Medicine

## 2017-11-29 NOTE — PROGRESS NOTES
1920 Received bedside patient report from [facility]. Patient AOx4. Assumed patient care    2000 Assessment completed and meds given. Patient still trying to drink the contrast. Needs encouragement. Patient on cardizem drip. Continuous Q2hrs VS per protocol. 2200 meds given, patient still taking contrast. Cardizem drip on going. 0000 Patient completed contrast, taken CT for Ct of the abdomen. Patient tired and lethargic. Back to floor. 0400 Patient sleeping. No complains noted for the remaining part of the shift. 1930 Bedside and Verbal shift change report given to Lemuel Zurita RN (oncoming nurse) by Adneike Chavarria (offgoing nurse). Report included the following information SBAR, Kardex and MAR.

## 2017-11-29 NOTE — PROGRESS NOTES
22G nexiva angiocath established in right forearm  Contrast administered through IV without difficulty or incident

## 2017-11-29 NOTE — PROGRESS NOTES
D/c plan Not finalized  Met with patient during IDR's. Patient has requested to go to rehab when ready for discharge foc has been submitted. Patient states she does not want to go envoy. Dr. Venessa Adame wants a ng and enema today on this patient . Patient is also waiting on braces. cristin primary RN will call and find out today when the braces are expected to be delivered.    Cm will continue to follow

## 2017-11-29 NOTE — PROGRESS NOTES
Hospitalist Progress Note-critical care note     Patient: Hortensia Beach MRN: [id number]  [facility]: [id number]    YOB: 1950  Age: 78 y.o. Sex: female    DOA: 11/22/2017 LOS:  LOS: 7 days            Chief complaint: uti, fracture , ileus, acute respiratory distress , tachycardia     Assessment/Plan         Hospital Problems  Date Reviewed: 11/23/2017          Codes Class Noted POA    Ileus ([facility] ICD-10-CM: K56.7  ICD-9-CM: 560.1  11/27/2017 Unknown        Acute respiratory distress ICD-10-CM: R06.03  ICD-9-CM: 518.82  11/27/2017 Unknown        Therapeutic opioid-induced constipation (OIC) ICD-10-CM: K59.03, T40.2X5A  ICD-9-CM: 564.09, E935.2  11/27/2017 Unknown        Tachycardia ICD-10-CM: R00.0  ICD-9-CM: 785.0  11/27/2017 Unknown        Sepsis ([facility] ICD-10-CM: A41.9  ICD-9-CM: 038.9, 995.91  11/24/2017 Unknown        Acute pain due to injury- multiple LE fractures ICD-10-CM: G89.11  ICD-9-CM: 338.11  11/24/2017 Unknown        Chronic pain ICD-10-CM: G89.29  ICD-9-CM: 338.29  11/23/2017 Unknown        Bedridden ICD-10-CM: Z74.01  ICD-9-CM: V49.84  11/23/2017 Unknown        * (Principal)Multiple fractures of both lower extremities, closed, initial encounter ICD-10-CM: S82.91XA, S82.92XA  ICD-9-CM: 828.0  11/22/2017 Unknown        Seizure disorder ([facility] ICD-10-CM: G40.909  ICD-9-CM: 345.90  6/24/2012 Yes        Depression ICD-10-CM: F32.9  ICD-9-CM: 280  6/24/2012 Yes        UTI (urinary tract infection) ICD-10-CM: N39.0  ICD-9-CM: 599.0  6/1/2012 Yes        Severe protein-calorie malnutrition ([facility] ICD-10-CM: E43  ICD-9-CM: 074  6/1/2012 Yes        Bipolar 1 disorder (HCC) (Chronic) ICD-10-CM: F31.9  ICD-9-CM: 296.7  5/3/2012 Yes            1. Acute respiratory distress   Stat v/q scan  Low probability for pulmonary embolus  Due to pressure from abdomen   2.  Ileus /sbo   Ct scan: constipation , sbo, ng tube suction   Dr. Mariluz Pandya surgery  On lubiprostone,  enama      3   Sepsis/UTI with chronic catheter -complicated UTI   leukocytosis resolved    f/u blood Cx No growth so far   UCX: ENTEROBACTER CLOACAE -, PROTEUS MIRABILIS    Continue merrem     2 Multiple lower extremity fractures:  Not a surgery candidate, will have brace   Ortho on board   3 Anemia: Chronic. H/h stable ,       5 Seizure:  Seizure precaution,  Continue home dose of keppra, Vimpat      5 Chronic pain:   Pain all over, continue pain control      6 Bipolar: Continue Zyprexa       7. Hyponatremia   Continue iv ns   8 tachycardia   Controlled per cardizem gtt for rate control,  ce wnl,     Need ng tube suction-discussed multiple times during the run,        subjective:no bm and no passing gas     Poor prognosis, palliative care on board , remained dnr/i    at the bedside, discussed with him about the poor prognosis,   Change to fluid to D5 ns. She looked pale. Explained to her and family, the importance to put ng tube in to prevent perforation, they indicated a verbal understanding. Review of systems:    General: No fevers or chills. Pain all over   Cardiovascular: No chest pain or pressure. No palpitations. Pulmonary: shortness of breath better . Gastrointestinal: No nausea, vomiting. No abdomen pain       Vital signs/Intake and Output:  Visit Vitals    /81 (BP 1 Location: Left arm, BP Patient Position: At rest;Supine; Head of bed elevated (Comment degrees))    Pulse 90    Temp 98.1 °F (36.7 °C)    Resp 14    Ht 5' 6.14\" (1.68 m)    Wt 78.9 kg (174 lb)    SpO2 95%    BMI 27.97 kg/m2     Current Shift:  11/29 0701 - 11/29 1900  In: -   Out: 350 [Urine:350]  Last three shifts:  11/27 1901 - 11/29 0700  In: 0   Out: 2815 [Urine:2815]    Physical Exam:  General: WD, WN. Alert, cooperative, no acute distress    HEENT: NC, Atraumatic. PERRLA, anicteric sclerae. Lungs: CTA Bilaterally. No Wheezing/Rhonchi/Rales. Heart:  rrr,  No murmur, No Rubs, No Gallops  Abdomen: Soft, distended-more,  Mild tender.  No Bowel sounds,   Extremities: No c/c, bilateral leg wrapped with ace bandage  Psych:   Not anxious or agitated. Neurologic:  No acute neurological deficit. Labs: Results:       Chemistry Recent Labs      11/29/17   0233  11/28/17   0332  11/27/17   0830   GLU  102*  119*  144*   NA  129*  132*  134*   K  3.7  3.9  4.3   CL  88*  95*  95*   CO2  32  32  33*   BUN  10  10  11   CREA  0.35*  0.41*  0.57*   CA  8.0*  8.3*  8.8   AGAP  9  5  6   BUCR  29*  24*  19      CBC w/Diff No results for input(s): WBC, RBC, HGB, HCT, PLT, GRANS, LYMPH, EOS, HGBEXT, HCTEXT, PLTEXT, HGBEXT, HCTEXT, PLTEXT in the last 72 hours. Cardiac Enzymes Recent Labs      11/27/17   1446   CPK  81   CKND1  2.7      Coagulation No results for input(s): PTP, INR, APTT in the last 72 hours. No lab exists for component: INREXT, INREXT    Lipid Panel No results found for: CHOL, CHOLPOCT, CHOLX, CHLST, CHOLV, 163184, HDL, LDL, LDLC, DLDLP, 502661, VLDLC, VLDL, TGLX, TRIGL, TRIGP, TGLPOCT, CHHD, CHHDX   BNP No results for input(s): BNPP in the last 72 hours. Liver Enzymes No results for input(s): TP, ALB, TBIL, AP, SGOT, GPT in the last 72 hours.     No lab exists for component: DBIL   Thyroid Studies Lab Results   Component Value Date/Time    TSH 17.30 08/27/2017 02:00 AM        Procedures/imaging: see electronic medical records for all procedures/Xrays and details which were not copied into this note but were reviewed prior to creation of Nondangelina Ascencio MD

## 2017-11-29 NOTE — PROGRESS NOTES
Consult Note    Patient: Fredy Horn               Sex: female          DOA: 11/22/2017         YOB: 1950      Age:  78 y.o.        LOS:  LOS: 7 days              HPI:     Fredy Horn is a 78 y.o. female who has been seen for displaced right distal femur fracture, right tibia fracture, left distal femur fracture and chronic bilateral hip dislocations/ fracture. She reports she fell out of her chair yesterday and had pain. She has had chronic pain in her hips and was seen by Dr. Hung Nayak in the office for the chronic hip injury. Patient has had altered mental status overnight and is currently being worked up for this. She is able to respond to my questions presently and does report pain. She states she is \"very tired. \"    Past Medical History:   Diagnosis Date    Acute pain due to injury- multiple LE fractures 11/24/2017    Anemia NEC     history of blood transfusion    Anxiety     panic attacks    Arthritis     Bipolar 1 disorder (HCC)     Chronic back pain     Depression     Elevated diaphragm 5/4/2012    Fatigue     along with weakness    GERD (gastroesophageal reflux disease)     Headache(784.0)     Hypertension     pt denies    Other ill-defined conditions(799.89) swallowing issues, decubitus    Psychiatric disorder     Seizures (HCC)     Therapeutic opioid-induced constipation (OIC) 11/27/2017    Thromboembolus ([facility]     TIA (transient ischemic attack)        Past Surgical History:   Procedure Laterality Date    COLONOSCOPY N/A 2/21/2017    COLONOSCOPY [facility] ANESTHESIA, PATIENT IS IN ROOM 358 performed by Mary Leggett MD at [address]  N/A 2/22/2017    COLONOSCOPY performed by Mary Leggett MD at [facility], COLON, DIAGNOSTIC      over 20 years ago    ENDOSCOPY, COLON, DIAGNOSTIC  9/24/09    HX APPENDECTOMY  11/6/84    HX BACK SURGERY  11/24/98    severe back problems    HX CHOLECYSTECTOMY  11/6/84    HX GASTRIC BYPASS  1995    Patricio    HX OTHER SURGICAL      IVC filter    HX REFRACTIVE SURGERY      HX TUBAL LIGATION  1991       Family History   Problem Relation Age of Onset    Heart Disease Mother    [facility] Arthritis-rheumatoid Mother     Stroke Father     Hypertension Father     Arthritis-rheumatoid Father        Social History     Social History    Marital status:      Spouse name: N/A    Number of children: N/A    Years of education: N/A     Social History Main Topics    Smoking status: Never Smoker    Smokeless tobacco: Never Used    Alcohol use No    Drug use: No    Sexual activity: Not Asked     Other Topics Concern    None     Social History Narrative       Prior to Admission medications    Medication Sig Start Date End Date Taking? Authorizing Provider   butalbital-acetaminophen-caffeine (FIORICET, ESGIC) -40 mg per tablet Take 1 Tab by mouth every four (4) hours as needed for Pain. Yes Giovani Rose MD   miSOPROStol (CYTOTEC) 100 mcg tablet Take 100 mcg by mouth two (2) times a day. Yes Giovani Rose MD   cyclobenzaprine (FLEXERIL) 10 mg tablet Take 10 mg by mouth nightly. Yes Giovani Rose MD   lactulose (KRISTALOSE) 10 gram packet Take 10 g by mouth two (2) times a day. Yes Giovani Rose MD   clotrimazole (MYCELEX) 1 % vaginal cream Insert 1 Applicator into vagina nightly as needed for Itching. For ABX induced yeast infection   Yes Historical Provider   DULoxetine (CYMBALTA) 30 mg capsule Take 30 mg by mouth two (2) times a day. Yes Historical Provider   levETIRAcetam (KEPPRA) 500 mg tablet Take 1,000 mg by mouth two (2) times a day. Yes Historical Provider   levothyroxine (SYNTHROID) 75 mcg tablet Take 75 mcg by mouth Daily (before breakfast). Yes Historical Provider   nystatin (MYCOSTATIN) topical cream Apply  to affected area two (2) times daily as needed for Skin Irritation.  Under breasts for topical yeast infection/irritation   Yes Historical Provider   pantoprazole (PROTONIX) 40 mg tablet Take 40 mg by mouth two (2) times a day. Yes Historical Provider   potassium chloride (K-DUR, KLOR-CON) 20 mEq tablet Take 10 mEq by mouth two (2) times a day. Yes Historical Provider   docusate sodium (COLACE) 100 mg capsule Take 100 mg by mouth two (2) times daily as needed for Constipation. Yes Historical Provider   morphine IR (MS IR) 15 mg tablet Take 15 mg by mouth every eight (8) hours as needed for Pain. Yes Historical Provider   nystatin (MYCOSTATIN) 100,000 unit/mL suspension Take 1 tsp by mouth four (4) times daily as needed. swish and spit   Yes Historical Provider   CRANBERRY FRUIT EXTRACT (CRANBERRY EXTRACT PO) Take 1 Cap by mouth two (2) times a day. For UTI prevention  Non-formulary medication. Pt to supply if ordered for admission. Yes Historical Provider   GLUC VARGAS/CHONDRO VARGAS A/VIT C/MN (GLUCOSAMINE CHONDROITIN MAXSTR PO) Take 1 Tab by mouth three (3) times daily. Yes Historical Provider   ferrous sulfate 325 mg (65 mg iron) tablet Take 325 mg by mouth Daily (before breakfast). Yes Historical Provider   polyethylene glycol (MIRALAX) 17 gram packet Take 17 g by mouth two (2) times a day. Yes Historical Provider   baclofen (LIORESAL) 10 mg tablet Take 10 mg by mouth three (3) times daily. Yes Phys Other, MD   [facility])  mg/gram ointment Apply  to affected area as needed. NON FORMULARY MEDICATION  PATIENT SUPPLIED HOME MEDICATION   Yes Giovani Rose MD   diclofenac (VOLTAREN) 1 % gel Apply 2 g to affected area four (4) times daily. Non-formulary medication. Pt to supply if ordered for admission. Yes Giovani Rose MD   ergocalciferol (VITAMIN D2) 50,000 unit capsule Take 50,000 Units by mouth Every Friday. Yes Giovani Rose MD   lacosamide (VIMPAT) 200 mg tab tablet Take 1 Tab by mouth two (2) times a day. Max Daily Amount: 400 mg.  Indications: PARTIAL EPILEPSY TREATMENT ADJUNCT 7/23/17  Yes Wadell Tamanna, DO   lubiPRO[facility]) 8 mcg capsule Take 1 Cap by mouth two (2) times daily (with meals). 3/1/17  Yes Zackary Gonzalez MD   loratadine-pseudoephedrine (CLARITIN-D 24 HOUR)  mg per tablet Take 1 Tab by mouth daily. Yes Historical Provider   OTHER,NON-FORMULARY, Take 1 Cap by mouth daily (with lunch). Indications: Bariatric Multi Formula   Yes Historical Provider   magnesium oxide 500 mg tab Take 500 mg by mouth daily (with lunch). Yes Historical Provider   ascorbic acid, vitamin C, (VITAMIN C) 500 mg tablet Take 500 mg by mouth daily. Yes Historical Provider   mirabegron ER (MYRBETRIQ) 50 mg ER tablet Take 50 mg by mouth nightly. Takes at 2300   Yes Historical Provider   rOPINIRole (REQUIP) 0.25 mg tablet Take 0.75 mg by mouth nightly. Takes at 2300   Yes Historical Provider   predniSONE (DELTASONE) 5 mg tablet Take 5 mg by mouth daily. Yes Historical Provider   gabapentin (NEURONTIN) 300 mg capsule Take 300 mg by mouth three (3) times daily. Yes Historical Provider   OLANZapine (ZYPREXA) 7.5 mg tablet Take 7.5 mg by mouth nightly. Yes Historical Provider   bisacodyl (DULCOLAX, BISACODYL,) 10 mg suppository Insert 10 mg into rectum daily. Giovani Rose MD       Allergies   Allergen Reactions    Ceftriaxone Seizures    Demerol [Meperidine] Unknown (comments)    Seafood [Shellfish Containing Products] Unknown (comments)       Review of Systems  Pertinent items are noted in the History of Present Illness. Physical Exam:      Visit Vitals    /75 (BP 1 Location: Left arm, BP Patient Position: At rest)    Pulse 94    Temp 98.2 °F (36.8 °C)    Resp 16    Ht 5' 6.14\" (1.68 m)    Wt 78.9 kg (174 lb)    SpO2 94%    BMI 27.97 kg/m2       Physical Exam:  Patient has pain to palpation of the bilateral knees and hips. Not markedly tender at right tibia. She is in chronic windswept deformity of the bilateral lower extremities. She has good DP, PT pulses and brisk capillary refill bilaterally.   She has no other orthopaedic complaints of the upper extremities, spine or thorax. Splints are maintained to the bilateral lower extremities and there appear to be no open wounds or tenting of the skin with gentle evaluation. Labs Reviewed:  Xrays 11/22/17     Left Femur:  1. Distal femoral diaphyseal to metadiaphyseal minimally comminuted displaced  fracture deformity . Right Femur  Suboptimal positioning due to patient condition. Comminuted, displaced,  angulated fracture of the distal femoral diametaphysis. Chronic superior and  slight lateral subluxation of the right femoral head. Pelvis  1. Stable exam. Osteopenia. Chronic bilateral superior lateral hip fracture  dislocations. No evidence of an acute fracture or dislocation. Left Tib/Fib  1. Osteopenia. Distal femoral diaphyseal to metadiaphyseal minimally comminuted,  displaced and angulated fracture deformity. 2. Remaining osseous structures are intact. Right Tib/Fib  Unable to optimally position the patient due to condition. Probable nondisplaced  fracture of the distal tibia. Fracture of the calcaneal tuberosity, with  sclerosis at the fracture line, possibly chronic, or possibly acute with  impaction. Assessment/Plan     Principal Problem:    Multiple fractures of both lower extremities, closed, initial encounter (11/22/2017)    Active Problems:    Bipolar 1 disorder ([address] (5/3/2012)      UTI (urinary tract infection) (6/1/2012)      Severe protein-calorie malnutrition ([address] (6/1/2012)      Seizure disorder ([address] (6/24/2012)      Depression (6/24/2012)      Chronic pain (11/23/2017)      Bedridden (11/23/2017)      Sepsis ([address] (11/24/2017)      Acute pain due to injury- multiple LE fractures (11/24/2017)      Ileus ([address] (11/27/2017)      Acute respiratory distress (11/27/2017)      Therapeutic opioid-induced constipation (OIC) (11/27/2017)      Tachycardia (11/27/2017)      Dr. Nena Stanford discussed the fractures in detail with the patient.   Risks associated with surgical intervention and conservative treatment were reviewed in detail. Patient would like to be conservative regarding treatment as per recommendation by Dr. William Nicolas. Awaiting splints from [employer]. Hopefully they will be delivered today. Patient is to be NWB. Patient should follow up in the office with Dr. William Nicolas in 2 weeks. Possible pain management referral outpatient. DVT prophylaxis and pain medications as per primary team.      The plan was discussed with Dr. William Nicolas.

## 2017-11-29 NOTE — PROGRESS NOTES
PO prep to begin @ 1245hrs for scan @ 2:45pm  rkb  patient only drank half of contrast @ 1630--VIVIANA  pt still not done drinking contrast @ [phone], RN to call when patient finishes

## 2017-11-30 ENCOUNTER — APPOINTMENT (OUTPATIENT)
Dept: GENERAL RADIOLOGY | Age: 67
DRG: 698 | End: 2017-11-30
Attending: SURGERY
Payer: MEDICARE

## 2017-11-30 ENCOUNTER — APPOINTMENT (OUTPATIENT)
Dept: INTERVENTIONAL RADIOLOGY/VASCULAR | Age: 67
DRG: 698 | End: 2017-11-30
Attending: HOSPITALIST
Payer: MEDICARE

## 2017-11-30 ENCOUNTER — NURSE NAVIGATOR (OUTPATIENT)
Dept: SURGERY | Age: 67
End: 2017-11-30

## 2017-11-30 LAB
ALBUMIN SERPL-MCNC: 2.2 G/DL (ref 3.4–5)
ALBUMIN/GLOB SERPL: 0.8 {RATIO} (ref 0.8–1.7)
ALP SERPL-CCNC: 101 U/L (ref 45–117)
ALT SERPL-CCNC: 19 U/L (ref 13–56)
ANION GAP SERPL CALC-SCNC: 7 MMOL/L (ref 3–18)
AST SERPL-CCNC: 18 U/L (ref 15–37)
BILIRUB SERPL-MCNC: 0.5 MG/DL (ref 0.2–1)
BUN SERPL-MCNC: 12 MG/DL (ref 7–18)
BUN/CREAT SERPL: 29 (ref 12–20)
CALCIUM SERPL-MCNC: 7.6 MG/DL (ref 8.5–10.1)
CHLORIDE SERPL-SCNC: 93 MMOL/L (ref 100–108)
CO2 SERPL-SCNC: 32 MMOL/L (ref 21–32)
CREAT SERPL-MCNC: 0.42 MG/DL (ref 0.6–1.3)
ERYTHROCYTE [DISTWIDTH] IN BLOOD BY AUTOMATED COUNT: 15.9 % (ref 11.6–14.5)
GLOBULIN SER CALC-MCNC: 2.9 G/DL (ref 2–4)
GLUCOSE BLD STRIP.AUTO-MCNC: 109 MG/DL (ref 70–110)
GLUCOSE BLD STRIP.AUTO-MCNC: 125 MG/DL (ref 70–110)
GLUCOSE SERPL-MCNC: 94 MG/DL (ref 74–99)
HCT VFR BLD AUTO: 29.9 % (ref 35–45)
HGB BLD-MCNC: 9.8 G/DL (ref 12–16)
MAGNESIUM SERPL-MCNC: 1.8 MG/DL (ref 1.6–2.6)
MCH RBC QN AUTO: 28.9 PG (ref 24–34)
MCHC RBC AUTO-ENTMCNC: 32.8 G/DL (ref 31–37)
MCV RBC AUTO: 88.2 FL (ref 74–97)
PLATELET # BLD AUTO: 320 K/UL (ref 135–420)
PMV BLD AUTO: 8.3 FL (ref 9.2–11.8)
POTASSIUM SERPL-SCNC: 4 MMOL/L (ref 3.5–5.5)
PROT SERPL-MCNC: 5.1 G/DL (ref 6.4–8.2)
RBC # BLD AUTO: 3.39 M/UL (ref 4.2–5.3)
SODIUM SERPL-SCNC: 132 MMOL/L (ref 136–145)
WBC # BLD AUTO: 4.7 K/UL (ref 4.6–13.2)

## 2017-11-30 PROCEDURE — 82962 GLUCOSE BLOOD TEST: CPT

## 2017-11-30 PROCEDURE — 74011250636 HC RX REV CODE- 250/636: Performed by: HOSPITALIST

## 2017-11-30 PROCEDURE — 77030011256 HC DRSG MEPILEX <16IN NO BORD MOLN -A

## 2017-11-30 PROCEDURE — 80053 COMPREHEN METABOLIC PANEL: CPT | Performed by: SURGERY

## 2017-11-30 PROCEDURE — 74011250636 HC RX REV CODE- 250/636: Performed by: RADIOLOGY

## 2017-11-30 PROCEDURE — C1751 CATH, INF, PER/CENT/MIDLINE: HCPCS

## 2017-11-30 PROCEDURE — 74011000250 HC RX REV CODE- 250: Performed by: HOSPITALIST

## 2017-11-30 PROCEDURE — 90471 IMMUNIZATION ADMIN: CPT

## 2017-11-30 PROCEDURE — 74011000258 HC RX REV CODE- 258: Performed by: HOSPITALIST

## 2017-11-30 PROCEDURE — 74011250636 HC RX REV CODE- 250/636: Performed by: INTERNAL MEDICINE

## 2017-11-30 PROCEDURE — 90686 IIV4 VACC NO PRSV 0.5 ML IM: CPT | Performed by: INTERNAL MEDICINE

## 2017-11-30 PROCEDURE — 36415 COLL VENOUS BLD VENIPUNCTURE: CPT | Performed by: HOSPITALIST

## 2017-11-30 PROCEDURE — 85027 COMPLETE CBC AUTOMATED: CPT | Performed by: SURGERY

## 2017-11-30 PROCEDURE — 74011250637 HC RX REV CODE- 250/637: Performed by: INTERNAL MEDICINE

## 2017-11-30 PROCEDURE — 83735 ASSAY OF MAGNESIUM: CPT | Performed by: HOSPITALIST

## 2017-11-30 PROCEDURE — 65660000000 HC RM CCU STEPDOWN

## 2017-11-30 PROCEDURE — 02HV33Z INSERTION OF INFUSION DEVICE INTO SUPERIOR VENA CAVA, PERCUTANEOUS APPROACH: ICD-10-PCS | Performed by: RADIOLOGY

## 2017-11-30 PROCEDURE — 74011250637 HC RX REV CODE- 250/637: Performed by: FAMILY MEDICINE

## 2017-11-30 PROCEDURE — 74000 XR ABD (KUB): CPT

## 2017-11-30 PROCEDURE — 74011000250 HC RX REV CODE- 250: Performed by: RADIOLOGY

## 2017-11-30 RX ORDER — HEPARIN SODIUM (PORCINE) LOCK FLUSH IV SOLN 100 UNIT/ML 100 UNIT/ML
500 SOLUTION INTRAVENOUS
Status: DISPENSED | OUTPATIENT
Start: 2017-11-30 | End: 2017-12-01

## 2017-11-30 RX ORDER — KETOROLAC TROMETHAMINE 15 MG/ML
15 INJECTION, SOLUTION INTRAMUSCULAR; INTRAVENOUS ONCE
Status: COMPLETED | OUTPATIENT
Start: 2017-11-30 | End: 2017-11-30

## 2017-11-30 RX ORDER — LIDOCAINE HYDROCHLORIDE 10 MG/ML
1-20 INJECTION INFILTRATION; PERINEURAL
Status: COMPLETED | OUTPATIENT
Start: 2017-11-30 | End: 2017-11-30

## 2017-11-30 RX ORDER — KETOROLAC TROMETHAMINE 15 MG/ML
15 INJECTION, SOLUTION INTRAMUSCULAR; INTRAVENOUS
Status: DISCONTINUED | OUTPATIENT
Start: 2017-11-30 | End: 2017-12-04 | Stop reason: HOSPADM

## 2017-11-30 RX ORDER — HEPARIN SODIUM 200 [USP'U]/100ML
500 INJECTION, SOLUTION INTRAVENOUS
Status: COMPLETED | OUTPATIENT
Start: 2017-11-30 | End: 2017-12-03

## 2017-11-30 RX ORDER — LEVETIRACETAM 10 MG/ML
1000 INJECTION INTRAVASCULAR EVERY 12 HOURS
Status: DISCONTINUED | OUTPATIENT
Start: 2017-11-30 | End: 2017-12-02

## 2017-11-30 RX ADMIN — SODIUM CHLORIDE 5 MG/HR: 900 INJECTION, SOLUTION INTRAVENOUS at 08:12

## 2017-11-30 RX ADMIN — MEROPENEM 500 MG: 500 INJECTION, POWDER, FOR SOLUTION INTRAVENOUS at 16:10

## 2017-11-30 RX ADMIN — HEPARIN SODIUM 1000 UNITS: 200 INJECTION, SOLUTION INTRAVENOUS at 15:39

## 2017-11-30 RX ADMIN — DEXTROSE MONOHYDRATE, SODIUM CHLORIDE, AND POTASSIUM CHLORIDE 75 ML/HR: 50; 9; 1.49 INJECTION, SOLUTION INTRAVENOUS at 18:16

## 2017-11-30 RX ADMIN — SODIUM CHLORIDE 5 MG/HR: 900 INJECTION, SOLUTION INTRAVENOUS at 02:09

## 2017-11-30 RX ADMIN — SODIUM CHLORIDE 5 MG/HR: 900 INJECTION, SOLUTION INTRAVENOUS at 18:16

## 2017-11-30 RX ADMIN — MEROPENEM 500 MG: 500 INJECTION, POWDER, FOR SOLUTION INTRAVENOUS at 00:47

## 2017-11-30 RX ADMIN — MIRABEGRON 50 MG: 25 TABLET, FILM COATED, EXTENDED RELEASE ORAL at 00:47

## 2017-11-30 RX ADMIN — DEXTROSE MONOHYDRATE, SODIUM CHLORIDE, AND POTASSIUM CHLORIDE 75 ML/HR: 50; 9; 1.49 INJECTION, SOLUTION INTRAVENOUS at 09:07

## 2017-11-30 RX ADMIN — MEROPENEM 500 MG: 500 INJECTION, POWDER, FOR SOLUTION INTRAVENOUS at 09:07

## 2017-11-30 RX ADMIN — ENOXAPARIN SODIUM 40 MG: 40 INJECTION SUBCUTANEOUS at 09:58

## 2017-11-30 RX ADMIN — LIDOCAINE HYDROCHLORIDE 3 ML: 10 INJECTION, SOLUTION INFILTRATION; PERINEURAL at 15:39

## 2017-11-30 RX ADMIN — KETOROLAC TROMETHAMINE 15 MG: 15 INJECTION, SOLUTION INTRAMUSCULAR; INTRAVENOUS at 22:13

## 2017-11-30 RX ADMIN — MEROPENEM 500 MG: 500 INJECTION, POWDER, FOR SOLUTION INTRAVENOUS at 22:14

## 2017-11-30 RX ADMIN — INFLUENZA VIRUS VACCINE 0.5 ML: 15; 15; 15; 15 SUSPENSION INTRAMUSCULAR at 10:58

## 2017-11-30 NOTE — PROGRESS NOTES
Brief Synopsis  Nick Malcolm  11/30/17    I have been asked to see Ms. Bhanu Oh in consultation for her current situation. Briefly, she has been admitted with a history of a bilateral tib/fib fracture 8 days ago and has been bedridden due to that and also she is paraplegic. She developed abdominal distension about 2 days ago and a CT scan suggested yesterday she might have a small bowel obstruction. Dr. Mayo Cobian saw her in consultation and suggested I be brought in on her case due to her prior gastric bypass procedure in 1995. I have examined the patient at the bedside. She has a diffusely distended non-tender abdomen and on CT scan, both myself and the radiologist today feel like she has a complete ileus of her entire small bowel with stool throughout her colon. I have suggested to Dr. Markell Walsh that she consult Dr. Nathaniel Page, her GI physician, to develop a treatment course via IV form to help facilitate resolution of this. I have explained to the patient, Dr. Markell Walsh and Dr. Nathaniel Page that, of course, this is not a surgical issue and they will institute the appropriate treatment regarding this.      MedDATA/gwo

## 2017-11-30 NOTE — ROUTINE PROCESS
Bedside shift change report given to Mark Tapia RN (oncoming nurse) by Cate Brown RN (offgoing nurse).  Report included the following information ADRY Stevenson Mar  .

## 2017-11-30 NOTE — PROGRESS NOTES
Problem: Falls - Risk of  Goal: *Absence of Falls  Document Quique Fall Risk and appropriate interventions in the flowsheet.    Outcome: Progressing Towards Goal  Fall Risk Interventions:  Mobility Interventions: Bed/chair exit alarm    Mentation Interventions: Update white board, More frequent rounding    Medication Interventions: Bed/chair exit alarm    Elimination Interventions: Bed/chair exit alarm, Call light in [facility], Toileting schedule/hourly rounds    History of Falls Interventions: Bed/chair exit alarm, Door open when patient unattended, Investigate reason for fall, Room close to nurse's station

## 2017-11-30 NOTE — H&P
The patient is an appropriate candidate to undergo PICC line. Patient assessed immediately prior to induction. Anesthesia plan as follows: Local/No Anesthesia. History and Physical update:  H&P was reviewed and the patient was examined. No changes have occurred in the patient's condition since the H&P was completed.     Kiarra Bone MD  Vascular & Interventional Radiology  [facility]  11/30/2017

## 2017-11-30 NOTE — PROGRESS NOTES
0730 Assumed care of patient from Sincere Campos RN. Patientt resting quietly in bed in the supine position HOB flat per patients request.    0800 Patient requesting ice water informed patient that she was NPO. All patients med's held except IV med's and pain patch. Will continue to monitor     1145 Patient off unit for KUB. 1200 Patient back on unit. Patient suppose to be turned Q2 hours, she refused turning stating it's painful. 1230 Patient NG suctioning changed from continous to intermitted     1300 Patient has order to receive PICC line today and start TPN. 1400 Attempted to turn patient she refused. Will continue to monitor    1415 S. [facility] ED tech applied 2 long posterior leg splints. 1517 Patient off unit to receive PICC line. 1600 Patient back on unit site clean dry and intact no bleeding noted positive blood return on both ports. Still refusing to be turned. Will continue to monitor. 1700 Patient had X 2 large soft loose stools and 1 medium soft loose stool. 1822 Removed patient's IV in right hand since patient now has a PICC line. 1830 Patient resting quietly in bed watching TV. No sign or symptoms of distress noted. 1900 Removed leg splints from bilateral legs patient having multiple stools that keeps seeping on splints. Legs will need to be rewrapped.

## 2017-11-30 NOTE — PROGRESS NOTES
Pt states she thinks last night may have been better for her. Abd seems a little less distended this morning. I am not so sure how much of her small bowel problems are relate to previous gastric by pass. Also pt has a significant lef hydronephrosis of right kidney. Rec: Bariatric consult with Dr. Rodríguez Mojica and upright abdominal film ordered now  Consider PICC line and TPN as pt is not able to take in p.o. Nutrition at this time.

## 2017-11-30 NOTE — PROGRESS NOTES
Problem: Falls - Risk of  Goal: *Absence of Falls  Document Quique Fall Risk and appropriate interventions in the flowsheet.    Outcome: Progressing Towards Goal  Fall Risk Interventions:  Mobility Interventions: Bed/chair exit alarm    Mentation Interventions: Room close to nurse's station    Medication Interventions: Bed/chair exit alarm    Elimination Interventions: Call light in reach    History of Falls Interventions: Room close to nurse's station

## 2017-11-30 NOTE — PROGRESS NOTES
D/c plan: not finalized  Met wit Dr Zoe Shea [id number] [place]. Dr. Zoe Shea informed  patient is not ready for rehab she still has NG tube in  [facility] where they could accomodate or have them look at patient for potential admission. Dr. Zoe Shea informed  patient is not ready for that yet. Cm will continue to follow.    Dr. Zoe Shea informed  that patient got braces and she is refusing to wear them

## 2017-11-30 NOTE — PROGRESS NOTES
Hospitalist Progress Note-critical care note     Patient: Oliver Cunha MRN: [id number]  CSN: [id number]    YOB: 1950  Age: 78 y.o. Sex: female    DOA: 11/22/2017 LOS:  LOS: 8 days            Chief complaint: uti, fracture , ileus, acute respiratory distress , tachycardia     Assessment/Plan         Hospital Problems  Date Reviewed: 11/23/2017          Codes Class Noted POA    SBO (small bowel obstruction) ICD-10-CM: K56.609  ICD-9-CM: 560.9  11/29/2017 Unknown        Ileus ([facility] ICD-10-CM: K56.7  ICD-9-CM: 560.1  11/27/2017 Unknown        Acute respiratory distress ICD-10-CM: R06.03  ICD-9-CM: 518.82  11/27/2017 Unknown        Therapeutic opioid-induced constipation (OIC) ICD-10-CM: K59.03, T40.2X5A  ICD-9-CM: 564.09, E935.2  11/27/2017 Unknown        Tachycardia ICD-10-CM: R00.0  ICD-9-CM: 785.0  11/27/2017 Unknown        Sepsis ([facility] ICD-10-CM: A41.9  ICD-9-CM: 038.9, 995.91  11/24/2017 Unknown        Acute pain due to injury- multiple LE fractures ICD-10-CM: G89.11  ICD-9-CM: 338.11  11/24/2017 Unknown        Chronic pain ICD-10-CM: G89.29  ICD-9-CM: 338.29  11/23/2017 Unknown        Bedridden ICD-10-CM: Z74.01  ICD-9-CM: V49.84  11/23/2017 Unknown        * (Principal)Multiple fractures of both lower extremities, closed, initial encounter ICD-10-CM: S82.91XA, S82.92XA  ICD-9-CM: 828.0  11/22/2017 Unknown        Seizure disorder ([facility] ICD-10-CM: G40.909  ICD-9-CM: 345.90  6/24/2012 Yes        Depression ICD-10-CM: F32.9  ICD-9-CM: 381  6/24/2012 Yes        UTI (urinary tract infection) ICD-10-CM: N39.0  ICD-9-CM: 599.0  6/1/2012 Yes        Severe protein-calorie malnutrition ([address] ICD-10-CM: E43  ICD-9-CM: 952  6/1/2012 Yes        Bipolar 1 disorder (HCC) (Chronic) ICD-10-CM: F31.9  ICD-9-CM: 296.7  5/3/2012 Yes            1. Acute respiratory distress   Resolved   Stat v/q scan  Low probability for pulmonary embolus  Due to pressure from abdomen   2.  Ileus /sbo   Ct scan: constipation , sbo, ng tube suction   Dr. Maria A Escalera surgery-case discussed with [employer] , recommend to have  Bariatric consult with Dr. Skyler Padron   On lubiprostone,  Patriciaann Certain   Will have picc for tpn   Will have kub today      3   Sepsis/UTI with chronic catheter -complicated UTI   leukocytosis resolved    f/u blood Cx No growth so far   UCX: ENTEROBACTER CLOACAE -, PROTEUS MIRABILIS    Continue merrem     2 Multiple lower extremity fractures:  Brace arrived , refused to put them on yesterday   Not a surgery candidate, will have brace   Ortho on board   3 Anemia: Chronic. H/h stable ,       5 Seizure:  Seizure precaution,  Continue home dose of keppra, Vimpat      5 Chronic pain:   Pain all over, continue pain control      6 Bipolar: Continue Zyprexa       7. Hyponatremia   Continue iv ns   8 tachycardia   Controlled per cardizem gtt for rate control,  ce wnl,          subjective: feel a little bit better     Poor prognosis, palliative care on board , remained dnr/i     Will continue iv cardizem , Picc for tpn     Review of systems:    General: No fevers or chills. Pain all over   Cardiovascular: No chest pain or pressure. No palpitations. Pulmonary: shortness of breath better . Gastrointestinal: No nausea, vomiting. No abdomen pain       Vital signs/Intake and Output:  Visit Vitals    /60 (BP 1 Location: Right arm, BP Patient Position: At rest)    Pulse 86    Temp 98.8 °F (37.1 °C)    Resp 18    Ht 5' 6.14\" (1.68 m)    Wt 79.4 kg (175 lb)    SpO2 94%    BMI 28.13 kg/m2     Current Shift:     Last three shifts:  11/28 1901 - 11/30 0700  In: 210 [I.V.:10]  Out: 2050 [Urine:2050]    Physical Exam:  General: WD, WN. Alert, cooperative, no acute distress    HEENT: NC, Atraumatic. PERRLA, anicteric sclerae. Lungs: CTA Bilaterally. No Wheezing/Rhonchi/Rales. Heart:  rrr,  No murmur, No Rubs, No Gallops  Abdomen: Soft, less distended, softer , no tender.  No  Bowel sounds,   Extremities: No c/c, bilateral leg wrapped with ace bandage  Psych:   Not anxious or agitated. Neurologic:  No acute neurological deficit. Labs: Results:       Chemistry Recent Labs      11/29/17   0233  11/28/17   0332   GLU  102*  119*   NA  129*  132*   K  3.7  3.9   CL  88*  95*   CO2  32  32   BUN  10  10   CREA  0.35*  0.41*   CA  8.0*  8.3*   AGAP  9  5   BUCR  29*  24*      CBC w/Diff No results for input(s): WBC, RBC, HGB, HCT, PLT, GRANS, LYMPH, EOS, HGBEXT, HCTEXT, PLTEXT, HGBEXT, HCTEXT, PLTEXT in the last 72 hours. Cardiac Enzymes Recent Labs      11/27/17   1446   CPK  81   CKND1  2.7      Coagulation No results for input(s): PTP, INR, APTT in the last 72 hours. No lab exists for component: INREXT, INREXT    Lipid Panel No results found for: CHOL, CHOLPOCT, CHOLX, CHLST, CHOLV, 790194, HDL, LDL, LDLC, DLDLP, 322775, VLDLC, VLDL, TGLX, TRIGL, TRIGP, TGLPOCT, CHHD, CHHDX   BNP No results for input(s): BNPP in the last 72 hours. Liver Enzymes No results for input(s): TP, ALB, TBIL, AP, SGOT, GPT in the last 72 hours.     No lab exists for component: DBIL   Thyroid Studies Lab Results   Component Value Date/Time    TSH 17.30 08/27/2017 02:00 AM        Procedures/imaging: see electronic medical records for all procedures/Xrays and details which were not copied into this note but were reviewed prior to creation of Reema Link MD

## 2017-11-30 NOTE — INTERDISCIPLINARY ROUNDS
Interdisciplinary team rounds were held 11/30/2017 with the following team members:Care Management, Nursing, Occupational Therapy, Physical Therapy, Physician and Clinical Coordinator and the patient. Plan of care discussed Patient to get PICC line today and start TPN, consult with surgeon, and KUB. See clinical pathway and/or care plan for interventions and desired outcomes.

## 2017-11-30 NOTE — PROGRESS NOTES
Consult Note    Patient: London Ford               Sex: female          DOA: 11/22/2017         YOB: 1950      Age:  78 y.o.        LOS:  LOS: 8 days              HPI:     London Ford is a 78 y.o. female who has been seen for displaced right distal femur fracture, right tibia fracture, left distal femur fracture and chronic bilateral hip dislocations/ fracture. She reports she fell out of her chair yesterday and had pain. She has had chronic pain in her hips and was seen by Dr. Rohini Beatty in the office for the chronic hip injury. Patient has had altered mental status overnight and is currently being worked up for this. She is able to respond to my questions presently and does report pain. She states she is \"very tired. \"    Past Medical History:   Diagnosis Date    Acute pain due to injury- multiple LE fractures 11/24/2017    Anemia NEC     history of blood transfusion    Anxiety     panic attacks    Arthritis     Bipolar 1 disorder (HCC)     Chronic back pain     Depression     Elevated diaphragm 5/4/2012    Fatigue     along with weakness    GERD (gastroesophageal reflux disease)     Headache(784.0)     Hypertension     pt denies    Other ill-defined conditions(799.89) swallowing issues, decubitus    Psychiatric disorder     Seizures (HCC)     Therapeutic opioid-induced constipation (OIC) 11/27/2017    Thromboembol[facility] ([facility]     TIA (transient ischemic attack)        Past Surgical History:   Procedure Laterality Date    COLONOSCOPY N/A 2/21/2017    COLONOSCOPY [facility] ANESTHESIA, PATIENT IS IN ROOM 358 performed by Sammy Monk MD at [address]  N/A 2/22/2017    COLONOSCOPY performed by Sammy Monk MD at [facility], COLON, DIAGNOSTIC      over 20 years ago    ENDOSCOPY, COLON, DIAGNOSTIC  9/24/09    HX APPENDECTOMY  11/6/84    HX BACK SURGERY  11/24/98    severe back problems    HX CHOLECYSTECTOMY  11/6/84    HX GASTRIC BYPASS  1995    Patricio    HX OTHER SURGICAL      IVC filter    HX REFRACTIVE SURGERY      HX TUBAL LIGATION  1991       Family History   Problem Relation Age of Onset    Heart Disease Mother    [employer] Arthritis-rheumatoid Mother     Stroke Father     Hypertension Father     Arthritis-rheumatoid Father        Social History     Social History    Marital status:      Spouse name: N/A    Number of children: N/A    Years of education: N/A     Social History Main Topics    Smoking status: Never Smoker    Smokeless tobacco: Never Used    Alcohol use No    Drug use: No    Sexual activity: Not Asked     Other Topics Concern    None     Social History Narrative       Prior to Admission medications    Medication Sig Start Date End Date Taking? Authorizing Provider   butalbital-acetaminophen-caffeine (FIORICET, ESGIC) -40 mg per tablet Take 1 Tab by mouth every four (4) hours as needed for Pain. Yes Giovani Rose MD   miSOPROStol (CYTOTEC) 100 mcg tablet Take 100 mcg by mouth two (2) times a day. Yes Giovani Rose MD   cyclobenzaprine (FLEXERIL) 10 mg tablet Take 10 mg by mouth nightly. Yes Giovani Rose MD   lactulose (KRISTALOSE) 10 gram packet Take 10 g by mouth two (2) times a day. Yes Giovani Rose MD   clotrimazole (MYCELEX) 1 % vaginal cream Insert 1 Applicator into vagina nightly as needed for Itching. For ABX induced yeast infection   Yes Historical Provider   DULoxetine (CYMBALTA) 30 mg capsule Take 30 mg by mouth two (2) times a day. Yes Historical Provider   levETIRAcetam (KEPPRA) 500 mg tablet Take 1,000 mg by mouth two (2) times a day. Yes Historical Provider   levothyroxine (SYNTHROID) 75 mcg tablet Take 75 mcg by mouth Daily (before breakfast). Yes Historical Provider   nystatin (MYCOSTATIN) topical cream Apply  to affected area two (2) times daily as needed for Skin Irritation.  Under breasts for topical yeast infection/irritation   Yes Historical Provider   pantoprazole (PROTONIX) 40 mg tablet Take 40 mg by mouth two (2) times a day. Yes Historical Provider   potassium chloride (K-DUR, KLOR-CON) 20 mEq tablet Take 10 mEq by mouth two (2) times a day. Yes Historical Provider   docusate sodium (COLACE) 100 mg capsule Take 100 mg by mouth two (2) times daily as needed for Constipation. Yes Historical Provider   morphine IR (MS IR) 15 mg tablet Take 15 mg by mouth every eight (8) hours as needed for Pain. Yes Historical Provider   nystatin (MYCOSTATIN) 100,000 unit/mL suspension Take 1 tsp by mouth four (4) times daily as needed. swish and spit   Yes Historical Provider   CRANBERRY FRUIT EXTRACT (CRANBERRY EXTRACT PO) Take 1 Cap by mouth two (2) times a day. For UTI prevention  Non-formulary medication. Pt to supply if ordered for admission. Yes Historical Provider   GLUC VARGAS/CHONDRO VARGAS A/VIT C/MN (GLUCOSAMINE CHONDROITIN MAXSTR PO) Take 1 Tab by mouth three (3) times daily. Yes Historical Provider   ferrous sulfate 325 mg (65 mg iron) tablet Take 325 mg by mouth Daily (before breakfast). Yes Historical Provider   polyethylene glycol (MIRALAX) 17 gram packet Take 17 g by mouth two (2) times a day. Yes Historical Provider   baclofen (LIORESAL) 10 mg tablet Take 10 mg by mouth three (3) times daily. Yes Phys Other, MD   [facility])  mg/gram ointment Apply  to affected area as needed. NON FORMULARY MEDICATION  PATIENT SUPPLIED HOME MEDICATION   Yes Giovani Rose MD   diclofenac (VOLTAREN) 1 % gel Apply 2 g to affected area four (4) times daily. Non-formulary medication. Pt to supply if ordered for admission. Yes Giovani Rose MD   ergocalciferol (VITAMIN D2) 50,000 unit capsule Take 50,000 Units by mouth Every Friday. Yes Giovani Rose MD   lacosamide (VIMPAT) 200 mg tab tablet Take 1 Tab by mouth two (2) times a day. Max Daily Amount: 400 mg.  Indications: PARTIAL EPILEPSY TREATMENT ADJUNCT 7/23/17  Yes Tabitha Gonzales, DO   lub[facility]) 8 mcg capsule Take 1 Cap by mouth two (2) times daily (with meals). 3/1/17  Yes Randolph Levy MD   loratadine-pseudoephedrine (CLARITIN-D 24 HOUR)  mg per tablet Take 1 Tab by mouth daily. Yes Historical Provider   OTHER,NON-FORMULARY, Take 1 Cap by mouth daily (with lunch). Indications: Bariatric Multi Formula   Yes Historical Provider   magnesium oxide 500 mg tab Take 500 mg by mouth daily (with lunch). Yes Historical Provider   ascorbic acid, vitamin C, (VITAMIN C) 500 mg tablet Take 500 mg by mouth daily. Yes Historical Provider   mirabegron ER (MYRBETRIQ) 50 mg ER tablet Take 50 mg by mouth nightly. Takes at 2300   Yes Historical Provider   rOPINIRole (REQUIP) 0.25 mg tablet Take 0.75 mg by mouth nightly. Takes at 2300   Yes Historical Provider   predniSONE (DELTASONE) 5 mg tablet Take 5 mg by mouth daily. Yes Historical Provider   gabapentin (NEURONTIN) 300 mg capsule Take 300 mg by mouth three (3) times daily. Yes Historical Provider   OLANZapine (ZYPREXA) 7.5 mg tablet Take 7.5 mg by mouth nightly. Yes Historical Provider   bisacodyl (DULCOLAX, BISACODYL,) 10 mg suppository Insert 10 mg into rectum daily. Giovani Rose MD       Allergies   Allergen Reactions    Ceftriaxone Seizures    Demerol [Meperidine] Unknown (comments)    Seafood [Shellfish Containing Products] Unknown (comments)       Review of Systems  Pertinent items are noted in the History of Present Illness. Physical Exam:      Visit Vitals    /62 (BP 1 Location: Right arm, BP Patient Position: At rest)    Pulse 89    Temp 97.7 °F (36.5 °C)    Resp 18    Ht 5' 6.14\" (1.68 m)    Wt 79.4 kg (175 lb)    SpO2 95%    BMI 28.13 kg/m2       Physical Exam:  Patient has pain to palpation of the bilateral knees and hips. Not markedly tender at right tibia. She is in chronic windswept deformity of the bilateral lower extremities. She has good DP, PT pulses and brisk capillary refill bilaterally.   She has no other orthopaedic complaints of the upper extremities, spine or thorax. Splints are maintained to the bilateral lower extremities and there appear to be no open wounds or tenting of the skin with gentle evaluation. Labs Reviewed:  Xrays 11/22/17     Left Femur:  1. Distal femoral diaphyseal to metadiaphyseal minimally comminuted displaced  fracture deformity . Right Femur  Suboptimal positioning due to patient condition. Comminuted, displaced,  angulated fracture of the distal femoral diametaphysis. Chronic superior and  slight lateral subluxation of the right femoral head. Pelvis  1. Stable exam. Osteopenia. Chronic bilateral superior lateral hip fracture  dislocations. No evidence of an acute fracture or dislocation. Left Tib/Fib  1. Osteopenia. Distal femoral diaphyseal to metadiaphyseal minimally comminuted,  displaced and angulated fracture deformity. 2. Remaining osseous structures are intact. Right Tib/Fib  Unable to optimally position the patient due to condition. Probable nondisplaced  fracture of the distal tibia. Fracture of the calcaneal tuberosity, with  sclerosis at the fracture line, possibly chronic, or possibly acute with  impaction.       Assessment/Plan     Principal Problem:    Multiple fractures of both lower extremities, closed, initial encounter (11/22/2017)    Active Problems:    Bipolar 1 disorder ([address] (5/3/2012)      UTI (urinary tract infection) (6/1/2012)      Severe protein-calorie malnutrition ([address] (6/1/2012)      Seizure disorder ([address] (6/24/2012)      Depression (6/24/2012)      Chronic pain (11/23/2017)      Bedridden (11/23/2017)      Sepsis ([address] (11/24/2017)      Acute pain due to injury- multiple LE fractures (11/24/2017)      Ileus ([address] (11/27/2017)      Acute respiratory distress (11/27/2017)      Therapeutic opioid-induced constipation (OIC) (11/27/2017)      Tachycardia (11/27/2017)      SBO (small bowel obstruction) (11/29/2017)      Dr. Rene Villeda discussed the fractures in detail with the patient. Risks associated with surgical intervention and conservative treatment were reviewed in detail. Patient would like to be conservative regarding treatment as per recommendation by Dr. Andrae Potts. Patient has refused splints. Will contact company and see if splints can be modified. Risks associated with not having splints were reviewed. She understands. Dr. Andrae Potts does not recommend surgical intervention. Patient is to be NWB. Patient should follow up in the office with Dr. Andrae Potts in 2 weeks. Possible pain management referral outpatient. DVT prophylaxis and pain medications as per primary team.  Able to sign off from Ortho perspective. The plan was discussed with Dr. Andrae Potts.

## 2017-11-30 NOTE — PROGRESS NOTES
Pt had CT scan today and now has Lam NG tube. Findings suggest SBO. Pt with history of gastric bypass and complications of intuss, post op  Keep NG tube for now,  I will review Ct scan with radiologist.  Need for surgery has not been ruled out.

## 2017-12-01 ENCOUNTER — APPOINTMENT (OUTPATIENT)
Dept: GENERAL RADIOLOGY | Age: 67
DRG: 698 | End: 2017-12-01
Attending: HOSPITALIST
Payer: MEDICARE

## 2017-12-01 LAB
ALBUMIN SERPL-MCNC: 2 G/DL (ref 3.4–5)
ALBUMIN/GLOB SERPL: 0.7 {RATIO} (ref 0.8–1.7)
ALP SERPL-CCNC: 108 U/L (ref 45–117)
ALT SERPL-CCNC: 25 U/L (ref 13–56)
ANION GAP SERPL CALC-SCNC: 4 MMOL/L (ref 3–18)
AST SERPL-CCNC: 23 U/L (ref 15–37)
ATRIAL RATE: 109 BPM
BILIRUB SERPL-MCNC: 0.8 MG/DL (ref 0.2–1)
BUN SERPL-MCNC: 2 MG/DL (ref 7–18)
BUN/CREAT SERPL: 7 (ref 12–20)
CALCIUM SERPL-MCNC: 7.2 MG/DL (ref 8.5–10.1)
CALCULATED P AXIS, ECG09: 59 DEGREES
CALCULATED R AXIS, ECG10: -29 DEGREES
CALCULATED T AXIS, ECG11: 92 DEGREES
CHLORIDE SERPL-SCNC: 98 MMOL/L (ref 100–108)
CO2 SERPL-SCNC: 35 MMOL/L (ref 21–32)
CREAT SERPL-MCNC: 0.29 MG/DL (ref 0.6–1.3)
DIAGNOSIS, 93000: NORMAL
GLOBULIN SER CALC-MCNC: 2.7 G/DL (ref 2–4)
GLUCOSE SERPL-MCNC: 126 MG/DL (ref 74–99)
MAGNESIUM SERPL-MCNC: 1.6 MG/DL (ref 1.6–2.6)
P-R INTERVAL, ECG05: 170 MS
PHOSPHATE SERPL-MCNC: 1.9 MG/DL (ref 2.5–4.9)
POTASSIUM SERPL-SCNC: 2.2 MMOL/L (ref 3.5–5.5)
PREALB SERPL-MCNC: 5.79 MG/DL (ref 20–40)
PROT SERPL-MCNC: 4.7 G/DL (ref 6.4–8.2)
Q-T INTERVAL, ECG07: 356 MS
QRS DURATION, ECG06: 130 MS
QTC CALCULATION (BEZET), ECG08: 479 MS
SODIUM SERPL-SCNC: 137 MMOL/L (ref 136–145)
TRIGL SERPL-MCNC: 68 MG/DL (ref ?–150)
VENTRICULAR RATE, ECG03: 109 BPM

## 2017-12-01 PROCEDURE — 74011250637 HC RX REV CODE- 250/637: Performed by: FAMILY MEDICINE

## 2017-12-01 PROCEDURE — 74011250637 HC RX REV CODE- 250/637: Performed by: INTERNAL MEDICINE

## 2017-12-01 PROCEDURE — 74000 XR ABD (KUB): CPT

## 2017-12-01 PROCEDURE — 74011250637 HC RX REV CODE- 250/637: Performed by: HOSPITALIST

## 2017-12-01 PROCEDURE — 84478 ASSAY OF TRIGLYCERIDES: CPT | Performed by: HOSPITALIST

## 2017-12-01 PROCEDURE — 77030033263 HC DRSG MEPILEX 16-48IN BORD MOLN -B

## 2017-12-01 PROCEDURE — 84134 ASSAY OF PREALBUMIN: CPT | Performed by: HOSPITALIST

## 2017-12-01 PROCEDURE — 84100 ASSAY OF PHOSPHORUS: CPT | Performed by: HOSPITALIST

## 2017-12-01 PROCEDURE — 74011250637 HC RX REV CODE- 250/637: Performed by: SURGERY

## 2017-12-01 PROCEDURE — 74011000250 HC RX REV CODE- 250: Performed by: HOSPITALIST

## 2017-12-01 PROCEDURE — 74011000258 HC RX REV CODE- 258: Performed by: HOSPITALIST

## 2017-12-01 PROCEDURE — 80053 COMPREHEN METABOLIC PANEL: CPT | Performed by: HOSPITALIST

## 2017-12-01 PROCEDURE — 74011636637 HC RX REV CODE- 636/637: Performed by: INTERNAL MEDICINE

## 2017-12-01 PROCEDURE — 65660000000 HC RM CCU STEPDOWN

## 2017-12-01 PROCEDURE — 74011250636 HC RX REV CODE- 250/636: Performed by: HOSPITALIST

## 2017-12-01 PROCEDURE — 3E0436Z INTRODUCTION OF NUTRITIONAL SUBSTANCE INTO CENTRAL VEIN, PERCUTANEOUS APPROACH: ICD-10-PCS | Performed by: HOSPITALIST

## 2017-12-01 PROCEDURE — 83735 ASSAY OF MAGNESIUM: CPT | Performed by: HOSPITALIST

## 2017-12-01 RX ORDER — POTASSIUM CHLORIDE, DEXTROSE MONOHYDRATE AND SODIUM CHLORIDE 300; 5; 900 MG/100ML; G/100ML; MG/100ML
INJECTION, SOLUTION INTRAVENOUS CONTINUOUS
Status: DISCONTINUED | OUTPATIENT
Start: 2017-12-01 | End: 2017-12-01

## 2017-12-01 RX ORDER — DEXTROSE 50 % IN WATER (D50W) INTRAVENOUS SYRINGE
25-50 AS NEEDED
Status: DISCONTINUED | OUTPATIENT
Start: 2017-12-01 | End: 2017-12-04 | Stop reason: HOSPADM

## 2017-12-01 RX ORDER — POTASSIUM CHLORIDE 7.45 MG/ML
10 INJECTION INTRAVENOUS
Status: DISCONTINUED | OUTPATIENT
Start: 2017-12-01 | End: 2017-12-01 | Stop reason: ALTCHOICE

## 2017-12-01 RX ORDER — MAGNESIUM SULFATE HEPTAHYDRATE 40 MG/ML
2 INJECTION, SOLUTION INTRAVENOUS ONCE
Status: COMPLETED | OUTPATIENT
Start: 2017-12-01 | End: 2017-12-03

## 2017-12-01 RX ORDER — MAGNESIUM SULFATE 100 %
4 CRYSTALS MISCELLANEOUS AS NEEDED
Status: DISCONTINUED | OUTPATIENT
Start: 2017-12-01 | End: 2017-12-04 | Stop reason: HOSPADM

## 2017-12-01 RX ADMIN — PANTOPRAZOLE SODIUM 40 MG: 40 TABLET, DELAYED RELEASE ORAL at 21:11

## 2017-12-01 RX ADMIN — MISOPROSTOL 100 MCG: 100 TABLET ORAL at 21:12

## 2017-12-01 RX ADMIN — MAGNESIUM SULFATE HEPTAHYDRATE 2 G: 40 INJECTION, SOLUTION INTRAVENOUS at 14:29

## 2017-12-01 RX ADMIN — LEVOTHYROXINE SODIUM 75 MCG: 75 TABLET ORAL at 07:30

## 2017-12-01 RX ADMIN — ACETAMINOPHEN 650 MG: 325 TABLET ORAL at 10:27

## 2017-12-01 RX ADMIN — OXYCODONE HYDROCHLORIDE 5 MG: 5 TABLET ORAL at 20:47

## 2017-12-01 RX ADMIN — DULOXETINE HYDROCHLORIDE 30 MG: 30 CAPSULE, DELAYED RELEASE ORAL at 21:12

## 2017-12-01 RX ADMIN — DEXTROSE MONOHYDRATE, SODIUM CHLORIDE, AND POTASSIUM CHLORIDE 75 ML/HR: 50; 9; 1.49 INJECTION, SOLUTION INTRAVENOUS at 09:36

## 2017-12-01 RX ADMIN — KETOROLAC TROMETHAMINE 15 MG: 15 INJECTION, SOLUTION INTRAMUSCULAR; INTRAVENOUS at 14:12

## 2017-12-01 RX ADMIN — SODIUM CHLORIDE 5 MG/HR: 900 INJECTION, SOLUTION INTRAVENOUS at 09:35

## 2017-12-01 RX ADMIN — PANTOPRAZOLE SODIUM 40 MG: 40 TABLET, DELAYED RELEASE ORAL at 09:00

## 2017-12-01 RX ADMIN — ENOXAPARIN SODIUM 40 MG: 40 INJECTION SUBCUTANEOUS at 10:27

## 2017-12-01 RX ADMIN — GABAPENTIN 400 MG: 400 CAPSULE ORAL at 21:12

## 2017-12-01 RX ADMIN — GABAPENTIN 400 MG: 400 CAPSULE ORAL at 16:38

## 2017-12-01 RX ADMIN — POTASSIUM CHLORIDE: 2 INJECTION, SOLUTION, CONCENTRATE INTRAVENOUS at 18:55

## 2017-12-01 RX ADMIN — ROPINIROLE HYDROCHLORIDE 0.75 MG: 0.25 TABLET, FILM COATED ORAL at 21:12

## 2017-12-01 RX ADMIN — LACOSAMIDE 200 MG: 100 TABLET, FILM COATED ORAL at 09:00

## 2017-12-01 RX ADMIN — OLANZAPINE 7.5 MG: 2.5 TABLET ORAL at 21:12

## 2017-12-01 RX ADMIN — NALOXEGOL OXALATE 25 MG: 25 TABLET, FILM COATED ORAL at 07:30

## 2017-12-01 RX ADMIN — SODIUM CHLORIDE 5 MG/HR: 900 INJECTION, SOLUTION INTRAVENOUS at 07:19

## 2017-12-01 RX ADMIN — LEVETIRACETAM 1000 MG: 10 INJECTION INTRAVENOUS at 11:23

## 2017-12-01 RX ADMIN — POTASSIUM PHOSPHATE, MONOBASIC AND POTASSIUM PHOSPHATE, DIBASIC: 224; 236 INJECTION, SOLUTION INTRAVENOUS at 15:45

## 2017-12-01 RX ADMIN — KETOROLAC TROMETHAMINE 15 MG: 15 INJECTION, SOLUTION INTRAMUSCULAR; INTRAVENOUS at 05:58

## 2017-12-01 RX ADMIN — LEVETIRACETAM 1000 MG: 10 INJECTION INTRAVENOUS at 00:25

## 2017-12-01 RX ADMIN — LACOSAMIDE 200 MG: 100 TABLET, FILM COATED ORAL at 21:11

## 2017-12-01 RX ADMIN — MEROPENEM 500 MG: 500 INJECTION, POWDER, FOR SOLUTION INTRAVENOUS at 14:29

## 2017-12-01 RX ADMIN — GABAPENTIN 400 MG: 400 CAPSULE ORAL at 09:00

## 2017-12-01 RX ADMIN — LUBIPROSTONE 8 MCG: 8 CAPSULE, GELATIN COATED ORAL at 16:38

## 2017-12-01 RX ADMIN — PREDNISONE 5 MG: 5 TABLET ORAL at 10:27

## 2017-12-01 RX ADMIN — MISOPROSTOL 100 MCG: 100 TABLET ORAL at 09:00

## 2017-12-01 RX ADMIN — ACETAMINOPHEN 650 MG: 325 TABLET ORAL at 21:12

## 2017-12-01 RX ADMIN — DULOXETINE HYDROCHLORIDE 30 MG: 30 CAPSULE, DELAYED RELEASE ORAL at 09:00

## 2017-12-01 RX ADMIN — ACETAMINOPHEN 650 MG: 325 TABLET ORAL at 16:38

## 2017-12-01 RX ADMIN — MULTIPLE VITAMINS W/ MINERALS TAB 1 TABLET: TAB at 09:00

## 2017-12-01 RX ADMIN — MEROPENEM 500 MG: 500 INJECTION, POWDER, FOR SOLUTION INTRAVENOUS at 10:27

## 2017-12-01 RX ADMIN — POTASSIUM CHLORIDE: 2 INJECTION, SOLUTION, CONCENTRATE INTRAVENOUS at 18:53

## 2017-12-01 NOTE — PROGRESS NOTES
D/c plan not finalized  Met with patient during IDR's. Patient states the braces do not fit. Aleida primary nurse to call [employer] that provided with the braces that they never fit per Dr. Jean-Claude Trevizo nurse to call [employer] and have them come adjust. Dr. Jean-Claude Trevizo informed team patient to start TPN today. Patient is refusing to the braces and PT. Explained to her that in order to go to rehab would need pt recommendations states she isnot going to rehab. Informed team when she is d/c she will be going home. States she and her  had a long talk and she is going to go home with him. States he was taking care of her before she came in. States she would like to use [facility]. CMS will place referral per foc. CM will continue to folow.  Dr. Jean-Claude Trevizo informed team patient would be here through the weekend

## 2017-12-01 NOTE — PROGRESS NOTES
Consult for TPN Dosing per Pharmacy by Dr. Diane Almonte provided for this 78 y.o. female, for indication of ileus  Day of Therapy 1  Ht = 66 inches  Wt = 71.7 kg  IBW = 52.4 kg  Dosing Weight = 57 kg          Labs:  BMP BMP: No results found for: NA, K, CL, CO2, AGAP, GLU, BUN, CREA, GFRAA, GFRNA        CMP CMP:         Lab Results   Component Value Date/Time     MG 1.6 12/01/2017 04:27 AM           Ca/ Phos       Lab Results   Component Value Date/Time     Calcium 7.6 11/30/2017 04:01 AM     Phosphorus 3.1 09/20/2012 06:15 AM       Mag           Lab Results   Component Value Date/Time     Magnesium 1.6 12/01/2017 04:27 AM       Tg No components found for: TGL   Albumin        Lab Results    Component Value Date/Time     Protein, total 5.1 11/30/2017 04:01 AM     Albumin 2.2 11/30/2017 04:01 AM          Kcal requirements:  25 kcal/kg = 1425 kcal/day     Goal Macronutrients:  Protein  1.2 g/kg/day                       68 g/day = 272 kcal  Lipids    20% of total kcal                32 g/day = 285 kcal  Dextrose remainder of total kcal   255 g/day =  868 kcal     TPN to be initiated at ½ goal macronutrients and titrated to goal per patient tolerance. Electrolytes to be monitored and adjusted daily. MD to replete electrolytes acutely outside of TPN as needed. Additional recommendations/Orders:   1. Corrective insulin coverage q6h while on TPN. 2. D/C IV fluid of D5NS with KCl 20 mEq/L at 75 ml/hr. TPN to infuse at 75 ml/hr -                  KCl to be added to TPN - MD to adjust IVF as needed             3.   Magnesium 2 gm bolus per MD            4.   KPhos 20 mmol bolus per MD  3. PO multivitamin dc'd and will add to TPN  4. BMP, Mag, Phos ordered daily  5. Triglycerides, Prealbumin, CMP ordered upon initiation and weekly     Pharmacy to follow daily and will make changes based on labs/clinical status.     I

## 2017-12-01 NOTE — PROGRESS NOTES
1930 Pt received from offgoing nurse without any signs or symptoms of distress. Pt vitals are stable and within normal limits. Pt bed in low position with wheels locked and call bell within reach. 2039 Assessment completed and documented in flow sheet. Pt denies any further needs at this time. Pt in NAD with bed in low position, wheels locked and call bell within reach. Pt refusing splints to be reapplied related to multiple loose stools. 2214 Scheduled medications administered as ordered. 0025 Scheduled medications administered as ordered. 5907 Reassessment completed with no changes noted. Bed locked, in lowest position, with call light within reach. 0320 into provide incontinence care to pt. Pt requesting splints be reapplied. Call placed to ED to inquire if tech available to come apply the splints. No tech available at this time. 0400 ED tech to unit to apply splints to bilateral legs as ordered. ED tech demonstrated to nursing staff how to properly apply splints to pt bilateral lower extremities. 0715 Bedside and Verbal shift change report given to Divya Bell (oncoming nurse) by FAUSTO Navarro RN (offgoing nurse). Report included the following information SBAR, Procedure Summary, Intake/Output, MAR and Recent Results.

## 2017-12-01 NOTE — PROGRESS NOTES
Hospitalist Progress Note-critical care note     Patient: Solange Kern MRN: [id number]  CSN: [id number]    YOB: 1950  Age: 78 y.o. Sex: female    DOA: 11/22/2017 LOS:  LOS: 9 days            Chief complaint: uti, fracture , ileus, acute respiratory distress , tachycardia ,    Assessment/Plan         Hospital Problems  Date Reviewed: 11/23/2017          Codes Class Noted POA    SBO (small bowel obstruction) ICD-10-CM: K56.609  ICD-9-CM: 560.9  11/29/2017 Unknown        Ileus ([facility] ICD-10-CM: K56.7  ICD-9-CM: 560.1  11/27/2017 Unknown        Acute respiratory distress ICD-10-CM: R06.03  ICD-9-CM: 518.82  11/27/2017 Unknown        Therapeutic opioid-induced constipation (OIC) ICD-10-CM: K59.03, T40.2X5A  ICD-9-CM: 564.09, E935.2  11/27/2017 Unknown        Tachycardia ICD-10-CM: R00.0  ICD-9-CM: 785.0  11/27/2017 Unknown        Sepsis ([facility] ICD-10-CM: A41.9  ICD-9-CM: 038.9, 995.91  11/24/2017 Unknown        Acute pain due to injury- multiple LE fractures ICD-10-CM: G89.11  ICD-9-CM: 338.11  11/24/2017 Unknown        Chronic pain ICD-10-CM: G89.29  ICD-9-CM: 338.29  11/23/2017 Unknown        Bedridden ICD-10-CM: Z74.01  ICD-9-CM: V49.84  11/23/2017 Unknown        * (Principal)Multiple fractures of both lower extremities, closed, initial encounter ICD-10-CM: S82.91XA, S82.92XA  ICD-9-CM: 828.0  11/22/2017 Unknown        Seizure disorder ([facility] ICD-10-CM: G40.909  ICD-9-CM: 345.90  6/24/2012 Yes        Depression ICD-10-CM: F32.9  ICD-9-CM: 868  6/24/2012 Yes        UTI (urinary tract infection) ICD-10-CM: N39.0  ICD-9-CM: 599.0  6/1/2012 Yes        Severe protein-calorie malnutrition ([facility] ICD-10-CM: E43  ICD-9-CM: 987  6/1/2012 Yes        Bipolar 1 disorder (HCC) (Chronic) ICD-10-CM: F31.9  ICD-9-CM: 296.7  5/3/2012 Yes            1. Acute respiratory distress   Resolved   v/q scan  Low probability for pulmonary embolus  Due to pressure from abdomen   2.  Ileus   Several BM   Will repeat  kub -will determine about ng tube to be removed or not according to xray   Ct scan: constipation , sbo, ng tube suction   Appreciated  Dr. Raza Abreu /Dr. Dennie Neigh recommendation   On lubiprostone,/movantiK-will d/c one if kub better,      3   Sepsis/UTI with chronic catheter -complicated UTI   leukocytosis resolved    f/u blood Cx No growth so far   UCX: ENTEROBACTER CLOACAE -, PROTEUS MIRABILIS    Continue merrem -day 8 of abx     2 Multiple lower extremity fractures:  Brace arrived , refused to put on -revise from vendor-waiting for coming back-but pt said\" not use them\"   Not a surgery candidate,   Ortho on board -will call ortho for new splint   3 Anemia: Chronic. H/h stable ,       5 Seizure:  Seizure precaution,  Continue home dose of keppra, Vimpat      5 Chronic pain:   Pain all over, continue pain control      6 Bipolar: Continue Zyprexa       7. Hyponatremia   Continue iv ns   8 tachycardia   Controlled per cardizem gtt for rate control,  ce wnl,     10 mal-nutrition   tpn-consult pharm  Will monitor electrolytes carefully      subjective: I did  Not get medication for my bipolar and seizure medication, I will not use the brace, I will go home from here, not going to rehab , my   Can take care of me or hire sb. Poor prognosis, palliative care on board , remained dnr/i     Will continue iv cardizem ,-will switch to  Po cardizem once gi issue resolved     Review of systems:    General: No fevers or chills. Pain all over   Cardiovascular: No chest pain or pressure. No palpitations. Pulmonary: no shortness of breath   Gastrointestinal: No nausea, vomiting.  BM a lot        Vital signs/Intake and Output:  Visit Vitals    /62 (BP 1 Location: Right arm, BP Patient Position: At rest)    Pulse 95    Temp 98 °F (36.7 °C)    Resp 18    Ht 5' 6.14\" (1.68 m)    Wt 71.7 kg (158 lb)    SpO2 98%    BMI 25.39 kg/m2     Current Shift:  12/01 0701 - 12/01 1900  In: 300   Out: 1050 [ASCJX:3695]  Last three shifts:  11/29 1901 - 12/01 0700  In: 9034 [I.V.:2005]  Out: 1500 [Urine:1300]    Physical Exam:  General: WD, WN. Alert, cooperative, no acute distress    HEENT: NC, Atraumatic. PERRLA, anicteric sclerae. Lungs: CTA Bilaterally. No Wheezing/Rhonchi/Rales. Heart:  rrr,  No murmur, No Rubs, No Gallops  Abdomen: Soft,  No distended , no tender. No  Bowel sounds,   Extremities: No c/c, bilateral leg wrapped with ace bandage  Psych:   Not anxious, + agitated. Neurologic:  No acute neurological deficit. Labs: Results:       Chemistry Recent Labs      11/30/17   0401 11/29/17   0233   GLU  94  102*   NA  132*  129*   K  4.0  3.7   CL  93*  88*   CO2  32  32   BUN  12  10   CREA  0.42*  0.35*   CA  7.6*  8.0*   AGAP  7  9   BUCR  29*  29*   AP  101   --    TP  5.1*   --    ALB  2.2*   --    GLOB  2.9   --    AGRAT  0.8   --       CBC w/Diff Recent Labs      11/30/17   0401   WBC  4.7   RBC  3.39*   HGB  9.8*   HCT  29.9*   PLT  320      Cardiac Enzymes No results for input(s): CPK, CKND1, PAOLA in the last 72 hours. No lab exists for component: CKRMB, TROIP   Coagulation No results for input(s): PTP, INR, APTT in the last 72 hours. No lab exists for component: INREXT, INREXT    Lipid Panel No results found for: CHOL, CHOLPOCT, CHOLX, CHLST, CHOLV, 892088, HDL, LDL, LDLC, DLDLP, 739759, VLDLC, VLDL, TGLX, TRIGL, TRIGP, TGLPOCT, CHHD, CHHDX   BNP No results for input(s): BNPP in the last 72 hours.    Liver Enzymes Recent Labs      11/30/17   0401   TP  5.1*   ALB  2.2*   AP  101   SGOT  18      Thyroid Studies Lab Results   Component Value Date/Time    TSH 17.30 08/27/2017 02:00 AM        Procedures/imaging: see electronic medical records for all procedures/Xrays and details which were not copied into this note but were reviewed prior to creation of Ankit Sharma MD

## 2017-12-01 NOTE — ROUTINE PROCESS
1250: Pt NG tube came out. Pt refused to have tube reinserted. 1400: Pt refuses to have braces placed on. Pt stated that she would rather not have any thing including splints on her legs. Pt stated she would rather let them heal naturally.

## 2017-12-01 NOTE — ROUTINE PROCESS
Bedside shift change report given to FAUSTO Chan  by Rafael Torres. Segundo Castro RN. Report included the following information SBAR, Kardex, MAR, Recent Results and Cardiac Rhythm NSR,BBB.

## 2017-12-01 NOTE — PROGRESS NOTES
Offered to reapply bilateral long leg posterior orthoglass splints, because the ones that were placed earlier in the day were soiled from the multiple BMs that she had.  Pt declined, states that she does not want them on because she feels like she is going to go to the bathroom again and \"doesn't want to waste my time\"

## 2017-12-01 NOTE — PROGRESS NOTES
NUTRITION FOLLOW-UP    RECOMMENDATIONS/PLAN:   - Adv diet per MD  - Order Phos labs  - TPN Reccs- 83g AA 17%, 285g CHO 50%, 72g Lipids 33% to provide 1949 total kcal  - Recc starting 1/2 strength and titrate to goal rate  Monitor labs/lytes, diet adv, skin integrity, wt, fluid status, BM  NUTRITION ASSESSMENT:   Client Update: 78 yrs old Female with UTI & AMS overnight. Admitted for multiple lower extremity fractures. Noted acute respiratory distress and tachycardia  Per MD pt has ileus of her entire small bowel with stool throughout her colon. Pt had PICC line placed to start TPN. FOOD/NUTRITION INTAKE   Diet Order:  NPO   Supplements: n/a   Food Allergies: Seafood-Shellfish  Average PO Intake:      Patient Vitals for the past 100 hrs:   % Diet Eaten   12/01/17 0620 0 %   11/30/17 1900 0 %   11/29/17 1915 0 %   11/28/17 1900 0 %   11/27/17 0624 0 %      Pertinent Medications:  [x] Reviewed; lactulose, cytotec, kepprra, synthroid, mycostatin, protonix, KCl, cranberry fruit extract, iron, miralax, vit d2, lacosamide, mag-ox, vit c, deltasone,   Electrolyte Replacement Protocol: []K []Mg []PO4  Insulin:  []SSI  []Pre-meal   []Basal    []Drip  []None  Cultural/Anabaptist Food Preferences: None Identified    BIOCHEMICAL DATA & MEDICAL TESTS  Pertinent Labs:  [x] Reviewed; ANTHROPOMETRICS  Height: 5' 6.14\" (168 cm)       Weight: 71.7 kg (158 lb)         BMI: 25.4 kg/m^2 normal weight (18.5%-24.9% BMI)   Adm Weight: 152 lbs                Weight change: +6lbs  Adjusted Body Weight: n/a     NUTRITION-FOCUSED PHYSICAL ASSESSMENT  Skin: Pressure Injury to sacrum       GI: +3 BM 11/30/17    NUTRITION PRESCRIPTION  Calories: 1932 kcal/day based on 28kcal/kg  Protein: 83 g/day based on 1.2 g/kg  CHO: 242 g/day based on 50% of total energy  Fluid: 1932 ml/day based on 1 kcal/ml     NUTRITION DIAGNOSES:   1.  Increased energy needs related to wound healing as evidenced by pt report of bed sore, hx of bed sore    NUTRITION INTERVENTIONS:   INTERVENTIONS:        GOALS:  1. Parental-83g AA 17%, 285g CHO 50%, 72g Lipids 33% to provide 1949 total kcal 1. Start TPN and be at goal rate by next review 3 days             LEARNING NEEDS (Diet, Supplementation, Food/Nutrient-Drug Interaction):   [x] None Identified   [] Education provided/documented      Identified and patient: [] Declined   [] Was not appropriate/indicated        NUTRITION MONITORING /EVALUATION:   Adjust EN/PN as appropriate  Monitor wt  Monitor renal labs, electrolytes, fluid status     Previous Recommendations Implemented: no        Previous Goals Met:  no -Pt remained NPO      [] Participated in Interdisciplinary Rounds    [x] Interdisciplinary Care Plan Reviewed  DISCHARGE NUTRITION RECOMMENDATIONS ADDRESSED:      [x] To be determined closer to discharge    NUTRITION RISK:           [x] At risk                        [] Not currently at risk        Will follow-up per policy.   Braulio Henning, RD  PAGER:  [phone]

## 2017-12-01 NOTE — PROGRESS NOTES
Palliative Medicine Progress Note  DR. [facility]: [id number] ([id number])  [facility]: [address] [place] [address] Way: [phone]    Patient Name: Jose Hopper  YOB: 1950    Date of Initial Consult: 11/24/17  Reason for Consult: symptom management/care planning  Requesting Provider: Dr. Ashwini Cordova   Primary Care Physician: Yehuda Sparks MD      SUMMARY:   Jose Hopper is a 78y.o. year old with a complex past history admitted for assessment of multiple bilat LE fractures following a fall from bed. [place] to be a poor surgical candidate and she is not interested. Past history includes bipolar disorder with at least one involuntary committal to [facility] and transfer to psychiatry hospital in 2012, chronic back pain previously followed by pain management, seizure disorder, and functional paraplegia of LEs leaving her wheelchair/bed bound. S/P bariatric surgery w/ dumping syndrome and nutritional failure to thrive. Had been enrolled in hospice in [id number] for this, but gradually weaning of seizure meds resulted in improvement and hospice graduation. Long term patient of Dr. Nikki Braden, has also been in pain management and most recently followed by [place] visiting physicians(Dr. Barclay). They are insisting she return to pain management and have not been prescribing opioids. Previously was on ms IR15 mg tid and fioricet w/ codeine prn. Also has a history of chronic constipation and recurring episodes of SBO.    11/27/17: Predictably rough weekend. Decreased LOC, likely partly due to fentanyl, increased O2 requirement w/ + d dimer, but VQ scan low probability. Abdomen increasingly distended and bloated. This has been a recurring problem for her,  states they have been able to manage it at home for several years w/ daily PEG and bisacodyl suppository. No nausea. Pain in in back and feet, legs when they are moved. 11/28/17: Ileus unimproved w/ suppository/PEG x 2.  Attempt to place NG unsuccessful. No nausea, but bloating discomfort. 11/29/17: Rested last night, but no change in abdominal distention. Not passing gas. More alert, pain at baseline. Taking her pills. 12/1/17: several large stools since yesterday, abdomen deflated. Less discomfort. Concerned she is not getting her oral seizure med. Has splints in lieu of braces. PALLIATIVE DIAGNOSES:     Patient Active Problem List   Diagnosis Code    Bipolar 1 disorder ([facility] F31.9    Decubitus ulcer of sacral area L89.159    UTI (urinary tract infection) N39.0    Severe protein-calorie malnutrition ([facility] E43    Seizure disorder ([facility] G40.909    Depression F32.9    Hypotension, unspecified I95.9    Personal history of DVT (deep vein thrombosis) Z86.718    Hypomagnesemia E83.42    Unspecified constipation K59.00    Seizure ([facility] R56.9    Overdose T50.901A    Narcosis R40.1    Multiple fractures of both lower extremities, closed, initial encounter S82.91XA, S82.92XA    Chronic pain G89.29    Bedridden Z74.01    Sepsis (HCC) A41.9    Acute pain due to injury- multiple LE fractures G89.11    Ileus ([facility]) K56.7    Acute respiratory distress R06.03    Therapeutic opioid-induced constipation (OIC) K59.03, T40.2X5A    Tachycardia R00.0    SBO (small bowel obstruction) K56.609     1. PLAN:   1. Met with patient in her room. She was alert and oriented providing reliable summary of her known history. Felt her mood had been doing pretty well until this happened, psych meds were helpful. She knows her care will be more of a challenge with this event. When asked about her previous hospitalizations in 2012 indicating she is a DNR she says she still feels that way and does not wish to undergo CPR. She already has an AMD with living will on file. She is anxious to complete a DDNR because she is worried her  will not respect her wishes in regard to this. DDNR completed and placed on chart.  Questions answered. Recommend: Cont low dose fentanyl patch monitoring closely for side effects. D/c hydrocodone and replace w/ oxycodone q 4 hr prn, giving APAP 650 mg qid. Add bowel regimen with senna qhs. Colace has no demonstrated efficacy in managing opioid induced constipation. Suspect rehab potential minimal and placement with subsequent care will prove a challenge. 11/27/17: They would like to try PEG and suppository, but understand she might need an NG if symptoms worsen. Cont current pain regimen, has not required much in way of breakthrough oxycodone.  with questions about DNR status. Explained distinction between treatment and CPR decisions and that Ms Slava Krueger may still have all forms of aggressive care including ICU interventions and intubation. Questions answered. Patient and  both feel Lennie Hui is still an accurate reflection of her wishes on the subject. 11/28/17: Predictable problems w/ GI motility. Methylnaltrexone might prove more effective in counteracting opioid contribution to ileus. 11/29/17: On two agents for opioid induced constipation w/ no effect so far. Not realistic to stop her pain meds given nature of her injuries. Her combination of problems presage a poor outcome. 12/1/17: Irritable today, suggesting she feels substantially better. Recommend removal of NG at first opportunity. 2. Initial consult note routed to primary continuity provider  3.  Communicated plan of care with: Palliative IDT       GOALS OF CARE:   Limited curative, control of symptoms    Advance Care Planning 11/24/2017   Patient's Healthcare Decision Maker is: Named in scanned ACP document   Primary Decision Maker Name Zev Funes   Primary Decision Maker Phone Number [phone]   Primary Decision Maker Relationship to Patient Spouse   Secondary Decision Maker Name Mary Ann Paredes Phone Number [phone]   Confirm Advance Directive Yes, on file   Patient Would Like to Complete Advance Directive -   Does the patient have other document types Do Not Resuscitate           HISTORY:     History obtained from: patient    CHIEF COMPLAINT: see summary    HPI/SUBJECTIVE:    The patient is:   [x] Verbal and participatory  [] Non-participatory due to:        Clinical Pain Assessment (nonverbal scale for nonverbal patients): Pain: 7    [++++ Clinical Pain Assessment++++]  [++++Pain Severity++++]: Pain: 7  [++++Pain Character++++]: sharp  [++++Pain Duration++++]: continuous  [++++Pain Effect++++]:   [++++Pain Factors++++]:   [++++Pain Frequency++++]:   [++++Pain Location++++]: legs  [++++ Clinical Pain Assessment++++]         FUNCTIONAL ASSESSMENT:     Palliative Performance Scale (PPS):  PPS: 40       PSYCHOSOCIAL/SPIRITUAL SCREENING:     Advance Care Planning:  Advance Care Planning 11/24/2017   Patient's Healthcare Decision Maker is: Named in scanned ACP document   Primary Decision Maker Name Pacheco Clancy   Primary Decision Maker Phone Number [phone]   Primary Decision Maker Relationship to Patient Spouse   Secondary Decision Maker Name Mary Ann Paredes Phone Number [phone]   Confirm Advance Directive Yes, on file   Patient Would Like to Complete Advance Directive -   Does the patient have other document types Do Not Resuscitate        Any spiritual / [employer]ian concerns:  [] Yes /  [x] No    Caregiver Burnout:  [] Yes /  [x] No /  [] No Caregiver Present      Anticipatory grief assessment:   [x] Normal  / [] Maladaptive       ESAS Anxiety: Anxiety: 0    ESAS Depression: Depression: 4        REVIEW OF SYSTEMS:     Positive and pertinent negative findings in ROS are noted above in HPI. The following systems were [x] reviewed / [] unable to be reviewed as noted in HPI  Other findings are noted below.   Systems: constitutional, ears/nose/mouth/throat, respiratory, gastrointestinal, genitourinary, musculoskeletal, integumentary, neurologic, psychiatric, endocrine. Positive findings noted below. Modified ESAS Completed by: provider   Fatigue: 8 Drowsiness: 6   Depression: 4 Pain: 7   Anxiety: 0 Nausea: 3   Anorexia: 7 Dyspnea: 0     Constipation: No     Stool Occurrence(s): 1        PHYSICAL EXAM:     Wt Readings from Last 3 Encounters:   12/01/17 71.7 kg (158 lb)   10/11/17 67.1 kg (148 lb)   10/07/17 68.5 kg (151 lb)     Blood pressure 131/62, pulse 95, temperature 98 °F (36.7 °C), resp. rate 18, height 5' 6.14\" (1.68 m), weight 71.7 kg (158 lb), SpO2 98 %. Pain:  Pain Scale 1: Numeric (0 - 10)  Pain Intensity 1: 9  Pain Onset 1:  (chronic)  Pain Location 1: Back, Leg  Pain Orientation 1: Right  Pain Description 1: Constant  Pain Intervention(s) 1: Rest (PAtient will receive new fentnyl patchb at 11 am)  Last bowel movement:     Constitutional: ill appearing, older than stated age  Eyes: pupils equal, anicteric  ENMT: no nasal discharge, moist mucous membranes  Cardiovascular: regular rhythm, distal pulses intact  Respiratory: breathing not labored, symmetric  Gastrointestinal: soft non-tender, +bowel sounds  Musculoskeletal: LE contractures and abnormal rotations  Skin: warm, dry  Neurologic: following commands, moving all extremities. Drowsy, but easily aroused. Psychiatric: full affect, no hallucinations  Other:       HISTORY:     Principal Problem:    Multiple fractures of both lower extremities, closed, initial encounter (11/22/2017)    Active Problems:    Bipolar 1 disorder ([address] (5/3/2012)      UTI (urinary tract infection) (6/1/2012)      Severe protein-calorie malnutrition ([address] (6/1/2012)      Seizure disorder ([address] (6/24/2012)      Depression (6/24/2012)      Chronic pain (11/23/2017)      Bedridden (11/23/2017)      Sepsis ([address] (11/24/2017)      Acute pain due to injury- multiple LE fractures (11/24/2017)      Ileus ([address] (11/27/2017)      Acute respiratory distress (11/27/2017)      Therapeutic opioid-induced constipation (OIC) (11/27/2017) Tachycardia (11/27/2017)      SBO (small bowel obstruction) (11/29/2017)      Past Medical History:   Diagnosis Date    Acute pain due to injury- multiple LE fractures 11/24/2017    Anemia NEC     history of blood transfusion    Anxiety     panic attacks    Arthritis     Bipolar 1 disorder (HCC)     Chronic back pain     Depression     Elevated diaphragm 5/4/2012    Fatigue     along with weakness    GERD (gastroesophageal reflux disease)     Headache(784.0)     Hypertension     pt denies    Other ill-defined conditions(799.89) swallowing issues, decubitus    Psychiatric disorder     Seizures (HCC)     Therapeutic opioid-induced constipation (OIC) 11/27/2017    Thromboembolus ([facility]     TIA (transient ischemic attack)       Past Surgical History:   Procedure Laterality Date    COLONOSCOPY N/A 2/21/2017    COLONOSCOPY [facility] ANESTHESIA, PATIENT IS IN ROOM 358 performed by Angelic Reardon MD at [address] [facility] COLONOSCOPY N/A 2/22/2017    COLONOSCOPY performed by Angelic Reardon MD at [facility], COLON, DIAGNOSTIC      over 20 years ago    ENDOSCOPY, COLON, DIAGNOSTIC  9/24/09    HX APPENDECTOMY  11/6/84    HX BACK SURGERY  11/24/98    severe back problems    HX CHOLECYSTECTOMY  11/6/84    HX GASTRIC BYPASS  1995    Patricio    HX OTHER SURGICAL      IVC filter    HX REFRACTIVE SURGERY      HX TUBAL LIGATION  1991      Family History   Problem Relation Age of Onset    Heart Disease Mother    Mayme Spinner Mother     Stroke Father     Hypertension Father     Arthritis-rheumatoid Father      History reviewed, no pertinent family history.   Social History   Substance Use Topics    Smoking status: Never Smoker    Smokeless tobacco: Never Used    Alcohol use No     Allergies   Allergen Reactions    Ceftriaxone Seizures    Demerol [Meperidine] Unknown (comments)    Seafood [Shellfish Containing Products] Unknown (comments)      Current Facility-Administered Medications   Medication Dose Route Frequency    ketorolac (TORADOL) injection 15 mg  15 mg IntraVENous Q8H PRN    levETIRAcetam (KEPPRA) 1,000 mg in saline (iso-osm) 100 ml IVPB  1,000 mg IntraVENous Q12H    dextrose 5% - 0.9% NaCl with KCl 20 mEq/L infusion  75 mL/hr IntraVENous CONTINUOUS    lubiPROStone (AMITIZA) capsule 8 mcg  8 mcg Oral BID WITH MEALS    enoxaparin (LOVENOX) injection 40 mg  40 mg SubCUTAneous Q24H    metoprolol (LOPRESSOR) injection 2.5 mg  2.5 mg IntraVENous Q6H PRN    polyethylene glycol (MIRALAX) packet 17 g  17 g Oral BID    bisacodyl (DULCOLAX) suppository 10 mg  10 mg Rectal DAILY PRN    senna (SENOKOT) tablet 17.2 mg  2 Tab Oral QHS    naloxone (NARCAN) injection 0.4 mg  0.4 mg IntraVENous EVERY 2 MINUTES AS NEEDED    dilTIAZem (CARDIZEM) 100 mg in 0.9% sodium chloride (MBP/ADV) 100 mL infusion  5 mg/hr IntraVENous CONTINUOUS    phenol throat spray (CHLORASEPTIC) 1 Spray  1 Spray Oral PRN    naloxegol (MOVANTIK) tablet 25 mg  25 mg Oral ACB    gabapentin (NEURONTIN) capsule 400 mg  400 mg Oral TID    meropenem (MERREM) 500 mg in sterile water (preservative free) 10 mL IV syringe  500 mg IntraVENous Q8H    fentaNYL (DURAGESIC) 12 mcg/hr patch 1 Patch  1 Patch TransDERmal Q72H    multivitamin, tx-iron-ca-min (THERA-M w/ IRON) tablet 1 Tab  1 Tab Oral DAILY    acetaminophen (TYLENOL) tablet 650 mg  650 mg Oral AC&HS    oxyCODONE IR (ROXICODONE) tablet 5 mg  5 mg Oral Q4H PRN    promethazine (PHENERGAN) 25 mg in 0.9% sodium chloride 50 mL IVPB  25 mg IntraVENous Q6H PRN    sodium chloride (NS) flush 5-10 mL  5-10 mL IntraVENous PRN    ondansetron (ZOFRAN) injection 4 mg  4 mg IntraVENous Q4H PRN    DULoxetine (CYMBALTA) capsule 30 mg  30 mg Oral BID    levothyroxine (SYNTHROID) tablet 75 mcg  75 mcg Oral ACB    miSOPROStol (CYTOTEC) tablet 100 mcg  100 mcg Oral BID    pantoprazole (PROTONIX) tablet 40 mg  40 mg Oral BID    lacosamide (VIMPAT) tablet 200 mg  200 mg Oral BID    predniSONE (DELTASONE) tablet 5 mg  5 mg Oral DAILY    rOPINIRole (REQUIP) tablet 0.75 mg  0.75 mg Oral QHS    mirabegron ER (MYRBETRIQ) tablet 50 mg  50 mg Oral QHS    OLANZapine (ZyPREXA) tablet 7.5 mg  7.5 mg Oral QHS    sodium chloride (OCEAN) 0.65 % nasal spray 2 Spray  2 Spray Both Nostrils Q2H PRN        LAB AND IMAGING FINDINGS:     Lab Results   Component Value Date/Time    WBC 4.7 11/30/2017 04:01 AM    HGB 9.8 11/30/2017 04:01 AM    PLATELET 259 45/84/5562 04:01 AM     Lab Results   Component Value Date/Time    Sodium 132 11/30/2017 04:01 AM    Potassium 4.0 11/30/2017 04:01 AM    Chloride 93 11/30/2017 04:01 AM    CO2 32 11/30/2017 04:01 AM    BUN 12 11/30/2017 04:01 AM    Creatinine 0.42 11/30/2017 04:01 AM    Calcium 7.6 11/30/2017 04:01 AM    Magnesium 1.6 12/01/2017 04:27 AM    Phosphorus 3.1 09/20/2012 06:15 AM      Lab Results   Component Value Date/Time    AST (SGOT) 18 11/30/2017 04:01 AM    Alk.  phosphatase 101 11/30/2017 04:01 AM    Protein, total 5.1 11/30/2017 04:01 AM    Albumin 2.2 11/30/2017 04:01 AM    Globulin 2.9 11/30/2017 04:01 AM     Lab Results   Component Value Date/Time    INR 0.9 08/27/2017 02:00 AM    Prothrombin time 11.2 08/27/2017 02:00 AM    aPTT 30.6 07/22/2017 05:00 AM      No results found for: IRON, FE, TIBC, IBCT, PSAT, FERR   No results found for: PH, PCO2, PO2  No components found for: [place]   Lab Results   Component Value Date/Time    CK 81 11/27/2017 02:46 PM    CK - MB 2.2 11/27/2017 02:46 PM              Total time: 15 min  Counseling / coordination time, spent as noted above: 12  > 50% counseling / coordination        Grecia Echavarria MD  Palliative Medicine

## 2017-12-01 NOTE — PROGRESS NOTES
Problem: Falls - Risk of  Goal: *Absence of Falls  Document Quique Fall Risk and appropriate interventions in the flowsheet.    Outcome: Progressing Towards Goal  Fall Risk Interventions:  Mobility Interventions: Communicate number of staff needed for ambulation/transfer, Patient to call before getting OOB, PT Consult for mobility concerns, PT Consult for assist device competence    Mentation Interventions: Adequate sleep, hydration, pain control, Increase mobility, More frequent rounding, Reorient patient, Room close to nurse's station, Toileting rounds, Update white board    Medication Interventions: Patient to call before getting OOB, Teach patient to arise slowly    Elimination Interventions: Call light in reach, Patient to call for help with toileting needs, Toilet paper/wipes in reach, Toileting schedule/hourly rounds    History of Falls Interventions: Room close to nurse's station

## 2017-12-02 LAB
ANION GAP SERPL CALC-SCNC: 2 MMOL/L (ref 3–18)
BUN SERPL-MCNC: 2 MG/DL (ref 7–18)
BUN/CREAT SERPL: 11 (ref 12–20)
CALCIUM SERPL-MCNC: 7.2 MG/DL (ref 8.5–10.1)
CHLORIDE SERPL-SCNC: 99 MMOL/L (ref 100–108)
CO2 SERPL-SCNC: 37 MMOL/L (ref 21–32)
CREAT SERPL-MCNC: 0.19 MG/DL (ref 0.6–1.3)
GLUCOSE BLD STRIP.AUTO-MCNC: 115 MG/DL (ref 70–110)
GLUCOSE BLD STRIP.AUTO-MCNC: 115 MG/DL (ref 70–110)
GLUCOSE BLD STRIP.AUTO-MCNC: 116 MG/DL (ref 70–110)
GLUCOSE BLD STRIP.AUTO-MCNC: 116 MG/DL (ref 70–110)
GLUCOSE BLD STRIP.AUTO-MCNC: 131 MG/DL (ref 70–110)
GLUCOSE SERPL-MCNC: 100 MG/DL (ref 74–99)
MAGNESIUM SERPL-MCNC: 1.9 MG/DL (ref 1.6–2.6)
MAGNESIUM SERPL-MCNC: 2 MG/DL (ref 1.6–2.6)
MAGNESIUM SERPL-MCNC: 2.1 MG/DL (ref 1.6–2.6)
PHOSPHATE SERPL-MCNC: 2.5 MG/DL (ref 2.5–4.9)
POTASSIUM SERPL-SCNC: 2.5 MMOL/L (ref 3.5–5.5)
POTASSIUM SERPL-SCNC: 3.7 MMOL/L (ref 3.5–5.5)
POTASSIUM SERPL-SCNC: 3.8 MMOL/L (ref 3.5–5.5)
SODIUM SERPL-SCNC: 138 MMOL/L (ref 136–145)

## 2017-12-02 PROCEDURE — 74011250637 HC RX REV CODE- 250/637: Performed by: INTERNAL MEDICINE

## 2017-12-02 PROCEDURE — 83735 ASSAY OF MAGNESIUM: CPT | Performed by: INTERNAL MEDICINE

## 2017-12-02 PROCEDURE — 74011250636 HC RX REV CODE- 250/636: Performed by: INTERNAL MEDICINE

## 2017-12-02 PROCEDURE — 74011250637 HC RX REV CODE- 250/637: Performed by: FAMILY MEDICINE

## 2017-12-02 PROCEDURE — 82962 GLUCOSE BLOOD TEST: CPT

## 2017-12-02 PROCEDURE — 74011000250 HC RX REV CODE- 250: Performed by: HOSPITALIST

## 2017-12-02 PROCEDURE — 83735 ASSAY OF MAGNESIUM: CPT | Performed by: HOSPITALIST

## 2017-12-02 PROCEDURE — 74011250636 HC RX REV CODE- 250/636: Performed by: HOSPITALIST

## 2017-12-02 PROCEDURE — 74011000258 HC RX REV CODE- 258: Performed by: HOSPITALIST

## 2017-12-02 PROCEDURE — 65270000029 HC RM PRIVATE

## 2017-12-02 PROCEDURE — 74011636637 HC RX REV CODE- 636/637: Performed by: INTERNAL MEDICINE

## 2017-12-02 PROCEDURE — 36592 COLLECT BLOOD FROM PICC: CPT

## 2017-12-02 PROCEDURE — 77010033678 HC OXYGEN DAILY

## 2017-12-02 PROCEDURE — 84100 ASSAY OF PHOSPHORUS: CPT | Performed by: HOSPITALIST

## 2017-12-02 PROCEDURE — 84132 ASSAY OF SERUM POTASSIUM: CPT | Performed by: HOSPITALIST

## 2017-12-02 PROCEDURE — 74011250637 HC RX REV CODE- 250/637: Performed by: HOSPITALIST

## 2017-12-02 PROCEDURE — 84132 ASSAY OF SERUM POTASSIUM: CPT | Performed by: INTERNAL MEDICINE

## 2017-12-02 RX ORDER — DILTIAZEM HYDROCHLORIDE 180 MG/1
180 CAPSULE, COATED, EXTENDED RELEASE ORAL DAILY
Status: DISCONTINUED | OUTPATIENT
Start: 2017-12-02 | End: 2017-12-04 | Stop reason: HOSPADM

## 2017-12-02 RX ORDER — FLUCONAZOLE 100 MG/1
150 TABLET ORAL
Status: COMPLETED | OUTPATIENT
Start: 2017-12-02 | End: 2017-12-02

## 2017-12-02 RX ORDER — LEVETIRACETAM 500 MG/1
1000 TABLET ORAL 2 TIMES DAILY
Status: DISCONTINUED | OUTPATIENT
Start: 2017-12-02 | End: 2017-12-04 | Stop reason: HOSPADM

## 2017-12-02 RX ORDER — POTASSIUM CHLORIDE 20 MEQ/1
40 TABLET, EXTENDED RELEASE ORAL
Status: COMPLETED | OUTPATIENT
Start: 2017-12-02 | End: 2017-12-02

## 2017-12-02 RX ORDER — POTASSIUM CHLORIDE 7.45 MG/ML
10 INJECTION INTRAVENOUS
Status: COMPLETED | OUTPATIENT
Start: 2017-12-02 | End: 2017-12-02

## 2017-12-02 RX ORDER — POTASSIUM CHLORIDE 20 MEQ/1
40 TABLET, EXTENDED RELEASE ORAL ONCE
Status: COMPLETED | OUTPATIENT
Start: 2017-12-02 | End: 2017-12-02

## 2017-12-02 RX ADMIN — DULOXETINE HYDROCHLORIDE 30 MG: 30 CAPSULE, DELAYED RELEASE ORAL at 22:28

## 2017-12-02 RX ADMIN — LUBIPROSTONE 8 MCG: 8 CAPSULE, GELATIN COATED ORAL at 16:54

## 2017-12-02 RX ADMIN — ACETAMINOPHEN 650 MG: 325 TABLET ORAL at 22:28

## 2017-12-02 RX ADMIN — MISOPROSTOL 100 MCG: 100 TABLET ORAL at 22:28

## 2017-12-02 RX ADMIN — OXYCODONE HYDROCHLORIDE 5 MG: 5 TABLET ORAL at 06:31

## 2017-12-02 RX ADMIN — LEVETIRACETAM 1000 MG: 500 TABLET ORAL at 22:27

## 2017-12-02 RX ADMIN — LEVETIRACETAM 1000 MG: 10 INJECTION INTRAVENOUS at 00:02

## 2017-12-02 RX ADMIN — LUBIPROSTONE 8 MCG: 8 CAPSULE, GELATIN COATED ORAL at 08:44

## 2017-12-02 RX ADMIN — PANTOPRAZOLE SODIUM 40 MG: 40 TABLET, DELAYED RELEASE ORAL at 08:44

## 2017-12-02 RX ADMIN — GABAPENTIN 400 MG: 400 CAPSULE ORAL at 16:54

## 2017-12-02 RX ADMIN — ROPINIROLE HYDROCHLORIDE 0.75 MG: 0.25 TABLET, FILM COATED ORAL at 22:28

## 2017-12-02 RX ADMIN — MEROPENEM 500 MG: 500 INJECTION, POWDER, FOR SOLUTION INTRAVENOUS at 15:14

## 2017-12-02 RX ADMIN — POTASSIUM CHLORIDE 10 MEQ: 10 INJECTION, SOLUTION INTRAVENOUS at 05:18

## 2017-12-02 RX ADMIN — FLUCONAZOLE 150 MG: 100 TABLET ORAL at 10:33

## 2017-12-02 RX ADMIN — LEVOTHYROXINE SODIUM 75 MCG: 75 TABLET ORAL at 06:31

## 2017-12-02 RX ADMIN — DULOXETINE HYDROCHLORIDE 30 MG: 30 CAPSULE, DELAYED RELEASE ORAL at 08:45

## 2017-12-02 RX ADMIN — PANTOPRAZOLE SODIUM 40 MG: 40 TABLET, DELAYED RELEASE ORAL at 22:28

## 2017-12-02 RX ADMIN — POTASSIUM CHLORIDE 40 MEQ: 20 TABLET, EXTENDED RELEASE ORAL at 08:44

## 2017-12-02 RX ADMIN — OXYCODONE HYDROCHLORIDE 5 MG: 5 TABLET ORAL at 23:39

## 2017-12-02 RX ADMIN — KETOROLAC TROMETHAMINE 15 MG: 15 INJECTION, SOLUTION INTRAMUSCULAR; INTRAVENOUS at 02:12

## 2017-12-02 RX ADMIN — OXYCODONE HYDROCHLORIDE 5 MG: 5 TABLET ORAL at 16:54

## 2017-12-02 RX ADMIN — ACETAMINOPHEN 650 MG: 325 TABLET ORAL at 16:54

## 2017-12-02 RX ADMIN — KETOROLAC TROMETHAMINE 15 MG: 15 INJECTION, SOLUTION INTRAMUSCULAR; INTRAVENOUS at 10:24

## 2017-12-02 RX ADMIN — PREDNISONE 5 MG: 5 TABLET ORAL at 08:45

## 2017-12-02 RX ADMIN — MISOPROSTOL 100 MCG: 100 TABLET ORAL at 08:45

## 2017-12-02 RX ADMIN — OLANZAPINE 7.5 MG: 2.5 TABLET ORAL at 22:28

## 2017-12-02 RX ADMIN — MIRABEGRON 50 MG: 25 TABLET, FILM COATED, EXTENDED RELEASE ORAL at 00:02

## 2017-12-02 RX ADMIN — POTASSIUM CHLORIDE 40 MEQ: 20 TABLET, EXTENDED RELEASE ORAL at 05:18

## 2017-12-02 RX ADMIN — DILTIAZEM HYDROCHLORIDE 180 MG: 180 CAPSULE, COATED, EXTENDED RELEASE ORAL at 13:21

## 2017-12-02 RX ADMIN — MEROPENEM 500 MG: 500 INJECTION, POWDER, FOR SOLUTION INTRAVENOUS at 22:28

## 2017-12-02 RX ADMIN — LACOSAMIDE 200 MG: 100 TABLET, FILM COATED ORAL at 08:44

## 2017-12-02 RX ADMIN — OXYCODONE HYDROCHLORIDE 5 MG: 5 TABLET ORAL at 02:12

## 2017-12-02 RX ADMIN — ACETAMINOPHEN 650 MG: 325 TABLET ORAL at 10:33

## 2017-12-02 RX ADMIN — MEROPENEM 500 MG: 500 INJECTION, POWDER, FOR SOLUTION INTRAVENOUS at 06:30

## 2017-12-02 RX ADMIN — ENOXAPARIN SODIUM 40 MG: 40 INJECTION SUBCUTANEOUS at 10:23

## 2017-12-02 RX ADMIN — MEROPENEM 500 MG: 500 INJECTION, POWDER, FOR SOLUTION INTRAVENOUS at 00:02

## 2017-12-02 RX ADMIN — LEVETIRACETAM 1000 MG: 10 INJECTION INTRAVENOUS at 10:41

## 2017-12-02 RX ADMIN — MIRABEGRON 50 MG: 25 TABLET, FILM COATED, EXTENDED RELEASE ORAL at 23:38

## 2017-12-02 RX ADMIN — ACETAMINOPHEN 650 MG: 325 TABLET ORAL at 06:31

## 2017-12-02 RX ADMIN — SODIUM CHLORIDE 5 MG/HR: 900 INJECTION, SOLUTION INTRAVENOUS at 05:17

## 2017-12-02 RX ADMIN — POTASSIUM CHLORIDE 10 MEQ: 10 INJECTION, SOLUTION INTRAVENOUS at 08:38

## 2017-12-02 RX ADMIN — LACOSAMIDE 200 MG: 100 TABLET, FILM COATED ORAL at 22:27

## 2017-12-02 RX ADMIN — GABAPENTIN 400 MG: 400 CAPSULE ORAL at 22:28

## 2017-12-02 RX ADMIN — GABAPENTIN 400 MG: 400 CAPSULE ORAL at 08:45

## 2017-12-02 RX ADMIN — POTASSIUM CHLORIDE: 2 INJECTION, SOLUTION, CONCENTRATE INTRAVENOUS at 16:51

## 2017-12-02 NOTE — PROGRESS NOTES
Hospitalist Progress Note    Patient: Nithin Gregg MRN: [id number]  CSN: [id number]    YOB: 1950  Age: 78 y.o. Sex: female    DOA: 11/22/2017 LOS:  LOS: 10 days            Assessment/Plan     Principal Problem:    Multiple fractures of both lower extremities, closed, initial encounter (11/22/2017)    Active Problems:    UTI (urinary tract infection) (6/1/2012)      Severe protein-calorie malnutrition ([address] (6/1/2012)      Bipolar 1 disorder ([address] (5/3/2012)      Seizure disorder ([address] (6/24/2012)      Depression (6/24/2012)      Chronic pain (11/23/2017)      Bedridden (11/23/2017)      Sepsis ([address] (11/24/2017)      Acute pain due to injury- multiple LE fractures (11/24/2017)      Ileus ([address] (11/27/2017)      Acute respiratory distress (11/27/2017)      Therapeutic opioid-induced constipation (OIC) (11/27/2017)      Tachycardia (11/27/2017)      SBO (small bowel obstruction) (11/29/2017)    Acute respiratory distress:  Resolved   v/q scan  Low probability for pulmonary embolus  Due to pressure from abdomen     Ileus: Resolved. Off the NG tube last night. Full liquid today, will DC TPM if she tolerates the food. Ct scan: constipation , sbo, ng tube suction   Reviewed  Dr. Raza Abreu /Dr. Dennie Neigh recommendation     Sepsis/UTI, chronic urinary cath   leukocytosis resolved    f/u blood Cx No growth so far   UCX: ENTEROBACTER CLOACAE -, PROTEUS MIRABILIS    Continue merrem -day 9 of abx     Candidiasis/vaginal itching: Diflucan      Multiple lower extremity fractures:  Brace arrived , refused to put on -revise from vendor-waiting for coming back-but pt said\" not use them\"   Not a surgery candidate,   Ortho on board -will call ortho for new splint     Anemia: Chronic. H/h stable ,     Tachycardia: switch to  Po cardizem today      Seizure:Seizure precaution,  Continue home dose of keppra, Vimpat      Chronic pain:   Pain all over, continue pain control       Bipolar: Continue Zyprexa     Mal-nutrition   tpn-consult pharm  Will monitor electrolytes carefully      Poor prognosis, palliative care on board , remained dnr/i        CC: SBO, hypokalemia, bipolar, malnutrition           Subjective:     Pt was seen and examined with the nurse in the morning round. \" I will not use the brace, I will go home from here, not going to rehab. my  can take care of me or hire sb\"    C/O vaginal itching. Reports that she usually get the yeast infection with Abx. Review of systems  General: No fevers or chills. Cardiovascular: No chest pain or pressure. No palpitations. Pulmonary: No cough, SOB  Gastrointestinal: No nausea, vomiting. Objective:      Visit Vitals    /51 (BP 1 Location: Left arm, BP Patient Position: At rest;Supine; Head of bed elevated (Comment degrees))    Pulse 81    Temp 97.4 °F (36.3 °C)    Resp 14    Ht 5' 6.14\" (1.68 m)    Wt 74.3 kg (163 lb 12.8 oz)    SpO2 100%    BMI 26.33 kg/m2       Physical Exam:    Gen: NAD, chronic ill appearing. Heent:  MMM, NC, AT. Cor: s1s2 RRR. No MRG. PMI mid 5th intercostal space. Resp:  CTA b/l. No w/r/r. Nml effort and diaphragmatic excursion. Abd:  NT ND.  BS positive. No rebound or guarding. No masses. Ext: ACE bandage to bilateral legs.         Intake and Output:  Current Shift:     Last three shifts:  11/30 1901 - 12/02 0700  In: 2737.4 [I.V.:2437.4]  Out: 1071 [Urine:4450]    Labs: Results:       Chemistry Recent Labs      12/02/17   0320  12/01/17   1250  11/30/17   0401   GLU  100*  126*  94   NA  138  137  132*   K  2.5*  2.2*  4.0   CL  99*  98*  93*   CO2  37*  35*  32   BUN  2*  2*  12   CREA  0.19*  0.29*  0.42*   CA  7.2*  7.2*  7.6*   AGAP  2*  4  7   BUCR  11*  7*  29*   AP   --   108  101   TP   --   4.7*  5.1*   ALB   --   2.0*  2.2*   GLOB   --   2.7  2.9   AGRAT   --   0.7*  0.8      CBC w/Diff Recent Labs      11/30/17   0401   WBC  4.7   RBC  3.39*   HGB  9.8*   HCT  29.9*   PLT  320 Cardiac Enzymes No results for input(s): CPK, CKND1, PAOLA in the last 72 hours. No lab exists for component: CKRMB, TROIP   Coagulation No results for input(s): PTP, INR, APTT in the last 72 hours. No lab exists for component: INREXT    Lipid Panel Lab Results   Component Value Date/Time    Triglyceride 68 12/01/2017 12:50 PM      BNP No results for input(s): BNPP in the last 72 hours.    Liver Enzymes Recent Labs      12/01/17   1250   TP  4.7*   ALB  2.0*   AP  108   SGOT  23      Thyroid Studies Lab Results   Component Value Date/Time    TSH 17.30 08/27/2017 02:00 AM        Procedures/imaging: see electronic medical records for all procedures/Xrays and details which were not copied into this note but were reviewed prior to creation of Plan      Medications Reviewed  Richy Tam MD

## 2017-12-02 NOTE — PROGRESS NOTES
Consult for TPN Dosing per Pharmacy by Dr. Carrington White provided for this 78 y.o. female, for indication of ileus  Day of Therapy 2  Ht = 66 inches   Wt = 71.7 kg  IBW = 52.4 kg       Dosing Weight = 57 kg    Labs:  BMP BMP:   Lab Results   Component Value Date/Time     12/02/2017 03:20 AM    K 2.5 (LL) 12/02/2017 03:20 AM    CL 99 (L) 12/02/2017 03:20 AM    CO2 37 (H) 12/02/2017 03:20 AM    AGAP 2 (L) 12/02/2017 03:20 AM     (H) 12/02/2017 03:20 AM    BUN 2 (L) 12/02/2017 03:20 AM    CREA 0.19 (L) 12/02/2017 03:20 AM    GFRAA >60 12/02/2017 03:20 AM    GFRNA >60 12/02/2017 03:20 AM          CMP CMP:   Lab Results   Component Value Date/Time     12/02/2017 03:20 AM    K 2.5 (LL) 12/02/2017 03:20 AM    CL 99 (L) 12/02/2017 03:20 AM    CO2 37 (H) 12/02/2017 03:20 AM    AGAP 2 (L) 12/02/2017 03:20 AM     (H) 12/02/2017 03:20 AM    BUN 2 (L) 12/02/2017 03:20 AM    CREA 0.19 (L) 12/02/2017 03:20 AM    GFRAA >60 12/02/2017 03:20 AM    GFRNA >60 12/02/2017 03:20 AM    CA 7.2 (L) 12/02/2017 03:20 AM    MG 1.9 12/02/2017 03:20 AM    PHOS 2.5 12/02/2017 03:20 AM    ALB 2.0 (L) 12/01/2017 12:50 PM    TP 4.7 (L) 12/01/2017 12:50 PM    GLOB 2.7 12/01/2017 12:50 PM    AGRAT 0.7 (L) 12/01/2017 12:50 PM    SGOT 23 12/01/2017 12:50 PM    ALT 25 12/01/2017 12:50 PM         Ca/ Phos Lab Results   Component Value Date/Time    Calcium 7.2 12/02/2017 03:20 AM    Phosphorus 2.5 12/02/2017 03:20 AM       Mag    Lab Results   Component Value Date/Time    Magnesium 1.9 12/02/2017 03:20 AM      Tg No components found for: TGL   Albumin Lab Results   Component Value Date/Time    Protein, total 4.7 12/01/2017 12:50 PM    Albumin 2.0 12/01/2017 12:50 PM         Kcal requirements:  25 kcal/kg = 1425 kcal/day     Goal Macronutrients:  Protein  1.2 g/kg/day                68 g/day = 272 kcal  Lipids    20% of total kcal         32 g/day = 285 kcal  Dextrose remainder of total kcal   255 g/day = 868 kcal    TPN increase to 3/4 goal macronutrients today and titrated to goal per patient tolerance. Electrolytes to be monitored and adjusted daily. MD to replete electrolytes acutely outside of TPN as needed. Additional recommendations/Orders:   1. Corrective insulin coverage q6h while on TPN. 2. Will increase NaCl to 170 mEq  3. Will increase KCl to 50 mEq  4. BMP, Mag, Phos ordered daily  5. Triglycerides, Prealbumin, CMP ordered upon initiation and weekly    Pharmacy to follow daily and will make changes based on labs/clinical status.

## 2017-12-02 NOTE — ROUTINE PROCESS
Bedside and Verbal shift change report given to Bernarda Aguayo RN (oncoming nurse) by Kenrick Dacosta RN (offgoing nurse). Report included the following information SBAR, Kardex, Intake/Output, MAR, Recent Results, Med Rec Status and Cardiac Rhythm SR BBB.

## 2017-12-02 NOTE — ROUTINE PROCESS
Bedside and Verbal shift change report given to Francisco Jin RN (oncoming nurse) by Em Camejo RN (offgoing nurse). Report given with SBAR, Procedure Summary, Intake/Output, MAR and Recent Results.

## 2017-12-02 NOTE — ROUTINE PROCESS
Bedside shift change report given to [facility] (oncoming nurse) by Barrera Camejo RN (offgoing nurse). Report included the following information SBAR, Kardex and MAR. Alarm parameters reviewed, on and audible Appropriate for patient clinical condition   Covered with RN that this RN cleaned and rewrapped pt splints and pt refusal for braces for BLE.

## 2017-12-02 NOTE — PROGRESS NOTES
1915:  Bedside and Verbal shift change report received from [employer]. RN (offgoing nurse) to Mabel De Leon RN (oncoming nurse). Report included the following information SBAR, Kardex, Intake/Output, MAR, Recent Results and Cardiac Rhythm SA/ST. Pt resting in bed receiving SPA treatment. Appears to be in no distress at this time. Call bell within reach. Zone phone number given to pt. Pt instructed to call zone phone or call bell if needed. 0430:  Critical lab called by [facility]; potassium 2.5. Spoke to Dr. Sathish Gomez, received order for order of 40 mEq of potassium PO now and again at 0900 and 2 runs of potassium 10 mEq in 100 mL. Shift summary:  Pt rested comfortably during shift.

## 2017-12-02 NOTE — PROGRESS NOTES
Problem: Falls - Risk of  Goal: *Absence of Falls  Document Quique Fall Risk and appropriate interventions in the flowsheet.    Outcome: Progressing Towards Goal  Fall Risk Interventions:  Mobility Interventions: Communicate number of staff needed for ambulation/transfer, OT consult for ADLs, PT Consult for mobility concerns, PT Consult for assist device competence    Mentation Interventions: Adequate sleep, hydration, pain control    Medication Interventions: Patient to call before getting OOB    Elimination Interventions: Call light in reach    History of Falls Interventions: Consult care management for discharge planning, Room close to nurse's station

## 2017-12-02 NOTE — PROGRESS NOTES
Called by Dr. Oscar Terry about this patient. No evidence of obstruction per Dr. Joe Kraus and Jacoby Teran. Rec advancing diet. GI input as suggested by Dr. Joe Kraus. Will sign off.

## 2017-12-02 NOTE — PROGRESS NOTES
0715:  Assumed care of patient. Patient resting in bed. Call bell and phone left in reach. 1126:  Reviewed IV cardizem and IV keppra with Dr Cat Marie as tolerating all other po medications. Patient also requesting to eat. Tolerating ginger ale well when taking other po medications. Dr. Cat Marie to review. 1330:  Cardizem IV discontinued, po given. Will monitor HR. Patient ate tomato soup for lunch, tolerated well.    1628:  Patient with uneventful shift aside from above. HR remains controlled. Left resting in bed in no acute distress.

## 2017-12-03 LAB
ANION GAP SERPL CALC-SCNC: 5 MMOL/L (ref 3–18)
APPEARANCE UR: CLEAR
BACTERIA URNS QL MICRO: ABNORMAL /HPF
BILIRUB UR QL: NEGATIVE
BUN SERPL-MCNC: 4 MG/DL (ref 7–18)
BUN/CREAT SERPL: 13 (ref 12–20)
CALCIUM SERPL-MCNC: 7.5 MG/DL (ref 8.5–10.1)
CHLORIDE SERPL-SCNC: 101 MMOL/L (ref 100–108)
CO2 SERPL-SCNC: 33 MMOL/L (ref 21–32)
COLOR UR: YELLOW
CREAT SERPL-MCNC: 0.3 MG/DL (ref 0.6–1.3)
EPITH CASTS URNS QL MICRO: ABNORMAL /LPF (ref 0–5)
GLUCOSE BLD STRIP.AUTO-MCNC: 106 MG/DL (ref 70–110)
GLUCOSE BLD STRIP.AUTO-MCNC: 140 MG/DL (ref 70–110)
GLUCOSE BLD STRIP.AUTO-MCNC: 97 MG/DL (ref 70–110)
GLUCOSE SERPL-MCNC: 93 MG/DL (ref 74–99)
GLUCOSE UR STRIP.AUTO-MCNC: NEGATIVE MG/DL
HGB UR QL STRIP: NEGATIVE
KETONES UR QL STRIP.AUTO: NEGATIVE MG/DL
LEUKOCYTE ESTERASE UR QL STRIP.AUTO: ABNORMAL
MAGNESIUM SERPL-MCNC: 1.9 MG/DL (ref 1.6–2.6)
MAGNESIUM SERPL-MCNC: 2 MG/DL (ref 1.6–2.6)
NITRITE UR QL STRIP.AUTO: NEGATIVE
PH UR STRIP: 7.5 [PH] (ref 5–8)
PHOSPHATE SERPL-MCNC: 2.2 MG/DL (ref 2.5–4.9)
POTASSIUM SERPL-SCNC: 4 MMOL/L (ref 3.5–5.5)
POTASSIUM SERPL-SCNC: 4.4 MMOL/L (ref 3.5–5.5)
PROT UR STRIP-MCNC: NEGATIVE MG/DL
RBC #/AREA URNS HPF: ABNORMAL /HPF (ref 0–5)
SODIUM SERPL-SCNC: 139 MMOL/L (ref 136–145)
SP GR UR REFRACTOMETRY: 1 (ref 1–1.03)
UROBILINOGEN UR QL STRIP.AUTO: 0.2 EU/DL (ref 0.2–1)
WBC URNS QL MICRO: ABNORMAL /HPF (ref 0–5)
YEAST URNS QL MICRO: ABNORMAL

## 2017-12-03 PROCEDURE — 65270000029 HC RM PRIVATE

## 2017-12-03 PROCEDURE — 74011250637 HC RX REV CODE- 250/637: Performed by: HOSPITALIST

## 2017-12-03 PROCEDURE — 74011000250 HC RX REV CODE- 250: Performed by: HOSPITALIST

## 2017-12-03 PROCEDURE — 84132 ASSAY OF SERUM POTASSIUM: CPT | Performed by: HOSPITALIST

## 2017-12-03 PROCEDURE — 82962 GLUCOSE BLOOD TEST: CPT

## 2017-12-03 PROCEDURE — 83735 ASSAY OF MAGNESIUM: CPT | Performed by: INTERNAL MEDICINE

## 2017-12-03 PROCEDURE — 74011636637 HC RX REV CODE- 636/637: Performed by: INTERNAL MEDICINE

## 2017-12-03 PROCEDURE — 36592 COLLECT BLOOD FROM PICC: CPT

## 2017-12-03 PROCEDURE — 74011250637 HC RX REV CODE- 250/637: Performed by: FAMILY MEDICINE

## 2017-12-03 PROCEDURE — 84100 ASSAY OF PHOSPHORUS: CPT | Performed by: HOSPITALIST

## 2017-12-03 PROCEDURE — 74011250636 HC RX REV CODE- 250/636: Performed by: HOSPITALIST

## 2017-12-03 PROCEDURE — 77030027138 HC INCENT SPIROMETER -A

## 2017-12-03 PROCEDURE — 83735 ASSAY OF MAGNESIUM: CPT | Performed by: HOSPITALIST

## 2017-12-03 PROCEDURE — 81001 URINALYSIS AUTO W/SCOPE: CPT | Performed by: FAMILY MEDICINE

## 2017-12-03 PROCEDURE — 74011250637 HC RX REV CODE- 250/637: Performed by: INTERNAL MEDICINE

## 2017-12-03 RX ORDER — POLYETHYLENE GLYCOL 3350 17 G/17G
17 POWDER, FOR SOLUTION ORAL DAILY
Status: DISCONTINUED | OUTPATIENT
Start: 2017-12-03 | End: 2017-12-04 | Stop reason: HOSPADM

## 2017-12-03 RX ADMIN — MEROPENEM 500 MG: 500 INJECTION, POWDER, FOR SOLUTION INTRAVENOUS at 17:12

## 2017-12-03 RX ADMIN — PANTOPRAZOLE SODIUM 40 MG: 40 TABLET, DELAYED RELEASE ORAL at 21:34

## 2017-12-03 RX ADMIN — LEVETIRACETAM 1000 MG: 500 TABLET ORAL at 10:21

## 2017-12-03 RX ADMIN — OXYCODONE HYDROCHLORIDE 5 MG: 5 TABLET ORAL at 21:34

## 2017-12-03 RX ADMIN — DULOXETINE HYDROCHLORIDE 30 MG: 30 CAPSULE, DELAYED RELEASE ORAL at 21:34

## 2017-12-03 RX ADMIN — OLANZAPINE 7.5 MG: 2.5 TABLET ORAL at 21:34

## 2017-12-03 RX ADMIN — MISOPROSTOL 100 MCG: 100 TABLET ORAL at 10:22

## 2017-12-03 RX ADMIN — MISOPROSTOL 100 MCG: 100 TABLET ORAL at 21:34

## 2017-12-03 RX ADMIN — ENOXAPARIN SODIUM 40 MG: 40 INJECTION SUBCUTANEOUS at 10:23

## 2017-12-03 RX ADMIN — LUBIPROSTONE 8 MCG: 8 CAPSULE, GELATIN COATED ORAL at 10:21

## 2017-12-03 RX ADMIN — DULOXETINE HYDROCHLORIDE 30 MG: 30 CAPSULE, DELAYED RELEASE ORAL at 10:22

## 2017-12-03 RX ADMIN — PANTOPRAZOLE SODIUM 40 MG: 40 TABLET, DELAYED RELEASE ORAL at 10:23

## 2017-12-03 RX ADMIN — GABAPENTIN 400 MG: 400 CAPSULE ORAL at 10:22

## 2017-12-03 RX ADMIN — OXYCODONE HYDROCHLORIDE 5 MG: 5 TABLET ORAL at 07:00

## 2017-12-03 RX ADMIN — ROPINIROLE HYDROCHLORIDE 0.75 MG: 0.25 TABLET, FILM COATED ORAL at 21:34

## 2017-12-03 RX ADMIN — ACETAMINOPHEN 650 MG: 325 TABLET ORAL at 07:00

## 2017-12-03 RX ADMIN — LEVETIRACETAM 1000 MG: 500 TABLET ORAL at 21:34

## 2017-12-03 RX ADMIN — LUBIPROSTONE 8 MCG: 8 CAPSULE, GELATIN COATED ORAL at 17:12

## 2017-12-03 RX ADMIN — LACOSAMIDE 200 MG: 100 TABLET, FILM COATED ORAL at 10:21

## 2017-12-03 RX ADMIN — ACETAMINOPHEN 650 MG: 325 TABLET ORAL at 21:34

## 2017-12-03 RX ADMIN — MEROPENEM 500 MG: 500 INJECTION, POWDER, FOR SOLUTION INTRAVENOUS at 07:01

## 2017-12-03 RX ADMIN — DILTIAZEM HYDROCHLORIDE 180 MG: 180 CAPSULE, COATED, EXTENDED RELEASE ORAL at 10:22

## 2017-12-03 RX ADMIN — ACETAMINOPHEN 650 MG: 325 TABLET ORAL at 11:29

## 2017-12-03 RX ADMIN — MEROPENEM 500 MG: 500 INJECTION, POWDER, FOR SOLUTION INTRAVENOUS at 23:25

## 2017-12-03 RX ADMIN — OXYCODONE HYDROCHLORIDE 5 MG: 5 TABLET ORAL at 17:22

## 2017-12-03 RX ADMIN — ACETAMINOPHEN 650 MG: 325 TABLET ORAL at 17:12

## 2017-12-03 RX ADMIN — LEVOTHYROXINE SODIUM 75 MCG: 75 TABLET ORAL at 07:00

## 2017-12-03 RX ADMIN — MIRABEGRON 50 MG: 25 TABLET, FILM COATED, EXTENDED RELEASE ORAL at 23:25

## 2017-12-03 RX ADMIN — GABAPENTIN 400 MG: 400 CAPSULE ORAL at 17:12

## 2017-12-03 RX ADMIN — LACOSAMIDE 200 MG: 100 TABLET, FILM COATED ORAL at 21:34

## 2017-12-03 RX ADMIN — POLYETHYLENE GLYCOL 3350 17 G: 17 POWDER, FOR SOLUTION ORAL at 11:29

## 2017-12-03 RX ADMIN — PREDNISONE 5 MG: 5 TABLET ORAL at 10:22

## 2017-12-03 RX ADMIN — GABAPENTIN 400 MG: 400 CAPSULE ORAL at 21:34

## 2017-12-03 NOTE — PROGRESS NOTES
Problem: Falls - Risk of  Goal: *Absence of Falls  Document Quique Fall Risk and appropriate interventions in the flowsheet. Outcome: Progressing Towards Goal  Fall Risk Interventions:  Mobility Interventions: Communicate number of staff needed for ambulation/transfer, Patient to call before getting OOB, PT Consult for mobility concerns    Mentation Interventions: Adequate sleep, hydration, pain control, Increase mobility    Medication Interventions: Teach patient to arise slowly    Elimination Interventions: Call light in reach, Patient to call for help with toileting needs, Toileting schedule/hourly rounds    History of Falls Interventions:  Investigate reason for fall

## 2017-12-03 NOTE — PROGRESS NOTES
Spoke w/ Dr. Oscar Terry via telephone to recommend d/c TPN today as pt tolerating full liquid diet since yesterday per RN. Also dietitian note today recommends weaning TPN. Please reduce TPN rate by 50% for 1 hour then reduce rate by half again for another hour then d/c. Blood glucose levels should be obtained at each rate reduction and 6 hours after the TPN is stopped.   Sliding scale insulin will be d/c'd upon discontinuation of TPN unless recommended otherwise by diabetic educator or hospitalist.    Garland Shetty [address] [place] [address]  Clinical Pharmacist  [phone]

## 2017-12-03 NOTE — PROGRESS NOTES
80 Pt received from offgoing nurse without any signs or symptoms of distress. Pt vitals are stable and within normal limits. Pt bed in low position with wheels locked and call bell within reach. 1657 Assessment completed and documented in flow sheet. Pt denies any further needs at this time. Pt in NAD with bed in low position, wheels locked and call bell within reach. Scheduled medications administered as ordered. 2232 Reassessment completed with no changes noted. Bed locked, in lowest position, with call light within reach. Scheduled medications administered as ordered. 2245 Bedside and Verbal shift change report given to YOLY Díaz RN (oncoming nurse) by Felipe Vargas RN   (offgoing nurse). Report included the following information SBAR, Procedure Summary, Intake/Output, MAR and Recent Results.

## 2017-12-03 NOTE — CDMP QUERY
Please clarify if this patient is being treated/managed for:    => Encephalopathy in setting of sepsis/hypoxia /pain medication  =>Other Explanation of clinical findings  =>Unable to Determine (no explanation of clinical findings)    The medical record reflects the following:    Risk:  Bilateral fractures     Clinical Indicators: Documented on 11-26 by hospitalist AMS-\"Patient has multiple reasons to have altered mental status including sepsis and is on multiple medications that can cause it most recently/notably phenergan. Hypoxia can also cause it and will be worked up as above. Monitor. \"    Treatment:     Please clarify and document your clinical opinion in the progress notes and discharge summary including the definitive and/or presumptive diagnosis, (suspected or probable), related to the above clinical findings. Please include clinical findings supporting your diagnosis. If you DECLINE this query or would like to communicate with [facility], please utilize the \"[facility] message box\" at the TOP of the Progress Note on the right.       Thank you,  Sanjuanita Gonzalez RN [facility] [phone]

## 2017-12-03 NOTE — PROGRESS NOTES
2320: patient arrived on floor. VS were obtained, Medication was given, patient was orientated to room, nurse, and call light. Only complaint at this time is pain 10/10.     2340: Patient medicated for pain and report given to Nidia Curran RN for continuation of care. Bedside shift change report given to Nidia Curran Rn (oncoming nurse) by Nohemi Sheehan RN (offgoing nurse). Report included the following information SBAR, Kardex, Procedure Summary, MAR and Recent Results.

## 2017-12-03 NOTE — PROGRESS NOTES
NUTRITION FOLLOW-UP    RECOMMENDATIONS/PLAN:   - Please wean TPN, recc half bag today and then d/c tomorrow  - Replete phos  - will order ensure enlive BID to aid in meeting estimated needs  - Monitor PO intakes, encourage and document please  - Monitor labs/lytes, fluid status, skin integrity, bowel function    NUTRITION ASSESSMENT:   Client Update: 78 yrs old Female with multiple lower extremity fractures. Was placed on TPN for ileus of entire small bowel. Appears to have resolved. Per MD note TPN to be d/c today if pt tolerates diet. FOOD/NUTRITION INTAKE   Diet Order:  Full liquid  Supplements: receiving TPN   Food Allergies: seafood, shellfish  Average PO Intake:      Patient Vitals for the past 100 hrs:   % Diet Eaten   12/01/17 1004 0 %   12/01/17 0620 0 %   11/30/17 1900 0 %   11/29/17 1915 0 %      Pertinent Medications:  [x] Reviewed; insulin, synthroid, keppra, protonix, deltasone  Electrolyte Replacement Protocol: []K []Mg []PO4  Insulin:  []SSI  []Pre-meal   [x]Basal    []Drip  []None  Cultural/Buddhism Food Preferences: None Identified    BIOCHEMICAL DATA & MEDICAL TESTS  Pertinent Labs:  [x] Reviewed; phos-2.2   ANTHROPOMETRICS  Height: 5' 6.14\" (168 cm)       Weight: 75.3 kg (166 lb 1.6 oz)         BMI: 26.7 kg/m^2 overweight (25.0%-29.9% BMI)   Adm Weight: 152 lbs                Weight change: +14lbs  Adjusted Body Weight: N/A--will use previous estimated needs    NUTRITION-FOCUSED PHYSICAL ASSESSMENT  Skin: no pressure area per documentation, +2-3 pitting edema    GI: +BM 12/3    NUTRITION PRESCRIPTION  Calories: 1932 kcal/day based on 28kcal/kg  Protein: 83 g/day based on 1.2 g/kg  CHO: 242 g/day based on 50% of total energy  Fluid: 1932 ml/day based on 1 kcal/ml      NUTRITION DIAGNOSES:   1.  Increased energy needs related to increased needs to promote healing and repletion as evidenced by estimated needs, recent multiple fractures, hx of bed sore    NUTRITION INTERVENTIONS: INTERVENTIONS:        GOALS:  1. Commercial beverage: ensure enlive 1. PO intakes >75% of meals and supplements throughout the next 3 days     LEARNING NEEDS (Diet, Supplementation, Food/Nutrient-Drug Interaction):   [x] None Identified   [] Education provided/documented      Identified and patient: [] Declined   [] Was not appropriate/indicated        NUTRITION MONITORING /EVALUATION:   Adjust EN/PN as appropriate  Follow PO intake  Monitor wt  Monitor renal labs, electrolytes, fluid status  Monitor for additional supplement needs     Previous Recommendations Implemented: yes        Previous Goals Met:  yes       [] Participated in Interdisciplinary Rounds    [x] Interdisciplinary Care Plan Reviewed  DISCHARGE NUTRITION RECOMMENDATIONS ADDRESSED:        [x] To be determined closer to discharge    NUTRITION RISK:           [x] At risk                        [] Not currently at risk        Will follow-up per policy.   Trevor Navarrete, [address]  PAGER:  [phone]

## 2017-12-03 NOTE — PROGRESS NOTES
0005-Resting quietly in bed. Stable. TPN infusing at 75 mL/hr. Positive dorsalis pedis pulse, warm, sensation, and capillary refill to left and right lower extremity. Splint with ace wrap, thigh toes, left and right lower extremity, intact, no drainage. Right upper arm, double lumen, PICC infusing. Angelika area swelling noted with redness. Gotti catheter in place; patent, draining. Assessment complete. Call light and personal items within reach. 0310-Blood drawn via PICC line. Resting quietly. No complaints. 0427-No change from initial assessment. Stable. Resting quietly in bed.    0639-Changed for incontinence of bowel. Barrier cream placed to groin areas for redness. Mepilex to sacral/buttocks area still intact. Call light and personal items within reach. Shift Summary:  Patient pain level constantly 10/10; FLACC 0/10. Slept the majority of shift. Uneventful shift.

## 2017-12-03 NOTE — PROGRESS NOTES
1020-alert and oriented x 4. Lungs CTA, but diminished. BS active x 4 quads. Redness and swelling to groin and thighs, pro shield at bedside. Gotti in place draining cloudy, yellow urine without difficulty. Half splints in place, covered with Ace wraps. TPN infusing at 75cc hr into  double lumen PICC line. 1500-TPN decreased to 36 cc/hr. 1600-TPN decreased to 18cc/hr. 1700-TPN discontinued. 1745-Blood sugar 97, BS checks discontinued per protocol. 1800-Lower extremities unwrapped, bandages cleaned or replaced, then splint and wrap reapplied. Bruising noted to left heel, mepilex applied. Splints do not seem to fit well. Shift summary-Pain controlled with PRN pain medication and Fentanyl Patch. Tolerated meals. TPN discontinued. Blood sugars in good range throughout the day. Gotti remains in place. Loose BM x 3.

## 2017-12-03 NOTE — PROGRESS NOTES
Hospitalist Progress Note    Patient: Remedios Connor MRN: [id number]  CSN: [id number]    YOB: 1950  Age: 78 y.o. Sex: female    DOA: 11/22/2017 LOS:  LOS: 11 days            Assessment/Plan     Principal Problem:    Multiple fractures of both lower extremities, closed, initial encounter (11/22/2017)    Active Problems:    UTI (urinary tract infection) (6/1/2012)      Severe protein-calorie malnutrition ([address] (6/1/2012)      Bipolar 1 disorder ([address] (5/3/2012)      Seizure disorder ([address] (6/24/2012)      Depression (6/24/2012)      Chronic pain (11/23/2017)      Bedridden (11/23/2017)      Sepsis ([address] (11/24/2017)      Acute pain due to injury- multiple LE fractures (11/24/2017)      Ileus ([address] (11/27/2017)      Acute respiratory distress (11/27/2017)      Therapeutic opioid-induced constipation (OIC) (11/27/2017)      Tachycardia (11/27/2017)      SBO (small bowel obstruction) (11/29/2017)      Ileus: Resolved. Off the NG tube on 12/01. Tolerate the Full liquid yesterday, will DC TPM today. Advance to full liquid  With her ongoing non mobile, high risks for recurrence. Discussed the prevention with the pt. Ct scan: constipation , sbo, ng tube suction   Reviewed  Dr. Loyd/Dr. Huseyin Boyle /Dr. Reema Rinaldi recommendation      Sepsis/UTI, chronic urinary cath   leukocytosis resolved    f/u blood Cx No growth so far   UCX: ENTEROBACTER CLOACAE -, PROTEUS MIRABILIS    Continue merrem -day 10 of abx, will dc today after the UA.     Candidiasis/vaginal itching: Diflucan       Multiple lower extremity fractures:  Brace arrived , refused to put on -revise from vendor-waiting for coming back-but pt said\" not use them\"   Not a surgery candidate,   Ortho on board -will call ortho for new splint      Anemia: Chronic. H/h stable ,      Tachycardia: switch to  Po cardizem today      Seizure:Seizure precaution,  Continue home dose of keppra, Vimpat      Chronic pain:   Pain all over, continue pain control     Bipolar: Continue Zyprexa       Mal-nutrition   tpn-consult pharm  Will monitor electrolytes carefully       Poor prognosis, palliative care on board , remained dnr/i          CC: LE fracture, hypokalemia, bipolar, malnutrition           Subjective:      Pt was seen and examined with the nurse in the morning round.      \"I ate fine yesterday but I feel my stomach a little bloated today. \"    Had BM this am     Review of systems  General: No fevers or chills. Cardiovascular: No chest pain or pressure. No palpitations. Pulmonary: No cough, SOB  Gastrointestinal: No nausea, vomiting. .     Objective:      Visit Vitals    /48    Pulse 87    Temp 97.7 °F (36.5 °C)    Resp 18    Ht 5' 6.14\" (1.68 m)    Wt 75.3 kg (166 lb 1.6 oz)    SpO2 99%    BMI 26.7 kg/m2       Physical Exam:    Gen: NAD, chronic ill appearing  Heent:  MMM, NC, AT. Cor: s1s2 RRR. No MRG. PMI mid 5th intercostal space. Resp:  CTA b/l. No w/r/r. Nml effort and diaphragmatic excursion. Abd:  NT ND.  BS positive. No rebound or guarding. No masses. Ext: ACE bandage to bilateral legs. Intake and Output:  Current Shift:  12/03 0701 - 12/03 1900  In: -   Out: 9645 [[id number]]  Last three shifts:  12/01 1901 - 12/03 0700  In: 4256.3 [P.O.:120;  I.V.:4136.3]  Out: 7354 [[facility]:[id number]]    Labs: Results:       Chemistry Recent Labs      12/03/17   0310  12/02/17   1931  12/02/17   1300  12/02/17   0320  12/01/17   1250   GLU  93   --    --   100*  126*   NA  139   --    --   138  137   K  4.0  3.7  3.8  2.5*  2.2*   CL  101   --    --   99*  98*   CO2  33*   --    --   37*  35*   BUN  4*   --    --   2*  2*   CREA  0.30*   --    --   0.19*  0.29*   CA  7.5*   --    --   7.2*  7.2*   AGAP  5   --    --   2*  4   BUCR  13   --    --   11*  7*   AP   --    --    --    --   108   TP   --    --    --    --   4.7*   ALB   --    --    --    --   2.0*   GLOB   --    --    --    --   2.7   AGRAT   --    --    --    --   0.7*      CBC w/Diff No results for input(s): WBC, RBC, HGB, HCT, PLT, GRANS, LYMPH, EOS, HGBEXT, HCTEXT, PLTEXT in the last 72 hours. Cardiac Enzymes No results for input(s): CPK, CKND1, PAOLA in the last 72 hours. No lab exists for component: CKRMB, TROIP   Coagulation No results for input(s): PTP, INR, APTT in the last 72 hours. No lab exists for component: INREXT    Lipid Panel Lab Results   Component Value Date/Time    Triglyceride 68 12/01/2017 12:50 PM      BNP No results for input(s): BNPP in the last 72 hours.    Liver Enzymes Recent Labs      12/01/17   1250   TP  4.7*   ALB  2.0*   AP  108   SGOT  23      Thyroid Studies Lab Results   Component Value Date/Time    TSH 17.30 08/27/2017 02:00 AM        Procedures/imaging: see electronic medical records for all procedures/Xrays and details which were not copied into this note but were reviewed prior to creation of Plan      Medications Reviewed  Kenny Sorto MD

## 2017-12-03 NOTE — ROUTINE PROCESS
Bedside and Verbal shift change report given to Ruth Potts RN (oncoming nurse) by Rebeca Heredia RN (offgoing nurse). Report included the following information SBAR and Kardex.

## 2017-12-03 NOTE — ROUTINE PROCESS
Bedside and Verbal shift change report given to Diogo Parmar RN (oncoming nurse) by Suzanna Herbert RN (offgoing nurse). Report included the following information SBAR and Kardex.

## 2017-12-04 VITALS
OXYGEN SATURATION: 100 % | HEART RATE: 88 BPM | TEMPERATURE: 98.1 F | HEIGHT: 66 IN | SYSTOLIC BLOOD PRESSURE: 131 MMHG | BODY MASS INDEX: 24.99 KG/M2 | DIASTOLIC BLOOD PRESSURE: 68 MMHG | RESPIRATION RATE: 16 BRPM | WEIGHT: 155.5 LBS

## 2017-12-04 LAB
ANION GAP SERPL CALC-SCNC: 5 MMOL/L (ref 3–18)
BUN SERPL-MCNC: 3 MG/DL (ref 7–18)
BUN/CREAT SERPL: 9 (ref 12–20)
CALCIUM SERPL-MCNC: 8.2 MG/DL (ref 8.5–10.1)
CHLORIDE SERPL-SCNC: 102 MMOL/L (ref 100–108)
CO2 SERPL-SCNC: 33 MMOL/L (ref 21–32)
CREAT SERPL-MCNC: 0.35 MG/DL (ref 0.6–1.3)
GLUCOSE SERPL-MCNC: 78 MG/DL (ref 74–99)
MAGNESIUM SERPL-MCNC: 1.9 MG/DL (ref 1.6–2.6)
PHOSPHATE SERPL-MCNC: 3.1 MG/DL (ref 2.5–4.9)
POTASSIUM SERPL-SCNC: 4.2 MMOL/L (ref 3.5–5.5)
SODIUM SERPL-SCNC: 140 MMOL/L (ref 136–145)

## 2017-12-04 PROCEDURE — 74011250636 HC RX REV CODE- 250/636: Performed by: HOSPITALIST

## 2017-12-04 PROCEDURE — 77030020847 HC STATLOK BARD -A

## 2017-12-04 PROCEDURE — 74011250637 HC RX REV CODE- 250/637: Performed by: INTERNAL MEDICINE

## 2017-12-04 PROCEDURE — 80048 BASIC METABOLIC PNL TOTAL CA: CPT | Performed by: HOSPITALIST

## 2017-12-04 PROCEDURE — 83735 ASSAY OF MAGNESIUM: CPT | Performed by: HOSPITALIST

## 2017-12-04 PROCEDURE — 74011636637 HC RX REV CODE- 636/637: Performed by: INTERNAL MEDICINE

## 2017-12-04 PROCEDURE — 74011250637 HC RX REV CODE- 250/637: Performed by: FAMILY MEDICINE

## 2017-12-04 PROCEDURE — 84100 ASSAY OF PHOSPHORUS: CPT | Performed by: HOSPITALIST

## 2017-12-04 PROCEDURE — 74011250637 HC RX REV CODE- 250/637: Performed by: HOSPITALIST

## 2017-12-04 PROCEDURE — 74011000250 HC RX REV CODE- 250: Performed by: HOSPITALIST

## 2017-12-04 RX ORDER — OXYCODONE HYDROCHLORIDE 5 MG/1
5 TABLET ORAL
Qty: 20 TAB | Refills: 0 | Status: SHIPPED | OUTPATIENT
Start: 2017-12-04 | End: 2018-07-29

## 2017-12-04 RX ORDER — DILTIAZEM HYDROCHLORIDE 180 MG/1
180 CAPSULE, COATED, EXTENDED RELEASE ORAL DAILY
Qty: 30 CAP | Refills: 0 | Status: SHIPPED | OUTPATIENT
Start: 2017-12-05 | End: 2018-04-08

## 2017-12-04 RX ORDER — FENTANYL 12.5 UG/1
1 PATCH TRANSDERMAL
Qty: 5 PATCH | Refills: 0 | Status: SHIPPED | OUTPATIENT
Start: 2017-12-06 | End: 2018-04-08

## 2017-12-04 RX ADMIN — GABAPENTIN 400 MG: 400 CAPSULE ORAL at 15:31

## 2017-12-04 RX ADMIN — MEROPENEM 500 MG: 500 INJECTION, POWDER, FOR SOLUTION INTRAVENOUS at 06:35

## 2017-12-04 RX ADMIN — LEVOTHYROXINE SODIUM 75 MCG: 75 TABLET ORAL at 06:35

## 2017-12-04 RX ADMIN — LEVETIRACETAM 1000 MG: 500 TABLET ORAL at 09:34

## 2017-12-04 RX ADMIN — DULOXETINE HYDROCHLORIDE 30 MG: 30 CAPSULE, DELAYED RELEASE ORAL at 09:34

## 2017-12-04 RX ADMIN — OXYCODONE HYDROCHLORIDE 5 MG: 5 TABLET ORAL at 06:35

## 2017-12-04 RX ADMIN — PREDNISONE 5 MG: 5 TABLET ORAL at 09:34

## 2017-12-04 RX ADMIN — ENOXAPARIN SODIUM 40 MG: 40 INJECTION SUBCUTANEOUS at 09:33

## 2017-12-04 RX ADMIN — PANTOPRAZOLE SODIUM 40 MG: 40 TABLET, DELAYED RELEASE ORAL at 09:34

## 2017-12-04 RX ADMIN — GABAPENTIN 400 MG: 400 CAPSULE ORAL at 09:34

## 2017-12-04 RX ADMIN — ACETAMINOPHEN 650 MG: 325 TABLET ORAL at 06:36

## 2017-12-04 RX ADMIN — MEROPENEM 500 MG: 500 INJECTION, POWDER, FOR SOLUTION INTRAVENOUS at 15:30

## 2017-12-04 RX ADMIN — MISOPROSTOL 100 MCG: 100 TABLET ORAL at 09:34

## 2017-12-04 RX ADMIN — LUBIPROSTONE 8 MCG: 8 CAPSULE, GELATIN COATED ORAL at 08:03

## 2017-12-04 RX ADMIN — LACOSAMIDE 200 MG: 100 TABLET, FILM COATED ORAL at 09:34

## 2017-12-04 RX ADMIN — ACETAMINOPHEN 650 MG: 325 TABLET ORAL at 12:09

## 2017-12-04 RX ADMIN — DILTIAZEM HYDROCHLORIDE 180 MG: 180 CAPSULE, COATED, EXTENDED RELEASE ORAL at 09:34

## 2017-12-04 RX ADMIN — OXYCODONE HYDROCHLORIDE 5 MG: 5 TABLET ORAL at 10:35

## 2017-12-04 NOTE — DISCHARGE INSTRUCTIONS
DISCHARGE SUMMARY from Nurse    PATIENT INSTRUCTIONS:    After general anesthesia or intravenous sedation, for 24 hours or while taking prescription Narcotics:  · Limit your activities  · Do not drive and operate hazardous machinery  · Do not make important personal or business decisions  · Do  not drink alcoholic beverages  · If you have not urinated within 8 hours after discharge, please contact your surgeon on call. Report the following to your surgeon:  · Excessive pain, swelling, redness or odor of or around the surgical area  · Temperature over 100.5  · Nausea and vomiting lasting longer than 4 hours or if unable to take medications  · Any signs of decreased circulation or nerve impairment to extremity: change in color, persistent  numbness, tingling, coldness or increase pain  · Any questions    What to do at Home:  Recommended activity: Bedrest, Regular diet    If you experience any of the following symptoms temperature greater than 101, uncontrolled nausea/vomiting/diarrhea, uncontrolled pain, please follow up with Dr. Alexys Pulido. *  Please give a list of your current medications to your Primary Care Provider. *  Please update this list whenever your medications are discontinued, doses are      changed, or new medications (including over-the-counter products) are added. *  Please carry medication information at all times in case of emergency situations. These are general instructions for a healthy lifestyle:    No smoking/ No tobacco products/ Avoid exposure to second hand smoke  Surgeon General's Warning:  Quitting smoking now greatly reduces serious risk to your health.     Obesity, smoking, and sedentary lifestyle greatly increases your risk for illness    A healthy diet, regular physical exercise & weight monitoring are important for maintaining a healthy lifestyle    You may be retaining fluid if you have a history of heart failure or if you experience any of the following symptoms:  Weight gain of 3 pounds or more overnight or 5 pounds in a week, increased swelling in our hands or feet or shortness of breath while lying flat in bed. Please call your doctor as soon as you notice any of these symptoms; do not wait until your next office visit. Recognize signs and symptoms of STROKE:    F-face looks uneven    A-arms unable to move or move unevenly    S-speech slurred or non-existent    T-time-call 911 as soon as signs and symptoms begin-DO NOT go       Back to bed or wait to see if you get better-TIME IS BRAIN. Warning Signs of HEART ATTACK     Call 911 if you have these symptoms:   Chest discomfort. Most heart attacks involve discomfort in the center of the chest that lasts more than a few minutes, or that goes away and comes back. It can feel like uncomfortable pressure, squeezing, fullness, or pain.  Discomfort in other areas of the upper body. Symptoms can include pain or discomfort in one or both arms, the back, neck, jaw, or stomach.  Shortness of breath with or without chest discomfort.  Other signs may include breaking out in a cold sweat, nausea, or lightheadedness. Don't wait more than five minutes to call 911 - MINUTES MATTER! Fast action can save your life. Calling 911 is almost always the fastest way to get lifesaving treatment. Emergency Medical Services staff can begin treatment when they arrive -- up to an hour sooner than if someone gets to the hospital by car. The discharge information has been reviewed with the patient. The patient verbalized understanding. Discharge medications reviewed with the patient and appropriate educational materials and side effects teaching were provided.   ___________________________________________________________________________________________________________________________________

## 2017-12-04 NOTE — PROGRESS NOTES
Problem: Falls - Risk of  Goal: *Absence of Falls  Document Quique Fall Risk and appropriate interventions in the flowsheet.    Outcome: Progressing Towards Goal  Fall Risk Interventions:  Mobility Interventions: Assess mobility with egress test    Mentation Interventions: Adequate sleep, hydration, pain control, Door open when patient unattended    Medication Interventions: Evaluate medications/consider consulting pharmacy    Elimination Interventions: Call light in reach, Patient to call for help with toileting needs    History of Falls Interventions: Room close to nurse's station

## 2017-12-04 NOTE — DISCHARGE SUMMARY
Discharge Summary    Patient: Shira Benjamin MRN: [id number]  CSN: [id number]    YOB: 1950  Age: 78 y.o.   Sex: female    DOA: 11/22/2017 LOS:  LOS: 12 days   Discharge Date:      Primary Care Provider:  Maria Elena Alberto MD    Admission Diagnoses: Multiple fractures of both lower extremities, closed, initial encounter  abdominal pain    Discharge Diagnoses:    Hospital Problems  Date Reviewed: 11/23/2017          Codes Class Noted POA    SBO (small bowel obstruction) ICD-10-CM: B33.989  ICD-9-CM: 560.9  11/29/2017 Unknown        Ileus ([facility] ICD-10-CM: K56.7  ICD-9-CM: 560.1  11/27/2017 Unknown        Acute respiratory distress ICD-10-CM: R06.03  ICD-9-CM: 518.82  11/27/2017 Unknown        Therapeutic opioid-induced constipation (OIC) ICD-10-CM: K59.03, T40.2X5A  ICD-9-CM: 564.09, E935.2  11/27/2017 Unknown        Tachycardia ICD-10-CM: R00.0  ICD-9-CM: 785.0  11/27/2017 Unknown        Sepsis ([facility] ICD-10-CM: A41.9  ICD-9-CM: 038.9, 995.91  11/24/2017 Unknown        Acute pain due to injury- multiple LE fractures ICD-10-CM: G89.11  ICD-9-CM: 338.11  11/24/2017 Unknown        Chronic pain ICD-10-CM: G89.29  ICD-9-CM: 338.29  11/23/2017 Unknown        Bedridden ICD-10-CM: Z74.01  ICD-9-CM: V49.84  11/23/2017 Unknown        * (Principal)Multiple fractures of both lower extremities, closed, initial encounter ICD-10-CM: S82.91XA, S82.92XA  ICD-9-CM: 828.0  11/22/2017 Unknown        Seizure disorder ([facility] ICD-10-CM: G40.909  ICD-9-CM: 345.90  6/24/2012 Yes        Depression ICD-10-CM: F32.9  ICD-9-CM: 841  6/24/2012 Yes        UTI (urinary tract infection) ICD-10-CM: N39.0  ICD-9-CM: 599.0  6/1/2012 Yes        Severe protein-calorie malnutrition ([facility] ICD-10-CM: E43  ICD-9-CM: 262  6/1/2012 Yes        Bipolar 1 disorder ([facility] (Chronic) ICD-10-CM: F31.9  ICD-9-CM: 296.7  5/3/2012 Yes              Discharge Condition: Stable    Discharge Medications:     Current Discharge Medication List      START taking these medications    Details   dilTIAZem CD (CARDIZEM CD) 180 mg ER capsule Take 1 Cap by mouth daily. Qty: 30 Cap, Refills: 0      fentaNYL (DURAGESIC) 12 mcg/hr patch 1 Patch by TransDERmal route every seventy-two (72) hours. Max Daily Amount: 1 Patch. Qty: 5 Patch, Refills: 0      oxyCODONE IR (ROXICODONE) 5 mg immediate release tablet Take 1 Tab by mouth every four (4) hours as needed. Max Daily Amount: 30 mg.  Qty: 20 Tab, Refills: 0         CONTINUE these medications which have NOT CHANGED    Details   butalbital-acetaminophen-caffeine (FIORICET, ESGIC) -40 mg per tablet Take 1 Tab by mouth every four (4) hours as needed for Pain.      miSOPROStol (CYTOTEC) 100 mcg tablet Take 100 mcg by mouth two (2) times a day. cyclobenzaprine (FLEXERIL) 10 mg tablet Take 10 mg by mouth nightly. lactulose (KRISTALOSE) 10 gram packet Take 10 g by mouth two (2) times a day. clotrimazole (MYCELEX) 1 % vaginal cream Insert 1 Applicator into vagina nightly as needed for Itching. For ABX induced yeast infection      DULoxetine (CYMBALTA) 30 mg capsule Take 30 mg by mouth two (2) times a day. levETIRAcetam (KEPPRA) 500 mg tablet Take 1,000 mg by mouth two (2) times a day. levothyroxine (SYNTHROID) 75 mcg tablet Take 75 mcg by mouth Daily (before breakfast). pantoprazole (PROTONIX) 40 mg tablet Take 40 mg by mouth two (2) times a day. nystatin (MYCOSTATIN) 100,000 unit/mL suspension Take 1 tsp by mouth four (4) times daily as needed. swish and spit      CRANBERRY FRUIT EXTRACT (CRANBERRY EXTRACT PO) Take 1 Cap by mouth two (2) times a day. For UTI prevention  Non-formulary medication. Pt to supply if ordered for admission. GLUC VARGAS/CHONDRO VARGAS A/VIT C/MN (GLUCOSAMINE CHONDROITIN MAXSTR PO) Take 1 Tab by mouth three (3) times daily. ferrous sulfate 325 mg (65 mg iron) tablet Take 325 mg by mouth Daily (before breakfast).       baclofen (LIORESAL) 10 mg tablet Take 10 mg by mouth three (3) times daily. balsam peru-castor oil (VENELEX) [id number] mg/gram ointment Apply  to affected area as needed. NON FORMULARY MEDICATION  PATIENT SUPPLIED HOME MEDICATION      diclofenac (VOLTAREN) 1 % gel Apply 2 g to affected area four (4) times daily. Non-formulary medication. Pt to supply if ordered for admission. ergocalciferol (VITAMIN D2) 50,000 unit capsule Take 50,000 Units by mouth Every Friday. lacosamide (VIMPAT) 200 mg tab tablet Take 1 Tab by mouth two (2) times a day. Max Daily Amount: 400 mg. Indications: PARTIAL EPILEPSY TREATMENT ADJUNCT  Qty: 180 Tab, Refills: 1      lubiPROStone (AMITIZA) 8 mcg capsule Take 1 Cap by mouth two (2) times daily (with meals). Qty: 60 Cap, Refills: 0      loratadine-pseudoephedrine (CLARITIN-D 24 HOUR)  mg per tablet Take 1 Tab by mouth daily. OTHER,NON-FORMULARY, Take 1 Cap by mouth daily (with lunch). Indications: Bariatric Multi Formula      magnesium oxide 500 mg tab Take 500 mg by mouth daily (with lunch). ascorbic acid, vitamin C, (VITAMIN C) 500 mg tablet Take 500 mg by mouth daily. mirabegron ER (MYRBETRIQ) 50 mg ER tablet Take 50 mg by mouth nightly. Takes at 2300      rOPINIRole (REQUIP) 0.25 mg tablet Take 0.75 mg by mouth nightly. Takes at 2300      predniSONE (DELTASONE) 5 mg tablet Take 5 mg by mouth daily. gabapentin (NEURONTIN) 300 mg capsule Take 300 mg by mouth three (3) times daily. OLANZapine (ZYPREXA) 7.5 mg tablet Take 7.5 mg by mouth nightly. bisacodyl (DULCOLAX, BISACODYL,) 10 mg suppository Insert 10 mg into rectum daily.          STOP taking these medications       nystatin (MYCOSTATIN) topical cream Comments:   Reason for Stopping:         potassium chloride (K-DUR, KLOR-CON) 20 mEq tablet Comments:   Reason for Stopping:         docusate sodium (COLACE) 100 mg capsule Comments:   Reason for Stopping:         morphine IR (MS IR) 15 mg tablet Comments:   Reason for Stopping:         cefdinir (OMNICEF) 300 mg capsule Comments:   Reason for Stopping:         polyethylene glycol (MIRALAX) 17 gram packet Comments:   Reason for Stopping:         fluconazole (DIFLUCAN) 150 mg tablet Comments:   Reason for Stopping:               Procedures : PICC line     Consults: Gastroenterology, General Surgery and Orthopedics      PHYSICAL EXAM   Visit Vitals    /68    Pulse 88    Temp 98.1 °F (36.7 °C)    Resp 16    Ht 5' 6.14\" (1.68 m)    Wt 70.5 kg (155 lb 8 oz)    SpO2 100%    BMI 24.99 kg/m2     General: Awake, cooperative, no acute distress    HEENT: NC, Atraumatic. PERRLA, EOMI. Anicteric sclerae. Lungs:  CTA Bilaterally. No Wheezing/Rhonchi/Rales. Heart:  Regular  rhythm,  No murmur, No Rubs, No Gallops  Abdomen: Soft, Non distended, Non tender. +Bowel sounds,   Extremities: No c/c/e  Psych:   Not anxious or agitated. Neurologic:  No acute neurological deficits. Admission HPI :     The patient is a patient very well-  known to the hospital who has been in and out of here quite a bit over  the past few years. She presents with lower extremity pain after a fall. It seems that she was sitting on the edge of the bed and lost her  balance and fell forward. She fell onto her legs. She is essentially  bedbound at baseline. She also has a chronic indwelling urinary  catheter. She had significant pain upon falling to the ground. She was  brought into the ER for evaluation. In the emergency department she  had a number of x-rays. She has a left femur fracture. There is also a  right angulated fracture of the distal femoral neck. A pelvic x-ray was  completed. There was no pelvic fracture.     She has a fracture of the left tibia. She has a possible displaced  fracture of the right tibia. In the ER she has been given pain  medications. Orthopedics was consulted and the case was discussed.   They want to put her in splints overnight.     Tomorrow they are going to discuss whether or not surgery makes  sense. In the ER, she has received some pain medication that has  been helpful, though she still has some pain.     While in the ER her labs were taken. She was incidentally noted to  have a leukocytosis with a predominance of neutrophils.     A urinalysis was checked and that demonstrated bacteria, lots of white  blood cells, positive nitrates and leukocyte esterase. She herself  denies fever, chills, malaise and urinary symptoms. It should be noted  that she does have a history of recurrent urinary tract infections that  are presumably secondary to her chronic indwelling Gotti. At the very  least she is chronically colonized, if not infected.     She was given a liter of fluid in the emergency department. Lactic acid  has been ordered and is pending. She has had some tachycardia that  we relate is secondary to pain. She has not had any hypotension. There is no evidence of end-organ failure. As a precaution we have  ordered Levaquin. Based on the available information, she does not  qualify for the severe sepsis or septic shock fluid challenge. Again,  blood pressure is perfectly normal right now.       Hospital Course :   She was admitted to medical floor for multiple fracture and completed uti. Orthopedics was on board and recommend : no surgery, need splint and brace. Brace arrived, but patient refused to be put on. Iv abx was continued for her complicated uti: UCX: ENTEROBACTER CLOACAE -, PROTEUS MIRABILIS  .merrem-completed abx treatment during her stay. Recommend to f/u with urology as out -pt. During her stay, RRT was called due to sob and tachycardia. She was transferred to telemetry. v/q scan  Low probability for pulmonary embolus. Abdomen xray indicated ileus vs sbo. Iv cardizem was started for HR control and Dr. Dorinda Goncalves was on board. Npo/ng suction/iv fluid were administrated. PICC line with tpn for nutrition and iv abx.  Aggressive BM management was performed. Finally her ileus vs sbo resolved and tolerated po well. Before discharge, discussed with d/c planning  with  and patient. They both want to go home with home health rather than going to rehab, also do not want to have  brace on discharge. Palliative care on board, she was DNR/I. Activity: Bedrest    Diet: Regular Diet    Follow-up: pcm, orthopedics    Disposition: home     Minutes spent on discharge: 60 min       Labs: Results:       Chemistry Recent Labs      12/04/17   0340  12/03/17   1327  12/03/17   0310   12/02/17   0320   GLU  78   --   93   --   100*   NA  140   --   139   --   138   K  4.2  4.4  4.0   < >  2.5*   CL  102   --   101   --   99*   CO2  33*   --   33*   --   37*   BUN  3*   --   4*   --   2*   CREA  0.35*   --   0.30*   --   0.19*   CA  8.2*   --   7.5*   --   7.2*   AGAP  5   --   5   --   2*   BUCR  9*   --   13   --   11*    < > = values in this interval not displayed. CBC w/Diff No results for input(s): WBC, RBC, HGB, HCT, PLT, GRANS, LYMPH, EOS, HGBEXT, HCTEXT, PLTEXT, HGBEXT, HCTEXT, PLTEXT in the last 72 hours. Cardiac Enzymes No results for input(s): CPK, CKND1, PAOLA in the last 72 hours. No lab exists for component: CKRMB, TROIP   Coagulation No results for input(s): PTP, INR, APTT in the last 72 hours. No lab exists for component: INREXT, INREXT    Lipid Panel Lab Results   Component Value Date/Time    Triglyceride 68 12/01/2017 12:50 PM      BNP No results for input(s): BNPP in the last 72 hours. Liver Enzymes No results for input(s): TP, ALB, TBIL, AP, SGOT, GPT in the last 72 hours.     No lab exists for component: DBIL   Thyroid Studies Lab Results   Component Value Date/Time    TSH 17.30 08/27/2017 02:00 AM            Significant Diagnostic Studies: Xr Chest Sngl V    Result Date: 11/22/2017  EXAM:Chest X-Ray  History: Fall, preoperative evaluation for distal left femoral fracture deformity Technique:  Portable Frontal View Comparison: 08/27/2017 _______________ FINDINGS: Mild rightward rotation degraded exam. The trachea is midline. The cardiomediastinal silhouette is midline and, within normal limits. Chronic moderate to severe elevated right diaphragmatic margin with right basilar opacity. Minimal streaky reticular opacities in the retrocardiac left lower lung. Surgical clips are present in the right upper quadrant, in keeping with prior cholecystectomy. IVC filter is in place. Intact osseous structures. _______________     IMPRESSION: 1. No acute cardiopulmonary process. Xr Femur Lt 2 V    Result Date: 11/22/2017  Femur Two Views Indication: Fall. Technique: AP and lateral views of the left femur. Comparison studies: Pelvis x-ray from same day, left leg x-ray from same day. FINDINGS: Osteopenia. Distal femoral diaphyseal to metadiaphyseal displaced fracture deformity with medial greater than anterior. Distal fracture fragment is medially displaced by 1.7 cm. IMPRESSION: 1. Distal femoral diaphyseal to metadiaphyseal minimally comminuted displaced fracture deformity . Xr Tib/fib Lt    Result Date: 11/22/2017  EXAM: Leg Series Indication: Fall. Comparison: femur x-ray from same day Technique: Frontal and lateral views of the left leg . _____________ FINDINGS: Osteopenia. Distal femoral diaphyseal to metadiaphyseal minimally comminuted, medially displaced and angulated fracture deformity with rotation. Remaining osseous structures are intact. Chronic inverted rotation deformity of the ankle, in keeping with history of paraplegia. The soft tissues appear within normal limits. No radiopaque foreign body is seen. _____________    IMPRESSION: 1. Osteopenia. Distal femoral diaphyseal to metadiaphyseal minimally comminuted, displaced and angulated fracture deformity. 2. Remaining osseous structures are intact.      Xr Abd (kub)    Result Date: 12/1/2017  Supine abdominal radiograph Comparison: 11/30/2017 Indication: Ileus versus bowel obstruction follow-up. Findings: Gas-filled and distended segments of bowel in the central abdomen slightly decreased in diameter from prior. Bowel signature difficult to determine, though this appears to be small bowel on prior CT. There does not appear to be a severe stool burden. No visible free air, given limitations of supine view. Right pleural effusion. Bilateral hip dysplasia with dislocations. Impression: Gaseous distention/dilation of small bowel appear slightly improved from prior. Xr Abd (kub)    Result Date: 11/30/2017  Supine Abdomen, 1 View Indication:  Ileus versus small bowel obstruction. Comparison:  KUB November 29, 2017. CT abdomen pelvis November 29, 2017. Findings: There is elevation of the right hemidiaphragm with exclusion of the right upper abdomen from the exam. There are surgical clips in the right upper abdomen. There is an IVC filter. There is marked dilatation of the small bowel, minimally improved from the prior study. There is a large amount of stool in the colon, improved from the prior study. There is osteopenia. There is dysplasia of bilateral hips. There are postsurgical changes to the lumbar sacral spine. IMPRESSION: 1. Abnormal but improved bowel gas pattern was significantly dilated loops of small bowel. A small bowel obstruction cannot be excluded from this exam. Recommend continued follow-up. Xr Abd (kub)    Result Date: 11/29/2017  EXAM:  XR ABD (KUB) INDICATION:  NG Tube placement COMPARISON: CT earlier same day. FINDINGS: No enteric tube is visible, could be within the chest and reevaluation is suggested. Dilated right colon with a large amount of stool. Severely dilated loops of bowel in the central abdomen and left upper quadrant again seen compatible with a bowel obstruction. IMPRESSION: Bowel obstruction. No enteric tube visible, could be within the chest and reevaluation is suggested.      Xr Abd (kub)    Result Date: 11/27/2017  --------------------------------------------------------------------------- <<<<<<<<<           [facility]           >>>>>>>>> --------------------------------------------------------------------------- CLINICAL HISTORY: Distended abdomen. COMPARISON EXAMINATIONS: November 26, 2017. --- [2 portable decubitus views of the abdomen obtained. --- Overlying soft tissue on the left evaluate limits evaluation on the left. There is retained stool within the right colon and there are distended air-filled small bowel loops with apparent fluid levels. No definite no evidence for free air is identified. There is a right lung base opacity. No soft tissue abnormalities are identified. No fracture or suspicious bone lesion. --------------    Impression: -------------- Overlying soft tissue densities on the left slightly limits evaluation on the left. Retained stool throughout the right colon. Prominent air-filled small bowel loops are noted with air fluid levels. Findings possibly represent ileus. A component of bowel obstruction should be considered. Ct Abd Pelv W Cont    Result Date: 11/29/2017  EXAM: CT of the abdomen and pelvis INDICATION: Abdominal distention. COMPARISON: October 7, 2017. TECHNIQUE: Axial CT imaging of the abdomen and pelvis was performed with intravenous contrast. Multiplanar reformats were generated. Dose reduction techniques used: automated exposure control, adjustment of the mAs and/or kVp according to patient size, and iterative reconstruction techniques. _______________ FINDINGS: LOWER CHEST: There is a small right pleural effusion with right lung consolidation similar to previous. There is mild left lung base atelectasis. LIVER, BILIARY: Liver is normal. No biliary dilation. Gallbladder is unremarkable. PANCREAS: Normal. SPLEEN: Normal. ADRENALS: Normal. KIDNEYS: Normal. LYMPH NODES: No enlarged lymph nodes.  GASTROINTESTINAL TRACT: There is retained stool throughout the colon. Dilated small bowel loops are again seen throughout measuring up to 6.7 cm. Multiple small bowel anastomoses are noted. PELVIC ORGANS: Unremarkable. VASCULATURE: Unremarkable. BONES: No acute or aggressive osseous abnormalities identified. A chronic left hip fracture is seen. Chronic right hip frontal changes also seen. OTHER: None. _______________     IMPRESSION: Retained stool throughout the colon. Dilated small bowel throughout the abdomen measuring up to 6.5 cm suggesting bowel obstruction. Xr Pelv 1 Or 2 V    Result Date: 11/22/2017  EXAM: Pelvis Indication: Fall, pain. Comparison: KUB from 02/19/2017 Technique: Single  frontal view of the pelvis. ___________ FINDINGS: Osteopenia. There is no evidence of an acute fracture or dislocation of the visualized pelvis. Chronic bilateral superior lateral hip fracture dislocation, similar to prior radiographs. Soft tissues appear within normal limits. No radiopaque foreign body. ___________    IMPRESSION: 1.  Stable exam. Osteopenia. Chronic bilateral superior lateral hip fracture dislocations. No evidence of an acute fracture or dislocation. Xr Chest Port    Result Date: 11/26/2017  EXAM: AP portable screening chest INDICATION: Hypoxia COMPARISON: November 22, 2017 _______________ FINDINGS: There is an elevated right hemidiaphragm. There is right lung base atelectasis and/or infiltrate. Heart size and mediastinal contour are normal and stable. The left lung is clear. There there is no pneumothorax. No left effusion seen however there may be small right effusion. Large amount of stool is present in the ascending colon. There are dilated bowel loops in the abdomen. There is a lucency seen underneath the right hemidiaphragm.  This may represent interposed bowel however left lateral decubitus view recommended to rule out free air. _______________     IMPRESSION: Elevated right hemidiaphragm with right lung base atelectasis Possible tiny right effusion Gas distended bowel loops in the abdomen. Lucency seen underneath the right hemidiaphragm questionable for free air verses interposed bowel loop. Left lateral decubitus view of the abdomen recommended to rule out free air. Kayleen Mann nurse caring for this patient informed 10:58 PM November 26, 2017     Xr Femur Rt 1 V    Result Date: 11/22/2017  EXAM: Right femur, one view INDICATION: Pelvis morning off bed face forward on bilateral knees and legs. Paraplegia. COMPARISON: None. _______________ FINDINGS: Technologist note states best images possible due to patient condition and broken bilateral knees. Lateral view of the femur was obtained. Bones are diffusely osteopenic. A comminuted fracture of the distal femoral diametaphysis is present with full shaft width anterior displacement of the distal component and slight apex posterior angulation. In addition, considerable overriding is present. Without a frontal view, cannot assess for medial or lateral displacement. Superior and slight lateral subluxation of the femoral head relative to the native acetabulum is present. The acetabulum is shallow. A neoacetabulum has formed with sclerotic and cystic changes on both sides of the joint, and flattening of the femoral head, findings appear chronic. _______________     IMPRESSION: Suboptimal positioning due to patient condition. Comminuted, displaced, angulated fracture of the distal femoral diametaphysis. Chronic superior and slight lateral subluxation of the right femoral head. Ir Fluoro Guide Picc Insertion    Result Date: 11/30/2017  EXAM: Right arm PICC placement CLINICAL HISTORY: Long-term IV access needed for antibiotics COMPARISON: None GUIDANCE: Ultrasound and fluoroscopic guidance was used to position (and confirm the position of) the needle / catheter. Image(s) saved in PACS: Ultrasound and fluoroscopic TECHNIQUE/FINDINGS: Informed consent was obtained. Time was performed.  The right  arm was prepped and draped in sterile fashion. Maximal sterile barrier technique was used for the procedure including sterile cap, sterile mask, sterile gown, sterile gloves and a large sterile sheet. Proper hand hygiene along with proper skin antiseptic was followed, utilizing 2% chlorhexidine solution for skin preparation. With respect to ultrasound guidance, sterile gel and sterile probe covers were used. The basilic vein was compressed to exclude thrombus. A permanent US recording of the vein was created for the patients file. The vein was then accessed with a 21-gauge needle and ultrasound guidance. Guidewire was passed centrally using fluoroscopic guidance. A peel-away sheath was inserted over the guidewire. The intravascular distance was measured. The PICC was trimmed to the appropriate length, 35cm. The double lumen midline PICC was placed into the vein with its tip in the SVC/RA junction the both ports aspirated and flushed easily. The PICC was secured to the skin, a sterile dressing was applied. Fluoroscopic time: 0.3 minutes Fluoroscopic dose (reference air kerma):  3 mGy     IMPRESSION: 1. Successful right arm PICC placement. Xr Chest Ap Lat    Result Date: 11/27/2017  EXAM: Chest 2 views INDICATION: Stress COMPARISON: Single view chest 11/26/2017  FINDINGS: Supine AP and crosstable lateral views were performed. There continues to be severe elevation of the right hemidiaphragm which appears to be slowly progressive since 8/10/2017. Possible underlying atelectasis related to be elevation of the hemidiaphragm. Stable mild blunting right costophrenic angle. Left lung is clear except for probable scarring in the left lower lobe. No effusion on the left. No pneumothorax. Heart size is normal. The continues be a large amount of stool in the visualized colon in the upper abdomen. IMPRESSION: 1. Persistent severe elevation right hemidiaphragm with right lower lobe atelectasis or scarring.  2. Minimal scarring versus discoid atelectasis left lower lobe. Xr Tib/fib Rt 1 V    Result Date: 11/22/2017  EXAM: Right lower leg, one view INDICATION: Lio Pierre this morning off of bed face toward on bilateral knees and legs. Paraplegia. Technologist note states best possible images due to patient condition and broken bilateral knees. COMPARISON: 11/29/2006 FINDINGS: The bones are osteopenic. Subtle suggestion of a nondisplaced oblique fracture of the distal tibial diametaphysis is present. Fracture of the calcaneal tuberosity is present with sclerosis at the fracture line suggesting impaction, and possibly a chronic process, not present in 2006. IMPRESSION: Unable to optimally position the patient due to condition. Probable nondisplaced fracture of the distal tibia. Fracture of the calcaneal tuberosity, with sclerosis at the fracture line, possibly chronic, or possibly acute with impaction. Nm Lung Perfusion W Vent    Result Date: 11/27/2017  Exam: Ventilation perfusion lung scan History: Short of breath. Elevated d-dimer Comparison: Two-view chest done following this procedure. Single view chest 11/26/2017 Technique: Ventilation examination was performed using 0.8 mCi of technetium 99 DTPA aerosol. Perfusion examination was performed using 7.2 mCi of technetium 99 MAA. Findings: Both ventilation and perfusion examination show absent activity in the inferior two thirds of the right hemithorax consistent with marked elevation of the right hemidiaphragm seen on chest film. There is also central accumulation of activity on the ventilation portion examination. Perfusion exam shows significantly better perfusion than ventilation with no peripheral defects. There are no mismatch defects. Impression: 1. Low probability for pulmonary embolus. There are no mismatched defects. 2. Severe elevation right hemidiaphragm as noted on chest film.  3. Central accumulation of activity on the ventilation study which can be seen with COPD.            [facility]     CC: Muna Marrero MD

## 2017-12-04 NOTE — ROUTINE PROCESS
Bedside shift change report given to Jayme Jordan RN (oncoming nurse) by Rosalia Salazar RN (offgoing nurse). Report included the following information SBAR, Kardex, MAR, Recent Results and Alarm Parameters .

## 2017-12-04 NOTE — PROGRESS NOTES
Palliative Medicine Progress Note  DR. [facility]: [phone])  Critical access hospital: [address] [place] [address] Way: [phone]    Patient Name: Diaz Garibay  YOB: 1950    Date of Initial Consult: 11/24/17  Reason for Consult: symptom management/care planning  Requesting Provider: Dr. Hazel Zaragoza   Primary Care Physician: Patrick Milton MD      SUMMARY:   Diaz Garibay is a 78y.o. year old with a complex past history admitted for assessment of multiple bilat LE fractures following a fall from bed. Rupert to be a poor surgical candidate and she is not interested. Past history includes bipolar disorder with at least one involuntary committal to [facility] and transfer to psychiatry hospital in 2012, chronic back pain previously followed by pain management, seizure disorder, and functional paraplegia of LEs leaving her wheelchair/bed bound. S/P bariatric surgery w/ dumping syndrome and nutritional failure to thrive. Had been enrolled in hospice in [id number] for this, but gradually weaning of seizure meds resulted in improvement and hospice graduation. Long term patient of Dr. Carla Blanc, has also been in pain management and most recently followed by [place] visiting physicians(Dr. Barclay). They are insisting she return to pain management and have not been prescribing opioids. Previously was on ms IR15 mg tid and fioricet w/ codeine prn. Also has a history of chronic constipation and recurring episodes of SBO.    11/27/17: Predictably rough weekend. Decreased LOC, likely partly due to fentanyl, increased O2 requirement w/ + d dimer, but VQ scan low probability. Abdomen increasingly distended and bloated. This has been a recurring problem for her,  states they have been able to manage it at home for several years w/ daily PEG and bisacodyl suppository. No nausea. Pain in in back and feet, legs when they are moved. 11/28/17: Ileus unimproved w/ suppository/PEG x 2.  Attempt to place NG unsuccessful. No nausea, but bloating discomfort. 11/29/17: Rested last night, but no change in abdominal distention. Not passing gas. More alert, pain at baseline. Taking her pills. 12/1/17: several large stools since yesterday, abdomen deflated. Less discomfort. Concerned she is not getting her oral seizure med. Has splints in lieu of braces. 12/4/17: ileus resolved, going home today rather than SNF. Will hire more help in. PALLIATIVE DIAGNOSES:     Patient Active Problem List   Diagnosis Code    Bipolar 1 disorder ([facility] F31.9    Decubitus ulcer of sacral area L89.159    UTI (urinary tract infection) N39.0    Severe protein-calorie malnutrition ([facility] E43    Seizure disorder ([facility] G40.909    Depression F32.9    Hypotension, unspecified I95.9    Personal history of DVT (deep vein thrombosis) Z86.718    Hypomagnesemia E83.42    Unspecified constipation K59.00    Seizure ([facility] R56.9    Overdose T50.901A    Narcosis R40.1    Multiple fractures of both lower extremities, closed, initial encounter S82.91XA, S82.92XA    Chronic pain G89.29    Bedridden Z74.01    Sepsis ([facility]) A41.9    Acute pain due to injury- multiple LE fractures G89.11    Ileus ([facility]) K56.7    Acute respiratory distress R06.03    Therapeutic opioid-induced constipation (OIC) K59.03, T40.2X5A    Tachycardia R00.0    SBO (small bowel obstruction) K56.609     1. PLAN:   1. Met with patient in her room. She was alert and oriented providing reliable summary of her known history. Felt her mood had been doing pretty well until this happened, psych meds were helpful. She knows her care will be more of a challenge with this event. When asked about her previous hospitalizations in 2012 indicating she is a DNR she says she still feels that way and does not wish to undergo CPR. She already has an AMD with living will on file.  She is anxious to complete a DDNR because she is worried her  will not respect her wishes in regard to this. DDNR completed and placed on chart. Questions answered. Recommend: Cont low dose fentanyl patch monitoring closely for side effects. D/c hydrocodone and replace w/ oxycodone q 4 hr prn, giving APAP 650 mg qid. Add bowel regimen with senna qhs. Colace has no demonstrated efficacy in managing opioid induced constipation. Suspect rehab potential minimal and placement with subsequent care will prove a challenge. 11/27/17: They would like to try PEG and suppository, but understand she might need an NG if symptoms worsen. Cont current pain regimen, has not required much in way of breakthrough oxycodone.  with questions about DNR status. Explained distinction between treatment and CPR decisions and that Ms Fly Ramirez may still have all forms of aggressive care including ICU interventions and intubation. Questions answered. Patient and  both feel Ernesto Favre is still an accurate reflection of her wishes on the subject. 11/28/17: Predictable problems w/ GI motility. Methylnaltrexone might prove more effective in counteracting opioid contribution to ileus. 11/29/17: On two agents for opioid induced constipation w/ no effect so far. Not realistic to stop her pain meds given nature of her injuries. Her combination of problems presage a poor outcome. 12/1/17: Irritable today, suggesting she feels substantially better. Recommend removal of NG at first opportunity. 12/4/17: Ready for d/c. Nature of injuries and baseline frailty portend poor outcome. 2. Initial consult note routed to primary continuity provider  3.  Communicated plan of care with: Palliative IDT       GOALS OF CARE:   Limited curative, control of symptoms    Advance Care Planning 11/24/2017   Patient's Healthcare Decision Maker is: Named in scanned ACP document   Primary Decision Maker Name Portillo Pierson   Primary Decision Maker Phone Number [phone]   Primary Decision Maker Relationship to Patient Spouse   Secondary Decision Maker Name [facility]   Secondary Decision Maker Phone Number [phone]   Confirm Advance Directive Yes, on file   Patient Would Like to Complete Advance Directive -   Does the patient have other document types Do Not Resuscitate           HISTORY:     History obtained from: patient    CHIEF COMPLAINT: see summary    HPI/SUBJECTIVE:    The patient is:   [x] Verbal and participatory  [] Non-participatory due to:        Clinical Pain Assessment (nonverbal scale for nonverbal patients): Pain: 7    [++++ Clinical Pain Assessment++++]  [++++Pain Severity++++]: Pain: 7  [++++Pain Character++++]: sharp  [++++Pain Duration++++]: continuous  [++++Pain Effect++++]:   [++++Pain Factors++++]:   [++++Pain Frequency++++]:   [++++Pain Location++++]: legs  [++++ Clinical Pain Assessment++++]         FUNCTIONAL ASSESSMENT:     Palliative Performance Scale (PPS):  PPS: 40       PSYCHOSOCIAL/SPIRITUAL SCREENING:     Advance Care Planning:  Advance Care Planning 11/24/2017   Patient's Healthcare Decision Maker is: Named in scanned ACP document   Primary Decision Maker Name Patsy Pryor   Primary Decision Maker Phone Number [phone]   Primary Decision Maker Relationship to Patient Spouse   Secondary Decision Maker Name Mary Ann Paredes Phone Number [phone]   Confirm Advance Directive Yes, on file   Patient Would Like to Complete Advance Directive -   Does the patient have other document types Do Not Resuscitate        Any spiritual / Restorationism concerns:  [] Yes /  [x] No    Caregiver Burnout:  [] Yes /  [x] No /  [] No Caregiver Present      Anticipatory grief assessment:   [x] Normal  / [] Maladaptive       ESAS Anxiety: Anxiety: 0    ESAS Depression: Depression: 4        REVIEW OF SYSTEMS:     Positive and pertinent negative findings in ROS are noted above in HPI.   The following systems were [x] reviewed / [] unable to be reviewed as noted in HPI  Other findings are noted below.  Systems: constitutional, ears/nose/mouth/throat, respiratory, gastrointestinal, genitourinary, musculoskeletal, integumentary, neurologic, psychiatric, endocrine. Positive findings noted below. Modified ESAS Completed by: provider   Fatigue: 8 Drowsiness: 6   Depression: 4 Pain: 7   Anxiety: 0 Nausea: 3   Anorexia: 7 Dyspnea: 0     Constipation: No     Stool Occurrence(s): 1        PHYSICAL EXAM:     Wt Readings from Last 3 Encounters:   12/04/17 70.5 kg (155 lb 8 oz)   10/11/17 67.1 kg (148 lb)   10/07/17 68.5 kg (151 lb)     Blood pressure 130/54, pulse 85, temperature 98.1 °F (36.7 °C), resp. rate 16, height 5' 6.14\" (1.68 m), weight 70.5 kg (155 lb 8 oz), SpO2 100 %. Pain:  Pain Scale 1: Numeric (0 - 10)  Pain Intensity 1: 10  Pain Onset 1:  (chronic)  Pain Location 1: Back, Leg  Pain Orientation 1: Left, Right  Pain Description 1: Stabbing  Pain Intervention(s) 1: Medication (see MAR)  Last bowel movement:     Constitutional: ill appearing, older than stated age  Eyes: pupils equal, anicteric  ENMT: no nasal discharge, moist mucous membranes  Cardiovascular: regular rhythm, distal pulses intact  Respiratory: breathing not labored, symmetric  Gastrointestinal: soft non-tender, +bowel sounds  Musculoskeletal: LE contractures and abnormal rotations  Skin: warm, dry  Neurologic: following commands, moving all extremities. Drowsy, but easily aroused. Psychiatric: full affect, no hallucinations  Other:       HISTORY:     Principal Problem:    Multiple fractures of both lower extremities, closed, initial encounter (11/22/2017)    Active Problems:    Bipolar 1 disorder ([address] (5/3/2012)      UTI (urinary tract infection) (6/1/2012)      Severe protein-calorie malnutrition ([address] (6/1/2012)      Seizure disorder ([address] (6/24/2012)      Depression (6/24/2012)      Chronic pain (11/23/2017)      Bedridden (11/23/2017)      Sepsis ([address] (11/24/2017)      Acute pain due to injury- multiple LE fractures (11/24/2017)      Ileus ([address] (11/27/2017)      Acute respiratory distress (11/27/2017)      Therapeutic opioid-induced constipation (OIC) (11/27/2017)      Tachycardia (11/27/2017)      SBO (small bowel obstruction) (11/29/2017)      Past Medical History:   Diagnosis Date    Acute pain due to injury- multiple LE fractures 11/24/2017    Anemia NEC     history of blood transfusion    Anxiety     panic attacks    Arthritis     Bipolar 1 disorder (HCC)     Chronic back pain     Depression     Elevated diaphragm 5/4/2012    Fatigue     along with weakness    GERD (gastroesophageal reflux disease)     Headache(784.0)     Hypertension     pt denies    Other ill-defined conditions(799.89) swallowing issues, decubitus    Psychiatric disorder     Seizures (HCC)     Therapeutic opioid-induced constipation (OIC) 11/27/2017    Thromboembolus [facility]     TIA (transient ischemic attack)       Past Surgical History:   Procedure Laterality Date    COLONOSCOPY N/A 2/21/2017    COLONOSCOPY [facility] ANESTHESIA, PATIENT IS IN ROOM 358 performed by Mina Nelson MD at [address] [facility] COLONOSCOPY N/A 2/22/2017    COLONOSCOPY performed by Mina Nelson MD at [facility], COLON, DIAGNOSTIC      over 20 years ago    ENDOSCOPY, COLON, DIAGNOSTIC  9/24/09    HX APPENDECTOMY  11/6/84    HX BACK SURGERY  11/24/98    severe back problems    HX CHOLECYSTECTOMY  11/6/84    HX GASTRIC BYPASS  1995    [employer]    HX OTHER SURGICAL      IVC filter    HX REFRACTIVE SURGERY      HX TUBAL LIGATION  1991      Family History   Problem Relation Age of Onset    Heart Disease Mother    Sujatha Grain Mother     Stroke Father     Hypertension Father     Arthritis-rheumatoid Father      History reviewed, no pertinent family history.   Social History   Substance Use Topics    Smoking status: Never Smoker    Smokeless tobacco: Never Used    Alcohol use No     Allergies   Allergen Reactions    Ceftriaxone Seizures    Demerol [Meperidine] Unknown (comments)    Seafood [Shellfish Containing Products] Unknown (comments)      Current Facility-Administered Medications   Medication Dose Route Frequency    polyethylene glycol (MIRALAX) packet 17 g  17 g Oral DAILY    dilTIAZem CD (CARDIZEM CD) capsule 180 mg  180 mg Oral DAILY    levETIRAcetam (KEPPRA) tablet 1,000 mg  1,000 mg Oral BID    glucose chewable tablet 16 g  4 Tab Oral PRN    glucagon (GLUCAGEN) injection 1 mg  1 mg IntraMUSCular PRN    dextrose (D50W) injection syrg 12.5-25 g  25-50 mL IntraVENous PRN    ketorolac (TORADOL) injection 15 mg  15 mg IntraVENous Q8H PRN    lubiPROStone (AMITIZA) capsule 8 mcg  8 mcg Oral BID WITH MEALS    enoxaparin (LOVENOX) injection 40 mg  40 mg SubCUTAneous Q24H    metoprolol (LOPRESSOR) injection 2.5 mg  2.5 mg IntraVENous Q6H PRN    bisacodyl (DULCOLAX) suppository 10 mg  10 mg Rectal DAILY PRN    naloxone (NARCAN) injection 0.4 mg  0.4 mg IntraVENous EVERY 2 MINUTES AS NEEDED    phenol throat spray (CHLORASEPTIC) 1 Spray  1 Spray Oral PRN    gabapentin (NEURONTIN) capsule 400 mg  400 mg Oral TID    meropenem (MERREM) 500 mg in sterile water (preservative free) 10 mL IV syringe  500 mg IntraVENous Q8H    fentaNYL (DURAGESIC) 12 mcg/hr patch 1 Patch  1 Patch TransDERmal Q72H    acetaminophen (TYLENOL) tablet 650 mg  650 mg Oral AC&HS    oxyCODONE IR (ROXICODONE) tablet 5 mg  5 mg Oral Q4H PRN    promethazine (PHENERGAN) 25 mg in 0.9% sodium chloride 50 mL IVPB  25 mg IntraVENous Q6H PRN    sodium chloride (NS) flush 5-10 mL  5-10 mL IntraVENous PRN    ondansetron (ZOFRAN) injection 4 mg  4 mg IntraVENous Q4H PRN    DULoxetine (CYMBALTA) capsule 30 mg  30 mg Oral BID    levothyroxine (SYNTHROID) tablet 75 mcg  75 mcg Oral ACB    miSOPROStol (CYTOTEC) tablet 100 mcg  100 mcg Oral BID    pantoprazole (PROTONIX) tablet 40 mg  40 mg Oral BID    lacosamide (VIMPAT) tablet 200 mg  200 mg Oral BID    predniSONE (DELTASONE) tablet 5 mg  5 mg Oral DAILY    rOPINIRole (REQUIP) tablet 0.75 mg  0.75 mg Oral QHS    mirabegron ER (MYRBETRIQ) tablet 50 mg  50 mg Oral QHS    OLANZapine (ZyPREXA) tablet 7.5 mg  7.5 mg Oral QHS    sodium chloride (OCEAN) 0.65 % nasal spray 2 Spray  2 Spray Both Nostrils Q2H PRN        LAB AND IMAGING FINDINGS:     Lab Results   Component Value Date/Time    WBC 4.7 11/30/2017 04:01 AM    HGB 9.8 11/30/2017 04:01 AM    PLATELET 201 13/69/4595 04:01 AM     Lab Results   Component Value Date/Time    Sodium 140 12/04/2017 03:40 AM    Potassium 4.2 12/04/2017 03:40 AM    Chloride 102 12/04/2017 03:40 AM    CO2 33 12/04/2017 03:40 AM    BUN 3 12/04/2017 03:40 AM    Creatinine 0.35 12/04/2017 03:40 AM    Calcium 8.2 12/04/2017 03:40 AM    Magnesium 1.9 12/04/2017 03:40 AM    Phosphorus 3.1 12/04/2017 03:40 AM      Lab Results   Component Value Date/Time    AST (SGOT) 23 12/01/2017 12:50 PM    Alk.  phosphatase 108 12/01/2017 12:50 PM    Protein, total 4.7 12/01/2017 12:50 PM    Albumin 2.0 12/01/2017 12:50 PM    Globulin 2.7 12/01/2017 12:50 PM     Lab Results   Component Value Date/Time    INR 0.9 08/27/2017 02:00 AM    Prothrombin time 11.2 08/27/2017 02:00 AM    aPTT 30.6 07/22/2017 05:00 AM      No results found for: IRON, FE, TIBC, IBCT, PSAT, FERR   No results found for: PH, PCO2, PO2  No components found for: [place]   Lab Results   Component Value Date/Time    CK 81 11/27/2017 02:46 PM    CK - MB 2.2 11/27/2017 02:46 PM              Total time: 15 min  Counseling / coordination time, spent as noted above: 12  > 50% counseling / coordination        Liza Chawla MD  Palliative Medicine

## 2017-12-04 NOTE — PROGRESS NOTES
.8:03 AM  Pt is awake, alert, oriented x 4. Lying in bed. Pt has  solt cast/ace wrap to both lower extremities. Pt served breakfast.  With león cath draining clear yellow urine. 9:46 AM   Pt was seen by Dr Kiersten Ceballos. PT for discharge this afternoon. Pi is incontinent of loose stools. 10:03 AM   Pt was kept clean. Mepilex dressing to sacrum was removed. Pt has old decub to sacral area. Skin pinkish. New mepilex dressing applied. 10:36 AM   Medicated with oxycodone 5 mg po for pain level 10/10. Plan for pt is discharge today after 3PM dose of Merepenem. PICC line to be removed after 3PM dose. Pt to go home with medical transport.

## 2017-12-04 NOTE — ROUTINE PROCESS
12:13 PM  Bedside and Verbal shift change report given to YOLY dominguez RN (oncoming nurse) by Stanford Puga RN (offgoing nurse). Report included the following information SBAR, Kardex and MAR.

## 2017-12-04 NOTE — PROGRESS NOTES
D/C Plan: [facility] 12/4/17    Noted pt transfer to 3 S. Met with pt to discuss plan of care. Pt has indicated she would like to return home with [facility]. Pt has indicated she would like to have [facility] given she has had them in the past.  Pt is current with [facility] and would like to return to this agency if [facility] is not able to service her. CMS has contacted [facility] and they are not able to service this pt. Pt will return home today with [facility]. Pt will need medial transport arranged to her home. Pt's family will be home after 4pm to receive this pt. Please arrange medical transport to pt's home ( [address] to ensure a safe transition. No other plan of care needs have been identified. Care Management Interventions  PCP Verified by CM:  Yes  Mode of Transport at Discharge: [facility]  Transition of Care Consult (CM Consult): [address]: No  Reason Outside [place]: Patient already serviced by other home care/hospice agency [facility])  Health Maintenance Reviewed: Yes  Physical Therapy Consult: Yes  Occupational Therapy Consult: Yes  Current Support Network: Lives with Spouse, Family Lives Nearby  Confirm Follow Up Transport: Family  Plan discussed with Pt/Family/Caregiver: Yes  Freedom of Choice Offered: Yes  Discharge Location  Discharge Placement: Home with home health

## 2017-12-04 NOTE — PROGRESS NOTES
conducted a Follow up consultation and Spiritual Assessment for Diaz Garibay, who is a 78 y.o.,female. Continued the relationship of care and support. Listened empathically. Offered prayer and assurance of continued prayer on patients behalf. Plan:  Chaplains will continue to follow and will provide pastoral care as needed or requested.  recommends bedside caregivers page the  on duty if patient shows signs of acute spiritual or emotional distress. Father INDIRA Mccarty.Div.   [address] [facility] - Office

## 2017-12-04 NOTE — PROGRESS NOTES
Pt was seen and examed AM, talked with d/c  Plan with  and patient. They both want to go home with home health rather than going to rehab, also do not want brace on discharge.

## 2017-12-04 NOTE — PROGRESS NOTES
0530- Pt woke up c/o of vertigo. Symptoms improved with re-positioning of patients head. Pt states this has happened in the past at home. Shift Summary- Pt had an incident where urine leaked out from around león. León was checked, repositioned. Pt had periods of incontinence with stool. Pt woke up this morning c/o of vertigo. Pt medicated for pain during the night.       Patient Vitals for the past 12 hrs:   Temp Pulse Resp BP SpO2   12/04/17 0306 98.5 °F (36.9 °C) 90 16 119/60 99 %   12/04/17 0002 98.2 °F (36.8 °C) 92 16 132/59 97 %   12/03/17 1923 98.4 °F (36.9 °C) 93 16 120/59 98 %

## 2018-04-08 ENCOUNTER — HOSPITAL ENCOUNTER (EMERGENCY)
Age: 68
Discharge: HOME | End: 2018-04-08
Attending: EMERGENCY MEDICINE | Admitting: EMERGENCY MEDICINE
Payer: MEDICARE

## 2018-04-08 ENCOUNTER — APPOINTMENT (OUTPATIENT)
Dept: CT IMAGING | Age: 68
End: 2018-04-08
Attending: PHYSICIAN ASSISTANT
Payer: MEDICARE

## 2018-04-08 ENCOUNTER — APPOINTMENT (OUTPATIENT)
Dept: GENERAL RADIOLOGY | Age: 68
End: 2018-04-08
Attending: PHYSICIAN ASSISTANT
Payer: MEDICARE

## 2018-04-08 VITALS
OXYGEN SATURATION: 100 % | TEMPERATURE: 97.7 F | HEART RATE: 95 BPM | RESPIRATION RATE: 15 BRPM | WEIGHT: 150 LBS | BODY MASS INDEX: 24.11 KG/M2 | SYSTOLIC BLOOD PRESSURE: 135 MMHG | DIASTOLIC BLOOD PRESSURE: 53 MMHG | HEIGHT: 66 IN

## 2018-04-08 DIAGNOSIS — N39.0 URINARY TRACT INFECTION ASSOCIATED WITH INDWELLING URETHRAL CATHETER, INITIAL ENCOUNTER (HCC): ICD-10-CM

## 2018-04-08 DIAGNOSIS — K59.03 DRUG-INDUCED CONSTIPATION: Primary | ICD-10-CM

## 2018-04-08 DIAGNOSIS — T83.511A URINARY TRACT INFECTION ASSOCIATED WITH INDWELLING URETHRAL CATHETER, INITIAL ENCOUNTER (HCC): ICD-10-CM

## 2018-04-08 LAB
ALBUMIN SERPL-MCNC: 3.4 G/DL (ref 3.4–5)
ALBUMIN/GLOB SERPL: 0.7 {RATIO} (ref 0.8–1.7)
ALP SERPL-CCNC: 165 U/L (ref 45–117)
ALT SERPL-CCNC: 159 U/L (ref 13–56)
ANION GAP SERPL CALC-SCNC: 11 MMOL/L (ref 3–18)
APPEARANCE UR: ABNORMAL
AST SERPL-CCNC: 118 U/L (ref 15–37)
BACTERIA URNS QL MICRO: ABNORMAL /HPF
BASOPHILS # BLD: 0 K/UL (ref 0–0.06)
BASOPHILS NFR BLD: 0 % (ref 0–2)
BILIRUB SERPL-MCNC: 0.3 MG/DL (ref 0.2–1)
BILIRUB UR QL: NEGATIVE
BUN SERPL-MCNC: 17 MG/DL (ref 7–18)
BUN/CREAT SERPL: 25 (ref 12–20)
CALCIUM SERPL-MCNC: 9.7 MG/DL (ref 8.5–10.1)
CHLORIDE SERPL-SCNC: 94 MMOL/L (ref 100–108)
CO2 SERPL-SCNC: 30 MMOL/L (ref 21–32)
COLOR UR: YELLOW
CREAT SERPL-MCNC: 0.69 MG/DL (ref 0.6–1.3)
DIFFERENTIAL METHOD BLD: ABNORMAL
EOSINOPHIL # BLD: 0 K/UL (ref 0–0.4)
EOSINOPHIL NFR BLD: 0 % (ref 0–5)
EPITH CASTS URNS QL MICRO: ABNORMAL /LPF (ref 0–5)
ERYTHROCYTE [DISTWIDTH] IN BLOOD BY AUTOMATED COUNT: 15.6 % (ref 11.6–14.5)
GLOBULIN SER CALC-MCNC: 5.1 G/DL (ref 2–4)
GLUCOSE SERPL-MCNC: 112 MG/DL (ref 74–99)
GLUCOSE UR STRIP.AUTO-MCNC: NEGATIVE MG/DL
HCT VFR BLD AUTO: 46.3 % (ref 35–45)
HGB BLD-MCNC: 15.4 G/DL (ref 12–16)
HGB UR QL STRIP: ABNORMAL
KETONES UR QL STRIP.AUTO: NEGATIVE MG/DL
LEUKOCYTE ESTERASE UR QL STRIP.AUTO: ABNORMAL
LIPASE SERPL-CCNC: 368 U/L (ref 73–393)
LYMPHOCYTES # BLD: 1.2 K/UL (ref 0.9–3.6)
LYMPHOCYTES NFR BLD: 10 % (ref 21–52)
MCH RBC QN AUTO: 27.5 PG (ref 24–34)
MCHC RBC AUTO-ENTMCNC: 33.3 G/DL (ref 31–37)
MCV RBC AUTO: 82.5 FL (ref 74–97)
MONOCYTES # BLD: 0.7 K/UL (ref 0.05–1.2)
MONOCYTES NFR BLD: 6 % (ref 3–10)
MUCOUS THREADS URNS QL MICRO: ABNORMAL /LPF
NEUTS SEG # BLD: 9.6 K/UL (ref 1.8–8)
NEUTS SEG NFR BLD: 84 % (ref 40–73)
NITRITE UR QL STRIP.AUTO: NEGATIVE
PH UR STRIP: 6 [PH] (ref 5–8)
PLATELET # BLD AUTO: 345 K/UL (ref 135–420)
PMV BLD AUTO: 9 FL (ref 9.2–11.8)
POTASSIUM SERPL-SCNC: 3.8 MMOL/L (ref 3.5–5.5)
PROT SERPL-MCNC: 8.5 G/DL (ref 6.4–8.2)
PROT UR STRIP-MCNC: 100 MG/DL
RBC # BLD AUTO: 5.61 M/UL (ref 4.2–5.3)
RBC #/AREA URNS HPF: ABNORMAL /HPF (ref 0–5)
SODIUM SERPL-SCNC: 135 MMOL/L (ref 136–145)
SP GR UR REFRACTOMETRY: 1.02 (ref 1–1.03)
UROBILINOGEN UR QL STRIP.AUTO: 0.2 EU/DL (ref 0.2–1)
WBC # BLD AUTO: 11.5 K/UL (ref 4.6–13.2)
WBC URNS QL MICRO: ABNORMAL /HPF (ref 0–5)

## 2018-04-08 PROCEDURE — 71045 X-RAY EXAM CHEST 1 VIEW: CPT

## 2018-04-08 PROCEDURE — 99285 EMERGENCY DEPT VISIT HI MDM: CPT

## 2018-04-08 PROCEDURE — 87077 CULTURE AEROBIC IDENTIFY: CPT | Performed by: PHYSICIAN ASSISTANT

## 2018-04-08 PROCEDURE — 74177 CT ABD & PELVIS W/CONTRAST: CPT

## 2018-04-08 PROCEDURE — 77030034849

## 2018-04-08 PROCEDURE — 80053 COMPREHEN METABOLIC PANEL: CPT | Performed by: PHYSICIAN ASSISTANT

## 2018-04-08 PROCEDURE — 96375 TX/PRO/DX INJ NEW DRUG ADDON: CPT

## 2018-04-08 PROCEDURE — 74011250637 HC RX REV CODE- 250/637: Performed by: PHYSICIAN ASSISTANT

## 2018-04-08 PROCEDURE — 83690 ASSAY OF LIPASE: CPT | Performed by: PHYSICIAN ASSISTANT

## 2018-04-08 PROCEDURE — 96365 THER/PROPH/DIAG IV INF INIT: CPT

## 2018-04-08 PROCEDURE — 74011636320 HC RX REV CODE- 636/320: Performed by: EMERGENCY MEDICINE

## 2018-04-08 PROCEDURE — 81001 URINALYSIS AUTO W/SCOPE: CPT | Performed by: PHYSICIAN ASSISTANT

## 2018-04-08 PROCEDURE — 96361 HYDRATE IV INFUSION ADD-ON: CPT

## 2018-04-08 PROCEDURE — 87186 SC STD MICRODIL/AGAR DIL: CPT | Performed by: PHYSICIAN ASSISTANT

## 2018-04-08 PROCEDURE — 87086 URINE CULTURE/COLONY COUNT: CPT | Performed by: PHYSICIAN ASSISTANT

## 2018-04-08 PROCEDURE — 85025 COMPLETE CBC W/AUTO DIFF WBC: CPT | Performed by: PHYSICIAN ASSISTANT

## 2018-04-08 PROCEDURE — 74011250636 HC RX REV CODE- 250/636: Performed by: PHYSICIAN ASSISTANT

## 2018-04-08 RX ORDER — LEVOFLOXACIN 750 MG/1
750 TABLET ORAL DAILY
Qty: 7 TAB | Refills: 0 | Status: SHIPPED | OUTPATIENT
Start: 2018-04-08 | End: 2018-04-15

## 2018-04-08 RX ORDER — LIDOCAINE 50 MG/G
1 PATCH TOPICAL EVERY 24 HOURS
COMMUNITY

## 2018-04-08 RX ORDER — ONDANSETRON 2 MG/ML
4 INJECTION INTRAMUSCULAR; INTRAVENOUS
Status: COMPLETED | OUTPATIENT
Start: 2018-04-08 | End: 2018-04-08

## 2018-04-08 RX ORDER — POTASSIUM CHLORIDE 750 MG/1
10 TABLET, FILM COATED, EXTENDED RELEASE ORAL 2 TIMES DAILY
COMMUNITY

## 2018-04-08 RX ORDER — MAGNESIUM CITRATE
296 SOLUTION, ORAL ORAL
Qty: 1 BOTTLE | Refills: 0 | Status: SHIPPED | OUTPATIENT
Start: 2018-04-08 | End: 2018-04-08

## 2018-04-08 RX ORDER — BUTALBITAL, ACETAMINOPHEN AND CAFFEINE 50; 325; 40 MG/1; MG/1; MG/1
1 TABLET ORAL
Status: COMPLETED | OUTPATIENT
Start: 2018-04-08 | End: 2018-04-08

## 2018-04-08 RX ORDER — LEVOFLOXACIN 5 MG/ML
750 INJECTION, SOLUTION INTRAVENOUS ONCE
Status: COMPLETED | OUTPATIENT
Start: 2018-04-08 | End: 2018-04-08

## 2018-04-08 RX ADMIN — LEVOFLOXACIN 750 MG: 5 INJECTION, SOLUTION INTRAVENOUS at 14:38

## 2018-04-08 RX ADMIN — IOPAMIDOL 100 ML: 612 INJECTION, SOLUTION INTRAVENOUS at 14:14

## 2018-04-08 RX ADMIN — BUTALBITAL, ACETAMINOPHEN AND CAFFEINE 1 TABLET: 50; 325; 40 TABLET ORAL at 16:35

## 2018-04-08 RX ADMIN — ONDANSETRON 4 MG: 2 INJECTION INTRAMUSCULAR; INTRAVENOUS at 11:39

## 2018-04-08 RX ADMIN — SODIUM CHLORIDE 1000 ML: 900 INJECTION, SOLUTION INTRAVENOUS at 11:56

## 2018-04-08 NOTE — ED PROVIDER NOTES
EMERGENCY DEPARTMENT HISTORY AND PHYSICAL EXAM    Date: 4/8/2018  Patient Name: Ro Garcia    History of Presenting Illness     Chief Complaint   Patient presents with    Abdominal Pain    Cough         History Provided By: Patient    Chief Complaint: cough  Duration: 1 Days  Timing:  Acute  Modifying Factors: No medication taken for symptoms. Associated Symptoms: nausea, vomiting, abdominal pain, dark color urine    Additional History (Context):   10:51 AM  Ro Garcia is a 80 y.o. female with PMHX of paraplegic, gastric bypass, gastric ulcer who presents to the emergency department C/O cough and vomiting yesterday evening. Associated symptoms include nausea, vomiting, and abdominal pain secondary to vomiting. Reports dark color urine this morning. No medication taken for symptoms. Last bowel movement 3 days ago and took suppository without relief. Pmhx of sacral ulcer managed by home health. Pt denies CP, SOB and any other Sx or complaints. PCP: Romi Mcdaniels, DO    Current Outpatient Prescriptions   Medication Sig Dispense Refill    lidocaine (LIDODERM) 5 % 1 Patch by TransDERmal route every twenty-four (24) hours. Apply patch to the affected area for 12 hours a day and remove for 12 hours a day.  oxyCODONE ER (XTAMPZA ER) 9 mg capsule Take 9 mg by mouth every twelve (12) hours. Indications: Chronic Pain      potassium chloride SR (KLOR-CON 10) 10 mEq tablet Take 10 mEq by mouth two (2) times a day.  levoFLOXacin (LEVAQUIN) 750 mg tablet Take 1 Tab by mouth daily for 7 days. 7 Tab 0    magnesium citrate solution Take 296 mL by mouth now for 1 dose. 1 Bottle 0    oxyCODONE IR (ROXICODONE) 5 mg immediate release tablet Take 1 Tab by mouth every four (4) hours as needed. Max Daily Amount: 30 mg.  (Patient taking differently: Take 5 mg by mouth every eight (8) hours as needed.) 20 Tab 0    butalbital-acetaminophen-caffeine (FIORICET, ESGIC) -40 mg per tablet Take 1 Tab by mouth every four (4) hours as needed for Pain.  DULoxetine (CYMBALTA) 30 mg capsule Take 30 mg by mouth two (2) times a day.  levETIRAcetam (KEPPRA) 500 mg tablet Take 750 mg by mouth two (2) times a day.  levothyroxine (SYNTHROID) 75 mcg tablet Take 75 mcg by mouth Daily (before breakfast).  pantoprazole (PROTONIX) 40 mg tablet Take 40 mg by mouth two (2) times a day.  ergocalciferol (VITAMIN D2) 50,000 unit capsule Take 50,000 Units by mouth Every Friday.  lacosamide (VIMPAT) 200 mg tab tablet Take 1 Tab by mouth two (2) times a day. Max Daily Amount: 400 mg. Indications: PARTIAL EPILEPSY TREATMENT ADJUNCT 180 Tab 1    lubiPROStone (AMITIZA) 8 mcg capsule Take 1 Cap by mouth two (2) times daily (with meals). 60 Cap 0    mirabegron ER (MYRBETRIQ) 50 mg ER tablet Take 50 mg by mouth nightly. Takes at 2300      predniSONE (DELTASONE) 5 mg tablet Take 5 mg by mouth daily.  OLANZapine (ZYPREXA) 7.5 mg tablet Take 7.5 mg by mouth nightly.  cyclobenzaprine (FLEXERIL) 10 mg tablet Take 10 mg by mouth nightly.  lactulose (KRISTALOSE) 10 gram packet Take 10 g by mouth two (2) times a day.  clotrimazole (MYCELEX) 1 % vaginal cream Insert 1 Applicator into vagina nightly as needed for Itching. For ABX induced yeast infection      nystatin (MYCOSTATIN) 100,000 unit/mL suspension Take 1 tsp by mouth four (4) times daily as needed. swish and spit      CRANBERRY FRUIT EXTRACT (CRANBERRY EXTRACT PO) Take 1 Cap by mouth two (2) times a day. For UTI prevention  Non-formulary medication. Pt to supply if ordered for admission.  GLUC VARGAS/CHONDRO VARGAS A/VIT C/MN (GLUCOSAMINE CHONDROITIN MAXSTR PO) Take 1 Tab by mouth three (3) times daily.  ferrous sulfate 325 mg (65 mg iron) tablet Take 325 mg by mouth Daily (before breakfast).  diclofenac (VOLTAREN) 1 % gel Apply 2 g to affected area four (4) times daily. Non-formulary medication. Pt to supply if ordered for admission.       bisacodyl (DULCOLAX, BISACODYL,) 10 mg suppository Insert 10 mg into rectum daily.  loratadine-pseudoephedrine (CLARITIN-D 24 HOUR)  mg per tablet Take 1 Tab by mouth daily.  OTHER,NON-FORMULARY, Take 1 Cap by mouth daily (with lunch). Indications: Bariatric Multi Formula      magnesium oxide 500 mg tab Take 500 mg by mouth daily (with lunch).  ascorbic acid, vitamin C, (VITAMIN C) 500 mg tablet Take 500 mg by mouth daily.  rOPINIRole (REQUIP) 0.25 mg tablet Take 0.75 mg by mouth nightly. Takes at 2300      gabapentin (NEURONTIN) 300 mg capsule Take 300 mg by mouth three (3) times daily.          Past History     Past Medical History:  Past Medical History:   Diagnosis Date    Acute pain due to injury- multiple LE fractures 11/24/2017    Anemia NEC     history of blood transfusion    Anxiety     panic attacks    Arthritis     Bipolar 1 disorder (HCC)     Chronic back pain     Depression     Elevated diaphragm 5/4/2012    Fatigue     along with weakness    GERD (gastroesophageal reflux disease)     Headache(784.0)     Hypertension     pt denies    Other ill-defined conditions(799.89) swallowing issues, decubitus    Psychiatric disorder     Seizures (HCC)     Therapeutic opioid-induced constipation (OIC) 11/27/2017    Thromboembolus ([facility] [place] [address]     TIA (transient ischemic attack)        Past Surgical History:  Past Surgical History:   Procedure Laterality Date    COLONOSCOPY N/A 2/21/2017    COLONOSCOPY [facility] ANESTHESIA, PATIENT IS IN ROOM 358 performed by Mika Duke MD at [address]  N/A 2/22/2017    COLONOSCOPY performed by Mika Duke MD at [facility], COLON, DIAGNOSTIC      over 20 years ago    ENDOSCOPY, COLON, DIAGNOSTIC  9/24/09    HX APPENDECTOMY  11/6/84    HX BACK SURGERY  11/24/98    severe back problems    HX CHOLECYSTECTOMY  11/6/84    HX GASTRIC BYPASS  1995    [facility]    HX OTHER SURGICAL      IVC filter  HX REFRACTIVE SURGERY      HX TUBAL LIGATION  1991       Family History:  Family History   Problem Relation Age of Onset    Heart Disease Mother    Christine Willy Arthritis-rheumatoid Mother     Stroke Father     Hypertension Father    Christine Willy Arthritis-rheumatoid Father        Social History:  Social History   Substance Use Topics    Smoking status: Never Smoker    Smokeless tobacco: Never Used    Alcohol use No       Allergies: Allergies   Allergen Reactions    Ceftriaxone Seizures    Demerol [Meperidine] Unknown (comments)    Seafood [Shellfish Containing Products] Unknown (comments)         Review of Systems   Review of Systems   Respiratory: Positive for cough. Negative for shortness of breath. Cardiovascular: Negative for chest pain. Gastrointestinal: Positive for abdominal pain, constipation, nausea and vomiting. Genitourinary:        (+) dark color urine     All other systems reviewed and are negative. Physical Exam     Vitals:    04/08/18 1431 04/08/18 1500 04/08/18 1530 04/08/18 1600   BP: 122/66 119/68 128/73 135/53   Pulse: 91 93 93 95   Resp:  12 14 15   Temp:       SpO2:  98% 98% 100%   Weight:       Height:         Physical Exam   Vital signs and nursing notes reviewed. CONSTITUTIONAL: Alert. Chronically ill-appearing; well-nourished; in no apparent distress. HEAD: Normocephalic; atraumatic. CV: Normal S1, S2; no murmurs, rubs, or gallops. No chest wall tenderness. RESPIRATORY: Normal chest excursion with respiration; breath sounds clear and equal bilaterally; no wheezes, rhonchi, or rales. GI: Normal bowel sounds; non-distended; +generalized tenderness; chronic indwelling Gotti catheter draining cloudy yellow urine; no guarding or rigidity; no palpable organomegaly. No CVA tenderness. EXT: Normal ROM in all four extremities; non-tender to palpation.   SKIN: Normal for age and race; warm; dry; good turgor; Reported sacral decubitus ulcer, but unable to visualize at this time.  NEURO: A & O x3. +BLE paraplegia. PSYCH:  Mood and affect appropriate. Diagnostic Study Results     Labs -     Recent Results (from the past 12 hour(s))   CBC WITH AUTOMATED DIFF    Collection Time: 04/08/18 11:30 AM   Result Value Ref Range    WBC 11.5 4.6 - 13.2 K/uL    RBC 5.61 (H) 4.20 - 5.30 M/uL    HGB 15.4 12.0 - 16.0 g/dL    HCT 46.3 (H) 35.0 - 45.0 %    MCV 82.5 74.0 - 97.0 FL    MCH 27.5 24.0 - 34.0 PG    MCHC 33.3 31.0 - 37.0 g/dL    RDW 15.6 (H) 11.6 - 14.5 %    PLATELET 925 035 - 697 K/uL    MPV 9.0 (L) 9.2 - 11.8 FL    NEUTROPHILS 84 (H) 40 - 73 %    LYMPHOCYTES 10 (L) 21 - 52 %    MONOCYTES 6 3 - 10 %    EOSINOPHILS 0 0 - 5 %    BASOPHILS 0 0 - 2 %    ABS. NEUTROPHILS 9.6 (H) 1.8 - 8.0 K/UL    ABS. LYMPHOCYTES 1.2 0.9 - 3.6 K/UL    ABS. MONOCYTES 0.7 0.05 - 1.2 K/UL    ABS. EOSINOPHILS 0.0 0.0 - 0.4 K/UL    ABS. BASOPHILS 0.0 0.0 - 0.06 K/UL    DF AUTOMATED     METABOLIC PANEL, COMPREHENSIVE    Collection Time: 04/08/18 12:45 PM   Result Value Ref Range    Sodium 135 (L) 136 - 145 mmol/L    Potassium 3.8 3.5 - 5.5 mmol/L    Chloride 94 (L) 100 - 108 mmol/L    CO2 30 21 - 32 mmol/L    Anion gap 11 3.0 - 18 mmol/L    Glucose 112 (H) 74 - 99 mg/dL    BUN 17 7.0 - 18 MG/DL    Creatinine 0.69 0.6 - 1.3 MG/DL    BUN/Creatinine ratio 25 (H) 12 - 20      GFR est AA >60 >60 ml/min/1.73m2    GFR est non-AA >60 >60 ml/min/1.73m2    Calcium 9.7 8.5 - 10.1 MG/DL    Bilirubin, total 0.3 0.2 - 1.0 MG/DL    ALT (SGPT) 159 (H) 13 - 56 U/L    AST (SGOT) 118 (H) 15 - 37 U/L    Alk.  phosphatase 165 (H) 45 - 117 U/L    Protein, total 8.5 (H) 6.4 - 8.2 g/dL    Albumin 3.4 3.4 - 5.0 g/dL    Globulin 5.1 (H) 2.0 - 4.0 g/dL    A-G Ratio 0.7 (L) 0.8 - 1.7     LIPASE    Collection Time: 04/08/18 12:45 PM   Result Value Ref Range    Lipase 368 73 - 393 U/L   URINALYSIS W/ RFLX MICROSCOPIC    Collection Time: 04/08/18 12:45 PM   Result Value Ref Range    Color YELLOW      Appearance CLOUDY      Specific gravity 1.022 1.005 - 1.030      pH (UA) 6.0 5.0 - 8.0      Protein 100 (A) NEG mg/dL    Glucose NEGATIVE  NEG mg/dL    Ketone NEGATIVE  NEG mg/dL    Bilirubin NEGATIVE  NEG      Blood MODERATE (A) NEG      Urobilinogen 0.2 0.2 - 1.0 EU/dL    Nitrites NEGATIVE  NEG      Leukocyte Esterase MODERATE (A) NEG     URINE MICROSCOPIC ONLY    Collection Time: 04/08/18 12:45 PM   Result Value Ref Range    WBC 31 to 40 0 - 5 /hpf    RBC 11 to 20 0 - 5 /hpf    Epithelial cells FEW 0 - 5 /lpf    Bacteria 2+ (A) NEG /hpf    Mucus FEW (A) NEG /lpf       Radiologic Studies -   CT ABD PELV W CONT   Final Result   IMPRESSION:      1. No evidence of bowel obstruction or acute inflammatory process in the abdomen   or pelvis. 2. Large colonic stool burden. Please correlate for constipation. 3. Incomplete visualization of the liver on this exam.   4. Post surgical changes of gastric bypass and cholecystectomy. As read by the radiologist.   XR CHEST PORT   Final Result         12:25 PM  RADIOLOGY FINDINGS  Chest X-ray shows chronically elevated right hemidiaphragm. NAP. Unchanged from previous   Pending review by Radiologist  Recorded by SARIAT Villarreal, as dictated by Trang Mallory PA-C    CT Results  (Last 48 hours)               04/08/18 1431  CT ABD PELV W CONT Final result    Impression:  IMPRESSION:       1. No evidence of bowel obstruction or acute inflammatory process in the abdomen   or pelvis. 2. Large colonic stool burden. Please correlate for constipation. 3. Incomplete visualization of the liver on this exam.   4. Post surgical changes of gastric bypass and cholecystectomy. Narrative:  EXAM: CT of the abdomen and pelvis       INDICATION: Abdominal pain. COMPARISON: 11/29/2017. TECHNIQUE: Axial CT imaging of the abdomen and pelvis was performed with   intravenous contrast. Multiplanar reformats were generated.        One or more dose reduction techniques were used on this CT: automated exposure control, adjustment of the mAs and/or kVp according to patient's size, and   iterative reconstruction techniques. The specific techniques utilized on this CT   exam have been documented in the patient's electronic medical record.       _______________       FINDINGS:       LOWER CHEST: Linear vertical scarring at the left lower lobe. There is no   pericardial or pleural effusion. LIVER, BILIARY: The superior aspect of the liver is excluded on this exam. There   is no intra-or extrahepatic biliary ductal dilatation. The gallbladder is   surgically absent. PANCREAS: Normal.       SPLEEN: Normal.       ADRENALS: Normal.       KIDNEYS: Normal. There is no nephrolithiasis. There is no hydronephrosis. LYMPH NODES: No enlarged lymph nodes. GASTROINTESTINAL TRACT: Post surgical changes related to gastric bypass. No   bowel dilation or wall thickening. There is a large colonic stool burden       PELVIC ORGANS: The urinary bladder is underdistended secondary to presence of a   Gotti catheter. VASCULATURE: IVC filter in situ. No acute abnormality. 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 No acute abnormality. There   is multilevel lumbar spondylosis. OTHER: There is no free intraperitoneal fluid or free air. SUPERFICIAL SOFT TISSUES: Unremarkable.       _______________               CXR Results  (Last 48 hours)               04/08/18 1216  XR CHEST PORT Final result    Impression:  IMPRESSION:       Chronic elevation of the right hemidiaphragm but no acute cardiopulmonary   process. Narrative:  EXAM: Portable chest x-ray       INDICATION: Cough. COMPARISON: 11/27/2017. TECHNIQUE: Single AP view of the chest was obtained.       _______________       FINDINGS:       The lungs are clear of focal infiltrate. Chronic elevation of the right   hemidiaphragm is stable. There is no pneumothorax or pleural effusion. Heart   size is within normal limits. There is no significant vascular congestion. There   is no acute osseous abnormality. _______________                 Medications given in the ED-  Medications   sodium chloride 0.9 % bolus infusion 1,000 mL (0 mL IntraVENous IV Completed 4/8/18 1256)   ondansetron (ZOFRAN) injection 4 mg (4 mg IntraVENous Given 4/8/18 1139)   levoFLOXacin (LEVAQUIN) 750 mg in D5W IVPB (0 mg IntraVENous IV Completed 4/8/18 1608)   iopamidol (ISOVUE 300) 61 % contrast injection 100 mL (100 mL IntraVENous Given 4/8/18 1414)   butalbital-acetaminophen-caffeine (FIORICET, ESGIC) -40 mg per tablet 1 Tab (1 Tab Oral Given 4/8/18 1635)         Medical Decision Making   I am the first provider for this patient. I reviewed the vital signs, available nursing notes, past medical history, past surgical history, family history and social history. Vital Signs-Reviewed the patient's vital signs. Pulse Oximetry Analysis - 98% on RA     Records Reviewed: Nursing Notes and Old Medical Records    Procedures:  Procedures    ED Course:   10:51 AM Initial assessment performed. The patients presenting problems have been discussed, and they are in agreement with the care plan formulated and outlined with them. I have encouraged them to ask questions as they arise throughout their visit. Diagnosis and Disposition       DISCHARGE NOTE:  3:58 PM  Gayatri Pichardo  results have been reviewed with her. She has been counseled regarding her diagnosis, treatment, and plan. She verbally conveys understanding and agreement of the signs, symptoms, diagnosis, treatment and prognosis and additionally agrees to follow up as discussed. She also agrees with the care-plan and conveys that all of her questions have been answered.   I have also provided discharge instructions for her that include: educational information regarding their diagnosis and treatment, and list of reasons why they would want to return to the ED prior to their follow-up appointment, should her condition change. She has been provided with education for proper emergency department utilization. CLINICAL IMPRESSION:    1. Drug-induced constipation    2. Urinary tract infection associated with indwelling urethral catheter, initial encounter ([facility]        PLAN:  1. D/C Home  2. Discharge Medication List as of 4/8/2018  3:59 PM      START taking these medications    Details   levoFLOXacin (LEVAQUIN) 750 mg tablet Take 1 Tab by mouth daily for 7 days. , Normal, Disp-7 Tab, R-0      magnesium citrate solution Take 296 mL by mouth now for 1 dose., Normal, Disp-1 Bottle, R-0         CONTINUE these medications which have NOT CHANGED    Details   lidocaine (LIDODERM) 5 % 1 Patch by TransDERmal route every twenty-four (24) hours. Apply patch to the affected area for 12 hours a day and remove for 12 hours a day., Historical Med      oxyCODONE ER (XTAMPZA ER) 9 mg capsule Take 9 mg by mouth every twelve (12) hours. Indications: Chronic Pain, Historical Med      potassium chloride SR (KLOR-CON 10) 10 mEq tablet Take 10 mEq by mouth two (2) times a day., Historical Med      oxyCODONE IR (ROXICODONE) 5 mg immediate release tablet Take 1 Tab by mouth every four (4) hours as needed. Max Daily Amount: 30 mg., Print, Disp-20 Tab, R-0      butalbital-acetaminophen-caffeine (FIORICET, ESGIC) -40 mg per tablet Take 1 Tab by mouth every four (4) hours as needed for Pain., Historical Med      DULoxetine (CYMBALTA) 30 mg capsule Take 30 mg by mouth two (2) times a day., Historical Med      levETIRAcetam (KEPPRA) 500 mg tablet Take 750 mg by mouth two (2) times a day., Historical Med      levothyroxine (SYNTHROID) 75 mcg tablet Take 75 mcg by mouth Daily (before breakfast). , Historical Med      pantoprazole (PROTONIX) 40 mg tablet Take 40 mg by mouth two (2) times a day., Historical Med      ergocalciferol (VITAMIN D2) 50,000 unit capsule Take 50,000 Units by mouth Every Friday., Historical Med lacosamide (VIMPAT) 200 mg tab tablet Take 1 Tab by mouth two (2) times a day. Max Daily Amount: 400 mg. Indications: PARTIAL EPILEPSY TREATMENT ADJUNCT, Print, Disp-180 Tab, R-1      lubiPROStone (AMITIZA) 8 mcg capsule Take 1 Cap by mouth two (2) times daily (with meals). , Print, Disp-60 Cap, R-0      mirabegron ER (MYRBETRIQ) 50 mg ER tablet Take 50 mg by mouth nightly. Takes at 2300, Historical Med      predniSONE (DELTASONE) 5 mg tablet Take 5 mg by mouth daily. , Historical Med      OLANZapine (ZYPREXA) 7.5 mg tablet Take 7.5 mg by mouth nightly. Historical Med, 7.5 mg      cyclobenzaprine (FLEXERIL) 10 mg tablet Take 10 mg by mouth nightly., Historical Med      lactulose (KRISTALOSE) 10 gram packet Take 10 g by mouth two (2) times a day., Historical Med      clotrimazole (MYCELEX) 1 % vaginal cream Insert 1 Applicator into vagina nightly as needed for Itching. For ABX induced yeast infection, Historical Med      nystatin (MYCOSTATIN) 100,000 unit/mL suspension Take 1 tsp by mouth four (4) times daily as needed. swish and spit, Historical Med      CRANBERRY FRUIT EXTRACT (CRANBERRY EXTRACT PO) Take 1 Cap by mouth two (2) times a day. For UTI prevention  Non-formulary medication. Pt to supply if ordered for admission. , Historical Med      GLUC VARGAS/CHONDRO VARGAS A/VIT C/MN (GLUCOSAMINE CHONDROITIN MAXSTR PO) Take 1 Tab by mouth three (3) times daily. , Historical Med      ferrous sulfate 325 mg (65 mg iron) tablet Take 325 mg by mouth Daily (before breakfast). , Historical Med      diclofenac (VOLTAREN) 1 % gel Apply 2 g to affected area four (4) times daily. Non-formulary medication. Pt to supply if ordered for admission. , Historical Med      bisacodyl (DULCOLAX, BISACODYL,) 10 mg suppository Insert 10 mg into rectum daily. , Historical Med      loratadine-pseudoephedrine (CLARITIN-D 24 HOUR)  mg per tablet Take 1 Tab by mouth daily. , Historical Med      OTHER,NON-FORMULARY, Take 1 Cap by mouth daily (with lunch). Indications: Bariatric Multi Formula, Historical Med      magnesium oxide 500 mg tab Take 500 mg by mouth daily (with lunch). , Historical Med      ascorbic acid, vitamin C, (VITAMIN C) 500 mg tablet Take 500 mg by mouth daily. , Historical Med      rOPINIRole (REQUIP) 0.25 mg tablet Take 0.75 mg by mouth nightly. Takes at 2300, Historical Med      gabapentin (NEURONTIN) 300 mg capsule Take 300 mg by mouth three (3) times daily. , Historical Med           3. Follow-up Information     Follow up With Details Comments 1 Sasabeaudrey Collado, DO Schedule an appointment as soon as possible for a visit For primary care follow up [address] [facility]      [facility] EMERGENCY DEPT Go to As needed, as symptoms worsen [address] [id number]  [phone]        _______________________________    Attestations: This note is prepared by David Posey, acting as Scribe for [employer]. [employer]:  The scribe's documentation has been prepared under my direction and personally reviewed by me in its entirety.   I confirm that the note above accurately reflects all work, treatment, procedures, and medical decision making performed by me.  _______________________________

## 2018-04-08 NOTE — ED TRIAGE NOTES
Pt presents to ED via EMS with c/o dark colored urine this AM and vomiting since last night. Pt is paraplegic and has chronic indwelling león catheter. Sepsis Screening completed    (  )Patient meets SIRS criteria. (  XX)Patient does not meet SIRS criteria.       SIRS Criteria is achieved when two or more of the following are present   Temperature < 96.8°F (36°C) or > 100.9°F (38.3°C)   Heart Rate > 90 beats per minute   Respiratory Rate > 20 breaths per minute   WBC count > 12,000 or <4,000 or > 10% bands

## 2018-04-08 NOTE — DISCHARGE INSTRUCTIONS
Constipation: Care Instructions  Your Care Instructions    Constipation means that you have a hard time passing stools (bowel movements). People pass stools from 3 times a day to once every 3 days. What is normal for you may be different. Constipation may occur with pain in the rectum and cramping. The pain may get worse when you try to pass stools. Sometimes there are small amounts of bright red blood on toilet paper or the surface of stools. This is because of enlarged veins near the rectum (hemorrhoids). A few changes in your diet and lifestyle may help you avoid ongoing constipation. Your doctor may also prescribe medicine to help loosen your stool. Some medicines can cause constipation. These include pain medicines and antidepressants. Tell your doctor about all the medicines you take. Your doctor may want to make a medicine change to ease your symptoms. Follow-up care is a key part of your treatment and safety. Be sure to make and go to all appointments, and call your doctor if you are having problems. It's also a good idea to know your test results and keep a list of the medicines you take. How can you care for yourself at home? · Drink plenty of fluids, enough so that your urine is light yellow or clear like water. If you have kidney, heart, or liver disease and have to limit fluids, talk with your doctor before you increase the amount of fluids you drink. · Include high-fiber foods in your diet each day. These include fruits, vegetables, beans, and whole grains. · Get at least 30 minutes of exercise on most days of the week. Walking is a good choice. You also may want to do other activities, such as running, swimming, cycling, or playing tennis or team sports. · Take a fiber supplement, such as Citrucel or Metamucil, every day. Read and follow all instructions on the label. · Schedule time each day for a bowel movement. A daily routine may help.  Take your time having your bowel movement. · Support your feet with a small step stool when you sit on the toilet. This helps flex your hips and places your pelvis in a squatting position. · Your doctor may recommend an over-the-counter laxative to relieve your constipation. Examples are Milk of Magnesia and MiraLax. Read and follow all instructions on the label. Do not use laxatives on a long-term basis. When should you call for help? Call your doctor now or seek immediate medical care if:  ? · You have new or worse belly pain. ? · You have new or worse nausea or vomiting. ? · You have blood in your stools. ? Watch closely for changes in your health, and be sure to contact your doctor if:  ? · Your constipation is getting worse. ? · You do not get better as expected. Where can you learn more? Go to http://sav-susannah.info/. Enter 21 [phone] in the search box to learn more about \"Constipation: Care Instructions. \"  Current as of: March 20, 2017  Content Version: 11.4  © 6257-4995 [employer]. Care instructions adapted under license by [employer] (which disclaims liability or warranty for this information). If you have questions about a medical condition or this instruction, always ask your healthcare professional. [address] any warranty or liability for your use of this information. Urinary Tract Infection in Women: Care Instructions  Your Care Instructions    A urinary tract infection, or UTI, is a general term for an infection anywhere between the kidneys and the urethra (where urine comes out). Most UTIs are bladder infections. They often cause pain or burning when you urinate. UTIs are caused by bacteria and can be cured with antibiotics. Be sure to complete your treatment so that the infection goes away. Follow-up care is a key part of your treatment and safety. Be sure to make and go to all appointments, and call your doctor if you are having problems. It's also a good idea to know your test results and keep a list of the medicines you take. How can you care for yourself at home? · Take your antibiotics as directed. Do not stop taking them just because you feel better. You need to take the full course of antibiotics. · Drink extra water and other fluids for the next day or two. This may help wash out the bacteria that are causing the infection. (If you have kidney, heart, or liver disease and have to limit fluids, talk with your doctor before you increase your fluid intake.)  · Avoid drinks that are carbonated or have caffeine. They can irritate the bladder. · Urinate often. Try to empty your bladder each time. · To relieve pain, take a hot bath or lay a heating pad set on low over your lower belly or genital area. Never go to sleep with a heating pad in place. To prevent UTIs  · Drink plenty of water each day. This helps you urinate often, which clears bacteria from your system. (If you have kidney, heart, or liver disease and have to limit fluids, talk with your doctor before you increase your fluid intake.)  · Urinate when you need to. · Urinate right after you have sex. · Change sanitary pads often. · Avoid douches, bubble baths, feminine hygiene sprays, and other feminine hygiene products that have deodorants. · After going to the bathroom, wipe from front to back. When should you call for help? Call your doctor now or seek immediate medical care if:  ? · Symptoms such as fever, chills, nausea, or vomiting get worse or appear for the first time. ? · You have new pain in your back just below your rib cage. This is called flank pain. ? · There is new blood or pus in your urine. ? · You have any problems with your antibiotic medicine. ? Watch closely for changes in your health, and be sure to contact your doctor if:  ? · You are not getting better after taking an antibiotic for 2 days. ? · Your symptoms go away but then come back.    Where can you learn more? Go to http://sav-susannah.info/. Enter L750 in the search box to learn more about \"Urinary Tract Infection in Women: Care Instructions. \"  Current as of: May 12, 2017  Content Version: 11.4  © 7696-7701 [employer]. Care instructions adapted under license by [employer] (which disclaims liability or warranty for this information). If you have questions about a medical condition or this instruction, always ask your healthcare professional. [address] any warranty or liability for your use of this information.

## 2018-04-09 ENCOUNTER — HOSPITAL ENCOUNTER (OUTPATIENT)
Dept: LAB | Age: 68
Discharge: HOME | End: 2018-04-09
Payer: MEDICARE

## 2018-04-09 PROCEDURE — 80177 DRUG SCRN QUAN LEVETIRACETAM: CPT | Performed by: PSYCHIATRY & NEUROLOGY

## 2018-04-09 PROCEDURE — 80339 ANTIEPILEPTICS NOS 1-3: CPT | Performed by: PSYCHIATRY & NEUROLOGY

## 2018-04-09 PROCEDURE — 36415 COLL VENOUS BLD VENIPUNCTURE: CPT | Performed by: PSYCHIATRY & NEUROLOGY

## 2018-04-10 LAB
BACTERIA SPEC CULT: ABNORMAL
BACTERIA SPEC CULT: ABNORMAL
LACOSAMIDE SERPL-MCNC: 5.5 UG/ML (ref 5–10)
LEVETIRACETAM SERPL-MCNC: 18.5 UG/ML (ref 10–40)
SERVICE CMNT-IMP: ABNORMAL

## 2018-04-10 NOTE — CALL BACK NOTE
8:26 AM  04/10/2018    On levaquin for UTI. Sensitive per C&S. On appropriate tx.      Kin Burrell PA-C

## 2018-04-11 LAB
FAX TO INFO,FAXT: NORMAL
FAX TO NUMBER,FAXN: NORMAL

## 2018-07-14 NOTE — PROGRESS NOTES
Pt Bernardokareem Harmeetshobha, 77 yo female. Pt in bed, bedrest, during bedside report. Pt admitted with león catheter, still in place, draining yellow urine with sediment. Pt has chronic pain issues with headache and back pain. Pt medicated with morphine IV Q4H. Pt states she is in her manic phase of her bipolar. IDR: Pt understands she must complete psych assessment prior to discharge today. Replaced león catheter prior to pt discharge. Reviewed discharge instructions and medications with pt. all other ROS negative except as per HPI

## 2018-07-28 ENCOUNTER — HOSPITAL ENCOUNTER (INPATIENT)
Age: 68
LOS: 1 days | Discharge: HOME HEALTH | DRG: 563 | End: 2018-07-29
Attending: EMERGENCY MEDICINE | Admitting: ORTHOPAEDIC SURGERY
Payer: MEDICARE

## 2018-07-28 ENCOUNTER — APPOINTMENT (OUTPATIENT)
Dept: GENERAL RADIOLOGY | Age: 68
DRG: 563 | End: 2018-07-28
Attending: EMERGENCY MEDICINE
Payer: MEDICARE

## 2018-07-28 DIAGNOSIS — S82.201A CLOSED FRACTURE OF RIGHT TIBIA AND FIBULA, INITIAL ENCOUNTER: Primary | ICD-10-CM

## 2018-07-28 DIAGNOSIS — S82.401A CLOSED FRACTURE OF RIGHT TIBIA AND FIBULA, INITIAL ENCOUNTER: Primary | ICD-10-CM

## 2018-07-28 PROBLEM — S82.409A TIBIA/FIBULA FRACTURE: Status: ACTIVE | Noted: 2018-07-28

## 2018-07-28 PROBLEM — S82.209A TIBIA/FIBULA FRACTURE: Status: ACTIVE | Noted: 2018-07-28

## 2018-07-28 PROCEDURE — 74011250636 HC RX REV CODE- 250/636: Performed by: EMERGENCY MEDICINE

## 2018-07-28 PROCEDURE — 99285 EMERGENCY DEPT VISIT HI MDM: CPT

## 2018-07-28 PROCEDURE — 74011250637 HC RX REV CODE- 250/637: Performed by: EMERGENCY MEDICINE

## 2018-07-28 PROCEDURE — 96375 TX/PRO/DX INJ NEW DRUG ADDON: CPT

## 2018-07-28 PROCEDURE — 96374 THER/PROPH/DIAG INJ IV PUSH: CPT

## 2018-07-28 PROCEDURE — 65270000029 HC RM PRIVATE

## 2018-07-28 PROCEDURE — 73590 X-RAY EXAM OF LOWER LEG: CPT

## 2018-07-28 PROCEDURE — 73552 X-RAY EXAM OF FEMUR 2/>: CPT

## 2018-07-28 RX ORDER — FENTANYL CITRATE 50 UG/ML
50 INJECTION, SOLUTION INTRAMUSCULAR; INTRAVENOUS
Status: COMPLETED | OUTPATIENT
Start: 2018-07-28 | End: 2018-07-28

## 2018-07-28 RX ORDER — MORPHINE SULFATE 4 MG/ML
4 INJECTION INTRAVENOUS
Status: COMPLETED | OUTPATIENT
Start: 2018-07-28 | End: 2018-07-28

## 2018-07-28 RX ORDER — FENTANYL CITRATE 50 UG/ML
25 INJECTION, SOLUTION INTRAMUSCULAR; INTRAVENOUS
Status: COMPLETED | OUTPATIENT
Start: 2018-07-28 | End: 2018-07-28

## 2018-07-28 RX ORDER — ONDANSETRON 2 MG/ML
4 INJECTION INTRAMUSCULAR; INTRAVENOUS
Status: COMPLETED | OUTPATIENT
Start: 2018-07-28 | End: 2018-07-28

## 2018-07-28 RX ORDER — BACLOFEN 10 MG/1
10 TABLET ORAL 3 TIMES DAILY
COMMUNITY

## 2018-07-28 RX ORDER — OXYCODONE HYDROCHLORIDE 5 MG/1
10 TABLET ORAL
Status: COMPLETED | OUTPATIENT
Start: 2018-07-28 | End: 2018-07-28

## 2018-07-28 RX ADMIN — FENTANYL CITRATE 25 MCG: 50 INJECTION, SOLUTION INTRAMUSCULAR; INTRAVENOUS at 17:15

## 2018-07-28 RX ADMIN — OXYCODONE HYDROCHLORIDE 10 MG: 5 TABLET ORAL at 18:23

## 2018-07-28 RX ADMIN — MORPHINE SULFATE 4 MG: 4 INJECTION INTRAVENOUS at 20:42

## 2018-07-28 RX ADMIN — ONDANSETRON 4 MG: 2 INJECTION INTRAMUSCULAR; INTRAVENOUS at 20:43

## 2018-07-28 RX ADMIN — FENTANYL CITRATE 50 MCG: 50 INJECTION, SOLUTION INTRAMUSCULAR; INTRAVENOUS at 22:11

## 2018-07-28 NOTE — ED NOTES
RN in room for purpose of hourly rounding. Room checked for trash and safety hazards. Patient is. .. 
 
[  ] seated at bedside. [x  ] lying in bed with side rails up and call bell in reach. [  ] lying in with call bell in reach and continuous monitoring in place. Pain reduction interventions. .. 
 
[  ] not indicated at this time 
    include the following 
 [  ] administration of analgesic medications [  ] lights turned down [  ] patient offered a cool washcloth 
 [  ] heat applied [  ] ice applied [x  ] patient repositioned Restroom needs addressed. Patient is. ... [ x ] Not in need restroom assistance at this time [  ] Ambulatory as needed to the restroom [  ] Assisted to bedside commode [  ] Assisted with use of bed pan [  ] Provided with a urinal 
 
Position. .. 
 
[  ] Patient does not require assistance with repositioning [  ] Patient repositions with assistance of RN 
[ x ] Patient repositioned with assistance of RN and other ED staff. Pt requesting pain medication, will make MD aware.

## 2018-07-28 NOTE — IP AVS SNAPSHOT
[address] [address] 
[phone] Patient: Flakita Virk MRN: [id number] FEJ:[id number] A check ebonie indicates which time of day the medication should be taken. My Medications START taking these medications Instructions Each Dose to Equal  
 Morning Noon Evening Bedtime HYDROcodone-acetaminophen 5-325 mg per tablet Commonly known as:  Rhina Taveras Your last dose was: Your next dose is: Take 1 Tab by mouth every four (4) hours as needed. Max Daily Amount: 6 Tabs. 1 Tab CONTINUE taking these medications Instructions Each Dose to Equal  
 Morning Noon Evening Bedtime  
 baclofen 10 mg tablet Commonly known as:  LIORESAL Your last dose was: Your next dose is: Take 10 mg by mouth three (3) times daily. 10 mg  
    
   
   
   
  
 butalbital-acetaminophen-caffeine -40 mg per tablet Commonly known as:  Cristhian Miner Your last dose was: Your next dose is: Take 1 Tab by mouth every four (4) hours as needed for Pain. 1 Tab CLARITIN-D 24 HOUR  mg per tablet Generic drug:  loratadine-pseudoephedrine Your last dose was: Your next dose is: Take 1 Tab by mouth daily. 1 Tab  
    
   
   
   
  
 clotrimazole 1 % vaginal cream  
Commonly known as:  Cayla Meyers Your last dose was: Your next dose is: Insert 1 Applicator into vagina nightly as needed for Itching. For ABX induced yeast infection 1 Applicator CRANBERRY EXTRACT PO Your last dose was: Your next dose is: Take 1 Cap by mouth two (2) times a day. For UTI prevention Non-formulary medication. Pt to supply if ordered for admission. 1 Cap  
    
   
   
   
  
 cyclobenzaprine 10 mg tablet Commonly known as:  FLEXERIL Your last dose was: Your next dose is: Take 10 mg by mouth nightly. 10 mg  
    
   
   
   
  
 CYMBALTA 30 mg capsule Generic drug:  DULoxetine Your last dose was: Your next dose is: Take 30 mg by mouth two (2) times a day. 30 mg  
    
   
   
   
  
 DULCOLAX (BISACODYL) 10 mg suppository Generic drug:  bisacodyl Your last dose was: Your next dose is: Insert 10 mg into rectum daily. 10 mg  
    
   
   
   
  
 ferrous sulfate 325 mg (65 mg iron) tablet Your last dose was: Your next dose is: Take 325 mg by mouth Daily (before breakfast). 325 mg  
    
   
   
   
  
 gabapentin 300 mg capsule Commonly known as:  NEURONTIN Your last dose was: Your next dose is: Take 300 mg by mouth three (3) times daily. 300 mg GLUCOSAMINE CHONDROITIN MAXSTR PO Your last dose was: Your next dose is: Take 1 Tab by mouth three (3) times daily. 1 Tab KEPPRA 500 mg tablet Generic drug:  levETIRAcetam  
   
Your last dose was: Your next dose is: Take 750 mg by mouth two (2) times a day. 750 mg  
    
   
   
   
  
 lacosamide 200 mg Tab tablet Commonly known as:  VIMPAT Your last dose was: Your next dose is: Take 1 Tab by mouth two (2) times a day. Max Daily Amount: 400 mg. Indications: PARTIAL EPILEPSY TREATMENT ADJUNCT  
 200 mg  
    
   
   
   
  
 lactulose 10 gram packet Commonly known as:  Rosa M Nordmann Your last dose was: Your next dose is: Take 10 g by mouth two (2) times a day. 10 g LIDODERM 5 % Generic drug:  lidocaine Your last dose was: Your next dose is:    
   
   
 1 Patch by TransDERmal route every twenty-four (24) hours.  Apply patch to the affected area for 12 hours a day and remove for 12 hours a day. 1 Patch  
    
   
   
   
  
 lubiPROStone 8 mcg capsule Commonly known as:  Taisha Grant Your last dose was: Your next dose is: Take 1 Cap by mouth two (2) times daily (with meals). 8 mcg  
    
   
   
   
  
 magnesium oxide 500 mg Tab Your last dose was: Your next dose is: Take 500 mg by mouth daily (with lunch). 500 mg MYRBETRIQ 50 mg ER tablet Generic drug:  mirabegron ER Your last dose was: Your next dose is: Take 50 mg by mouth nightly. Takes at 2300  
 50 mg  
    
   
   
   
  
 nystatin 100,000 unit/mL suspension Commonly known as:  MYCOSTATIN Your last dose was: Your next dose is: Take 1 tsp by mouth four (4) times daily as needed. swish and spit 1 tsp OTHER(NON-FORMULARY) Your last dose was: Your next dose is: Take 1 Cap by mouth daily (with lunch). Indications: Bariatric Multi Formula 1 Cap  
    
   
   
   
  
 pantoprazole 40 mg tablet Commonly known as:  PROTONIX Your last dose was: Your next dose is: Take 40 mg by mouth two (2) times a day. 40 mg  
    
   
   
   
  
 potassium chloride SR 10 mEq tablet Commonly known as:  KLOR-CON 10 Your last dose was: Your next dose is: Take 10 mEq by mouth two (2) times a day. 10 mEq  
    
   
   
   
  
 predniSONE 5 mg tablet Commonly known as:  Ethel Pound Your last dose was: Your next dose is: Take 5 mg by mouth daily. 5 mg REQUIP 0.25 mg tablet Generic drug:  rOPINIRole Your last dose was: Your next dose is: Take 0.75 mg by mouth nightly. Takes at 2300  
 0.75 mg SYNTHROID 75 mcg tablet Generic drug:  levothyroxine Your last dose was: Your next dose is: Take 75 mcg by mouth Daily (before breakfast). 75 mcg VITAMIN C 500 mg tablet Generic drug:  ascorbic acid (vitamin C) Your last dose was: Your next dose is: Take 500 mg by mouth daily. 500 mg  
    
   
   
   
  
 VITAMIN D2 50,000 unit capsule Generic drug:  ergocalciferol Your last dose was: Your next dose is: Take 50,000 Units by mouth Every Friday. [address] Units VOLTAREN 1 % Gel Generic drug:  diclofenac Your last dose was: Your next dose is:    
   
   
 Apply 2 g to affected area four (4) times daily. Non-formulary medication. Pt to supply if ordered for admission. 2 g XTAMPZA ER 9 mg capsule Generic drug:  oxyCODONE ER Your last dose was: Your next dose is: Take 9 mg by mouth every twelve (12) hours. Indications: Chronic Pain  
 9 mg ZyPREXA 7.5 mg tablet Generic drug:  OLANZapine Your last dose was: Your next dose is: Take 7.5 mg by mouth nightly. 7.5 mg  
    
   
   
   
  
  
STOP taking these medications   
 oxyCODONE IR 5 mg immediate release tablet Commonly known as:  Trisha Records Where to Get Your Medications Information on where to get these meds will be given to you by the nurse or doctor. ! Ask your nurse or doctor about these medications HYDROcodone-acetaminophen 5-325 mg per tablet

## 2018-07-28 NOTE — ED PROVIDER NOTES
EMERGENCY DEPARTMENT HISTORY AND PHYSICAL EXAM 
 
Date: 7/28/2018 Patient Name: Frederick Yost History of Presenting Illness Chief Complaint Patient presents with  Leg Injury History Provided By: Patient Chief Complaint: right leg pain Duration: PTA Timing:  Acute Location: right lower leg Quality: deformed Severity: Severe Associated Symptoms: denies any other associated signs or symptoms Additional History (Context):  
5:03 PM 
Frederick Yost is a 80 y.o. female with PMHX of Depression, anxiety, chronic back pain, HA, fatigue, anemia, arthritis, GERD, Seizures, Bipolar 1 disorder, HTN, TIA, and thromboembolus who presents to the emergency department C/O right leg pain PTA s/p injury when running into a pallet and boxes at [address] while in a motorized wheelchair. There are previous breaks in the left tib/fib as well as the right and left femurs. Pt last took Oxycodone 15 mg at noon. Allergies reported to Ceftriaxone, Demerol, and Seafood. PSHx of cholecystectomy, appendectomy, tubal ligation, back surgery, gastric bypass, and colonoscopy. Pt endorses being a non smoker, a non EtOH user, and a non recreational drug user. Pt denies fever, chills, N/V, and any other sxs or complaints. PCP: Hector Chavarria DO 
 
Current Facility-Administered Medications Medication Dose Route Frequency Provider Last Rate Last Dose  fentaNYL citrate (PF) injection 50 mcg  50 mcg IntraVENous NOW Lisa Boss MD      
 
Current Outpatient Prescriptions Medication Sig Dispense Refill  baclofen (LIORESAL) 10 mg tablet Take 10 mg by mouth three (3) times daily.  lidocaine (LIDODERM) 5 % 1 Patch by TransDERmal route every twenty-four (24) hours. Apply patch to the affected area for 12 hours a day and remove for 12 hours a day.  potassium chloride SR (KLOR-CON 10) 10 mEq tablet Take 10 mEq by mouth two (2) times a day.     
 oxyCODONE IR (ROXICODONE) 5 mg immediate release tablet Take 1 Tab by mouth every four (4) hours as needed. Max Daily Amount: 30 mg. (Patient taking differently: Take 5 mg by mouth every eight (8) hours as needed.) 20 Tab 0  
 butalbital-acetaminophen-caffeine (FIORICET, ESGIC) -40 mg per tablet Take 1 Tab by mouth every four (4) hours as needed for Pain.  cyclobenzaprine (FLEXERIL) 10 mg tablet Take 10 mg by mouth nightly.  DULoxetine (CYMBALTA) 30 mg capsule Take 30 mg by mouth two (2) times a day.  levETIRAcetam (KEPPRA) 500 mg tablet Take 750 mg by mouth two (2) times a day.  levothyroxine (SYNTHROID) 75 mcg tablet Take 75 mcg by mouth Daily (before breakfast).  nystatin (MYCOSTATIN) 100,000 unit/mL suspension Take 1 tsp by mouth four (4) times daily as needed. swish and spit  GLUC VARGAS/CHONDRO VARGAS A/VIT C/MN (GLUCOSAMINE CHONDROITIN MAXSTR PO) Take 1 Tab by mouth three (3) times daily.  ferrous sulfate 325 mg (65 mg iron) tablet Take 325 mg by mouth Daily (before breakfast).  diclofenac (VOLTAREN) 1 % gel Apply 2 g to affected area four (4) times daily. Non-formulary medication. Pt to supply if ordered for admission.  ergocalciferol (VITAMIN D2) 50,000 unit capsule Take 50,000 Units by mouth Every Friday.  bisacodyl (DULCOLAX, BISACODYL,) 10 mg suppository Insert 10 mg into rectum daily.  lacosamide (VIMPAT) 200 mg tab tablet Take 1 Tab by mouth two (2) times a day. Max Daily Amount: 400 mg. Indications: PARTIAL EPILEPSY TREATMENT ADJUNCT 180 Tab 1  
 lubiPROStone (AMITIZA) 8 mcg capsule Take 1 Cap by mouth two (2) times daily (with meals). 60 Cap 0  
 loratadine-pseudoephedrine (CLARITIN-D 24 HOUR)  mg per tablet Take 1 Tab by mouth daily.  magnesium oxide 500 mg tab Take 500 mg by mouth daily (with lunch).  ascorbic acid, vitamin C, (VITAMIN C) 500 mg tablet Take 500 mg by mouth daily.  mirabegron ER (MYRBETRIQ) 50 mg ER tablet Take 50 mg by mouth nightly.  Takes at 2300    
 rOPINIRole (REQUIP) 0.25 mg tablet Take 0.75 mg by mouth nightly. Takes at [id number]  predniSONE (DELTASONE) 5 mg tablet Take 5 mg by mouth daily.  gabapentin (NEURONTIN) 300 mg capsule Take 300 mg by mouth three (3) times daily.  OLANZapine (ZYPREXA) 7.5 mg tablet Take 7.5 mg by mouth nightly.  oxyCODONE ER (XTAMPZA ER) 9 mg capsule Take 9 mg by mouth every twelve (12) hours. Indications: Chronic Pain  lactulose (KRISTALOSE) 10 gram packet Take 10 g by mouth two (2) times a day.  clotrimazole (MYCELEX) 1 % vaginal cream Insert 1 Applicator into vagina nightly as needed for Itching. For ABX induced yeast infection  pantoprazole (PROTONIX) 40 mg tablet Take 40 mg by mouth two (2) times a day.  CRANBERRY FRUIT EXTRACT (CRANBERRY EXTRACT PO) Take 1 Cap by mouth two (2) times a day. For UTI prevention Non-formulary medication. Pt to supply if ordered for admission.  OTHER,NON-FORMULARY, Take 1 Cap by mouth daily (with lunch). Indications: Bariatric Multi Formula Past History Past Medical History: 
Past Medical History:  
Diagnosis Date  Acute pain due to injury- multiple LE fractures 11/24/2017  Anemia NEC   
 history of blood transfusion  Anxiety   
 panic attacks  Arthritis  Bipolar 1 disorder ([address]  Chronic back pain  Depression  Elevated diaphragm 5/4/2012  Fatigue   
 along with weakness  GERD (gastroesophageal reflux disease)  Headache(784.0)  Hypertension   
 pt denies  Other ill-defined conditions(799.89) swallowing issues, decubitus  Psychiatric disorder  Seizures ([address]  Therapeutic opioid-induced constipation (OIC) 11/27/2017  Thromboembolus ([address]  TIA (transient ischemic attack) Past Surgical History: 
Past Surgical History:  
Procedure Laterality Date  COLONOSCOPY N/A 2/21/2017 COLONOSCOPY [facility] ANESTHESIA, PATIENT IS IN ROOM 358 performed by Scott Alegre MD at THE [facility] ENDOSCOPY  COLONOSCOPY N/A 2/22/2017 COLONOSCOPY performed by Milagros Miller MD at THE [facility] ENDOSCOPY  ENDOSCOPY, COLON, DIAGNOSTIC    
 over 20 years ago  ENDOSCOPY, COLON, DIAGNOSTIC  9/24/09  HX APPENDECTOMY  11/6/84  HX BACK SURGERY  11/24/98  
 severe back problems  HX CHOLECYSTECTOMY  11/6/84 [address] [facility] [place]  HX OTHER SURGICAL    
 IVC filter [address] Family History: 
Family History Problem Relation Age of Onset  Heart Disease Mother  Arthritis-rheumatoid Mother  Stroke Father  Hypertension Father  Arthritis-rheumatoid Father Social History: 
Social History Substance Use Topics  Smoking status: Never Smoker  Smokeless tobacco: Never Used  Alcohol use No  
 
 
Allergies: Allergies Allergen Reactions  Ceftriaxone Seizures  Demerol [Meperidine] Unknown (comments)  Seafood [Shellfish Containing Products] Unknown (comments) Review of Systems Review of Systems Constitutional: Negative for chills and fever. Gastrointestinal: Negative for nausea and vomiting. Musculoskeletal:  
     (+) Right leg pain All other systems reviewed and are negative. Physical Exam  
 
Vitals:  
 07/28/18 1827 07/28/18 1900 07/28/18 1920 07/28/18 1940 BP: 116/60 110/60 106/66 113/76 Pulse: 80 Resp: 16 Temp:      
SpO2: 99% 98% 98% 98% Weight:      
Height:      
 
Physical Exam  
Nursing note and vitals reviewed. Vital signs and nursing notes reviewed CONSTITUTIONAL: Alert, in no apparent distress; well-developed; well-nourished. HEAD:  Normocephalic, atraumatic EYES: PERRL; EOM's intact. ENTM: Nose: no rhinorrhea; Throat: no erythema or exudate, mucous membranes moist 
Neck:  No JVD, supple without lymphadenopathy RESP: Chest clear, equal breath sounds. CV: S1 and S2 WNL; No murmurs, gallops or rubs. GI: Normal bowel sounds, abdomen soft and non-tender.  No masses or organomegaly. : No costo-vertebral angle tenderness. BACK:  Non-tender UPPER EXT:  Normal inspection LOWER EXT: No edema. Distal pulses intact. Left leg is internally rotated at the knee. Right is externally rotated at the knee. Crepitus in the right lower leg. Tenderness over the left and right legs. She has prior femur fractures. NEURO: CN intact, reflexes 2/4 and sym, strength 5/5 and sym, sensation intact. SKIN: No rashes; Normal for age and stage. PSYCH:  Alert and oriented, normal affect. Diagnostic Study Results Labs - No results found for this or any previous visit (from the past 12 hour(s)). Radiologic Studies -  
XR FEMUR RT 2 VS    (Results Pending) XR TIB/FIB RT    (Results Pending) 8:03 PM 
RADIOLOGY FINDINGS Right femur X-ray shows old femur fractures Pending review by Radiologist 
Recorded by [facility], ED Scribe, as dictated by Morelia Rajput MD 
 
8:03 PM 
RADIOLOGY FINDINGS Right tib/fib X-ray shows fracture Pending review by Radiologist 
Recorded by [facility], ED Scribe, as dictated by Morelia Rajput MD 
 
CT Results  (Last 48 hours) None CXR Results  (Last 48 hours) None Medications given in the ED- Medications  
fentaNYL citrate (PF) injection 50 mcg (not administered)  
fentaNYL citrate (PF) injection 25 mcg (25 mcg IntraNASal Given 7/28/18 1715) oxyCODONE IR (ROXICODONE) tablet 10 mg (10 mg Oral Given 7/28/18 1823) morphine injection 4 mg (4 mg IntraVENous Given 7/28/18 2042) ondansetron [facility]) injection 4 mg (4 mg IntraVENous Given 7/28/18 2043) Medical Decision Making I am the first provider for this patient. I reviewed the vital signs, available nursing notes, past medical history, past surgical history, family history and social history. Vital Signs-Reviewed the patient's vital signs. Pulse Oximetry Analysis - 99% on RA Records Reviewed: Nursing Notes and Old Medical Records Provider Notes (Medical Decision Making): DDx: sprain, strain, fx, dislocation. Procedures: 
Procedures ED Course:  
5:03 PM Initial assessment performed. The patients presenting problems have been discussed, and they are in agreement with the care plan formulated and outlined with them. I have encouraged them to ask questions as they arise throughout their visit. BEDSIDE TRANSFER OF CARE: 
7:00 PM 
Patient care will be transferred from Kiara Wetzel MD to Ray Knutson MD.  Discussed available diagnostic results and care plan at length at beside. Patient and family made aware of provider change. All questions answered. Patient and family voiced understanding. Written by Taylor Fields, ED Scribe, as dictated by Kiara Wetzel MD.  
 
9:23 PM 
Tib/fib fracture, spoke to Dr Isabella Castellanos and will admit to orthopedics service for further evaluation. Diagnosis and Disposition Critical Care Time: None Core Measures: 
For Hospitalized Patients: 
 
1. Hospitalization Decision Time: The decision to hospitalize the patient was made by Ray Knutson MD at 9:13 PM on 7/28/2018 2. Aspirin: Aspirin was not given because the patient did not present with a stroke at the time of their Emergency Department evaluation 9:13 PM  Patient is being admitted to the hospital by Kaylene Ansari MD. The results of their tests and reasons for their admission have been discussed with them and/or available family. They convey agreement and understanding for the need to be admitted and for their admission diagnosis. CLINICAL IMPRESSION: 
 
1. Closed fracture of right tibia and fibula, initial encounter PLAN: 
1. ADMIT TO ORTHOPEDICS 
_______________________________ Attestations:  
This note is prepared by Taylor Fields, acting as Scribe for Kiara Wetzel MD. 
 
Kiara Wetzel MD:  The scribe's documentation has been prepared under my direction and personally reviewed by me in its entirety. I confirm that the note above accurately reflects all work, treatment, procedures, and medical decision making performed by me. This note is prepared by [facility], acting as Scribe for Chan Dunham MD. Chan Dunham MD:  The scribe's documentation has been prepared under my direction and personally reviewed by me in its entirety. I confirm that the note above accurately reflects all work, treatment, procedures, and medical decision making performed by me. 
_______________________________

## 2018-07-28 NOTE — IP AVS SNAPSHOT
[address] [place] [id number] 
[phone] Patient: Chon Gibson MRN: [id number] ZPW:[id number] About your hospitalization You were admitted on:  July 28, 2018 You last received care in the:  [address] [place] [address] You were discharged on:  July 29, 2018 Why you were hospitalized Your primary diagnosis was:  Not on File Your diagnoses also included:  Tibia/Fibula Fracture Follow-up Information Follow up With Details Comments Contact Info Kay North, [address] [facility] [address] [place] [address] 
[phone] Discharge Orders None A check ebonie indicates which time of day the medication should be taken. My Medications START taking these medications Instructions Each Dose to Equal  
 Morning Noon Evening Bedtime HYDROcodone-acetaminophen 5-325 mg per tablet Commonly known as:  Saira Damian Your last dose was: Your next dose is: Take 1 Tab by mouth every four (4) hours as needed. Max Daily Amount: 6 Tabs. 1 Tab CONTINUE taking these medications Instructions Each Dose to Equal  
 Morning Noon Evening Bedtime  
 baclofen 10 mg tablet Commonly known as:  LIORESAL Your last dose was: Your next dose is: Take 10 mg by mouth three (3) times daily. 10 mg  
    
   
   
   
  
 butalbital-acetaminophen-caffeine -40 mg per tablet Commonly known as:  Mardeen Morales Your last dose was: Your next dose is: Take 1 Tab by mouth every four (4) hours as needed for Pain. 1 Tab CLARITIN-D 24 HOUR  mg per tablet Generic drug:  loratadine-pseudoephedrine Your last dose was: Your next dose is: Take 1 Tab by mouth daily. 1 Tab  
    
   
   
   
  
 clotrimazole 1 % vaginal cream  
Commonly known as:  Tad Tequila Your last dose was: Your next dose is: Insert 1 Applicator into vagina nightly as needed for Itching. For ABX induced yeast infection 1 Applicator CRANBERRY EXTRACT PO Your last dose was: Your next dose is: Take 1 Cap by mouth two (2) times a day. For UTI prevention Non-formulary medication. Pt to supply if ordered for admission. 1 Cap  
    
   
   
   
  
 cyclobenzaprine 10 mg tablet Commonly known as:  FLEXERIL Your last dose was: Your next dose is: Take 10 mg by mouth nightly. 10 mg  
    
   
   
   
  
 CYMBALTA 30 mg capsule Generic drug:  DULoxetine Your last dose was: Your next dose is: Take 30 mg by mouth two (2) times a day. 30 mg  
    
   
   
   
  
 DULCOLAX (BISACODYL) 10 mg suppository Generic drug:  bisacodyl Your last dose was: Your next dose is: Insert 10 mg into rectum daily. 10 mg  
    
   
   
   
  
 ferrous sulfate 325 mg (65 mg iron) tablet Your last dose was: Your next dose is: Take 325 mg by mouth Daily (before breakfast). 325 mg  
    
   
   
   
  
 gabapentin 300 mg capsule Commonly known as:  NEURONTIN Your last dose was: Your next dose is: Take 300 mg by mouth three (3) times daily. 300 mg GLUCOSAMINE CHONDROITIN MAXSTR PO Your last dose was: Your next dose is: Take 1 Tab by mouth three (3) times daily. 1 Tab KEPPRA 500 mg tablet Generic drug:  levETIRAcetam  
   
Your last dose was: Your next dose is: Take 750 mg by mouth two (2) times a day. 750 mg  
    
   
   
   
  
 lacosamide 200 mg Tab tablet Commonly known as:  VIMPAT Your last dose was: Your next dose is: Take 1 Tab by mouth two (2) times a day. Max Daily Amount: 400 mg. Indications: PARTIAL EPILEPSY TREATMENT ADJUNCT  
 200 mg  
    
   
   
   
  
 lactulose 10 gram packet Commonly known as:  Alvia Greet Your last dose was: Your next dose is: Take 10 g by mouth two (2) times a day. 10 g LIDODERM 5 % Generic drug:  lidocaine Your last dose was: Your next dose is:    
   
   
 1 Patch by TransDERmal route every twenty-four (24) hours. Apply patch to the affected area for 12 hours a day and remove for 12 hours a day. 1 Patch  
    
   
   
   
  
 lubiPROStone 8 mcg capsule Commonly known as:  Xiomara Shukla Your last dose was: Your next dose is: Take 1 Cap by mouth two (2) times daily (with meals). 8 mcg  
    
   
   
   
  
 magnesium oxide 500 mg Tab Your last dose was: Your next dose is: Take 500 mg by mouth daily (with lunch). 500 mg MYRBETRIQ 50 mg ER tablet Generic drug:  mirabegron ER Your last dose was: Your next dose is: Take 50 mg by mouth nightly. Takes at 2300  
 50 mg  
    
   
   
   
  
 nystatin 100,000 unit/mL suspension Commonly known as:  MYCOSTATIN Your last dose was: Your next dose is: Take 1 tsp by mouth four (4) times daily as needed. swish and spit 1 tsp OTHER(NON-FORMULARY) Your last dose was: Your next dose is: Take 1 Cap by mouth daily (with lunch). Indications: Bariatric Multi Formula 1 Cap  
    
   
   
   
  
 pantoprazole 40 mg tablet Commonly known as:  PROTONIX Your last dose was: Your next dose is: Take 40 mg by mouth two (2) times a day. 40 mg  
    
   
   
   
  
 potassium chloride SR 10 mEq tablet Commonly known as:  KLOR-CON 10 Your last dose was: Your next dose is: Take 10 mEq by mouth two (2) times a day. 10 mEq  
    
   
   
   
  
 predniSONE 5 mg tablet Commonly known as:  Royce Lever Your last dose was: Your next dose is: Take 5 mg by mouth daily. 5 mg REQUIP 0.25 mg tablet Generic drug:  rOPINIRole Your last dose was: Your next dose is: Take 0.75 mg by mouth nightly. Takes at 2300  
 0.75 mg SYNTHROID 75 mcg tablet Generic drug:  levothyroxine Your last dose was: Your next dose is: Take 75 mcg by mouth Daily (before breakfast). 75 mcg VITAMIN C 500 mg tablet Generic drug:  ascorbic acid (vitamin C) Your last dose was: Your next dose is: Take 500 mg by mouth daily. 500 mg  
    
   
   
   
  
 VITAMIN D2 50,000 unit capsule Generic drug:  ergocalciferol Your last dose was: Your next dose is: Take 50,000 Units by mouth Every Friday. 22754 Units VOLTAREN 1 % Gel Generic drug:  diclofenac Your last dose was: Your next dose is:    
   
   
 Apply 2 g to affected area four (4) times daily. Non-formulary medication. Pt to supply if ordered for admission. 2 g XTAMPZA ER 9 mg capsule Generic drug:  oxyCODONE ER Your last dose was: Your next dose is: Take 9 mg by mouth every twelve (12) hours. Indications: Chronic Pain  
 9 mg ZyPREXA 7.5 mg tablet Generic drug:  OLANZapine Your last dose was: Your next dose is: Take 7.5 mg by mouth nightly. 7.5 mg  
    
   
   
   
  
  
STOP taking these medications   
 oxyCODONE IR 5 mg immediate release tablet Commonly known as:  Abdiaziz Hernandez Where to Get Your Medications Information on where to get these meds will be given to you by the nurse or doctor. ! Ask your nurse or doctor about these medications HYDROcodone-acetaminophen 5-325 mg per tablet Opioid Education Prescription Opioids: What You Need to Know: 
 
Prescription opioids can be used to help relieve moderate-to-severe pain and are often prescribed following a surgery or injury, or for certain health conditions. These medications can be an important part of treatment but also come with serious risks. Opioids are strong pain medicines. Examples include hydrocodone, oxycodone, fentanyl, and morphine. Heroin is an example of an illegal opioid. It is important to work with your health care provider to make sure you are getting the safest, most effective care. WHAT ARE THE RISKS AND SIDE EFFECTS OF OPIOID USE? Prescription opioids carry serious risks of addiction and overdose, especially with prolonged use. An opioid overdose, often marked by slow breathing, can cause sudden death. The use of prescription opioids can have a number of side effects as well, even when taken as directed. · Tolerance-meaning you might need to take more of a medication for the same pain relief · Physical dependence-meaning you have symptoms of withdrawal when the medication is stopped. Withdrawal symptoms can include nausea, sweating, chills, diarrhea, stomach cramps, and muscle aches. Withdrawal can last up to several weeks, depending on which drug you took and how long you took it. · Increased sensitivity to pain · Constipation · Nausea, vomiting, and dry mouth · Sleepiness and dizziness · Confusion · Depression · Low levels of testosterone that can result in lower sex drive, energy, and strength · Itching and sweating RISKS ARE GREATER WITH:      
· History of drug misuse, substance use disorder, or overdose · Mental health conditions (such as depression or anxiety) · Sleep apnea · Older age (72 years or older) · Pregnancy Avoid alcohol while taking prescription opioids. Also, unless specifically advised by your health care provider, medications to avoid include: · Benzodiazepines (such as Xanax or Valium) · Muscle relaxants (such as Soma or Flexeril) · Hypnotics (such as Ambien or Lunesta) · Other prescription opioids KNOW YOUR OPTIONS Talk to your health care provider about ways to manage your pain that don't involve prescription opioids. Some of these options may actually work better and have fewer risks and side effects. Options may include: 
· Pain relievers such as acetaminophen, ibuprofen, and naproxen · Some medications that are also used for depression or seizures · Physical therapy and exercise · Counseling to help patients learn how to cope better with triggers of pain and stress. · Application of heat or cold compress · Massage therapy · Relaxation techniques Be Informed Make sure you know the name of your medication, how much and how often to take it, and its potential risks & side effects. IF YOU ARE PRESCRIBED OPIOIDS FOR PAIN: 
· Never take opioids in greater amounts or more often than prescribed. Remember the goal is not to be pain-free but to manage your pain at a tolerable level. · Follow up with your primary care provider to: · Work together to create a plan on how to manage your pain. · Talk about ways to help manage your pain that don't involve prescription opioids. · Talk about any and all concerns and side effects. · Help prevent misuse and abuse. · Never sell or share prescription opioids · Help prevent misuse and abuse. · Store prescription opioids in a secure place and out of reach of others (this may include visitors, children, friends, and family).  
· Safely dispose of unused/unwanted prescription opioids: Find your community drug take-back program or your pharmacy mail-back program, or flush them down the toilet, following guidance from the Food and Drug Administration (www.fda.gov/Drugs/ResourcesForYou). · Visit www.cdc.gov/drugoverdose to learn about the risks of opioid abuse and overdose. · If you believe you may be struggling with addiction, tell your health care provider and ask for guidance or call [employer] at 6-006-620-PDMX. Discharge Instructions Broken Lower Leg: Care Instructions Your Care Instructions Treatment for your broken leg will depend on how bad the break is. Your doctor may have put your lower leg in a splint or a cast to allow it to heal or keep it stable until you see another doctor. It may take weeks or months for your leg to heal. You can help it heal with some care at home. You heal best when you take good care of yourself. Eat a variety of healthy foods, and don't smoke. Follow-up care is a key part of your treatment and safety. Be sure to make and go to all appointments, and call your doctor if you are having problems. It's also a good idea to know your test results and keep a list of the medicines you take. How can you care for yourself at home? · Put ice or a cold pack on your lower leg for 10 to 20 minutes at a time. Try to do this every 1 to 2 hours for the next 3 days (when you are awake). Put a thin cloth between the ice and your cast or splint. Keep your cast or splint dry. · Follow the cast care instructions your doctor gives you. If you have a splint, do not take it off unless your doctor tells you to. · Be safe with medicines. Take pain medicines exactly as directed. ¨ If the doctor gave you a prescription medicine for pain, take it as prescribed. ¨ If you are not taking a prescription pain medicine, ask your doctor if you can take an over-the-counter medicine. · Do not put weight on your leg unless your doctor tells you to. Use crutches to walk. · Prop up your leg on pillows when you sit or lie down in the first few days after the injury. Keep your leg higher than the level of your heart. This will help reduce swelling. · Follow instructions for exercises to keep your leg strong. · Wiggle your toes often to reduce swelling and stiffness. When should you call for help? Call 911 anytime you think you may need emergency care. For example, call if: 
  · You have chest pain, are short of breath, or you cough up blood.  
  · You are very sleepy and you have trouble waking up.  
 Call your doctor now or seek immediate medical care if: 
  · You have new or worse nausea or vomiting.  
  · You have new or worse pain.  
  · Your foot is cool or pale or changes color.  
  · You have tingling, weakness, or numbness in your toes.  
  · Your cast or splint feels too tight.  
  · You have signs of a blood clot in your leg (called a deep vein thrombosis), such as: 
¨ Pain in your calf, back of the knee, thigh, or groin. ¨ Redness or swelling in your leg.  
 Watch closely for changes in your health, and be sure to contact your doctor if: 
  · You have a problem with your splint or cast.  
  · You do not get better as expected. Where can you learn more? Go to http://sav-susannah.info/. Enter L198 in the search box to learn more about \"Broken Lower Leg: Care Instructions. \" Current as of: November 29, 2017 Content Version: 11.7 © 9928-3306 [employer]. Care instructions adapted under license by [employer] (which disclaims liability or warranty for this information). If you have questions about a medical condition or this instruction, always ask your healthcare professional. [place] [address] any warranty or liability for your use of this information. BuildFax Announcement  We are excited to announce that we are making your provider's discharge notes available to you in WoraPay. You will see these notes when they are completed and signed by the physician that discharged you from your recent hospital stay. If you have any questions or concerns about any information you see in WoraPay, please call the Health Information Department where you were seen or reach out to your Primary Care Provider for more information about your plan of care. Introducing hospitals & HEALTH SERVICES! [facility] introduces WoraPay patient portal. Now you can access parts of your medical record, email your doctor's office, and request medication refills online. 1. In your internet browser, go to [email or web address] 2. Click on the First Time User? Click Here link in the Sign In box. You will see the New Member Sign Up page. 3. Enter your WoraPay Access Code exactly as it appears below. You will not need to use this code after youve completed the sign-up process. If you do not sign up before the expiration date, you must request a new code. · WoraPay Access Code: [email or web address] Expires: 10/26/2018  4:56 PM 
 
4. Enter the last four digits of your Social Security Number (xxxx) and Date of Birth (mm/dd/yyyy) as indicated and click Submit. You will be taken to the next sign-up page. 5. Create a WoraPay ID. This will be your WoraPay login ID and cannot be changed, so think of one that is secure and easy to remember. 6. Create a WoraPay password. You can change your password at any time. 7. Enter your Password Reset Question and Answer. This can be used at a later time if you forget your password. 8. Enter your e-mail address. You will receive e-mail notification when new information is available in [address] 9. Click Sign Up. You can now view and download portions of your medical record. 10. Click the Download Summary menu link to download a portable copy of your medical information. If you have questions, please visit the Frequently Asked Questions section of the "[employer]t website. Remember, Teranode is NOT to be used for urgent needs. For medical emergencies, dial 911. Now available from your iPhone and Android! Introducing Ronald Dela Cruz As a Claudia Dobbins patient, I wanted to make you aware of our electronic visit tool called Ronald Dela Cruz. L2 24/7 allows you to connect within minutes with a medical provider 24 hours a day, seven days a week via a mobile device or tablet or logging into a secure website from your computer. You can access Ronald Dela Cruz from anywhere in the [place]. A virtual visit might be right for you when you have a simple condition and feel like you just dont want to get out of bed, or cant get away from work for an appointment, when your regular Rhettlyla Mu provider is not available (evenings, weekends or holidays), or when youre out of town and need minor care. Electronic visits cost only $49 and if the RhettmyAchy 24/7 provider determines a prescription is needed to treat your condition, one can be electronically transmitted to a nearby pharmacy*. Please take a moment to enroll today if you have not already done so. The enrollment process is free and takes just a few minutes. To enroll, please download the L2 24/[employer] bhupendra to your tablet or phone, or visit [email or web address] to enroll on your computer. And, as an [address] [place] [address] patient with a IPDIA account, the results of your visits will be scanned into your electronic medical record and your primary care provider will be able to view the scanned results. We urge you to continue to see your regular Claudia Dobbins provider for your ongoing medical care.   And while your primary care provider may not be the one available when you seek a Ronald Dela Cruz virtual visit, the peace of mind you get from getting a real diagnosis real time can be priceless. For more information on Ronald Dela Cruz, view our Frequently Asked Questions (FAQs) at [email or web address]. Sincerely, 
 
Casey Michelle MD 
Chief Medical Officer 50Neeraj Damian *:  certain medications cannot be prescribed via Ronald Dela Cruz Providers Seen During Your Hospitalization Provider Specialty Primary office phone Fidencio Forbes MD Emergency Medicine [phone] Richard Calhoun MD Orthopedic Surgery [phone] Your Primary Care Physician (PCP) Primary Care Physician Office Phone Office Fax 4474 [facility], [address] [place] [address] [phone] You are allergic to the following Allergen Reactions Ceftriaxone Seizures Demerol (Meperidine) Unknown (comments) Seafood (Shellfish Containing Products) Unknown (comments) Recent Documentation Height Weight BMI OB Status Smoking Status 1.676 m 68.5 kg 24.37 kg/m2 Postmenopausal Never Smoker Emergency Contacts Name Discharge Info Relation Home Work Mobile RoseTulio DISCHARGE CAREGIVER [3] Spouse [3] [phone] Patient Belongings The following personal items are in your possession at time of discharge: 
                Jewelry: Watch (silver and gold colored watch )       Other Valuables: Cell Phone, Money (comment) (backpack ) Please provide this summary of care documentation to your next provider. Signatures-by signing, you are acknowledging that this After Visit Summary has been reviewed with you and you have received a copy. Patient Signature:  ____________________________________________________________ Date:  ____________________________________________________________  
  
Aloma Snellen Provider Signature:  ____________________________________________________________ Date:  ____________________________________________________________

## 2018-07-28 NOTE — ED NOTES
Called radiology, spoke w/ Anjali Hahn to make aware that exam needs to be done portable (MD request).

## 2018-07-29 VITALS
HEART RATE: 93 BPM | HEIGHT: 66 IN | BODY MASS INDEX: 24.27 KG/M2 | WEIGHT: 151 LBS | SYSTOLIC BLOOD PRESSURE: 112 MMHG | DIASTOLIC BLOOD PRESSURE: 64 MMHG | OXYGEN SATURATION: 99 % | RESPIRATION RATE: 16 BRPM | TEMPERATURE: 98.1 F

## 2018-07-29 PROCEDURE — 74011250636 HC RX REV CODE- 250/636: Performed by: ORTHOPAEDIC SURGERY

## 2018-07-29 PROCEDURE — 74011250637 HC RX REV CODE- 250/637: Performed by: ORTHOPAEDIC SURGERY

## 2018-07-29 RX ORDER — HYDROCODONE BITARTRATE AND ACETAMINOPHEN 5; 325 MG/1; MG/1
1 TABLET ORAL
Qty: 60 TAB | Refills: 0 | Status: SHIPPED | OUTPATIENT
Start: 2018-07-29

## 2018-07-29 RX ORDER — ONDANSETRON 2 MG/ML
4 INJECTION INTRAMUSCULAR; INTRAVENOUS
Status: DISCONTINUED | OUTPATIENT
Start: 2018-07-29 | End: 2018-07-29 | Stop reason: HOSPADM

## 2018-07-29 RX ORDER — HYDROCODONE BITARTRATE AND ACETAMINOPHEN 5; 325 MG/1; MG/1
1-2 TABLET ORAL
Status: DISCONTINUED | OUTPATIENT
Start: 2018-07-29 | End: 2018-07-29 | Stop reason: HOSPADM

## 2018-07-29 RX ORDER — SODIUM CHLORIDE 9 MG/ML
60 INJECTION, SOLUTION INTRAVENOUS CONTINUOUS
Status: DISCONTINUED | OUTPATIENT
Start: 2018-07-29 | End: 2018-07-29

## 2018-07-29 RX ORDER — HYDROMORPHONE HYDROCHLORIDE 2 MG/ML
2 INJECTION, SOLUTION INTRAMUSCULAR; INTRAVENOUS; SUBCUTANEOUS ONCE
Status: COMPLETED | OUTPATIENT
Start: 2018-07-29 | End: 2018-07-29

## 2018-07-29 RX ADMIN — HYDROCODONE BITARTRATE AND ACETAMINOPHEN 2 TABLET: 5; 325 TABLET ORAL at 08:18

## 2018-07-29 RX ADMIN — HYDROMORPHONE HYDROCHLORIDE 2 MG: 2 INJECTION, SOLUTION INTRAMUSCULAR; INTRAVENOUS; SUBCUTANEOUS at 12:28

## 2018-07-29 RX ADMIN — ONDANSETRON 4 MG: 2 INJECTION INTRAMUSCULAR; INTRAVENOUS at 00:58

## 2018-07-29 RX ADMIN — SODIUM CHLORIDE 60 ML/HR: 900 INJECTION, SOLUTION INTRAVENOUS at 00:58

## 2018-07-29 RX ADMIN — HYDROCODONE BITARTRATE AND ACETAMINOPHEN 1 TABLET: 5; 325 TABLET ORAL at 00:58

## 2018-07-29 NOTE — ED NOTES
Accepting nurse called to receive report,  took name and extension. Third attempt to call report unsuccessful.

## 2018-07-29 NOTE — PROGRESS NOTES
Physical Therapy Screening: 
Services are not indicated at this time. [facility] based on results obtained during the nursing admission assessment. The patients chart was reviewed and the patient is not appropriate for a skilled therapy evaluation at this time. Patient noted to be bed bound at this time which is baseline. Please consult physical therapy if any therapy needs arise. Thank you.  
 
Devendra Woodward PTA

## 2018-07-29 NOTE — DISCHARGE SUMMARY
Discharge Summary Patient: Itz Delvalle               Sex: female          DOA: 7/28/2018 YOB: 1950      Age:  80 y.o.        LOS:  LOS: 1 day Admit Date: 7/28/2018 Discharge Date: 7/29/2018 Admission Diagnoses: Tibia/fibula fracture Discharge Diagnoses:   
Problem List as of 7/29/2018  Date Reviewed: 7/29/2018 Codes Class Noted - Resolved Tibia/fibula fracture ICD-10-CM: S82.209A, X59.868Q ICD-9-CM: 823.82  7/28/2018 - Present SBO (small bowel obstruction) ([facility] ICD-10-CM: K72.540 ICD-9-CM: 560.9  11/29/2017 - Present Ileus ([facility].) ICD-10-CM: K56.7 ICD-9-CM: 560.1  11/27/2017 - Present Acute respiratory distress ICD-10-CM: R06.03 
ICD-9-CM: 518.82  11/27/2017 - Present Therapeutic opioid-induced constipation (OIC) ICD-10-CM: K59.03, T40.2X5A 
ICD-9-CM: 564.09, E935.2  11/27/2017 - Present Tachycardia ICD-10-CM: R00.0 ICD-9-CM: 785.0  11/27/2017 - Present Sepsis ([facility] ICD-10-CM: A41.9 ICD-9-CM: 038.9, 995.91  11/24/2017 - Present Acute pain due to injury- multiple LE fractures ICD-10-CM: G89.11 ICD-9-CM: 338.11  11/24/2017 - Present Chronic pain ICD-10-CM: G89.29 ICD-9-CM: 338.29  11/23/2017 - Present Bedridden ICD-10-CM: Z74.01 
ICD-9-CM: V49.84  11/23/2017 - Present Multiple fractures of both lower extremities, closed, initial encounter ICD-10-CM: S82.91XA, W63.19UE ICD-9-CM: 828.0  11/22/2017 - Present Overdose ICD-10-CM: Z86.906U ICD-9-CM: 977.9, E980.5  8/27/2017 - Present Narcosis ICD-10-CM: R40.1 ICD-9-CM: 780.09  8/27/2017 - Present Seizure ([address] ICD-10-CM: R56.9 ICD-9-CM: 780.39  7/21/2017 - Present Hypomagnesemia ICD-10-CM: X50.24 
ICD-9-CM: 275.2  9/12/2012 - Present Unspecified constipation ICD-10-CM: K59.00 ICD-9-CM: 564.00  9/12/2012 - Present  Personal history of DVT (deep vein thrombosis) (Chronic) ICD-10-CM: N70.760 ICD-9-CM: V12.51  7/7/2012 - Present Hypotension, unspecified ICD-10-CM: I95.9 ICD-9-CM: 458.9  6/26/2012 - Present Seizure disorder ([facility] ICD-10-CM: G40.909 ICD-9-CM: 345.90  6/24/2012 - Present Depression ICD-10-CM: F32.9 ICD-9-CM: 298  6/24/2012 - Present UTI (urinary tract infection) ICD-10-CM: N39.0 ICD-9-CM: 599.0  6/1/2012 - Present Severe protein-calorie malnutrition ([facility] ICD-10-CM: Q33 ICD-9-CM: 471  6/1/2012 - Present Bipolar 1 disorder (HCC) (Chronic) ICD-10-CM: F31.9 ICD-9-CM: 296.7  5/3/2012 - Present Decubitus ulcer of sacral area (Chronic) ICD-10-CM: X63.622 ICD-9-CM: 707.03, 707.20  5/3/2012 - Present RESOLVED: Hyponatremia ICD-10-CM: E87.1 ICD-9-CM: 276.1  7/21/2017 - 8/27/2017 RESOLVED: Mild dehydration ICD-10-CM: E86.0 ICD-9-CM: 276.51  7/21/2017 - 8/27/2017 RESOLVED: Epistaxis ICD-10-CM: R04.0 ICD-9-CM: 784.7  2/19/2017 - 8/27/2017 RESOLVED: Gingival bleeding ICD-10-CM: K06.8 ICD-9-CM: 523.8  2/18/2017 - 8/27/2017 RESOLVED: GI (gastrointestinal bleed) ICD-10-CM: K92.2 ICD-9-CM: 578.9  2/18/2017 - 8/27/2017 RESOLVED: Chronic anticoagulation ICD-10-CM: Z79.01 
ICD-9-CM: V58.61  2/18/2017 - 8/27/2017 RESOLVED: Intestinal infection due to Clostridium difficile ICD-10-CM: A04.72 
ICD-9-CM: 008.45  6/4/2012 - 6/26/2012 RESOLVED: Hypotension, unspecified ICD-10-CM: I95.9 ICD-9-CM: 458.9  6/3/2012 - 6/7/2012 RESOLVED: Sepsis(995.91) ICD-10-CM: A41.9 ICD-9-CM: 995.91  6/1/2012 - 6/8/2012 RESOLVED: Encephalopathy, unspecified ICD-10-CM: G93.40 ICD-9-CM: 348.30  6/1/2012 - 6/26/2012 RESOLVED: Decubitus ulcer ICD-10-CM: L89.90 ICD-9-CM: 707.00, 707.20  6/1/2012 - 6/26/2012 RESOLVED: Hypertension ICD-10-CM: I10 
ICD-9-CM: 401.9  Unknown - 6/26/2012 RESOLVED: GERD (gastroesophageal reflux disease) ICD-10-CM: K21.9 ICD-9-CM: 530.81  Unknown - 6/26/2012 RESOLVED: Anxiety ICD-10-CM: F41.9 ICD-9-CM: 300.00  Unknown - 6/26/2012 Overview Signed 6/1/2012  5:25 PM by Manasa Mathews MD  
  panic attacks RESOLVED: Encephalopathy, unspecified ICD-10-CM: G93.40 ICD-9-CM: 348.30  5/7/2012 - 6/26/2012 RESOLVED: Hypokalemia ICD-10-CM: E87.6 ICD-9-CM: 276.8  5/5/2012 - 5/8/2012 RESOLVED: Anemia ICD-10-CM: D64.9 ICD-9-CM: 285.9  5/4/2012 - 8/27/2017 RESOLVED: Unspecified hypothyroidism ICD-10-CM: E03.9 ICD-9-CM: 244.9  5/4/2012 - 6/26/2012 RESOLVED: Elevated diaphragm (Chronic) ICD-10-CM: J98.6 ICD-9-CM: 519.4  5/4/2012 - 5/4/2012 RESOLVED: Leg DVT (deep venous thromboembolism), chronic (HCC) (Chronic) ICD-10-CM: G69.872 ICD-9-CM: 453.50  5/4/2012 - 6/26/2012 RESOLVED: Anticoagulated on Coumadin (Chronic) ICD-10-CM: Z51.81, Z79.01 
ICD-9-CM: V58.83, V58.61  5/4/2012 - 6/26/2012 RESOLVED: Seizure ([address] (Chronic) ICD-10-CM: R56.9 ICD-9-CM: 780.39  5/3/2012 - 5/8/2012 RESOLVED: Severe sepsis(995.92) ICD-10-CM: A41.9, R65.20 ICD-9-CM: 995.92  5/3/2012 - 5/7/2012 RESOLVED: Essential hypertension, benign (Chronic) ICD-10-CM: I10 
ICD-9-CM: 401.1  5/3/2012 - 6/26/2012 RESOLVED: Back pain (Chronic) ICD-10-CM: M54.9 ICD-9-CM: 724.5  5/3/2012 - 5/8/2012 RESOLVED: Hypertension ICD-10-CM: I10 
ICD-9-CM: 401.9  Unknown - 6/26/2012 RESOLVED: Follow-up exam ICD-10-CM: R89 ICD-9-CM: V67.9  Unknown - 6/26/2012 Discharge Condition:  stable Hospital Course: The patient was transferred to the floor for medical care. She 
 was alert and oriented times 3. She was neurologically intact in the lower extremities, and calves are non-tender. She was c/o of stable, mild-to-moderate joint symptoms intermittently, reasonably well controlled by current meds . She will be discharged today. Consults: None and Cardiology Significant Diagnostic Studies: none Discharge Medications:    
Current Discharge Medication List  
  
START taking these medications Details HYDROcodone-acetaminophen (NORCO) 5-325 mg per tablet Take 1 Tab by mouth every four (4) hours as needed. Max Daily Amount: 6 Tabs. Qty: 60 Tab, Refills: 0 Associated Diagnoses: Closed fracture of right tibia and fibula, initial encounter CONTINUE these medications which have NOT CHANGED Details  
baclofen (LIORESAL) 10 mg tablet Take 10 mg by mouth three (3) times daily. lidocaine (LIDODERM) 5 % 1 Patch by TransDERmal route every twenty-four (24) hours. Apply patch to the affected area for 12 hours a day and remove for 12 hours a day. potassium chloride SR (KLOR-CON 10) 10 mEq tablet Take 10 mEq by mouth two (2) times a day. butalbital-acetaminophen-caffeine (FIORICET, ESGIC) -40 mg per tablet Take 1 Tab by mouth every four (4) hours as needed for Pain. cyclobenzaprine (FLEXERIL) 10 mg tablet Take 10 mg by mouth nightly. DULoxetine (CYMBALTA) 30 mg capsule Take 30 mg by mouth two (2) times a day. levETIRAcetam (KEPPRA) 500 mg tablet Take 750 mg by mouth two (2) times a day. levothyroxine (SYNTHROID) 75 mcg tablet Take 75 mcg by mouth Daily (before breakfast). nystatin (MYCOSTATIN) 100,000 unit/mL suspension Take 1 tsp by mouth four (4) times daily as needed. swish and spit GLUC VARGAS/CHONDRO VARGAS A/VIT C/MN (GLUCOSAMINE CHONDROITIN MAXSTR PO) Take 1 Tab by mouth three (3) times daily. ferrous sulfate 325 mg (65 mg iron) tablet Take 325 mg by mouth Daily (before breakfast). diclofenac (VOLTAREN) 1 % gel Apply 2 g to affected area four (4) times daily. Non-formulary medication. Pt to supply if ordered for admission. ergocalciferol (VITAMIN D2) 50,000 unit capsule Take 50,000 Units by mouth Every Friday. bisacodyl (DULCOLAX, BISACODYL,) 10 mg suppository Insert 10 mg into rectum daily.   
  
lacosamide (VIMPAT) 200 mg tab tablet Take 1 Tab by mouth two (2) times a day. Max Daily Amount: 400 mg. Indications: PARTIAL EPILEPSY TREATMENT ADJUNCT Qty: 180 Tab, Refills: 1  
  
lubiPROStone (AMITIZA) 8 mcg capsule Take 1 Cap by mouth two (2) times daily (with meals). Qty: 60 Cap, Refills: 0  
  
loratadine-pseudoephedrine (CLARITIN-D 24 HOUR)  mg per tablet Take 1 Tab by mouth daily. magnesium oxide 500 mg tab Take 500 mg by mouth daily (with lunch). ascorbic acid, vitamin C, (VITAMIN C) 500 mg tablet Take 500 mg by mouth daily. mirabegron ER (MYRBETRIQ) 50 mg ER tablet Take 50 mg by mouth nightly. Takes at 2300  
  
rOPINIRole (REQUIP) 0.25 mg tablet Take 0.75 mg by mouth nightly. Takes at 2300  
  
predniSONE (DELTASONE) 5 mg tablet Take 5 mg by mouth daily. gabapentin (NEURONTIN) 300 mg capsule Take 300 mg by mouth three (3) times daily. OLANZapine (ZYPREXA) 7.5 mg tablet Take 7.5 mg by mouth nightly. oxyCODONE ER (XTAMPZA ER) 9 mg capsule Take 9 mg by mouth every twelve (12) hours. Indications: Chronic Pain  
  
lactulose (KRISTALOSE) 10 gram packet Take 10 g by mouth two (2) times a day. clotrimazole (MYCELEX) 1 % vaginal cream Insert 1 Applicator into vagina nightly as needed for Itching. For ABX induced yeast infection  
  
pantoprazole (PROTONIX) 40 mg tablet Take 40 mg by mouth two (2) times a day. CRANBERRY FRUIT EXTRACT (CRANBERRY EXTRACT PO) Take 1 Cap by mouth two (2) times a day. For UTI prevention Non-formulary medication. Pt to supply if ordered for admission. OTHER,NON-FORMULARY, Take 1 Cap by mouth daily (with lunch). Indications: Bariatric Multi Formula STOP taking these medications  
  
 oxyCODONE IR (ROXICODONE) 5 mg immediate release tablet Comments:  
Reason for Stopping:   
   
  
 
 
Activity: No lifting, driving or strenuous activity for 2 weeks Diet:  Regular Diet Wound Care: Keep wound clean and dry. Change dressing as needed.  
 
Follow-up: Pt should follow-up in the office in 10 days - 2 weeks. They should not get their incision wet until they see us back. The patient will have home health care since they are home bound after their recent surgery. They will have dressing changes and physical therapy. With physical therapy, they should be up and ambulatory weight bear as tolerated. They will be sent home on hydrocodone and will take Aspirin 81mg BID as an antiplatelet agent if her medical doctor will allow. They will also take all of the medications on their discharge medical reconciliation form. They should avoid any lifting, anti-inflammatories or tobacco use. They will call with any and all concerns. Their medications are on the chart.

## 2018-07-29 NOTE — DISCHARGE INSTRUCTIONS
Broken Lower Leg: Care Instructions  Your Care Instructions    Treatment for your broken leg will depend on how bad the break is. Your doctor may have put your lower leg in a splint or a cast to allow it to heal or keep it stable until you see another doctor. It may take weeks or months for your leg to heal. You can help it heal with some care at home. You heal best when you take good care of yourself. Eat a variety of healthy foods, and don't smoke. Follow-up care is a key part of your treatment and safety. Be sure to make and go to all appointments, and call your doctor if you are having problems. It's also a good idea to know your test results and keep a list of the medicines you take. How can you care for yourself at home? · Put ice or a cold pack on your lower leg for 10 to 20 minutes at a time. Try to do this every 1 to 2 hours for the next 3 days (when you are awake). Put a thin cloth between the ice and your cast or splint. Keep your cast or splint dry. · Follow the cast care instructions your doctor gives you. If you have a splint, do not take it off unless your doctor tells you to. · Be safe with medicines. Take pain medicines exactly as directed. ¨ If the doctor gave you a prescription medicine for pain, take it as prescribed. ¨ If you are not taking a prescription pain medicine, ask your doctor if you can take an over-the-counter medicine. · Do not put weight on your leg unless your doctor tells you to. Use crutches to walk. · Prop up your leg on pillows when you sit or lie down in the first few days after the injury. Keep your leg higher than the level of your heart. This will help reduce swelling. · Follow instructions for exercises to keep your leg strong. · Wiggle your toes often to reduce swelling and stiffness. When should you call for help? Call 911 anytime you think you may need emergency care.  For example, call if:    · You have chest pain, are short of breath, or you cough up blood.     · You are very sleepy and you have trouble waking up.    Call your doctor now or seek immediate medical care if:    · You have new or worse nausea or vomiting.     · You have new or worse pain.     · Your foot is cool or pale or changes color.     · You have tingling, weakness, or numbness in your toes.     · Your cast or splint feels too tight.     · You have signs of a blood clot in your leg (called a deep vein thrombosis), such as:  ¨ Pain in your calf, back of the knee, thigh, or groin. ¨ Redness or swelling in your leg.    Watch closely for changes in your health, and be sure to contact your doctor if:    · You have a problem with your splint or cast.     · You do not get better as expected. Where can you learn more? Go to http://sav-susannah.info/. Enter L198 in the search box to learn more about \"Broken Lower Leg: Care Instructions. \"  Current as of: November 29, 2017  Content Version: 11.7  © 0347-3754 [employer]. Care instructions adapted under license by [employer] (which disclaims liability or warranty for this information). If you have questions about a medical condition or this instruction, always ask your healthcare professional. [address] any warranty or liability for your use of this information.

## 2018-07-29 NOTE — ROUTINE PROCESS
TRANSFER - IN REPORT: 
 
Verbal report received from Judge Carroll RN (name) on Juan Aleman  being received from ED (unit) for routine progression of care Report consisted of patients Situation, Background, Assessment and  
Recommendations(SBAR). Information from the following report(s) SBAR, Kardex, ED Summary and MAR was reviewed with the receiving nurse. Opportunity for questions and clarification was provided. Assessment completed upon patients arrival to unit and care assumed.

## 2018-07-29 NOTE — PROGRESS NOTES
0966-received report from Earvin Spatz RN included ADRY GOETZ and Kardex. 1105-paged Dr Ana Rosa Gutierrez for knee immobilizer settings, awaiting callback. 110-Dr Blackman responded stated to place in full extention or position that pt can tolerate. To be used only for transport, not needed for full time use. 1245-discharged to home via medical transport. Prescriptions given and instructions reviewed and understood by pt.

## 2018-07-29 NOTE — PROGRESS NOTES
Chart accessed as CM on call. Pt admitted by MD Blackman for R leg pain. Met with pt at bedside. Pt offered FOC for HH. Pt states she is active with [facility] and would like to cont with them. Pt states she has a home health aide through [facility]. Pt bed bound. Pt states she has no DME needs. Pt is awaiting a knee immobilizer, primary RN Leonardo Presley aware. Pts  will come to hospital to be educated on knee immobilizer. Pt will need medical transport home, primary RN Leonardo Presley aware. CM will cont to be available, as needed. Reason for Admission:   is a 80 y.o. female with PMHX of Depression, anxiety, chronic back pain, HA, fatigue, anemia, arthritis, GERD, Seizures, Bipolar 1 disorder, HTN, TIA, and thromboembolus who presents to the emergency department C/O right leg pain PTA s/p injury when running into a pallet and boxes at [address] while in a motorized wheelchair. There are previous breaks in the left tib/fib as well as the right and left femurs. Pt last took Oxycodone 15 mg at noon. Allergies reported to Ceftriaxone, Demerol, and Seafood. PSHx of cholecystectomy, appendectomy, tubal ligation, back surgery, gastric bypass, and colonoscopy. Pt endorses being a non smoker, a non EtOH user, and a non recreational drug user. Pt denies fever, chills, N/V, and any other sxs or complaints.  
  
               
RRAT Score:     17 mod Do you (patient/family) have any concerns for transition/discharge? None expressed Plan for utilizing home health: Active with [facility] Likelihood of readmission? Mod, high considering PMHx Transition of Care Plan:      Discharge home by medical transport with [facility] and family assistance Care Management Interventions PCP Verified by CM: Yes Mode of Transport at Discharge: BLS Transition of Care Consult (CM Consult[facility] - [facility]: No 
Reason Outside [place]: Patient already serviced by other home care/hospice agency [facility]) Current Support Network: Lives with Spouse, Has Personal Caregivers Confirm Follow Up Transport: Family Plan discussed with Pt/Family/Caregiver: Yes Freedom of Choice Offered: Yes Discharge Location Discharge Placement: Home with home health

## 2018-07-29 NOTE — H&P
History and Physical 
 
 
 
Patient: Manuel Bowling               Sex: female          DOA: 7/28/2018 YOB: 1950      Age:  80 y.o.        LOS:  LOS: 1 day HPI:  
 
Manuel Bowling is a 80 y.o. female [place]. History of dislocation of right hip chronic. Hx 100% displaced and angulated Right femur fracture  And now has proximal tib/fib fracture with minimal displacement or angulation. Does not want any surgery since does not walk and is transferred it seems only by being lifted. Discussed at minimum bracing - Does not want pierre fracture brace because she has uncontrolled diarrhea. Will fit for knee immobilizer that she can put on only for lift/transfer if needed. Past Medical History:  
Diagnosis Date  Acute pain due to injury- multiple LE fractures 11/24/2017  Anemia NEC   
 history of blood transfusion  Anxiety   
 panic attacks  Arthritis  Bipolar 1 disorder ([address]  Chronic back pain  Depression  Elevated diaphragm 5/4/2012  Fatigue   
 along with weakness  GERD (gastroesophageal reflux disease)  Headache(784.0)  Hypertension   
 pt denies  Other ill-defined conditions(799.89) swallowing issues, decubitus  Psychiatric disorder  Seizures ([address]  Therapeutic opioid-induced constipation (OIC) 11/27/2017  Thromboembolus ([address]  TIA (transient ischemic attack) Past Surgical History:  
Procedure Laterality Date  COLONOSCOPY N/A 2/21/2017 COLONOSCOPY [facility] ANESTHESIA, PATIENT IS IN ROOM 358 performed by Kamille Zambrano MD at THE [facility] ENDOSCOPY  COLONOSCOPY N/A 2/22/2017 COLONOSCOPY performed by Kamille Zambrano MD at THE [facility] ENDOSCOPY  ENDOSCOPY, COLON, DIAGNOSTIC    
 over 20 years ago  ENDOSCOPY, COLON, DIAGNOSTIC  9/24/09  HX APPENDECTOMY  11/6/84  HX BACK SURGERY  11/24/98  
 severe back problems  HX CHOLECYSTECTOMY  11/6/84 [facility]  HX OTHER SURGICAL    
 IVC filter [address] Family History Problem Relation Age of Onset  Heart Disease Mother  Arthritis-rheumatoid Mother  Stroke Father  Hypertension Father  Arthritis-rheumatoid Father Social History Social History  Marital status:  Spouse name: N/A  
 Number of children: N/A  
 Years of education: N/A Social History Main Topics  Smoking status: Never Smoker  Smokeless tobacco: Never Used  Alcohol use No  
 Drug use: No  
 Sexual activity: Not Asked Other Topics Concern  None Social History Narrative Prior to Admission medications Medication Sig Start Date End Date Taking? Authorizing Provider  
baclofen (LIORESAL) 10 mg tablet Take 10 mg by mouth three (3) times daily. Yes Giovani Rose MD  
lidocaine (LIDODERM) 5 % 1 Patch by TransDERmal route every twenty-four (24) hours. Apply patch to the affected area for 12 hours a day and remove for 12 hours a day. Yes Giovani Rose MD  
potassium chloride SR (KLOR-CON 10) 10 mEq tablet Take 10 mEq by mouth two (2) times a day. Yes Giovani Rose MD  
oxyCODONE IR (ROXICODONE) 5 mg immediate release tablet Take 1 Tab by mouth every four (4) hours as needed. Max Daily Amount: 30 mg. Patient taking differently: Take 5 mg by mouth every eight (8) hours as needed. 12/4/17  Yes Soy De Dios MD  
butalbital-acetaminophen-caffeine (FIORICET, ESGIC) -40 mg per tablet Take 1 Tab by mouth every four (4) hours as needed for Pain. Yes Giovani Rose MD  
cyclobenzaprine (FLEXERIL) 10 mg tablet Take 10 mg by mouth nightly. Yes Giovani Rose MD  
DULoxetine (CYMBALTA) 30 mg capsule Take 30 mg by mouth two (2) times a day. Yes Historical Provider  
levETIRAcetam (KEPPRA) 500 mg tablet Take 750 mg by mouth two (2) times a day.    Yes Historical Provider  
levothyroxine (SYNTHROID) 75 mcg tablet Take 75 mcg by mouth Daily (before breakfast). Yes Historical Provider  
nystatin (MYCOSTATIN) 100,000 unit/mL suspension Take 1 tsp by mouth four (4) times daily as needed. swish and spit   Yes Historical Provider GLUC VARGAS/CHONDRO VARGAS A/VIT C/MN (GLUCOSAMINE CHONDROITIN MAXSTR PO) Take 1 Tab by mouth three (3) times daily. Yes Historical Provider  
ferrous sulfate 325 mg (65 mg iron) tablet Take 325 mg by mouth Daily (before breakfast). Yes Historical Provider  
diclofenac (VOLTAREN) 1 % gel Apply 2 g to affected area four (4) times daily. Non-formulary medication. Pt to supply if ordered for admission. Yes Giovani Rose, MD  
ergocalciferol (VITAMIN D2) 50,000 unit capsule Take 50,000 Units by mouth Every Friday. Yes Giovani Rose MD  
bisacodyl (DULCOLAX, BISACODYL,) 10 mg suppository Insert 10 mg into rectum daily. Yes Giovani Rose MD  
lacosamide (VIMPAT) 200 mg tab tablet Take 1 Tab by mouth two (2) times a day. Max Daily Amount: 400 mg. Indications: PARTIAL EPILEPSY TREATMENT ADJUNCT 7/23/17  Yes Ai Olvera DO  
lubiPROStone (AMITIZA) 8 mcg capsule Take 1 Cap by mouth two (2) times daily (with meals). 3/1/17  Yes Sonia Arnold MD  
loratadine-pseudoephedrine (CLARITIN-D 24 HOUR)  mg per tablet Take 1 Tab by mouth daily. Yes Historical Provider  
magnesium oxide 500 mg tab Take 500 mg by mouth daily (with lunch). Yes Historical Provider  
ascorbic acid, vitamin C, (VITAMIN C) 500 mg tablet Take 500 mg by mouth daily. Yes Historical Provider  
mirabegron ER (MYRBETRIQ) 50 mg ER tablet Take 50 mg by mouth nightly. Takes at 2300   Yes Historical Provider  
rOPINIRole (REQUIP) 0.25 mg tablet Take 0.75 mg by mouth nightly. Takes at 2300   Yes Historical Provider  
predniSONE (DELTASONE) 5 mg tablet Take 5 mg by mouth daily. Yes Historical Provider  
gabapentin (NEURONTIN) 300 mg capsule Take 300 mg by mouth three (3) times daily. Yes Historical Provider OLANZapine (ZYPREXA) 7.5 mg tablet Take 7.5 mg by mouth nightly. Yes Historical Provider  
oxyCODONE ER [facility] ER) 9 mg capsule Take 9 mg by mouth every twelve (12) hours. Indications: Chronic Pain    Phys Other, MD  
lactulose (KRISTALOSE) 10 gram packet Take 10 g by mouth two (2) times a day. Phys Other, MD  
clotrimazole (MYCELEX) 1 % vaginal cream Insert 1 Applicator into vagina nightly as needed for Itching. For ABX induced yeast infection    Historical Provider  
pantoprazole (PROTONIX) 40 mg tablet Take 40 mg by mouth two (2) times a day. Historical Provider CRANBERRY FRUIT EXTRACT (CRANBERRY EXTRACT PO) Take 1 Cap by mouth two (2) times a day. For UTI prevention Non-formulary medication. Pt to supply if ordered for admission. Historical Provider OTHER,NON-FORMULARY, Take 1 Cap by mouth daily (with lunch). Indications: Bariatric Multi Formula    Historical Provider Allergies Allergen Reactions  Ceftriaxone Seizures  Demerol [Meperidine] Unknown (comments)  Seafood [Shellfish Containing Products] Unknown (comments) Review of Systems A comprehensive review of systems was negative except for that written in the History of Present Illness. Physical Exam:  
  
Visit Vitals  /74 (BP 1 Location: Left arm, BP Patient Position: At rest)  Pulse 86  Temp 98 °F (36.7 °C)  Resp 15  Ht 5' 6\" (1.676 m)  Wt 68.5 kg (151 lb)  SpO2 96%  BMI 24.37 kg/m2 Physical Exam: 
Physical Exam:  
General:  Alert, cooperative, no distress, appears stated age. Eyes:  Conjunctivae/corneas clear. PERRL, EOMs intact. Fundi benign Ears:  Normal TMs and external ear canals both ears. Nose: Nares normal. Septum midline. Mucosa normal. No drainage or sinus tenderness. Mouth/Throat: Lips, mucosa, and tongue normal. Teeth and gums normal.  
Neck: Supple, symmetrical, trachea midline, no adenopathy, thyroid: no enlargement/tenderness/nodules, no carotid bruit and no JVD. Back:   Symmetric, no curvature.  ROM normal. No CVA tenderness. Lungs:   Clear to auscultation bilaterally. Heart:  Regular rate and rhythm, S1, S2 normal, no murmur, click, rub or gallop. Abdomen:   Soft, non-tender. Bowel sounds normal. No masses,  No organomegaly. Extremities: Extremities normal, atraumatic, no cyanosis or edema. Right short and deformed with rotation from hip dislocation chronic and previous femur fracture Pulses: 2+ and symmetric all extremities. Skin: Skin color, texture, turgor normal. No rashes or lesions Lymph nodes: Cervical, supraclavicular, and axillary nodes normal.  
Neurologic: CNII-XII intact. Normal strength, sensation and reflexes throughout. Labs Reviewed: All lab results for the last 24 hours reviewed. Assessment/Plan Active Problems: 
  Tibia/fibula fracture (7/28/2018) Chronic Right hip disclocation Chronic Right Femur fracture with 100% displacement and 30 degrees angulation Acute Right Tib/Fib fracture with minimal angulation or displacement Non - Ambulatory status Chronic Plan to fit for knee immobilizer and allow discharge home with Hydrocodone. Patient will use brace for transfers and continue all other previous meds and blood thinners.

## 2018-07-29 NOTE — ED NOTES
Called accepting unit to give report, spoke with Kiesha Goldman. Accepting nurse will call me back for report.

## 2018-07-29 NOTE — ED NOTES
TRANSFER - OUT REPORT: 
 
Verbal report given to Dara Titus RN(name) on Duey Score  being transferred to [facility](unit) for routine progression of care Report consisted of patients Situation, Background, Assessment and  
Recommendations(SBAR). Information from the following report(s) SBAR, Kardex, ED Summary and MAR was reviewed with the receiving nurse. Lines:  
Peripheral IV 07/28/18 Left Hand (Active) Site Assessment Clean, dry, & intact 7/28/2018  8:42 PM  
Phlebitis Assessment 0 7/28/2018  8:42 PM  
Infiltration Assessment 0 7/28/2018  8:42 PM  
Dressing Status Clean, dry, & intact 7/28/2018  8:42 PM  
Hub Color/Line Status Flushed;Yellow 7/28/2018  8:42 PM  
  
 
Opportunity for questions and clarification was provided. Patient transported with: 
 [employer]

## 2018-07-29 NOTE — PROGRESS NOTES
2330- Pt received from ED. C/o generalized pain, mostly to RLE rated 10/10. Awaiting orders. Dual skin assessed, pt has sacral wound (sacrum red, no drainage). Pt states she is seen by someone at home for wound care. Abeba Pyle for orders. Telephone orders received for vicodin 5 mg 1-2 tab Q4H PRN, apply ice to affected area, IV zofran 4 mg Q6H PRN, normal saline @ 60 ml/hr, and NPO.  
 
0058- Norco 1 tab given for pain. Ice pack applied to R knee. Will continue to monitor. 0145- Pt sleeping. NAD. 0700- Pt asking if she can eat. Verified with Dr. Denae Pyle that this is okay.

## 2018-07-29 NOTE — ROUTINE PROCESS
Bedside and Verbal shift change report given to FAUSTO Ricci RN (oncoming nurse) by Brianna Thomson RN 
 (offgoing nurse). Report included the following information SBAR, Kardex, Intake/Output, MAR and Recent Results.

## 2018-08-24 ENCOUNTER — HOSPITAL ENCOUNTER (EMERGENCY)
Age: 68
Discharge: HOME | End: 2018-08-25
Attending: EMERGENCY MEDICINE
Payer: MEDICARE

## 2018-08-24 ENCOUNTER — APPOINTMENT (OUTPATIENT)
Dept: GENERAL RADIOLOGY | Age: 68
End: 2018-08-24
Attending: EMERGENCY MEDICINE
Payer: MEDICARE

## 2018-08-24 DIAGNOSIS — F31.9 BIPOLAR 1 DISORDER (HCC): Chronic | ICD-10-CM

## 2018-08-24 DIAGNOSIS — R13.12 OROPHARYNGEAL DYSPHAGIA: Primary | ICD-10-CM

## 2018-08-24 DIAGNOSIS — G82.50 QUADRIPLEGIA AND QUADRIPARESIS (HCC): ICD-10-CM

## 2018-08-24 DIAGNOSIS — G40.909 SEIZURE DISORDER (HCC): ICD-10-CM

## 2018-08-24 DIAGNOSIS — E43 SEVERE PROTEIN-CALORIE MALNUTRITION (HCC): ICD-10-CM

## 2018-08-24 PROCEDURE — 99285 EMERGENCY DEPT VISIT HI MDM: CPT

## 2018-08-24 PROCEDURE — 71045 X-RAY EXAM CHEST 1 VIEW: CPT

## 2018-08-25 VITALS
HEIGHT: 66 IN | SYSTOLIC BLOOD PRESSURE: 103 MMHG | TEMPERATURE: 98 F | OXYGEN SATURATION: 96 % | RESPIRATION RATE: 18 BRPM | HEART RATE: 105 BPM | WEIGHT: 150 LBS | DIASTOLIC BLOOD PRESSURE: 62 MMHG | BODY MASS INDEX: 24.11 KG/M2

## 2018-08-25 LAB
ANION GAP SERPL CALC-SCNC: 7 MMOL/L (ref 3–18)
BASOPHILS # BLD: 0 K/UL (ref 0–0.1)
BASOPHILS NFR BLD: 0 % (ref 0–2)
BUN SERPL-MCNC: 14 MG/DL (ref 7–18)
BUN/CREAT SERPL: 33 (ref 12–20)
CALCIUM SERPL-MCNC: 8.8 MG/DL (ref 8.5–10.1)
CHLORIDE SERPL-SCNC: 96 MMOL/L (ref 100–108)
CO2 SERPL-SCNC: 27 MMOL/L (ref 21–32)
CREAT SERPL-MCNC: 0.42 MG/DL (ref 0.6–1.3)
DIFFERENTIAL METHOD BLD: ABNORMAL
EOSINOPHIL # BLD: 0.1 K/UL (ref 0–0.4)
EOSINOPHIL NFR BLD: 2 % (ref 0–5)
ERYTHROCYTE [DISTWIDTH] IN BLOOD BY AUTOMATED COUNT: 17.6 % (ref 11.6–14.5)
GLUCOSE SERPL-MCNC: 98 MG/DL (ref 74–99)
HCT VFR BLD AUTO: 35 % (ref 35–45)
HGB BLD-MCNC: 11.5 G/DL (ref 12–16)
LYMPHOCYTES # BLD: 1.1 K/UL (ref 0.9–3.6)
LYMPHOCYTES NFR BLD: 14 % (ref 21–52)
MAGNESIUM SERPL-MCNC: 1.8 MG/DL (ref 1.6–2.6)
MCH RBC QN AUTO: 26.6 PG (ref 24–34)
MCHC RBC AUTO-ENTMCNC: 32.9 G/DL (ref 31–37)
MCV RBC AUTO: 81 FL (ref 74–97)
MONOCYTES # BLD: 0.6 K/UL (ref 0.05–1.2)
MONOCYTES NFR BLD: 8 % (ref 3–10)
NEUTS SEG # BLD: 5.8 K/UL (ref 1.8–8)
NEUTS SEG NFR BLD: 76 % (ref 40–73)
PLATELET # BLD AUTO: 269 K/UL (ref 135–420)
PMV BLD AUTO: 8.5 FL (ref 9.2–11.8)
POTASSIUM SERPL-SCNC: 4.4 MMOL/L (ref 3.5–5.5)
RBC # BLD AUTO: 4.32 M/UL (ref 4.2–5.3)
SODIUM SERPL-SCNC: 130 MMOL/L (ref 136–145)
WBC # BLD AUTO: 7.6 K/UL (ref 4.6–13.2)

## 2018-08-25 PROCEDURE — 74011250637 HC RX REV CODE- 250/637: Performed by: EMERGENCY MEDICINE

## 2018-08-25 PROCEDURE — 83735 ASSAY OF MAGNESIUM: CPT | Performed by: EMERGENCY MEDICINE

## 2018-08-25 PROCEDURE — 80048 BASIC METABOLIC PNL TOTAL CA: CPT | Performed by: EMERGENCY MEDICINE

## 2018-08-25 PROCEDURE — 74011000250 HC RX REV CODE- 250: Performed by: EMERGENCY MEDICINE

## 2018-08-25 PROCEDURE — 85025 COMPLETE CBC W/AUTO DIFF WBC: CPT | Performed by: EMERGENCY MEDICINE

## 2018-08-25 PROCEDURE — 96374 THER/PROPH/DIAG INJ IV PUSH: CPT

## 2018-08-25 RX ORDER — METOCLOPRAMIDE HYDROCHLORIDE 5 MG/1
5 TABLET, ORALLY DISINTEGRATING ORAL
Qty: 30 TAB | Refills: 0 | Status: SHIPPED | OUTPATIENT
Start: 2018-08-25

## 2018-08-25 RX ORDER — SUCRALFATE 1 G/10ML
1 SUSPENSION ORAL
Qty: 414 ML | Refills: 0 | Status: SHIPPED | OUTPATIENT
Start: 2018-08-25

## 2018-08-25 RX ORDER — FAMOTIDINE 10 MG/ML
20 INJECTION INTRAVENOUS
Status: COMPLETED | OUTPATIENT
Start: 2018-08-25 | End: 2018-08-25

## 2018-08-25 RX ADMIN — FAMOTIDINE 20 MG: 10 INJECTION, SOLUTION INTRAVENOUS at 02:07

## 2018-08-25 RX ADMIN — Medication 15 ML: at 02:09

## 2018-08-25 NOTE — ED NOTES
Patient given copy of dc instructions and 0 script(s). Patient verbalized understanding of instructions and script (s). Patient given a current medication reconciliation form and verbalized understanding of their medications. Patient verbalized understanding of the importance of discussing medications with  his or her physician or clinic they will be following up with. Patient alert and oriented and in no acute distress. Patient discharged home ambulatory with spouse.

## 2018-08-25 NOTE — ED PROVIDER NOTES
EMERGENCY DEPARTMENT HISTORY AND PHYSICAL EXAM    Date: 8/24/2018  Patient Name: Shannon Peterson    History of Presenting Illness     Chief Complaint   Patient presents with    Aspiration         History Provided By: Patient    Chief Complaint: SOB after choking on medications  Duration: 2 hours   Timing:  Acute  Location: Lungs  Associated Symptoms: dysphagia and cough    Additional History (Context):   1:23 AM  Shannon Peterson is a 80 y.o. female with PMHx of GERD and paraplegia who presents via EMS to the emergency department C/O SOB after choking on medications, onset 2 hours ago. Associated sxs include dysphagia and cough. Pt reports that she had an episode of choking after taking her evening medications with water and soda and felt like some pills were stuck in her throat. EMS was called. While in route, pt with a forceful cough that resolved the stridor.  reports that pt has not had any complications swallowing her pills before.  adds that pt had an endoscopy last year which revealed a peptic ulcer. Pt also has PSHx of gastric bypass. Pt denies h/o bronchoscopy, or any other sxs or complaints. PCP: Michelle Del Angel, DO    Current Outpatient Prescriptions   Medication Sig Dispense Refill    sucralfate (CARAFATE) 100 mg/mL suspension Take 10 mL by mouth three (3) times daily as needed. 414 mL 0    metoclopramide HCl (METOZOLV ODT) 5 mg rapid dissolve tab Take 1 Tab by mouth three (3) times daily as needed. 30 Tab 0    HYDROcodone-acetaminophen (NORCO) 5-325 mg per tablet Take 1 Tab by mouth every four (4) hours as needed. Max Daily Amount: 6 Tabs. 60 Tab 0    baclofen (LIORESAL) 10 mg tablet Take 10 mg by mouth three (3) times daily.  lidocaine (LIDODERM) 5 % 1 Patch by TransDERmal route every twenty-four (24) hours. Apply patch to the affected area for 12 hours a day and remove for 12 hours a day.  oxyCODONE ER (XTAMPZA ER) 9 mg capsule Take 9 mg by mouth every twelve (12) hours. Indications: Chronic Pain      potassium chloride SR (KLOR-CON 10) 10 mEq tablet Take 10 mEq by mouth two (2) times a day.  butalbital-acetaminophen-caffeine (FIORICET, ESGIC) -40 mg per tablet Take 1 Tab by mouth every four (4) hours as needed for Pain.  cyclobenzaprine (FLEXERIL) 10 mg tablet Take 10 mg by mouth nightly.  lactulose (KRISTALOSE) 10 gram packet Take 10 g by mouth two (2) times a day.  clotrimazole (MYCELEX) 1 % vaginal cream Insert 1 Applicator into vagina nightly as needed for Itching. For ABX induced yeast infection      DULoxetine (CYMBALTA) 30 mg capsule Take 30 mg by mouth two (2) times a day.  levETIRAcetam (KEPPRA) 500 mg tablet Take 750 mg by mouth two (2) times a day.  levothyroxine (SYNTHROID) 75 mcg tablet Take 75 mcg by mouth Daily (before breakfast).  pantoprazole (PROTONIX) 40 mg tablet Take 40 mg by mouth two (2) times a day.  nystatin (MYCOSTATIN) 100,000 unit/mL suspension Take 1 tsp by mouth four (4) times daily as needed. swish and spit      CRANBERRY FRUIT EXTRACT (CRANBERRY EXTRACT PO) Take 1 Cap by mouth two (2) times a day. For UTI prevention  Non-formulary medication. Pt to supply if ordered for admission.  GLUC VARGAS/CHONDRO VARGAS A/VIT C/MN (GLUCOSAMINE CHONDROITIN MAXSTR PO) Take 1 Tab by mouth three (3) times daily.  ferrous sulfate 325 mg (65 mg iron) tablet Take 325 mg by mouth Daily (before breakfast).  diclofenac (VOLTAREN) 1 % gel Apply 2 g to affected area four (4) times daily. Non-formulary medication. Pt to supply if ordered for admission.  ergocalciferol (VITAMIN D2) 50,000 unit capsule Take 50,000 Units by mouth Every Friday.  bisacodyl (DULCOLAX, BISACODYL,) 10 mg suppository Insert 10 mg into rectum daily.  lacosamide (VIMPAT) 200 mg tab tablet Take 1 Tab by mouth two (2) times a day. Max Daily Amount: 400 mg.  Indications: PARTIAL EPILEPSY TREATMENT ADJUNCT 180 Tab 1    lubiPROStone (AMITIZA) 8 mcg capsule Take 1 Cap by mouth two (2) times daily (with meals). 60 Cap 0    loratadine-pseudoephedrine (CLARITIN-D 24 HOUR)  mg per tablet Take 1 Tab by mouth daily.  OTHER,NON-FORMULARY, Take 1 Cap by mouth daily (with lunch). Indications: Bariatric Multi Formula      magnesium oxide 500 mg tab Take 500 mg by mouth daily (with lunch).  ascorbic acid, vitamin C, (VITAMIN C) 500 mg tablet Take 500 mg by mouth daily.  mirabegron ER (MYRBETRIQ) 50 mg ER tablet Take 50 mg by mouth nightly. Takes at 2300      rOPINIRole (REQUIP) 0.25 mg tablet Take 0.75 mg by mouth nightly. Takes at 2300      predniSONE (DELTASONE) 5 mg tablet Take 5 mg by mouth daily.  gabapentin (NEURONTIN) 300 mg capsule Take 300 mg by mouth three (3) times daily.  OLANZapine (ZYPREXA) 7.5 mg tablet Take 7.5 mg by mouth nightly.            Past History     Past Medical History:  Past Medical History:   Diagnosis Date    Acute pain due to injury- multiple LE fractures 11/24/2017    Anemia NEC     history of blood transfusion    Anxiety     panic attacks    Arthritis     Bipolar 1 disorder (HCC)     Chronic back pain     Depression     Elevated diaphragm 5/4/2012    Fatigue     along with weakness    GERD (gastroesophageal reflux disease)     Headache(784.0)     Hypertension     pt denies    Other ill-defined conditions(799.89) swallowing issues, decubitus    Psychiatric disorder     Seizures (HCC)     Therapeutic opioid-induced constipation (OIC) 11/27/2017    Thromboembolus ([facility] [address]     TIA (transient ischemic attack)        Past Surgical History:  Past Surgical History:   Procedure Laterality Date    COLONOSCOPY N/A 2/21/2017    COLONOSCOPY [facility] ANESTHESIA, PATIENT IS IN ROOM 358 performed by Josh Chaney MD at [address] COLONOSCOPY N/A 2/22/2017    COLONOSCOPY performed by Josh Chaney MD at [facility]. [address], COLON, DIAGNOSTIC      over 20 years ago  ENDOSCOPY, COLON, DIAGNOSTIC  9/24/09    HX APPENDECTOMY  11/6/84    HX BACK SURGERY  11/24/98    severe back problems    HX CHOLECYSTECTOMY  11/6/84    HX GASTRIC BYPASS  1995    Patricio    HX OTHER SURGICAL      IVC filter    HX REFRACTIVE SURGERY      HX TUBAL LIGATION  1991       Family History:  Family History   Problem Relation Age of Onset    Heart Disease Mother    Teagan Youngblood Arthritis-rheumatoid Mother     Stroke Father     Hypertension Father     Arthritis-rheumatoid Father        Social History:  Social History   Substance Use Topics    Smoking status: Never Smoker    Smokeless tobacco: Never Used    Alcohol use No       Allergies: Allergies   Allergen Reactions    Ceftriaxone Seizures    Demerol [Meperidine] Unknown (comments)    Seafood [Shellfish Containing Products] Unknown (comments)         Review of Systems   Review of Systems   Constitutional: Negative for chills, diaphoresis, fever and unexpected weight change. HENT: Positive for trouble swallowing. Negative for congestion, drooling, ear pain, rhinorrhea, sore throat and tinnitus. Eyes: Negative for photophobia, pain, redness and visual disturbance. Respiratory: Positive for cough, choking and shortness of breath. Negative for chest tightness, wheezing and stridor. Cardiovascular: Negative for chest pain, palpitations and leg swelling. Gastrointestinal: Negative for abdominal distention, abdominal pain, anal bleeding, blood in stool, constipation, diarrhea, nausea and vomiting. Genitourinary: Negative for difficulty urinating, dysuria, flank pain, frequency, hematuria and urgency. Musculoskeletal: Negative for arthralgias, back pain and neck pain. Skin: Negative for color change, rash and wound. Neurological: Negative for dizziness, seizures, syncope, speech difficulty, light-headedness and headaches. Hematological: Does not bruise/bleed easily.    Psychiatric/Behavioral: Negative for agitation, behavioral problems, hallucinations, self-injury and suicidal ideas. The patient is not hyperactive. Physical Exam     Vitals:    08/25/18 0200 08/25/18 0245 08/25/18 0300 08/25/18 0315   BP: 117/74 113/70 109/66 103/62   Pulse:       Resp:       Temp:       SpO2: 95% 96% 96% 96%   Weight:       Height:         Physical Exam   Constitutional: She is oriented to person, place, and time. She appears well-developed and well-nourished. No distress. HENT:   Head: Normocephalic and atraumatic. Right Ear: External ear normal.   Left Ear: External ear normal.   Mouth/Throat: Oropharynx is clear and moist. No oropharyngeal exudate. Eyes: Conjunctivae and EOM are normal. Pupils are equal, round, and reactive to light. No scleral icterus. No pallor   Neck: Normal range of motion. Neck supple. No JVD present. No tracheal deviation present. No thyromegaly present. Cardiovascular: Normal rate, regular rhythm and normal heart sounds. Pulmonary/Chest: Effort normal and breath sounds normal. No stridor. No respiratory distress. Abdominal: Soft. Bowel sounds are normal. She exhibits no distension. There is generalized tenderness. There is no rebound and no guarding. She has diffuse tenderness to abdomen but certainly greatest in periumbilical region. Her umbilicus is absent after multiple surgical procedures. Multiple well healed surgical scars. Musculoskeletal: Normal range of motion. She exhibits no edema or tenderness. No soft tissue injuries   Lymphadenopathy:     She has no cervical adenopathy. Neurological: She is alert and oriented to person, place, and time. She has normal reflexes. No cranial nerve deficit. Coordination normal.   Skin: Skin is warm and dry. No rash noted. She is not diaphoretic. No erythema. Psychiatric: She has a normal mood and affect. Her behavior is normal. Judgment and thought content normal.   Nursing note and vitals reviewed.      Diagnostic Study Results     Labs -     Recent Results (from the past 12 hour(s))   METABOLIC PANEL, BASIC    Collection Time: 08/25/18  2:11 AM   Result Value Ref Range    Sodium 130 (L) 136 - 145 mmol/L    Potassium 4.4 3.5 - 5.5 mmol/L    Chloride 96 (L) 100 - 108 mmol/L    CO2 27 21 - 32 mmol/L    Anion gap 7 3.0 - 18 mmol/L    Glucose 98 74 - 99 mg/dL    BUN 14 7.0 - 18 MG/DL    Creatinine 0.42 (L) 0.6 - 1.3 MG/DL    BUN/Creatinine ratio 33 (H) 12 - 20      GFR est AA >60 >60 ml/min/1.73m2    GFR est non-AA >60 >60 ml/min/1.73m2    Calcium 8.8 8.5 - 10.1 MG/DL   MAGNESIUM    Collection Time: 08/25/18  2:11 AM   Result Value Ref Range    Magnesium 1.8 1.6 - 2.6 mg/dL   CBC WITH AUTOMATED DIFF    Collection Time: 08/25/18  2:11 AM   Result Value Ref Range    WBC 7.6 4.6 - 13.2 K/uL    RBC 4.32 4.20 - 5.30 M/uL    HGB 11.5 (L) 12.0 - 16.0 g/dL    HCT 35.0 35.0 - 45.0 %    MCV 81.0 74.0 - 97.0 FL    MCH 26.6 24.0 - 34.0 PG    MCHC 32.9 31.0 - 37.0 g/dL    RDW 17.6 (H) 11.6 - 14.5 %    PLATELET 985 604 - 166 K/uL    MPV 8.5 (L) 9.2 - 11.8 FL    NEUTROPHILS 76 (H) 40 - 73 %    LYMPHOCYTES 14 (L) 21 - 52 %    MONOCYTES 8 3 - 10 %    EOSINOPHILS 2 0 - 5 %    BASOPHILS 0 0 - 2 %    ABS. NEUTROPHILS 5.8 1.8 - 8.0 K/UL    ABS. LYMPHOCYTES 1.1 0.9 - 3.6 K/UL    ABS. MONOCYTES 0.6 0.05 - 1.2 K/UL    ABS. EOSINOPHILS 0.1 0.0 - 0.4 K/UL    ABS. BASOPHILS 0.0 0.0 - 0.1 K/UL    DF AUTOMATED         Radiologic Studies -    XR CHEST PORT    (Results Pending)     2:38 AM  RADIOLOGY FINDINGS  Chest X-ray shows rotated XR but no evidence of aspiration or effusion  Pending review by Radiologist  Recorded by Rich Gant ED Scribe, as dictated by Hansel. Harlen Fleischer, MD    CT Results  (Last 48 hours)    None        CXR Results  (Last 48 hours)    None            Medical Decision Making   I am the first provider for this patient. I reviewed the vital signs, available nursing notes, past medical history, past surgical history, family history and social history.     Vital Signs-Reviewed the patient's vital signs. Pulse Oximetry Analysis - 96% on RA     Records Reviewed: Nursing Notes    Provider Notes (Medical Decision Making): Aspiration PNA unlikely. She has more sxs of dysphagia. Treat for dehydration as needed. Procedures:  Procedures    MEDICATIONS GIVEN:  Medications   famotidine (PF) (PEPCID) injection 20 mg (20 mg IntraVENous Given 8/25/18 0207)   GI COCKTAIL [facility] CMPD) (15 mL Oral Given 8/25/18 0209)       ED Course:   1:23 AM   Initial assessment performed. The patients presenting problems have been discussed, and they are in agreement with the care plan formulated and outlined with them. I have encouraged them to ask questions as they arise throughout their visit. Diagnosis and Disposition     DISCHARGE NOTE:  2:41 AM  Pancho Ji  results have been reviewed with her. She has been counseled regarding her diagnosis, treatment, and plan. She verbally conveys understanding and agreement of the signs, symptoms, diagnosis, treatment and prognosis and additionally agrees to follow up as discussed. She also agrees with the care-plan and conveys that all of her questions have been answered. I have also provided discharge instructions for her that include: educational information regarding their diagnosis and treatment, and list of reasons why they would want to return to the ED prior to their follow-up appointment, should her condition change. She has been provided with education for proper emergency department utilization. CLINICAL IMPRESSION:    1. Oropharyngeal dysphagia    2. Quadriplegia and quadriparesis ([address]    3. Bipolar 1 disorder ([address]    4. Severe protein-calorie malnutrition ([address]    5. Seizure disorder ([address]        PLAN:  1. D/C Home  2.    Discharge Medication List as of 8/25/2018  2:44 AM      START taking these medications    Details   sucralfate (CARAFATE) 100 mg/mL suspension Take 10 mL by mouth three (3) times daily as needed., Normal, Disp-414 mL, R-0      metoclopramide HCl (METOZOLV ODT) 5 mg rapid dissolve tab Take 1 Tab by mouth three (3) times daily as needed., Normal, Disp-30 Tab, R-0         CONTINUE these medications which have NOT CHANGED    Details   HYDROcodone-acetaminophen (NORCO) 5-325 mg per tablet Take 1 Tab by mouth every four (4) hours as needed. Max Daily Amount: 6 Tabs., Print, Disp-60 Tab, R-0      baclofen (LIORESAL) 10 mg tablet Take 10 mg by mouth three (3) times daily. , Historical Med      lidocaine (LIDODERM) 5 % 1 Patch by TransDERmal route every twenty-four (24) hours. Apply patch to the affected area for 12 hours a day and remove for 12 hours a day., Historical Med      oxyCODONE ER (XTAMPZA ER) 9 mg capsule Take 9 mg by mouth every twelve (12) hours. Indications: Chronic Pain, Historical Med      potassium chloride SR (KLOR-CON 10) 10 mEq tablet Take 10 mEq by mouth two (2) times a day., Historical Med      butalbital-acetaminophen-caffeine (FIORICET, ESGIC) -40 mg per tablet Take 1 Tab by mouth every four (4) hours as needed for Pain., Historical Med      cyclobenzaprine (FLEXERIL) 10 mg tablet Take 10 mg by mouth nightly., Historical Med      lactulose (KRISTALOSE) 10 gram packet Take 10 g by mouth two (2) times a day., Historical Med      clotrimazole (MYCELEX) 1 % vaginal cream Insert 1 Applicator into vagina nightly as needed for Itching. For ABX induced yeast infection, Historical Med      DULoxetine (CYMBALTA) 30 mg capsule Take 30 mg by mouth two (2) times a day., Historical Med      levETIRAcetam (KEPPRA) 500 mg tablet Take 750 mg by mouth two (2) times a day., Historical Med      levothyroxine (SYNTHROID) 75 mcg tablet Take 75 mcg by mouth Daily (before breakfast). , Historical Med      pantoprazole (PROTONIX) 40 mg tablet Take 40 mg by mouth two (2) times a day., Historical Med      nystatin (MYCOSTATIN) 100,000 unit/mL suspension Take 1 tsp by mouth four (4) times daily as needed.  swish and spit, Historical Med      CRANBERRY FRUIT EXTRACT (CRANBERRY EXTRACT PO) Take 1 Cap by mouth two (2) times a day. For UTI prevention  Non-formulary medication. Pt to supply if ordered for admission. , Historical Med      GLUC VARGAS/CHONDRO VARGAS A/VIT C/MN (GLUCOSAMINE CHONDROITIN MAXSTR PO) Take 1 Tab by mouth three (3) times daily. , Historical Med      ferrous sulfate 325 mg (65 mg iron) tablet Take 325 mg by mouth Daily (before breakfast). , Historical Med      diclofenac (VOLTAREN) 1 % gel Apply 2 g to affected area four (4) times daily. Non-formulary medication. Pt to supply if ordered for admission. , Historical Med      ergocalciferol (VITAMIN D2) 50,000 unit capsule Take 50,000 Units by mouth Every Friday., Historical Med      bisacodyl (DULCOLAX, BISACODYL,) 10 mg suppository Insert 10 mg into rectum daily. , Historical Med      lacosamide (VIMPAT) 200 mg tab tablet Take 1 Tab by mouth two (2) times a day. Max Daily Amount: 400 mg. Indications: PARTIAL EPILEPSY TREATMENT ADJUNCT, Print, Disp-180 Tab, R-1      lubiPROStone (AMITIZA) 8 mcg capsule Take 1 Cap by mouth two (2) times daily (with meals). , Print, Disp-60 Cap, R-0      loratadine-pseudoephedrine (CLARITIN-D 24 HOUR)  mg per tablet Take 1 Tab by mouth daily. , Historical Med      OTHER,NON-FORMULARY, Take 1 Cap by mouth daily (with lunch). Indications: Bariatric Multi Formula, Historical Med      magnesium oxide 500 mg tab Take 500 mg by mouth daily (with lunch). , Historical Med      ascorbic acid, vitamin C, (VITAMIN C) 500 mg tablet Take 500 mg by mouth daily. , Historical Med      mirabegron ER (MYRBETRIQ) 50 mg ER tablet Take 50 mg by mouth nightly. Takes at 2300, Historical Med      rOPINIRole (REQUIP) 0.25 mg tablet Take 0.75 mg by mouth nightly. Takes at 2300, Historical Med      predniSONE (DELTASONE) 5 mg tablet Take 5 mg by mouth daily. , Historical Med      gabapentin (NEURONTIN) 300 mg capsule Take 300 mg by mouth three (3) times daily. , Historical Med      OLANZapine (ZYPREXA) 7.5 mg tablet Take 7.5 mg by mouth nightly. Historical Med, 7.5 mg           3. Follow-up Information     Follow up With Details Comments 1 Jose Alberto Collado, DO Call in 1 day For follow up [address] [facility]      [facility] EMERGENCY DE[facility]  As needed, If symptoms worsen 2 Anuradha Brothers  [address] [address]  [phone]        _______________________________    Attestations: This note is prepared by Irais Vazquez, acting as Scribe for Hansel. Dayron Hill MD.    [employer]. Dayron Hill MD:  The scribe's documentation has been prepared under my direction and personally reviewed by me in its entirety.   I confirm that the note above accurately reflects all work, treatment, procedures, and medical decision making performed by me.  _______________________________

## 2018-08-25 NOTE — ED TRIAGE NOTES
Triage: pt was taking her evening medications with a coke. Pt began coughing and felt like pills were stuck. EMS was contacted. They reported stridor on first assessment. While in route, pt with a forceful cough that resolved the stridor. Pt states that she feels like \"most of the pills are gone\" but still feels like something might be stuck.

## 2018-08-25 NOTE — DISCHARGE INSTRUCTIONS
Learning About Swallowing Problems  What are swallowing problems? Certain health problems that affect the nervous system can cause trouble swallowing. These conditions include stroke, ALS (also known as Aranza Gehrig's disease), Parkinson's disease, and multiple sclerosis. The muscles and nerves that help move food through the throat and esophagus may not work right. Growths, such as cancer, and other problems with your esophagus can also make it hard to swallow. The esophagus is the tube that leads from your throat to your stomach. How are swallowing problems diagnosed? A doctor or speech therapist will examine you to check for swallowing problems. You may get swallowing tests to check how well your throat muscles work. For these tests, you swallow a special liquid that helps the doctor see your throat and esophagus on an X-ray or video screen. Other tests use a thin, flexible tube called a scope to check for problems with your esophagus. The doctor puts the scope in your mouth and down your throat to look at your esophagus. What are the symptoms? Symptoms of swallowing problems may include:  · Trouble getting food or liquids to go down on the first try. · Gagging, choking, or coughing when you swallow. · Having food or liquids come back up through your throat, mouth, or nose after you swallow. · Feeling like foods or liquids are stuck in some part of your throat or chest.  · Pain when you swallow. How are swallowing problems treated? How swallowing problems are treated depends on the cause. The main goals of treatment will be to help you eat and swallow safely and get good nutrition. This is important for your health and quality of life. You may learn exercises to train your throat muscles to work together so you're able to swallow better. Learning certain ways to put food in your mouth or to position your head while eating may also help.   Your doctor or a speech therapist may recommend changes to your diet to help make it easier to swallow. You may need to avoid certain foods or liquids. You also may need to change the thickness of foods or liquids in your diet. To eat and swallow safely, follow any instructions you get from your doctor or therapist. These ideas may help:  · Sit upright when eating, drinking, and taking pills. · Take small bites of food. Chew completely and swallow before taking another bite. · Take small sips of liquids. · If eating makes you tired, eat smaller but more frequent meals. · Tip your chin down when there is food in your mouth. Where can you learn more? Go to http://savLivescribesusannah.info/. Enter [id number] in the search box to learn more about \"Learning About Swallowing Problems. \"  Current as of: November 20, 2017  Content Version: 11.7  © 8419-1907 [employer]. Care instructions adapted under license by [employer] (which disclaims liability or warranty for this information). If you have questions about a medical condition or this instruction, always ask your healthcare professional. [place] [address] any warranty or liability for your use of this information. Learning About the Diet for Swallowing Problems  What are swallowing problems? Difficulty swallowing is also called dysphagia (say \"gby-DXD-zhx-uh\"). It is most often a sign of a problem with your throat or esophagus. This is the tube that moves food and liquids from the back of your mouth to your stomach. Trouble swallowing can occur when the muscles and nerves that move food through the throat and esophagus are not working right. To help you swallow food, your doctor or speech therapist may advise a special dysphagia diet for you. Why is a special diet important? A dysphagia diet can help you handle some problems that can occur when it's hard to swallow food and liquids easily. These problems can include:  · Malnutrition.  This means you aren't getting enough healthy foods to keep your body working well. · Dehydration. This means you aren't getting enough liquids to keep your body healthy. · Aspiration. This means that food, liquid, or saliva goes down your windpipe (trachea) into your lungs, instead of down your esophagus to your stomach. This can lead to aspiration pneumonia, which is an inflammation of the lungs. What is the dysphagia diet? In the dysphagia diet, you change the foods you eat and the liquids you drink to make it easier to swallow them. You may:  · Change the texture of the foods you eat. Your doctor or speech therapist may advise you to eat one of these types of foods:  ¨ Solid, soft foods that have been cut up into small pieces. Extra gravy or sauce can help make these foods easier to swallow. ¨ Semi-solid foods, like ground meats or fruits and vegetables that are mashed with a fork. These foods require some chewing. Extra gravy or sauce is often served. ¨ Foods that are the texture of pudding. You don't have to chew these foods. Examples include mashed potatoes with gravy; yogurt; pudding; and pureed meats, fruits, and vegetables. · Thicken the liquids you drink. Your doctor or speech therapist will tell you what kind of thickener to use and how thick to make the liquids. ¨ Nectar-thick liquids leave a thin coating when they are poured from a spoon. ¨ Honey-thick liquids drip slowly when they are poured from a spoon. ¨ Pudding-thick liquids do not pour from a spoon. Your speech therapist will help you learn exercises to train your muscles to work together so you can swallow. You may also need to learn how to position your body or how to put food in your mouth to be able to swallow better. Some people have severe trouble swallowing and can't get enough food and liquids. In those cases, the doctor can insert a feeding tube so the person gets enough nutrients. Follow-up care is a key part of your treatment and safety.  Be sure to make and go to all appointments, and call your doctor if you are having problems. It's also a good idea to know your test results and keep a list of the medicines you take. Where can you learn more? Go to http://sav-susannah.info/. Enter W842 in the search box to learn more about \"Learning About the Diet for Swallowing Problems. \"  Current as of: November 20, 2017  Content Version: 11.7  © 1727-5445 [employer]. Care instructions adapted under license by [employer] (which disclaims liability or warranty for this information). If you have questions about a medical condition or this instruction, always ask your healthcare professional. [address] any warranty or liability for your use of this information.

## 2019-08-03 ENCOUNTER — HOSPITAL ENCOUNTER (OUTPATIENT)
Dept: LAB | Age: 69
Discharge: HOME | End: 2019-08-03
Payer: MEDICARE

## 2019-08-03 LAB
ALBUMIN SERPL-MCNC: 3.6 G/DL (ref 3.4–5)
ALBUMIN/GLOB SERPL: 0.9 {RATIO} (ref 0.8–1.7)
ALP SERPL-CCNC: 123 U/L (ref 45–117)
ALT SERPL-CCNC: 30 U/L (ref 13–56)
ANION GAP SERPL CALC-SCNC: 4 MMOL/L (ref 3–18)
AST SERPL-CCNC: 24 U/L (ref 10–38)
BILIRUB SERPL-MCNC: 0.3 MG/DL (ref 0.2–1)
BUN SERPL-MCNC: 23 MG/DL (ref 7–18)
BUN/CREAT SERPL: 61 (ref 12–20)
CALCIUM SERPL-MCNC: 9.1 MG/DL (ref 8.5–10.1)
CHLORIDE SERPL-SCNC: 88 MMOL/L (ref 100–111)
CHOLEST SERPL-MCNC: 223 MG/DL
CO2 SERPL-SCNC: 34 MMOL/L (ref 21–32)
CREAT SERPL-MCNC: 0.38 MG/DL (ref 0.6–1.3)
FAX TO INFO,FAXT: NORMAL
FAX TO NUMBER,FAXN: NORMAL
GLOBULIN SER CALC-MCNC: 3.9 G/DL (ref 2–4)
GLUCOSE SERPL-MCNC: 90 MG/DL (ref 74–99)
HDLC SERPL-MCNC: 126 MG/DL (ref 40–60)
HDLC SERPL: 1.8 {RATIO} (ref 0–5)
LDLC SERPL CALC-MCNC: 84.6 MG/DL (ref 0–100)
LIPID PROFILE,FLP: ABNORMAL
POTASSIUM SERPL-SCNC: 4.9 MMOL/L (ref 3.5–5.5)
PROT SERPL-MCNC: 7.5 G/DL (ref 6.4–8.2)
SODIUM SERPL-SCNC: 126 MMOL/L (ref 136–145)
TRIGL SERPL-MCNC: 62 MG/DL (ref ?–150)
VLDLC SERPL CALC-MCNC: 12.4 MG/DL

## 2019-08-03 PROCEDURE — 80053 COMPREHEN METABOLIC PANEL: CPT

## 2019-08-03 PROCEDURE — 36415 COLL VENOUS BLD VENIPUNCTURE: CPT

## 2019-08-03 PROCEDURE — 80061 LIPID PANEL: CPT

## 2019-12-30 NOTE — PROGRESS NOTES
Problem: Self Care Deficits Care Plan (Adult)  Goal: *Acute Goals and Plan of Care (Insert Text)  Outcome: Resolved/Met Date Met:  08/30/17  OCCUPATIONAL THERAPY EVALUATION/DISCHARGE     Patient: Citlalli Moon (84 y.o. female)  Date: 8/30/2017  Primary Diagnosis: Overdose        Precautions:  Fall, Skin, Spinal, Seizure, Aspiration      ASSESSMENT AND RECOMMENDATIONS:  Based on the objective data described below, the patient presents with ability to perform ADL tasks at baseline level. Patient has been bed-bound for several years and now has power tilt-in-space w/c and has been working w/ [facility] for wheelchair mobility. Pt reports using BZ board/slide board transfer. Pt declined rolling and sitting up d/t \"dizziness\" and reports being discharged from [facility]. Pt capable of grooming and simple ADLs at setup. Pt observed able to reach for bottles and drinking at nearly flat level. Encouraged slow HOB rising during feeding to avoid aspiration, but pt declining. Pt has maximized potential with OT as pt not willing to attempt alternative strategies or education and reports she has all ADLs taken care of w/o needs. Skilled occupational therapy is not indicated at this time. Pt with no further OT/ADL concerns at this time. Education: Reviewed Back & body mechanics for pain management, home safety, importance of increasing Upright sitting in prep for w/c and to minimize complications of PNA/Aspiration/wounds/etc, Energy Conservation/Work Simplification Techniques,  adaptive strategies and adaptive dressing techniques including clothing modifications with patient verbalizing understanding at this time, but declining needs. Discharge Recommendations: Home Health  Further Equipment Recommendations for Discharge: N/A        SUBJECTIVE:   Patient stated I'm a paraplegic. I don't walk, but I can feed myself and I have help on things I can't do.       OBJECTIVE DATA SUMMARY:       Past Medical History:   Diagnosis Date    Anemia NEC       history of blood transfusion    Anxiety       panic attacks    Arthritis      Bipolar 1 disorder (HCC)      Chronic back pain      Depression      Elevated diaphragm 5/4/2012    Fatigue       along with weakness    GERD (gastroesophageal reflux disease)      Headache      Hypertension       pt denies    Other ill-defined conditions swallowing issues, decubitus    Psychiatric disorder      Seizures ([address]      TIA (transient ischemic attack)       Past Surgical History:   Procedure Laterality Date    COLONOSCOPY N/A 2/21/2017     COLONOSCOPY [facility] ANESTHESIA, PATIENT IS IN ROOM 358 performed by Devonte Goode MD at [address] N/A 2/22/2017     COLONOSCOPY performed by Devonte Goode MD at [facility], DIAGNOSTIC         over 20 years ago    ENDOSCOPY, COLON, DIAGNOSTIC   9/24/09    HX APPENDECTOMY   11/6/84    HX BACK SURGERY   11/24/98     severe back problems    HX CHOLECYSTECTOMY   11/6/84    HX CORONARY STENT PLACEMENT         ivc filter    HX GASTRIC BYPASS   1995     [place]    HX TUBAL LIGATION   1991     Barriers to Learning/Limitations: yes;  emotional and cognitive  Compensate with: visual, verbal, tactile, kinesthetic cues/model     G-Codes (GP)  Self Care   Current  CL= 60-79%   Goal  CL= 60-79%   D/C  CL= 60-79%  The severity rating is based on the professional judgement & direct observation of Level of Assistance required for Functional Mobility and ADLs. Eval Complexity: History: HIGH Complexity : Extensive review of history including physical, cognitive and psychosocial history ; Examination: LOW Complexity : 1-3 performance deficits relating to physical, cognitive , or psychosocial skils that result in activity limitations and / or participation restrictions ; Decision Making:MEDIUM Complexity : Patient may present with comorbidities that affect occupational performnce.  Miniml to moderate modification of tasks or assistance (eg, physical or verbal ) with assesment(s) is necessary to enable patient to complete evaluation      Prior Level of Function/Home Situation: Pt using ROHO cushion in tilt-in-space w/c; using BZ board/sliding board transfer, hospital bed; using [place]: Private residence  # Steps to Enter: 0  One/Two Story Residence: Two story, live on 1st floor  Living Alone: No  Support Systems: Spouse/Significant Other/Partner  Patient Expects to be Discharged to[de-identified] Private residence  Current DME Used/Available at Home: [address]  [X]     Right hand dominant       [ ]     Left hand dominant  Cognitive/Behavioral Status:  Neurologic State: Alert  Orientation Level: Oriented X4  Cognition: Follows commands;Decreased attention/concentration  Safety/Judgement: Decreased awareness of need for safety;Decreased insight into deficits  Skin: sacral wound  Edema:  No edema noted  Vision/Perceptual:      appears WFL   Coordination:  Coordination: Within functional limits  Fine Motor Skills-Upper: Left Intact; Right Intact    Gross Motor Skills-Upper: Left Intact; Right Intact  Strength:  WFL  Strength: Generally decreased, functional  Tone & Sensation:  Tone: Normal  Sensation: Intact  Range of Motion:  AROM: Generally decreased, functional  PROM: Generally decreased, functional  Functional Mobility and Transfers for ADLs:  Bed Mobility:   declined rolling or scooting up in bed  ADL Assessment:   Feeding/Grooming: setup  Requires assist w/ bathing/dressing at baseline  Toileting: catheter  ADL Intervention:  Feeding  Feeding Assistance: Supervision/set-up; Modified independent  Container Management: Modified independent  Drink to Mouth: Supervision/set-up     Grooming  Washing Face: Supervision/set-up  Washing Hands: Supervision/set-up  Brushing Teeth: Supervision/set-up  Brushing/Combing Hair: Supervision/set-up     Toileting  Toileting Assistance: (catheter)     Cognitive Retraining  Safety/Judgement: Decreased awareness of need for safety;Decreased insight into deficits     Pain:  Pre-treatment: 0/10  Post-treatment: 0/10     Activity Tolerance:   Pt able to complete simple ADLs with intermittent rest breaks. Pt limited by pain & self-limiting. Please refer to the flowsheet for vital signs taken during this treatment. After treatment:   [ ]  Patient left in no apparent distress sitting up in chair  [X]  Patient left in no apparent distress in bed  [X]  Call bell left within reach  [X]  Nursing notified  [ ]  Caregiver present  [ ]  Bed alarm activated      COMMUNICATION/EDUCATION:   Communication/Collaboration:  [X]      Home safety education was provided and the patient/caregiver indicated understanding. [X]      Patient/family have participated as able and agree with findings and recommendations. [ ]      Patient is unable to participate in plan of care at this time.      Thank you for this referral.  Madiha Chavez, OTR/L  Time Calculation: 10 mins Kenalog Preparation: Kenalog

## 2020-01-01 NOTE — ED NOTES
Pt ACI reviewed and pt verbalizes understanding. Pt discharged with copy. Pt discharged in nad via wheelchair from ER. Opportunity for questions provided.
Pt continues in nad. Spouse at bedside. Lights off as requested.
Pt requesting Fioricet with Codeine for HA prior to discharged.
Verbal report given to [employer]
[facility]

## 2021-01-01 ENCOUNTER — APPOINTMENT (OUTPATIENT)
Dept: CT IMAGING | Age: 71
DRG: 871 | End: 2021-01-01
Attending: EMERGENCY MEDICINE
Payer: MEDICARE

## 2021-01-01 ENCOUNTER — APPOINTMENT (OUTPATIENT)
Dept: GENERAL RADIOLOGY | Age: 71
DRG: 871 | End: 2021-01-01
Attending: EMERGENCY MEDICINE
Payer: MEDICARE

## 2021-01-01 ENCOUNTER — HOSPITAL ENCOUNTER (INPATIENT)
Age: 71
LOS: 1 days | DRG: 871 | End: 2021-01-28
Attending: EMERGENCY MEDICINE | Admitting: FAMILY MEDICINE
Payer: MEDICARE

## 2021-01-01 VITALS
RESPIRATION RATE: 1 BRPM | WEIGHT: 162.9 LBS | TEMPERATURE: 97.8 F | OXYGEN SATURATION: 94 % | HEIGHT: 63 IN | DIASTOLIC BLOOD PRESSURE: 49 MMHG | SYSTOLIC BLOOD PRESSURE: 106 MMHG | BODY MASS INDEX: 28.86 KG/M2

## 2021-01-01 DIAGNOSIS — A41.9 SEPSIS WITHOUT ACUTE ORGAN DYSFUNCTION, DUE TO UNSPECIFIED ORGANISM (HCC): Primary | ICD-10-CM

## 2021-01-01 DIAGNOSIS — J96.01 ACUTE RESPIRATORY FAILURE WITH HYPOXIA AND HYPERCAPNIA (HCC): ICD-10-CM

## 2021-01-01 DIAGNOSIS — R31.9 URINARY TRACT INFECTION WITH HEMATURIA, SITE UNSPECIFIED: ICD-10-CM

## 2021-01-01 DIAGNOSIS — N39.0 URINARY TRACT INFECTION WITH HEMATURIA, SITE UNSPECIFIED: ICD-10-CM

## 2021-01-01 DIAGNOSIS — E87.20 ACIDOSIS: ICD-10-CM

## 2021-01-01 DIAGNOSIS — E87.1 HYPONATREMIA: ICD-10-CM

## 2021-01-01 DIAGNOSIS — J96.02 ACUTE RESPIRATORY FAILURE WITH HYPOXIA AND HYPERCAPNIA (HCC): ICD-10-CM

## 2021-01-01 DIAGNOSIS — E87.5 ACUTE HYPERKALEMIA: ICD-10-CM

## 2021-01-01 LAB
ALBUMIN SERPL-MCNC: 4 G/DL (ref 3.4–5)
ALBUMIN/GLOB SERPL: 0.8 {RATIO} (ref 0.8–1.7)
ALP SERPL-CCNC: 209 U/L (ref 45–117)
ALT SERPL-CCNC: 32 U/L (ref 13–56)
ANION GAP SERPL CALC-SCNC: 4 MMOL/L (ref 3–18)
ANION GAP SERPL CALC-SCNC: 7 MMOL/L (ref 3–18)
APPEARANCE UR: ABNORMAL
ARTERIAL PATENCY WRIST A: ABNORMAL
ARTERIAL PATENCY WRIST A: YES
AST SERPL-CCNC: 54 U/L (ref 10–38)
ATRIAL RATE: 91 BPM
BACTERIA URNS QL MICRO: ABNORMAL /HPF
BASE DEFICIT BLD-SCNC: 1 MMOL/L
BASE DEFICIT BLD-SCNC: 1 MMOL/L
BASOPHILS # BLD: 0 K/UL (ref 0–0.1)
BASOPHILS NFR BLD: 0 % (ref 0–2)
BDY SITE: ABNORMAL
BDY SITE: ABNORMAL
BILIRUB SERPL-MCNC: 0.5 MG/DL (ref 0.2–1)
BILIRUB UR QL: NEGATIVE
BNP SERPL-MCNC: 1609 PG/ML (ref 0–900)
BODY TEMPERATURE: 97.8
BODY TEMPERATURE: 98.3
BUN SERPL-MCNC: 13 MG/DL (ref 7–18)
BUN SERPL-MCNC: 15 MG/DL (ref 7–18)
BUN/CREAT SERPL: 21 (ref 12–20)
BUN/CREAT SERPL: 35 (ref 12–20)
CALCIUM SERPL-MCNC: 8.3 MG/DL (ref 8.5–10.1)
CALCIUM SERPL-MCNC: 9.7 MG/DL (ref 8.5–10.1)
CALCULATED P AXIS, ECG09: 66 DEGREES
CALCULATED R AXIS, ECG10: -4 DEGREES
CALCULATED T AXIS, ECG11: 11 DEGREES
CHLORIDE SERPL-SCNC: 76 MMOL/L (ref 100–111)
CHLORIDE SERPL-SCNC: 84 MMOL/L (ref 100–111)
CK MB CFR SERPL CALC: 1.7 % (ref 0–4)
CK MB SERPL-MCNC: 21.3 NG/ML (ref 5–25)
CK SERPL-CCNC: 1219 U/L (ref 26–192)
CO2 SERPL-SCNC: 29 MMOL/L (ref 21–32)
CO2 SERPL-SCNC: 30 MMOL/L (ref 21–32)
COLOR UR: YELLOW
COVID-19 RAPID TEST, COVR: NOT DETECTED
CREAT SERPL-MCNC: 0.43 MG/DL (ref 0.6–1.3)
CREAT SERPL-MCNC: 0.61 MG/DL (ref 0.6–1.3)
DIAGNOSIS, 93000: NORMAL
DIFFERENTIAL METHOD BLD: ABNORMAL
EOSINOPHIL # BLD: 0 K/UL (ref 0–0.4)
EOSINOPHIL NFR BLD: 0 % (ref 0–5)
EPITH CASTS URNS QL MICRO: NEGATIVE /LPF (ref 0–5)
ERYTHROCYTE [DISTWIDTH] IN BLOOD BY AUTOMATED COUNT: 13.4 % (ref 11.6–14.5)
GAS FLOW.O2 O2 DELIVERY SYS: ABNORMAL L/MIN
GAS FLOW.O2 O2 DELIVERY SYS: ABNORMAL L/MIN
GLOBULIN SER CALC-MCNC: 5.1 G/DL (ref 2–4)
GLUCOSE SERPL-MCNC: 131 MG/DL (ref 74–99)
GLUCOSE SERPL-MCNC: 142 MG/DL (ref 74–99)
GLUCOSE UR STRIP.AUTO-MCNC: NEGATIVE MG/DL
HCO3 BLD-SCNC: 27.1 MMOL/L (ref 22–26)
HCO3 BLD-SCNC: 29 MMOL/L (ref 22–26)
HCT VFR BLD AUTO: 46.1 % (ref 35–45)
HGB BLD-MCNC: 16 G/DL (ref 12–16)
HGB UR QL STRIP: ABNORMAL
KETONES UR QL STRIP.AUTO: ABNORMAL MG/DL
LACTATE BLD-SCNC: 2.19 MMOL/L (ref 0.4–2)
LACTATE BLD-SCNC: 3.55 MMOL/L (ref 0.4–2)
LEUKOCYTE ESTERASE UR QL STRIP.AUTO: ABNORMAL
LYMPHOCYTES # BLD: 0.3 K/UL (ref 0.9–3.6)
LYMPHOCYTES NFR BLD: 2 % (ref 21–52)
MCH RBC QN AUTO: 29.8 PG (ref 24–34)
MCHC RBC AUTO-ENTMCNC: 34.7 G/DL (ref 31–37)
MCV RBC AUTO: 85.8 FL (ref 74–97)
MONOCYTES # BLD: 0.6 K/UL (ref 0.05–1.2)
MONOCYTES NFR BLD: 4 % (ref 3–10)
NEUTS SEG # BLD: 14.7 K/UL (ref 1.8–8)
NEUTS SEG NFR BLD: 94 % (ref 40–73)
NITRITE UR QL STRIP.AUTO: NEGATIVE
O2/TOTAL GAS SETTING VFR VENT: 1 %
O2/TOTAL GAS SETTING VFR VENT: 1 %
P-R INTERVAL, ECG05: 214 MS
PCO2 BLD: 100.7 MMHG (ref 35–45)
PCO2 BLD: 71.1 MMHG (ref 35–45)
PEEP RESPIRATORY: 6 CMH2O
PEEP RESPIRATORY: 8 CMH2O
PH BLD: 7.07 [PH] (ref 7.35–7.45)
PH BLD: 7.19 [PH] (ref 7.35–7.45)
PH UR STRIP: 5.5 [PH] (ref 5–8)
PLATELET # BLD AUTO: 395 K/UL (ref 135–420)
PMV BLD AUTO: 8.4 FL (ref 9.2–11.8)
PO2 BLD: 101 MMHG (ref 80–100)
PO2 BLD: 325 MMHG (ref 80–100)
POTASSIUM SERPL-SCNC: 5.2 MMOL/L (ref 3.5–5.5)
POTASSIUM SERPL-SCNC: 6.4 MMOL/L (ref 3.5–5.5)
PRESSURE SUPPORT SETTING VENT: 12 CMH2O
PRESSURE SUPPORT SETTING VENT: 8 CMH2O
PROT SERPL-MCNC: 9.1 G/DL (ref 6.4–8.2)
PROT UR STRIP-MCNC: 100 MG/DL
Q-T INTERVAL, ECG07: 410 MS
QRS DURATION, ECG06: 194 MS
QTC CALCULATION (BEZET), ECG08: 504 MS
RBC # BLD AUTO: 5.37 M/UL (ref 4.2–5.3)
RBC #/AREA URNS HPF: ABNORMAL /HPF (ref 0–5)
SAO2 % BLD: 100 % (ref 92–97)
SAO2 % BLD: 94 % (ref 92–97)
SARS-COV-2, COV2: NORMAL
SERVICE CMNT-IMP: ABNORMAL
SERVICE CMNT-IMP: ABNORMAL
SODIUM SERPL-SCNC: 112 MMOL/L (ref 136–145)
SODIUM SERPL-SCNC: 118 MMOL/L (ref 136–145)
SOURCE, COVRS: NORMAL
SP GR UR REFRACTOMETRY: 1.03 (ref 1–1.03)
SPECIMEN TYPE: ABNORMAL
SPECIMEN TYPE: ABNORMAL
TOTAL RESP. RATE, ITRR: 19
TOTAL RESP. RATE, ITRR: 20
TROPONIN I SERPL-MCNC: 0.02 NG/ML (ref 0–0.04)
UROBILINOGEN UR QL STRIP.AUTO: 0.2 EU/DL (ref 0.2–1)
VENTRICULAR RATE, ECG03: 91 BPM
WBC # BLD AUTO: 15.6 K/UL (ref 4.6–13.2)
WBC URNS QL MICRO: ABNORMAL /HPF (ref 0–5)

## 2021-01-01 PROCEDURE — 71260 CT THORAX DX C+: CPT

## 2021-01-01 PROCEDURE — 82553 CREATINE MB FRACTION: CPT

## 2021-01-01 PROCEDURE — 74011250636 HC RX REV CODE- 250/636: Performed by: FAMILY MEDICINE

## 2021-01-01 PROCEDURE — 83605 ASSAY OF LACTIC ACID: CPT

## 2021-01-01 PROCEDURE — 36600 WITHDRAWAL OF ARTERIAL BLOOD: CPT

## 2021-01-01 PROCEDURE — 83880 ASSAY OF NATRIURETIC PEPTIDE: CPT

## 2021-01-01 PROCEDURE — 74011000258 HC RX REV CODE- 258: Performed by: EMERGENCY MEDICINE

## 2021-01-01 PROCEDURE — 96366 THER/PROPH/DIAG IV INF ADDON: CPT

## 2021-01-01 PROCEDURE — 96361 HYDRATE IV INFUSION ADD-ON: CPT

## 2021-01-01 PROCEDURE — 87635 SARS-COV-2 COVID-19 AMP PRB: CPT

## 2021-01-01 PROCEDURE — 74011250636 HC RX REV CODE- 250/636: Performed by: EMERGENCY MEDICINE

## 2021-01-01 PROCEDURE — 65270000029 HC RM PRIVATE

## 2021-01-01 PROCEDURE — 82803 BLOOD GASES ANY COMBINATION: CPT

## 2021-01-01 PROCEDURE — 99285 EMERGENCY DEPT VISIT HI MDM: CPT

## 2021-01-01 PROCEDURE — 96365 THER/PROPH/DIAG IV INF INIT: CPT

## 2021-01-01 PROCEDURE — 74011000250 HC RX REV CODE- 250: Performed by: EMERGENCY MEDICINE

## 2021-01-01 PROCEDURE — 71045 X-RAY EXAM CHEST 1 VIEW: CPT

## 2021-01-01 PROCEDURE — 74011000636 HC RX REV CODE- 636: Performed by: EMERGENCY MEDICINE

## 2021-01-01 PROCEDURE — 80053 COMPREHEN METABOLIC PANEL: CPT

## 2021-01-01 PROCEDURE — 87040 BLOOD CULTURE FOR BACTERIA: CPT

## 2021-01-01 PROCEDURE — 85025 COMPLETE CBC W/AUTO DIFF WBC: CPT

## 2021-01-01 PROCEDURE — 93005 ELECTROCARDIOGRAM TRACING: CPT

## 2021-01-01 PROCEDURE — 5A09357 ASSISTANCE WITH RESPIRATORY VENTILATION, LESS THAN 24 CONSECUTIVE HOURS, CONTINUOUS POSITIVE AIRWAY PRESSURE: ICD-10-PCS | Performed by: FAMILY MEDICINE

## 2021-01-01 PROCEDURE — 96375 TX/PRO/DX INJ NEW DRUG ADDON: CPT

## 2021-01-01 PROCEDURE — 81001 URINALYSIS AUTO W/SCOPE: CPT

## 2021-01-01 PROCEDURE — 74011636637 HC RX REV CODE- 636/637: Performed by: EMERGENCY MEDICINE

## 2021-01-01 RX ORDER — CALCIUM GLUCONATE 20 MG/ML
1 INJECTION, SOLUTION INTRAVENOUS ONCE
Status: COMPLETED | OUTPATIENT
Start: 2021-01-01 | End: 2021-01-01

## 2021-01-01 RX ORDER — LEVOFLOXACIN 5 MG/ML
750 INJECTION, SOLUTION INTRAVENOUS EVERY 24 HOURS
Status: DISCONTINUED | OUTPATIENT
Start: 2021-01-01 | End: 2021-01-01 | Stop reason: HOSPADM

## 2021-01-01 RX ORDER — PROMETHAZINE HYDROCHLORIDE 25 MG/1
12.5 TABLET ORAL
Status: DISCONTINUED | OUTPATIENT
Start: 2021-01-01 | End: 2021-01-01 | Stop reason: HOSPADM

## 2021-01-01 RX ORDER — SODIUM CHLORIDE 0.9 % (FLUSH) 0.9 %
5-40 SYRINGE (ML) INJECTION AS NEEDED
Status: DISCONTINUED | OUTPATIENT
Start: 2021-01-01 | End: 2021-01-01 | Stop reason: HOSPADM

## 2021-01-01 RX ORDER — VANCOMYCIN 1.75 GRAM/500 ML IN 0.9 % SODIUM CHLORIDE INTRAVENOUS
1750 ONCE
Status: COMPLETED | OUTPATIENT
Start: 2021-01-01 | End: 2021-01-01

## 2021-01-01 RX ORDER — CALCIUM GLUCONATE 20 MG/ML
1 INJECTION, SOLUTION INTRAVENOUS ONCE
Status: DISCONTINUED | OUTPATIENT
Start: 2021-01-01 | End: 2021-01-01 | Stop reason: HOSPADM

## 2021-01-01 RX ORDER — MORPHINE SULFATE 2 MG/ML
2 INJECTION, SOLUTION INTRAMUSCULAR; INTRAVENOUS
Status: DISCONTINUED | OUTPATIENT
Start: 2021-01-01 | End: 2021-01-01 | Stop reason: HOSPADM

## 2021-01-01 RX ORDER — SODIUM CHLORIDE 0.9 % (FLUSH) 0.9 %
5-40 SYRINGE (ML) INJECTION EVERY 8 HOURS
Status: DISCONTINUED | OUTPATIENT
Start: 2021-01-01 | End: 2021-01-01 | Stop reason: HOSPADM

## 2021-01-01 RX ORDER — DEXTROSE MONOHYDRATE 100 MG/ML
125-250 INJECTION, SOLUTION INTRAVENOUS AS NEEDED
Status: DISCONTINUED | OUTPATIENT
Start: 2021-01-01 | End: 2021-01-01 | Stop reason: HOSPADM

## 2021-01-01 RX ORDER — SODIUM CHLORIDE 0.9 % (FLUSH) 0.9 %
5-10 SYRINGE (ML) INJECTION AS NEEDED
Status: DISCONTINUED | OUTPATIENT
Start: 2021-01-01 | End: 2021-01-01 | Stop reason: HOSPADM

## 2021-01-01 RX ORDER — POLYETHYLENE GLYCOL 3350 17 G/17G
17 POWDER, FOR SOLUTION ORAL DAILY PRN
Status: DISCONTINUED | OUTPATIENT
Start: 2021-01-01 | End: 2021-01-01 | Stop reason: HOSPADM

## 2021-01-01 RX ORDER — ACETAMINOPHEN 325 MG/1
650 TABLET ORAL
Status: DISCONTINUED | OUTPATIENT
Start: 2021-01-01 | End: 2021-01-01 | Stop reason: HOSPADM

## 2021-01-01 RX ORDER — ACETAMINOPHEN 650 MG/1
650 SUPPOSITORY RECTAL
Status: DISCONTINUED | OUTPATIENT
Start: 2021-01-01 | End: 2021-01-01 | Stop reason: HOSPADM

## 2021-01-01 RX ORDER — ONDANSETRON 2 MG/ML
4 INJECTION INTRAMUSCULAR; INTRAVENOUS
Status: DISCONTINUED | OUTPATIENT
Start: 2021-01-01 | End: 2021-01-01 | Stop reason: HOSPADM

## 2021-01-01 RX ORDER — SODIUM BICARBONATE 1 MEQ/ML
50 SYRINGE (ML) INTRAVENOUS ONCE
Status: COMPLETED | OUTPATIENT
Start: 2021-01-01 | End: 2021-01-01

## 2021-01-01 RX ORDER — ENOXAPARIN SODIUM 100 MG/ML
40 INJECTION SUBCUTANEOUS DAILY
Status: DISCONTINUED | OUTPATIENT
Start: 2021-01-01 | End: 2021-01-01 | Stop reason: HOSPADM

## 2021-01-01 RX ORDER — VANCOMYCIN HYDROCHLORIDE
1250 EVERY 12 HOURS
Status: DISCONTINUED | OUTPATIENT
Start: 2021-01-01 | End: 2021-01-01 | Stop reason: HOSPADM

## 2021-01-01 RX ADMIN — LEVOFLOXACIN 750 MG: 5 INJECTION, SOLUTION INTRAVENOUS at 18:42

## 2021-01-01 RX ADMIN — MORPHINE SULFATE 2 MG: 2 INJECTION, SOLUTION INTRAMUSCULAR; INTRAVENOUS at 00:01

## 2021-01-01 RX ADMIN — Medication 10 ML: at 22:05

## 2021-01-01 RX ADMIN — HUMAN INSULIN 10 UNITS: 100 INJECTION, SOLUTION SUBCUTANEOUS at 19:43

## 2021-01-01 RX ADMIN — SODIUM BICARBONATE 50 MEQ: 84 INJECTION, SOLUTION INTRAVENOUS at 19:44

## 2021-01-01 RX ADMIN — PIPERACILLIN AND TAZOBACTAM 4.5 G: 4; .5 INJECTION, POWDER, LYOPHILIZED, FOR SOLUTION INTRAVENOUS; PARENTERAL at 18:42

## 2021-01-01 RX ADMIN — CALCIUM GLUCONATE 1000 MG: 20 INJECTION, SOLUTION INTRAVENOUS at 22:04

## 2021-01-01 RX ADMIN — MORPHINE SULFATE 2 MG: 2 INJECTION, SOLUTION INTRAMUSCULAR; INTRAVENOUS at 23:46

## 2021-01-01 RX ADMIN — SODIUM CHLORIDE 1000 ML: 900 INJECTION, SOLUTION INTRAVENOUS at 18:43

## 2021-01-01 RX ADMIN — VANCOMYCIN HYDROCHLORIDE 1750 MG: 10 INJECTION, POWDER, LYOPHILIZED, FOR SOLUTION INTRAVENOUS at 20:56

## 2021-01-01 RX ADMIN — DEXTROSE MONOHYDRATE 250 ML: 100 INJECTION, SOLUTION INTRAVENOUS at 19:44

## 2021-01-01 RX ADMIN — SODIUM CHLORIDE 217 ML: 900 INJECTION, SOLUTION INTRAVENOUS at 20:56

## 2021-01-01 RX ADMIN — Medication 10 ML: at 21:06

## 2021-01-01 RX ADMIN — IOPAMIDOL 100 ML: 612 INJECTION, SOLUTION INTRAVENOUS at 20:17

## 2021-01-01 RX ADMIN — SODIUM CHLORIDE 1000 ML: 900 INJECTION, SOLUTION INTRAVENOUS at 19:50

## 2021-01-27 PROBLEM — J96.02 ACUTE RESPIRATORY FAILURE WITH HYPOXIA AND HYPERCAPNIA (HCC): Status: ACTIVE | Noted: 2021-01-01

## 2021-01-27 PROBLEM — E87.20 ACIDOSIS: Status: ACTIVE | Noted: 2021-01-01

## 2021-01-27 PROBLEM — E87.5 ACUTE HYPERKALEMIA: Status: ACTIVE | Noted: 2021-01-01

## 2021-01-27 PROBLEM — J96.01 ACUTE RESPIRATORY FAILURE WITH HYPOXIA AND HYPERCAPNIA (HCC): Status: ACTIVE | Noted: 2021-01-01

## 2021-01-27 NOTE — ED TRIAGE NOTES
Pt brought in by [facility] for unstable  Shortness of breath. Pt was on Cpap upon entering ER. Pt placed on Bipap at this time.

## 2021-01-27 NOTE — ED PROVIDER NOTES
EMERGENCY DEPARTMENT HISTORY AND PHYSICAL EXAM 
 
Date: 1/27/2021 Patient Name: Marcy Santana History of Presenting Illness Chief Complaint Patient presents with  Shortness of Breath History Provided By: EMS Marcy Santana is a 72 y.o. female who presents to the emergency department C/O of respiratory distress. EMS report states they received a call initially for generalized weakness possible urinary tract infection. They state upon their arrival the patient was found to be in respiratory distress with a pulse ox in the mid 70s on room air. They state the patient had a large amount of audible wheezes and place her on CPAP with improvement. They state the patient has been able to talk in 1-2 word sentences. The patient herself does not provide much history due to her respiratory status but states that the CPAP is helping with her breathing and states she does not want to be put on a ventilator machine. PCP: Minda Sibley DO 
 
Current Facility-Administered Medications Medication Dose Route Frequency Provider Last Rate Last Admin  sodium chloride (NS) flush 5-10 mL  5-10 mL IntraVENous PRN Kade Keenan DO      
 [START ON 1/28/2021] piperacillin-tazobactam (ZOSYN) 4.5 g in 0.9% sodium chloride (MBP/ADV) 100 mL MBP  4.5 g IntraVENous Q6H Kade Keenan DO      
 levoFLOXacin (LEVAQUIN) 750 mg in D5W IVPB  750 mg IntraVENous Q24H Kade Keenan DO   Stopped at 01/27/21 2055  vancomycin (VANCOCIN) 1750 mg in  ml infusion  1,750 mg IntraVENous ONCE Kade Keenan  mL/hr at 01/27/21 2056 1,750 mg at 01/27/21 2056  VANCOMYCIN- PHARMACY TO DOSE  1 Each Other Rx Dosing/Monitoring Kade Keenan DO      
 sodium chloride (NS) flush 5-40 mL  5-40 mL IntraVENous Q8H Kade Keenan DO      
 sodium chloride (NS) flush 5-40 mL  5-40 mL IntraVENous PRN Kade Keenan DO      
 dextrose 10% infusion 125-250 mL  125-250 mL IntraVENous PRN Lonza Ines MCKEON,  999 mL/hr at 01/27/21 1944 250 mL at 01/27/21 1944  [START ON 1/28/2021] vancomycin (VANCOCIN) 1250 mg in  ml infusion  1,250 mg IntraVENous Q12H Kade Keenan DO      
 iopamidoL (ISOVUE 300) 61 % contrast injection 100 mL  100 mL IntraVENous RAD ONCE Kade Keenan DO      
 sodium chloride (NS) flush 5-40 mL  5-40 mL IntraVENous Q8H Manpreet Simon MD      
 sodium chloride (NS) flush 5-40 mL  5-40 mL IntraVENous PRN Manpreet Simon MD      
 acetaminophen (TYLENOL) tablet 650 mg  650 mg Oral Q6H PRN Manpreet Simon MD      
 Or  
 acetaminophen (TYLENOL) suppository 650 mg  650 mg Rectal Q6H PRN Manpreet Simon MD      
 polyethylene glycol (MIRALAX) packet 17 g  17 g Oral DAILY PRN Manpreet Simon MD      
 promethazine (PHENERGAN) tablet 12.5 mg  12.5 mg Oral Q6H PRN Manpreet Simon MD      
 Or  
 ondansetron [facility]) injection 4 mg  4 mg IntraVENous Q6H PRN MD Melissa Yao ON 1/28/2021] enoxaparin (LOVENOX) injection 40 mg  40 mg SubCUTAneous DAILY Manpreet Simon MD      
 calcium gluconate 1 gram in sodium chloride (ISO-OSM) 50 mL infusion  1 g IntraVENous ONCE Manpreet Simon MD      
 
Current Outpatient Medications Medication Sig Dispense Refill  sucralfate (CARAFATE) 100 mg/mL suspension Take 10 mL by mouth three (3) times daily as needed. 414 mL 0  
 metoclopramide HCl (METOZOLV ODT) 5 mg rapid dissolve tab Take 1 Tab by mouth three (3) times daily as needed. 30 Tab 0  
 HYDROcodone-acetaminophen (NORCO) 5-325 mg per tablet Take 1 Tab by mouth every four (4) hours as needed. Max Daily Amount: 6 Tabs. 60 Tab 0  
 baclofen (LIORESAL) 10 mg tablet Take 10 mg by mouth three (3) times daily.  lidocaine (LIDODERM) 5 % 1 Patch by TransDERmal route every twenty-four (24) hours. Apply patch to the affected area for 12 hours a day and remove for 12 hours a day.     
 oxyCODONE ER [facility]) 9 mg capsule Take 9 mg by mouth every twelve (12) hours. Indications: Chronic Pain  potassium chloride SR (KLOR-CON 10) 10 mEq tablet Take 10 mEq by mouth two (2) times a day.  butalbital-acetaminophen-caffeine (FIORICET, ESGIC) -40 mg per tablet Take 1 Tab by mouth every four (4) hours as needed for Pain.  cyclobenzaprine (FLEXERIL) 10 mg tablet Take 10 mg by mouth nightly.  lactulose (KRISTALOSE) 10 gram packet Take 10 g by mouth two (2) times a day.  clotrimazole (MYCELEX) 1 % vaginal cream Insert 1 Applicator into vagina nightly as needed for Itching. For ABX induced yeast infection  DULoxetine (CYMBALTA) 30 mg capsule Take 30 mg by mouth two (2) times a day.  levETIRAcetam (KEPPRA) 500 mg tablet Take 750 mg by mouth two (2) times a day.  levothyroxine (SYNTHROID) 75 mcg tablet Take 75 mcg by mouth Daily (before breakfast).  pantoprazole (PROTONIX) 40 mg tablet Take 40 mg by mouth two (2) times a day.  nystatin (MYCOSTATIN) 100,000 unit/mL suspension Take 1 tsp by mouth four (4) times daily as needed. swish and spit  CRANBERRY FRUIT EXTRACT (CRANBERRY EXTRACT PO) Take 1 Cap by mouth two (2) times a day. For UTI prevention Non-formulary medication. Pt to supply if ordered for admission.  GLUC VARGAS/CHONDRO VARGAS A/VIT C/MN (GLUCOSAMINE CHONDROITIN MAXSTR PO) Take 1 Tab by mouth three (3) times daily.  ferrous sulfate 325 mg (65 mg iron) tablet Take 325 mg by mouth Daily (before breakfast).  diclofenac (VOLTAREN) 1 % gel Apply 2 g to affected area four (4) times daily. Non-formulary medication. Pt to supply if ordered for admission.  ergocalciferol (VITAMIN D2) 50,000 unit capsule Take 50,000 Units by mouth Every Friday.  bisacodyl (DULCOLAX, BISACODYL,) 10 mg suppository Insert 10 mg into rectum daily.  lacosamide (VIMPAT) 200 mg tab tablet Take 1 Tab by mouth two (2) times a day. Max Daily Amount: 400 mg. Indications: PARTIAL EPILEPSY TREATMENT ADJUNCT 180 Tab 1  
 lubiPROStone (AMITIZA) 8 mcg capsule Take 1 Cap by mouth two (2) times daily (with meals). 60 Cap 0  
 loratadine-pseudoephedrine (CLARITIN-D 24 HOUR)  mg per tablet Take 1 Tab by mouth daily.  OTHER,NON-FORMULARY, Take 1 Cap by mouth daily (with lunch). Indications: Bariatric Multi Formula  magnesium oxide 500 mg tab Take 500 mg by mouth daily (with lunch).  ascorbic acid, vitamin C, (VITAMIN C) 500 mg tablet Take 500 mg by mouth daily.  mirabegron ER (MYRBETRIQ) 50 mg ER tablet Take 50 mg by mouth nightly. Takes at 2300    
 rOPINIRole (REQUIP) 0.25 mg tablet Take 0.75 mg by mouth nightly. Takes at [id number]  predniSONE (DELTASONE) 5 mg tablet Take 5 mg by mouth daily.  gabapentin (NEURONTIN) 300 mg capsule Take 300 mg by mouth three (3) times daily.  OLANZapine (ZYPREXA) 7.5 mg tablet Take 7.5 mg by mouth nightly. Past History Past Medical History: 
Past Medical History:  
Diagnosis Date  Acute pain due to injury- multiple LE fractures 11/24/2017  Anemia NEC   
 history of blood transfusion  Anxiety   
 panic attacks  Arthritis  Bipolar 1 disorder ([address]  Chronic back pain  Depression  Elevated diaphragm 5/4/2012  Fatigue   
 along with weakness  GERD (gastroesophageal reflux disease)  Headache(784.0)  Hypertension   
 pt denies  Other ill-defined conditions(799.89) swallowing issues, decubitus  Psychiatric disorder  Seizures ([address]  Therapeutic opioid-induced constipation (OIC) 11/27/2017  Thromboembolus ([address]  TIA (transient ischemic attack) Past Surgical History: 
Past Surgical History:  
Procedure Laterality Date  COLONOSCOPY N/A 2/21/2017 COLONOSCOPY [facility] ANESTHESIA, PATIENT IS IN ROOM 358 performed by Sarahi Whatley MD at THE [facility] ENDOSCOPY  COLONOSCOPY N/A 2/22/2017  COLONOSCOPY performed by Sarahi Whatley MD at THE [facility] ENDOSCOPY  ENDOSCOPY, COLON, DIAGNOSTIC    
 over 20 years ago  ENDOSCOPY, COLON, DIAGNOSTIC  9/24/09  HX APPENDECTOMY  11/6/84  HX BACK SURGERY  11/24/98  
 severe back problems  HX CHOLECYSTECTOMY  11/6/84 [facility] OTHER SURGICAL    
 IVC filter [address] Family History: 
Family History Problem Relation Age of Onset  Heart Disease Mother  Arthritis-rheumatoid Mother  Stroke Father  Hypertension Father  Arthritis-rheumatoid Father Social History: 
Social History Tobacco Use  Smoking status: Never Smoker  Smokeless tobacco: Never Used Substance Use Topics  Alcohol use: No  
 Drug use: No  
 
 
Allergies: Allergies Allergen Reactions  Ceftriaxone Seizures  Demerol [Meperidine] Unknown (comments)  Seafood [Shellfish Containing Products] Unknown (comments) Review of Systems Review of Systems Constitutional: Positive for fatigue. Respiratory: Positive for shortness of breath. Cardiovascular: Negative for chest pain. Neurological: Positive for weakness. All other systems reviewed and are negative. All other systems reviewed and are negative. Physical Exam  
 
Vitals:  
 01/27/21 1815 01/27/21 1849 01/27/21 1923 01/27/21 1945 BP: 107/86 (!) 140/94  106/75 Pulse: 91 90  90 Resp: 17 23  20 Temp:      
SpO2: 100% 98% 97% 92% Weight:      
Height:      
 
Physical Exam 
 
Nursing notes and vital signs reviewed Airway: intact, speaking in 1-2 word sentences Breathing: Severe distress, no cyanosis Circulation: Thready peripheral pulses equal bilaterally Constitutional: toxic appearing, severe distress, appearing stated age, morbidly obese HEENT: 
Head: Normocephalic, Atraumatic Eyes: PERRL, EOMI, No conjunctival injection Ears: external ears normal 
Nose: No rhinorrhea, external nose normal 
Throat: mucous membranes moist 
Neck: symmetric, trachea midline, no obvious swelling, no JVD Cardiovascular: Regular rate and rhythm, no murmurs Lungs: Coarse wheezes bilaterally, no stridor, significant increased work of breathing and chest excursion bilaterally Abdomen: Soft, non tender, significantly distended, normoactive bowel sounds, No rigidity, no peritoneal signs, Gotti catheter in place Musculoskeletal:  No evidence of obvious deformity to the back, neck or extremities, no LE edema Skin: Warm, dry, No obvious rashes Neuro: Awake and oriented x 3, CN 2-12 intact, normal speech, generalized weakness, sensation full and symmetric bilaterally Psychiatric: Normal mood and affect Diagnostic Study Results Labs - Recent Results (from the past 72 hour(s)) EKG, 12 LEAD, INITIAL Collection Time: 01/27/21  5:55 PM  
Result Value Ref Range Ventricular Rate 91 BPM  
 Atrial Rate 91 BPM  
 P-R Interval 214 ms QRS Duration 194 ms Q-T Interval 410 ms QTC Calculation (Bezet) 504 ms Calculated P Axis 66 degrees Calculated R Axis -4 degrees Calculated T Axis 11 degrees Diagnosis Sinus rhythm with 1st degree AV block Left atrial enlargement Right bundle branch block Abnormal ECG When compared with ECG of 26-NOV-2017 22:36, 
MO interval has increased Right bundle branch block has replaced Nonspecific intraventricular block SARS-COV-2 Collection Time: 01/27/21  6:00 PM  
Result Value Ref Range SARS-CoV-2 Please find results under separate order COVID-19 RAPID TEST Collection Time: 01/27/21  6:00 PM  
Result Value Ref Range Specimen source Nasopharyngeal    
 COVID-19 rapid test Not detected NOTD METABOLIC PANEL, COMPREHENSIVE Collection Time: 01/27/21  6:10 PM  
Result Value Ref Range Sodium 112 (LL) 136 - 145 mmol/L Potassium 6.4 (HH) 3.5 - 5.5 mmol/L Chloride 76 (L) 100 - 111 mmol/L  
 CO2 29 21 - 32 mmol/L Anion gap 7 3.0 - 18 mmol/L  Glucose 142 (H) 74 - 99 mg/dL BUN 13 7.0 - 18 MG/DL Creatinine 0.61 0.6 - 1.3 MG/DL  
 BUN/Creatinine ratio 21 (H) 12 - 20 GFR est AA >60 >60 ml/min/1.73m2 GFR est non-AA >60 >60 ml/min/1.73m2 Calcium 9.7 8.5 - 10.1 MG/DL Bilirubin, total 0.5 0.2 - 1.0 MG/DL  
 ALT (SGPT) 32 13 - 56 U/L  
 AST (SGOT) 54 (H) 10 - 38 U/L Alk. phosphatase 209 (H) 45 - 117 U/L Protein, total 9.1 (H) 6.4 - 8.2 g/dL Albumin 4.0 3.4 - 5.0 g/dL Globulin 5.1 (H) 2.0 - 4.0 g/dL A-G Ratio 0.8 0.8 - 1.7    
CBC WITH AUTOMATED DIFF Collection Time: 01/27/21  6:10 PM  
Result Value Ref Range WBC 15.6 (H) 4.6 - 13.2 K/uL  
 RBC 5.37 (H) 4.20 - 5.30 M/uL  
 HGB 16.0 12.0 - 16.0 g/dL HCT 46.1 (H) 35.0 - 45.0 % MCV 85.8 74.0 - 97.0 FL  
 MCH 29.8 24.0 - 34.0 PG  
 MCHC 34.7 31.0 - 37.0 g/dL  
 RDW 13.4 11.6 - 14.5 % PLATELET 445 506 - 914 K/uL MPV 8.4 (L) 9.2 - 11.8 FL  
 NEUTROPHILS 94 (H) 40 - 73 % LYMPHOCYTES 2 (L) 21 - 52 % MONOCYTES 4 3 - 10 % EOSINOPHILS 0 0 - 5 % BASOPHILS 0 0 - 2 %  
 ABS. NEUTROPHILS 14.7 (H) 1.8 - 8.0 K/UL  
 ABS. LYMPHOCYTES 0.3 (L) 0.9 - 3.6 K/UL  
 ABS. MONOCYTES 0.6 0.05 - 1.2 K/UL  
 ABS. EOSINOPHILS 0.0 0.0 - 0.4 K/UL  
 ABS. BASOPHILS 0.0 0.0 - 0.1 K/UL  
 DF AUTOMATED CARDIAC PANEL,(CK, CKMB & TROPONIN) Collection Time: 01/27/21  6:10 PM  
Result Value Ref Range CK - MB 21.3 (H) <3.6 ng/ml CK-MB Index 1.7 0.0 - 4.0 % CK 1,219 (H) 26 - 192 U/L Troponin-I, QT 0.02 0.0 - 0.045 NG/ML  
NT-PRO BNP Collection Time: 01/27/21  6:10 PM  
Result Value Ref Range NT pro-BNP 1,609 (H) 0 - 900 PG/ML  
URINALYSIS W/ RFLX MICROSCOPIC Collection Time: 01/27/21  6:15 PM  
Result Value Ref Range Color YELLOW Appearance CLOUDY Specific gravity 1.026 1.005 - 1.030    
 pH (UA) 5.5 5.0 - 8.0 Protein 100 (A) NEG mg/dL Glucose Negative NEG mg/dL Ketone TRACE (A) NEG mg/dL Bilirubin Negative NEG Blood LARGE (A) NEG  Urobilinogen 0.2 0.2 - 1.0 EU/dL Nitrites Negative NEG Leukocyte Esterase MODERATE (A) NEG URINE MICROSCOPIC ONLY Collection Time: 01/27/21  6:15 PM  
Result Value Ref Range WBC 36 to 50 0 - 5 /hpf  
 RBC 11 to 20 0 - 5 /hpf Epithelial cells Negative 0 - 5 /lpf Bacteria 3+ (A) NEG /hpf POC LACTIC ACID Collection Time: 01/27/21  6:18 PM  
Result Value Ref Range Lactic Acid (POC) 3.55 (HH) 0.40 - 2.00 mmol/L  
POC G3 Collection Time: 01/27/21  7:23 PM  
Result Value Ref Range Device: BIPAP    
 FIO2 (POC) 1.0 %  
 pH (POC) 7.19 (LL) 7.35 - 7.45    
 pCO2 (POC) 71.1 (H) 35.0 - 45.0 MMHG  
 pO2 (POC) 325 (H) 80 - 100 MMHG  
 HCO3 (POC) 27.1 (H) 22 - 26 MMOL/L  
 sO2 (POC) 100 (H) 92 - 97 % Base deficit (POC) 1 mmol/L  
 PEEP/CPAP (POC) 6 cmH2O Pressure support 8 cmH2O Allens test (POC) YES Total resp. rate 19 Site RIGHT RADIAL Patient temp. 97.8 Specimen type (POC) ARTERIAL Performed by Lilly Landry Radiologic Studies -  
XR CHEST PORT Final Result 1. No suspicious consolidation, pleural effusion, or pneumothorax. 2.  Diffuse colonic dilation appears similar to prior studies, differential  
considerations include chronic ileus, obstruction distal mass, and large bowel  
obstruction. Incompletely visualized on this study. CT CHEST ABD PELV W CONT    (Results Pending) CT Results  (Last 48 hours) None CXR Results  (Last 48 hours) 01/27/21 1827  XR CHEST PORT Final result Impression: 1. No suspicious consolidation, pleural effusion, or pneumothorax. 2.  Diffuse colonic dilation appears similar to prior studies, differential  
considerations include chronic ileus, obstruction distal mass, and large bowel  
obstruction. Incompletely visualized on this study.   
  
 Narrative:  EXAM: PORTABLE  FRONTAL CHEST RADIOGRAPH  
   
CLINICAL INDICATION/HISTORY: Patient unresponsive, history of gastroesophageal reflux and seizures COMPARISON: 8/24/2018 TECHNIQUE: Portable frontal view of the chest  
   
_______________ FINDINGS:  
   
SUPPORT DEVICES: EKG leads overlie the patient. IVC filter projects over the mid  
abdomen. HEART AND MEDIASTINUM: Normal heart size and mediastinal contours. LUNGS: Linear opacities at the right lung base and left midlung likely represent  
atelectasis/scarring. No lobar consolidation. PLEURAL SPACES:No large pneumothorax. No large pleural effusion. BONY THORAX AND SOFT TISSUES: No acute abnormality. The bones are diffusely  
demineralized. Markedly elevated right hemidiaphragm, similar to prior studies. Diffusely dilated colon. _______________ Medications given in the ED- Medications  
sodium chloride (NS) flush 5-10 mL (has no administration in time range)  
piperacillin-tazobactam (ZOSYN) 4.5 g in 0.9% sodium chloride (MBP/ADV) 100 mL MBP (0 g IntraVENous IV Completed 1/27/21 1923) Followed by  
piperacillin-tazobactam (ZOSYN) 4.5 g in 0.9% sodium chloride (MBP/ADV) 100 mL MBP (has no administration in time range) levoFLOXacin (LEVAQUIN) 750 mg in D5W IVPB (0 mg IntraVENous IV Completed 1/27/21 2055)  
vancomycin (VANCOCIN) 1750 mg in  ml infusion (1,750 mg IntraVENous New Bag 1/27/21 2056) VANCOMYCIN- PHARMACY TO DOSE (has no administration in time range)  
sodium chloride (NS) flush 5-40 mL (has no administration in time range)  
sodium chloride (NS) flush 5-40 mL (has no administration in time range) dextrose 10% infusion 125-250 mL (250 mL IntraVENous New Bag 1/27/21 1944) vancomycin (VANCOCIN) 1250 mg in  ml infusion (has no administration in time range) iopamidoL (ISOVUE 300) 61 % contrast injection 100 mL (has no administration in time range)  
sodium chloride (NS) flush 5-40 mL (has no administration in time range)  
sodium chloride (NS) flush 5-40 mL (has no administration in time range) acetaminophen (TYLENOL) tablet 650 mg (has no administration in time range) Or  
acetaminophen (TYLENOL) suppository 650 mg (has no administration in time range)  
polyethylene glycol (MIRALAX) packet 17 g (has no administration in time range)  
promethazine (PHENERGAN) tablet 12.5 mg (has no administration in time range) Or  
ondansetron (ZOFRAN) injection 4 mg (has no administration in time range)  
enoxaparin (LOVENOX) injection 40 mg (has no administration in time range)  
calcium gluconate 1 gram in sodium chloride (ISO-OSM) 50 mL infusion (has no administration in time range)  
sodium chloride 0.9 % bolus infusion 1,000 mL (0 mL IntraVENous IV Completed 1/27/21 1950) Followed by  
sodium chloride 0.9 % bolus infusion 1,000 mL (0 mL IntraVENous IV Completed 1/27/21 2055) Followed by  
sodium chloride 0.9 % bolus infusion 217 mL (217 mL IntraVENous New Bag 1/27/21 2056) insulin regular (NOVOLIN R, HUMULIN R) injection 10 Units (10 Units IntraVENous Given 1/27/21 1943)  
sodium bicarbonate 8.4 % (1 mEq/mL) injection 50 mEq (50 mEq IntraVENous Given 1/27/21 1944) Medical Decision Making I reviewed the vital signs, available nursing notes, past medical history, past surgical history, family history and social history. Vital Signs interpretation- I have reviewed the patient's vital signs. Pulse Oximetry interpretation - 99% on BIPAP 100% fiO2 Cardiac Monitor interpretation: 
Rate: 92 bpm 
Rhythm: sinus EKG interpretation: (Preliminary) EKG interpretation by Dr. Klein Plate Rate 91 sinus rhythm with first-degree AV block, right bundle branch block, nonspecific ST changes in the anterior leads Records Reviewed: Nursing Notes and Old Medical Records Procedures: 
Procedures ED Course & MDM:  
Risk stratification: Considering the patient's initial evaluation will continue with BiPAP and obtain a rapid Covid.   She is noted to be hypotensive as well a code sepsis was called after her lactic acid was noted to be elevated. We will give the patient 30 cc/kg fluid bolus of normal saline and give broad-spectrum antibiotics Data review: Laboratory findings showing hyponatremia and hyperkalemia. Patient was given insulin with glucose as well as bicarb due to her acidosis. Urinary studies do show evidence of infection. Chest x-ray shows significant elevation of the right hemidiaphragm and large amount of air in the bowel with dilation which apparently has been seen in the past.  Considering her sepsis status will obtain CT scan of her chest abdomen and pelvis to rule out any potential pulmonary etiology and intra-abdominal ileus/obstruction. Problem follow up: At this time due to the patient's sepsis status and her electrolyte abnormalities and acidosis as well as respiratory failure requiring BiPAP we will plan for admission to critical care. Patient's rapid Covid noted to be negative Diagnosis and Disposition Critical Care Time: 9:01 PM 
I have spent 60 minutes of critical care time involved in lab review, consultations with specialist, family decision-making, and documentation. During this entire length of time I was immediately available to the patient. Critical Care: The reason for providing this level of medical care for this critically ill patient was due a critical illness that impaired one or more vital organ systems such that there was a high probability of imminent or life threatening deterioration in the patients condition. This care involved high complexity decision making to assess, manipulate, and support vital system functions, to treat this degreee vital organ system failure and to prevent further life threatening deterioration of the patients condition. Core Measures: 
For Hospitalized Patients: 
 
1. Hospitalization Decision Time: The decision to hospitalize the patient was made by Rosario Olson DO at 9:01 PM on 1/27/2021 2. Aspirin: Aspirin was not given because the patient did not present with a stroke at the time of their Emergency Department evaluation 9:01 PM  Patient is being admitted to the hospital by Dr. Paulette Arboleda. The results of their tests and reasons for their admission have been discussed with them and/or available family. They convey agreement and understanding for the need to be admitted and for their admission diagnosis. CONDITIONS ON ADMISSION: 
Sepsis is present at the time of admission. Deep Vein Thrombosis is not present at the time of admission. Thrombosis is not present at the time of admission. Urinary Tract Infection is present at the time of admission. Pneumonia is possibly not present at the time of admission. MRSA is possibly present at the time of admission. Wound infection is not present at the time of admission. Pressure Ulcer is not present at the time of admission. CLINICAL IMPRESSION: 
 
1. Sepsis without acute organ dysfunction, due to unspecified organism ([address] 2. Acute respiratory failure with hypoxia and hypercapnia (HCC) 3. Urinary tract infection with hematuria, site unspecified 4. Hyponatremia 5. Acute hyperkalemia 6. Acidosis Please note that this dictation was completed with [employer], the computer voice recognition software. Quite often unanticipated grammatical, syntax, homophones, and other interpretive errors are inadvertently transcribed by the computer software. Please disregard these errors. Please excuse any errors that have escaped final proofreading.

## 2021-01-28 PROBLEM — K56.2 SIGMOID VOLVULUS (HCC): Status: ACTIVE | Noted: 2021-01-01

## 2021-01-28 PROBLEM — J69.0 ASPIRATION PNEUMONIA (HCC): Status: ACTIVE | Noted: 2021-01-01

## 2021-01-28 NOTE — PROGRESS NOTES
I evaluated the patient for admission. She had pending CT scans and the RN was pursuing an IV in order to obtain her scans. She had BiPAP in place and tachypneic. She had previously voiced to Dr. Francesco Staley (ED physician) that she did not want to be intubated but was not listed as a DNR. I was concerned about impending respiratory failure and the need for intubation. I asked the patient if she would want a breathing tube or to be on a ventilator and she said no. I asked again if she was sure about this and she again voiced no. Plan for CT scan with repeat labs and blood gas afterward. Her  was brought back bedside and advised she is critically ill and likely septic from a UTI. I advised I would talk with him again once CT scan was completed and she had repeat lab/ABG.

## 2021-01-28 NOTE — PROGRESS NOTES
Attempted to call  at number on chart. No answer and no way to leave a message.  has not yet designated a  home. INDIRA Maria. Kelsey

## 2021-01-28 NOTE — PROGRESS NOTES
Death note She deteriorated into asystole quickly. She had an absence of cardiac sounds x1 minute as well as absent pupillary and corneal reflexes. Time of death 26. Her  is at bedside and the  is on his way in.

## 2021-01-28 NOTE — PROGRESS NOTES
Vancomycin - Pharmacy to Dose Consult provided for this 72y.o. year old ,female for indication of Sepsis of Unknown Etiology. Therapy day 1 Wt Readings from Last 1 Encounters:  
01/27/21 73.9 kg (162 lb 14.4 oz) Ht Readings from Last 1 Encounters:  
01/27/21 160 cm (63\") Dosing Weight:  73.9 kg Additional Antibiotics:  Levofloxacin, Zosyn Date:  1/27/21 Lab Results Component Value Date/Time Creatinine 0.61 01/27/2021 06:10 PM  
 
creatinine clearance:  73 ml/min 
 
white blood cell count:  15.6 Vancomycin 1750 mg IV ordered, will continue with 1250 mg IV q12hrs. Trough level after 4-5 doses infused. Dose calculated to approximate a therapeutic trough of 15-20 mcg/mL. Pharmacy to follow daily and will make changes to dose and/or frequency based on clinical status. [id number] Former[facility], [address] [place] [address]

## 2021-01-28 NOTE — ED NOTES
Antibiotics infusing per order. Awaiting for vancomycin from pharmacy. Pt remains on BiPAP with saturations above 95%.

## 2021-01-28 NOTE — CONSULTS
Patient chronic illness, bed bound. No with sepsis and found to have sigmoid volvulus. Patient requested no intubation but now  wants intubated. She is stable but is hyponatremic and acidotic. This patient is not a good surgical candidate give her acidosis and would certainly not recover well post op if she made it through jhony op period. Recommend treating with enemas and GI input for colonoscopy, BE, etc.  
 
Also, should have ethics and hospice consults. Discussed with Dr. Shana Hooks.

## 2021-01-28 NOTE — ED NOTES
at bedside, bipap removed and placed on 2L NC for comfort. Patient given morphine 2mg as ordered by dr. Shahriar Rocha.

## 2021-01-28 NOTE — ED NOTES
Dr. Dimitrios Alvarado at bedside, patient is requesting to have bipap removed and does not wish to have any measures taken to keep her alive. When asked if she wanted to have breathing tube placed, she shook her head no, when she was asked if she understood that if we didn't she may die, she nodded yes.   at bedside and is aware of patients request.

## 2021-01-28 NOTE — DISCHARGE SUMMARY
Discharge Summary Patient: Cristy Echavarria MRN: [id number]  CSN: [id number] YOB: 1950  Age: 72 y.o. Sex: female DOA: 1/27/2021 LOS:  LOS: 1 day   Discharge Date: 1/28/2021 Primary Care Provider:  Grisel Ha DO Admission Diagnoses: Sepsis ([facility] [A41.9] Discharge Diagnoses:   
Problem List as of 1/28/2021 Date Reviewed: 1/28/2021 Codes Class Noted - Resolved Sigmoid volvulus ([facility] ICD-10-CM: C83.1 ICD-9-CM: 560.2  1/28/2021 - Present Paraplegia ([facility] ICD-10-CM: G82.20 ICD-9-CM: 344.1  Unknown - Present Aspiration pneumonia ([facility] ICD-10-CM: J69.0 ICD-9-CM: 507.0  1/28/2021 - Present Acute respiratory failure with hypoxia and hypercapnia (HCC) ICD-10-CM: J96.01, J96.02 
ICD-9-CM: 518.81  1/27/2021 - Present Acute hyperkalemia ICD-10-CM: E87.5 ICD-9-CM: 276.7  1/27/2021 - Present Acidosis ICD-10-CM: E87.2 ICD-9-CM: 276.2  1/27/2021 - Present Tibia/fibula fracture ICD-10-CM: S82.209A, H94.889R ICD-9-CM: 823.82  7/28/2018 - Present SBO (small bowel obstruction) ([facility] ICD-10-CM: F64.184 ICD-9-CM: 560.9  11/29/2017 - Present Ileus ([facility] ICD-10-CM: K56.7 ICD-9-CM: 560.1  11/27/2017 - Present Acute respiratory distress ICD-10-CM: R06.03 
ICD-9-CM: 518.82  11/27/2017 - Present Therapeutic opioid-induced constipation (OIC) ICD-10-CM: K59.03, T40.2X5A 
ICD-9-CM: 564.09, E935.2  11/27/2017 - Present Tachycardia ICD-10-CM: R00.0 ICD-9-CM: 785.0  11/27/2017 - Present Sepsis ([facility] ICD-10-CM: A41.9 ICD-9-CM: 038.9, 995.91  11/24/2017 - Present Acute pain due to injury- multiple LE fractures ICD-10-CM: G89.11 ICD-9-CM: 338.11  11/24/2017 - Present Chronic pain ICD-10-CM: G89.29 ICD-9-CM: 338.29  11/23/2017 - Present Bedridden ICD-10-CM: Z74.01 
ICD-9-CM: V49.84  11/23/2017 - Present  Multiple fractures of both lower extremities, closed, initial encounter ICD-10-CM: P56.24DX, U60.73SW ICD-9-CM: 828.0  11/22/2017 - Present Overdose ICD-10-CM: G29.731Y ICD-9-CM: 977.9, E980.5  8/27/2017 - Present Narcosis ICD-10-CM: R06.89 
ICD-9-CM: 780.09  8/27/2017 - Present Seizure ([facility] ICD-10-CM: R56.9 ICD-9-CM: 780.39  7/21/2017 - Present Hyponatremia ICD-10-CM: E87.1 ICD-9-CM: 276.1  7/21/2017 - Present Hypomagnesemia ICD-10-CM: O50.33 
ICD-9-CM: 275.2  9/12/2012 - Present Unspecified constipation ICD-10-CM: K59.00 ICD-9-CM: 564.00  9/12/2012 - Present Personal history of DVT (deep vein thrombosis) (Chronic) ICD-10-CM: C34.991 ICD-9-CM: V12.51  7/7/2012 - Present Hypotension, unspecified ICD-10-CM: I95.9 ICD-9-CM: 458.9  6/26/2012 - Present Seizure disorder ([place] [address].) ICD-10-CM: G40.909 ICD-9-CM: 345.90  6/24/2012 - Present Depression ICD-10-CM: F32.9 ICD-9-CM: 785  6/24/2012 - Present UTI (urinary tract infection) ICD-10-CM: N39.0 ICD-9-CM: 599.0  6/1/2012 - Present Severe protein-calorie malnutrition ([facility] ICD-10-CM: F70 ICD-9-CM: 487  6/1/2012 - Present Bipolar 1 disorder (HCC) (Chronic) ICD-10-CM: F31.9 ICD-9-CM: 296.7  5/3/2012 - Present Decubitus ulcer of sacral area (Chronic) ICD-10-CM: G55.165 ICD-9-CM: 707.03, 707.20  5/3/2012 - Present RESOLVED: Mild dehydration ICD-10-CM: E86.0 ICD-9-CM: 276.51  7/21/2017 - 8/27/2017 RESOLVED: Epistaxis ICD-10-CM: R04.0 ICD-9-CM: 784.7  2/19/2017 - 8/27/2017 RESOLVED: Gingival bleeding ICD-10-CM: K06.8 ICD-9-CM: 523.8  2/18/2017 - 8/27/2017 RESOLVED: GI (gastrointestinal bleed) ICD-10-CM: K92.2 ICD-9-CM: 578.9  2/18/2017 - 8/27/2017 RESOLVED: Chronic anticoagulation ICD-10-CM: Z79.01 
ICD-9-CM: V58.61  2/18/2017 - 8/27/2017 RESOLVED: Intestinal infection due to Clostridium difficile ICD-10-CM: A04.72 
ICD-9-CM: 008.45  6/4/2012 - 6/26/2012  RESOLVED: Hypotension, unspecified ICD-10-CM: I95.9 ICD-9-CM: 458.9  6/3/2012 - 6/7/2012 RESOLVED: Sepsis, unspecified ([facility] ICD-10-CM: A41.9 ICD-9-CM: 995.91  6/1/2012 - 6/8/2012 RESOLVED: Encephalopathy, unspecified ICD-10-CM: G93.40 ICD-9-CM: 348.30  6/1/2012 - 6/26/2012 RESOLVED: Decubitus ulcer ICD-10-CM: L89.90 ICD-9-CM: 707.00, 707.20  6/1/2012 - 6/26/2012 RESOLVED: Hypertension ICD-10-CM: I10 
ICD-9-CM: 401.9  Unknown - 6/26/2012 RESOLVED: GERD (gastroesophageal reflux disease) ICD-10-CM: K21.9 ICD-9-CM: 530.81  Unknown - 6/26/2012 RESOLVED: Anxiety ICD-10-CM: F41.9 ICD-9-CM: 300.00  Unknown - 6/26/2012 Overview Signed 6/1/2012  5:25 PM by Tone Cardona MD  
  panic attacks RESOLVED: Encephalopathy, unspecified ICD-10-CM: G93.40 ICD-9-CM: 348.30  5/7/2012 - 6/26/2012 RESOLVED: Hypokalemia ICD-10-CM: E87.6 ICD-9-CM: 276.8  5/5/2012 - 5/8/2012 RESOLVED: Anemia ICD-10-CM: D64.9 ICD-9-CM: 285.9  5/4/2012 - 8/27/2017 RESOLVED: Unspecified hypothyroidism ICD-10-CM: E03.9 ICD-9-CM: 244.9  5/4/2012 - 6/26/2012 RESOLVED: Elevated diaphragm (Chronic) ICD-10-CM: J98.6 ICD-9-CM: 519.4  5/4/2012 - 5/4/2012 RESOLVED: Leg DVT (deep venous thromboembolism), chronic (HCC) (Chronic) ICD-10-CM: Y39.818 ICD-9-CM: 453.50  5/4/2012 - 6/26/2012 RESOLVED: Anticoagulated on Coumadin (Chronic) ICD-10-CM: Z79.01 
ICD-9-CM: V58.61  5/4/2012 - 6/26/2012 RESOLVED: Seizure ([address] (Chronic) ICD-10-CM: R56.9 ICD-9-CM: 780.39  5/3/2012 - 5/8/2012 RESOLVED: Severe sepsis(995.92) ICD-10-CM: A41.9, R65.20 ICD-9-CM: 995.92  5/3/2012 - 5/7/2012 RESOLVED: Essential hypertension, benign (Chronic) ICD-10-CM: I10 
ICD-9-CM: 401.1  5/3/2012 - 6/26/2012 RESOLVED: Back pain (Chronic) ICD-10-CM: M54.9 ICD-9-CM: 724.5  5/3/2012 - 5/8/2012 RESOLVED: Hypertension ICD-10-CM: I10 
ICD-9-CM: 401.9  Unknown - 6/26/2012  RESOLVED: Follow-up exam ICD-10-CM: U22 ICD-9-CM: V67.9  Unknown - 2012 Discharge Medications:    
Discharge Medication List as of 2021  5:10 AM  
  
 
 
Discharge Condition:  Procedures : none Consults: General Surgery PHYSICAL EXAM  
Visit Vitals BP (!) 106/49 Pulse (!) 0 Temp 97.8 °F (36.6 °C) Resp (!) 1 Ht 5' 3\" (1.6 m) Wt 73.9 kg (162 lb 14.4 oz) SpO2 94% BMI 28.86 kg/m² Absent cardiac sounds x1 minute, absent pupillary and corneal reflexes. Admission HPI : Rohini Mendieta is a 72 y.o. female who is bedbound with paraplegia secondary to status epilepticus and has indwelling león catheter who presents via EMS with her  for respiratory distress. She was placed on CPAP en route and on BiPAP in the ED. She declined intubation to the ED attending. I am asked to admit for further management. Her  states that she was prescribed bactrim a few days ago for a UTI. She has had progressive abdominal distension. Further history is unobtainable from the patient due to respiratory distress. Hospital Course : The patient was in acute hypoxic and hypercapneic respiratory failure secondary to bowel ischemia from sigmoid volvulus as well as sepsis due to a UTI. Her CT also showed evidence of aspiration pneumonia. She was also hyponatremic and hyperkalemic. She declined intubation and was placed on BiPAP for support. She rapidly deteriorated and wished to have the BiPAP removed and be made comfortable. Her  considered having her placed on the ventilator to try to reverse her acidosis and improve her infection, but ultimately agreed with her wishes as it became apparent that she would not survive. She deteriorated into asystole very quickly. Her  was at bedside and the  was called. Time of death 26. Disposition:  Minutes spent on discharge: 20 minutes Labs: Results:  
   
Chemistry Recent Labs  
  21 2152 01/27/21 1810 * 142* * 112*  
K 5.2 6.4*  
CL 84* 76* CO2 30 29 BUN 15 13 CREA 0.43* 0.61  
CA 8.3* 9.7 AGAP 4 7 BUCR 35* 21* AP  --  209* TP  --  9.1* ALB  --  4.0  
GLOB  --  5.1* AGRAT  --  0.8 CBC w/Diff Recent Labs  
  01/27/21 1810 WBC 15.6*  
RBC 5.37* HGB 16.0 HCT 46.1*  
 GRANS 94* LYMPH 2*  
EOS 0 Cardiac Enzymes Recent Labs  
  01/27/21 1810 CPK 1,219* CKND1 1.7 Coagulation No results for input(s): PTP, INR, APTT, INREXT, INREXT in the last 72 hours. Lipid Panel Lab Results Component Value Date/Time Cholesterol, total 223 (H) 08/03/2019 11:10 AM  
 HDL Cholesterol 126 (H) 08/03/2019 11:10 AM  
 LDL, calculated 84.6 08/03/2019 11:10 AM  
 VLDL, calculated 12.4 08/03/2019 11:10 AM  
 Triglyceride 62 08/03/2019 11:10 AM  
 CHOL/HDL Ratio 1.8 08/03/2019 11:10 AM  
  
BNP No results for input(s): BNPP in the last 72 hours. Liver Enzymes Recent Labs  
  01/27/21 1810 TP 9.1* ALB 4.0 * Thyroid Studies Lab Results Component Value Date/Time TSH 17.30 (H) 08/27/2017 02:00 AM  
    
 
 
Significant Diagnostic Studies: Ct Chest Abd Pelv W Cont Result Date: 1/27/2021 EXAM: CT CHEST, ABDOMEN AND PELVIS INDICATION: sepsis, abd distension, respiratory distress COMPARISON: Chest radiograph 1/27/2021. CT 4/8/2018 TECHNIQUE: Axial CT imaging of the chest, abdomen, and pelvis was performed with intravenous contrast. Multiplanar reformats were generated. One or more dose reduction techniques were used on this CT: automated exposure control, adjustment of the mAs and/or kVp according to patient size, and iterative reconstruction techniques. The specific techniques used on this CT exam have been documented in the patient's electronic medical record.  Digital Imaging and Communications in Medicine (DICOM) format image data are available to nonaffiliated external healthcare facilities or entities on a secure, media free, reciprocally searchable basis with patient authorization for at least a 12-month period after this study. ________________ FINDINGS: LIMITATIONS:   > Motion artifact is present which limits evaluation.   > Suboptimal exam due to patient body habitus. CHEST: LUNGS: Consolidative opacities with air bronchograms in the dependent right upper lobe. Consolidation in the apical segment left lower lobe and throughout the right lower lobe. PLEURA: Normal. AIRWAY: Intraluminal debris is seen throughout both lower lobe bronchi. MEDIASTINUM: The heart is normal size with aortic valvular calcifications. Atherosclerosis including the coronary arteries. No large pericardial effusion. The great vessels are normal caliber. Markedly distended fluid-filled esophagus, please note this places the patient at increased risk of aspiration. LYMPH NODES: No enlarged lymph nodes. CHEST WALL: Foci of subcutaneous emphysema in the ventral left axillary soft tissues (axial image 11) is of uncertain etiology, correlate with physical exam. Markedly elevated right hemidiaphragm, similar to prior studies. _______________ ABDOMEN/PELVIS: LIVER: No enhancing hepatic mass. The portal veins are patent. BILIARY: Mild extrahepatic biliary ductal dilation likely reservoir effect post cholecystectomy. No intrahepatic biliary ductal dilation. SPLEEN: Normal. PANCREAS: Normal. ADRENALS: Normal. KIDNEYS: Normal. Critical Result - GI TRACT:  The esophagus is distended with fluid throughout. Postsurgical changes consistent with gastric bypass; hiatal hernia present. Diffusely dilated small bowel. Markedly dilated colon extending to the level of the sigmoid colon where there is swirling about the mesentery and a transition point (axial image 110, coronal image 56, and sagittal image 67) to decompressed fluid-filled colon distally; this is associated with narrowing/nonopacification of the mesenteric vein distally.   These findings are highly concerning for bowel obstruction related to sigmoid volvulus and possible ischemia. There is mucosal hyperenhancement and fluid density stool in the rectum. Prior appendectomy. BLADDER: Small amount of air and Gotti catheter present within the decompressed bladder. There is mild mucosal hyperenhancement. PELVIC ORGANS: No acute abnormality. VASCULATURE: No arterial aneurysm. Infrarenal IVC filter present LYMPH NODES: No mesenteric or retroperitoneal lymphadenopathy. OTHER: No free intraperitoneal air. No ascites. OSSEOUS STRUCTURES: No acute osseous abnormality. Deformity and both femoral heads appear similar to prior study. The bones are diffusely demineralized. Postsurgical changes at the lumbosacral junction. Multilevel degenerative changes throughout the spine. _______________ 1. Diffuse bowel dilation with transition point in the sigmoid colon concerning for bowel obstruction related to sigmoid volvulus and possible ischemia. Recommend surgical consultation and correlation with lactate. 2.  Hyperenhancement and fluid density stool in the rectum suggest proctitis. 3.  Consolidative opacities in both lungs likely represent pneumonia, this may be secondary to aspiration. Recommend repeat imaging following completion of appropriate medical therapy. 4.  Mild mucosal hyperenhancement and bladder wall thickening, correlate for cystitis. 5.  Osseous degenerative changes and additional findings, as detailed in the body of the report. Findings discussed with Dr. Gladis Pulido by Jana De Leon MD, PhD at 10:43 PM on 1/27/2021 Xr Chest Charles Vazquezcarlitos Result Date: 1/27/2021 EXAM: PORTABLE  FRONTAL CHEST RADIOGRAPH CLINICAL INDICATION/HISTORY: Patient unresponsive, history of gastroesophageal reflux and seizures COMPARISON: 8/24/2018 TECHNIQUE: Portable frontal view of the chest _______________ FINDINGS: SUPPORT DEVICES: EKG leads overlie the patient. IVC filter projects over the mid abdomen.  HEART AND MEDIASTINUM: Normal heart size and mediastinal contours. LUNGS: Linear opacities at the right lung base and left midlung likely represent atelectasis/scarring. No lobar consolidation. PLEURAL SPACES:No large pneumothorax. No large pleural effusion. BONY THORAX AND SOFT TISSUES: No acute abnormality. The bones are diffusely demineralized. Markedly elevated right hemidiaphragm, similar to prior studies. Diffusely dilated colon. _______________ 1. No suspicious consolidation, pleural effusion, or pneumothorax. 2.  Diffuse colonic dilation appears similar to prior studies, differential considerations include chronic ileus, obstruction distal mass, and large bowel obstruction. Incompletely visualized on this study. No results found for this or any previous visit.  
 
 
 
CC: Jamie Thomson, DO

## 2021-01-28 NOTE — PROGRESS NOTES
I talked with her  about the CT findings (sigmoid volvulus with mesenteric ischemia) and plan for initial conservative management. At this time, the patient was more alert and able to nod in response to questions. I asked (with her  present) if she wanted a breathing tube and she shook her head no. I asked her if she understood that she could die without it and she nodded yes. She then pulled on her BiPAP mask and indicated she wanted to take it off. I asked if she wanted her mask off and she said yes. She also nodded yes when I asked her if she wanted pain medication to make her comfortable. Comfort measures were initiated and she  shortly thereafter.

## 2021-01-28 NOTE — H&P
History & Physical 
 
Patient: Jean Carlos Chan MRN: [id number]  CSN: [id number] YOB: 1950  Age: 72 y.o. Sex: female DOA: 1/27/2021 Primary Care Provider:  Eladia Correia DO Assessment/Plan Patient Active Problem List  
Diagnosis Code  Bipolar 1 disorder (HCC) F31.9  Decubitus ulcer of sacral area L89.159  
 UTI (urinary tract infection) N39.0  Severe protein-calorie malnutrition ([facility] E43  Seizure disorder ([facility] G40.909  Depression F32.9  Hypotension, unspecified I95.9  Personal history of DVT (deep vein thrombosis) Z86.718  Hypomagnesemia E83.42  
 Unspecified constipation K59.00  Seizure ([facility] [address] R56.9  Hyponatremia E87.1  Overdose T50.901A  Narcosis R06.89  
 Multiple fractures of both lower extremities, closed, initial encounter S82.91XA, S82.92XA  Chronic pain G89.29  Bedridden Z74.01  
 Sepsis ([facility] A41.9  Acute pain due to injury- multiple LE fractures G89.11  
 Ileus ([facility]) K56.7  Acute respiratory distress R06.03  
 Therapeutic opioid-induced constipation (OIC) K59.03, T40.2X5A  Tachycardia R00.0  SBO (small bowel obstruction) ([facility] [address] [facility] K56.609  Tibia/fibula fracture S82.209A, [id number]  Acute respiratory failure with hypoxia and hypercapnia ([facility]) J96.01, J96.02  
 Acute hyperkalemia E87.5  Acidosis E87.2  Sigmoid volvulus ([facility]) K56.2  Paraplegia [facility]) G80.25  
 
71 y/o female who is bedbound with paraplegia secondary to status epilepticus and has indwelling león catheter is admitted for sepsis due to urinary tract infection and sigmoid volvulus with bowel ischemia. Neuro-alert, able to communicate, BiPAP in place for respiratory distress Pulm-acute hypoxic and hypercapneic respiratory failure secondary to sepsis and acidosis from bowel obstruction. BiPAP in place and patient declines intubation. Follow ABG closely. CV-normotensive initially then developing hypotension.  Pressors deferred due to rapid clinical decline and decision for comfort measures. GI-sigmoid volvulus with ischemia. Surgery consulted who recommended conservative management with enemas and possible endoscopy by GI. She is not a surgical candidate due to electrolyte derangements and critical illness Renal-hyperkalemia and hyponatremia likely due to bowel ischemia and infection Heme-no issues ID-sepsis due to UTI and ischemia. On broad spectrum antibiotics (zosyn/vancomycin/levaquin) Endo-no issues F/E/N-IV fluid boluses/medical treatment of hyperkalemia; trend hyponatremia closely and suspect related to infection and hypovolemia/NPO for respiratory distress DNI-requested by the patient Extensive family discussions with her  Prentiss Dubin at bedside. See progress notes for further decision making. Ultimately, the patient declined intubation and wished to have the BiPAP removed. She desired treatment of her pain. Her  agreed with these decisions. Prophylaxis: protonix IV, lovenox SQ Estimated length of stay : 4-5 nights Johann Ashby MD 
[facility] 
 
Critical Care Time [id number] During this time period I spent 90 minutes directly at the patient's bedside with her  making critical decisions regarding her care. 90 minutes of critical care time spent in the direct evaluation and treatment of this high risk patient. The reason for providing this level of medical care for this critically ill patient was due a critical illness that impaired one or more vital organ systems such that there was a high probability of imminent or life threatening deterioration in the patients condition. This care involved high complexity decision making to assess, manipulate, and support vital system functions, to treat this degreee vital organ system failure and to prevent further life threatening deterioration of the patients condition. ---------------------------------------------------------------------------------------------------------------------------------------------------------------------------- 
CC: shortness of breath HPI:  
 
Esther Washington is a 72 y.o. female who is bedbound with paraplegia secondary to status epilepticus and has indwelling león catheter who presents via EMS with her  for respiratory distress. She was placed on CPAP en route and on BiPAP in the ED. She declined intubation to the ED attending. I am asked to admit for further management. Her  states that she was prescribed bactrim a few days ago for a UTI. She has had progressive abdominal distension. Further history is unobtainable from the patient due to respiratory distress. Past Medical History:  
Diagnosis Date  Acute pain due to injury- multiple LE fractures 11/24/2017  Anemia NEC   
 history of blood transfusion  Anxiety   
 panic attacks  Arthritis  Bipolar 1 disorder ([address]  Chronic back pain  Depression  Elevated diaphragm 5/4/2012  Fatigue   
 along with weakness  GERD (gastroesophageal reflux disease)  Headache(784.0)  Hypertension   
 pt denies  Other ill-defined conditions(799.89) swallowing issues, decubitus  Psychiatric disorder  Seizures ([address]  Therapeutic opioid-induced constipation (OIC) 11/27/2017  Thromboembolus ([address]  TIA (transient ischemic attack) Past Surgical History:  
Procedure Laterality Date  COLONOSCOPY N/A 2/21/2017 COLONOSCOPY [facility] ANESTHESIA, PATIENT IS IN ROOM 358 performed by Beatrice Yousif MD at THE [facility] ENDOSCOPY  COLONOSCOPY N/A 2/22/2017 COLONOSCOPY performed by Beatrice Yousif MD at THE [facility] ENDOSCOPY  ENDOSCOPY, COLON, DIAGNOSTIC    
 over 20 years ago  ENDOSCOPY, COLON, DIAGNOSTIC  9/24/09  HX APPENDECTOMY  11/6/84  HX BACK SURGERY  11/24/98  
 severe back problems  HX CHOLECYSTECTOMY  11/6/84  HX [address] Lila Miranda  HX OTHER SURGICAL    
 IVC filter [address] Family History Problem Relation Age of Onset  Heart Disease Mother  Arthritis-rheumatoid Mother  Stroke Father  Hypertension Father  Arthritis-rheumatoid Father Social History Socioeconomic History  Marital status:  Spouse name: Not on file  Number of children: Not on file  Years of education: Not on file  Highest education level: Not on file Tobacco Use  Smoking status: Never Smoker  Smokeless tobacco: Never Used Substance and Sexual Activity  Alcohol use: No  
 Drug use: No  
Other Topics Concern Prior to Admission medications Medication Sig Start Date End Date Taking? Authorizing Provider  
sucralfate (CARAFATE) 100 mg/mL suspension Take 10 mL by mouth three (3) times daily as needed. 8/25/18   Mario Alberto Fu MD  
metoclopramide HCl (METOZOLV ODT) 5 mg rapid dissolve tab Take 1 Tab by mouth three (3) times daily as needed. 8/25/18   Mario Alberto Fu MD  
HYDROcodone-acetaminophen [facility]) 5-325 mg per tablet Take 1 Tab by mouth every four (4) hours as needed. Max Daily Amount: 6 Tabs. 7/29/18   Olivier Blackman MD  
baclofen (LIORESAL) 10 mg tablet Take 10 mg by mouth three (3) times daily. Giovani Rose MD  
lidocaine (LIDODERM) 5 % 1 Patch by TransDERmal route every twenty-four (24) hours. Apply patch to the affected area for 12 hours a day and remove for 12 hours a day. Giovani Rose MD  
oxyCODONE ER [facility]) 9 mg capsule Take 9 mg by mouth every twelve (12) hours. Indications: Chronic Pain    Giovani Rose MD  
potassium chloride SR (KLOR-CON 10) 10 mEq tablet Take 10 mEq by mouth two (2) times a day. Giovani Rose MD  
butalbital-acetaminophen-caffeine (FIORICET, ESGIC) -40 mg per tablet Take 1 Tab by mouth every four (4) hours as needed for Pain.     Giovani Rose MD cyclobenzaprine (FLEXERIL) 10 mg tablet Take 10 mg by mouth nightly. Milton, MD Giovani  
lactulose (KRISTALOSE) 10 gram packet Take 10 g by mouth two (2) times a day. Giovani Rose MD  
clotrimazole (MYCELEX) 1 % vaginal cream Insert 1 Applicator into vagina nightly as needed for Itching. For ABX induced yeast infection    Provider, Historical  
DULoxetine (CYMBALTA) 30 mg capsule Take 30 mg by mouth two (2) times a day. Provider, Historical  
levETIRAcetam (KEPPRA) 500 mg tablet Take 750 mg by mouth two (2) times a day. Provider, Historical  
levothyroxine (SYNTHROID) 75 mcg tablet Take 75 mcg by mouth Daily (before breakfast). Provider, Historical  
pantoprazole (PROTONIX) 40 mg tablet Take 40 mg by mouth two (2) times a day. Provider, Historical  
nystatin (MYCOSTATIN) 100,000 unit/mL suspension Take 1 tsp by mouth four (4) times daily as needed. swish and spit    Provider, Historical  
CRANBERRY FRUIT EXTRACT (CRANBERRY EXTRACT PO) Take 1 Cap by mouth two (2) times a day. For UTI prevention Non-formulary medication. Pt to supply if ordered for admission. Provider, Historical  
GLUC VARGAS/CHONDRO VARGAS A/VIT C/MN (GLUCOSAMINE CHONDROITIN MAXSTR PO) Take 1 Tab by mouth three (3) times daily. Provider, Historical  
ferrous sulfate 325 mg (65 mg iron) tablet Take 325 mg by mouth Daily (before breakfast). Provider, Historical  
diclofenac (VOLTAREN) 1 % gel Apply 2 g to affected area four (4) times daily. Non-formulary medication. Pt to supply if ordered for admission. Milton, MD Giovani  
ergocalciferol (VITAMIN D2) 50,000 unit capsule Take 50,000 Units by mouth Every Friday. Giovani Rose MD  
bisacodyl (DULCOLAX, BISACODYL,) 10 mg suppository Insert 10 mg into rectum daily. Giovani Rose MD  
lacosamide (VIMPAT) 200 mg tab tablet Take 1 Tab by mouth two (2) times a day. Max Daily Amount: 400 mg.  Indications: PARTIAL EPILEPSY TREATMENT ADJUNCT 7/23/17   Aiden Hall DO lubiPROStone (AMITIZA) 8 mcg capsule Take 1 Cap by mouth two (2) times daily (with meals). 3/1/17   Lisa Hwang MD  
loratadine-pseudoephedrine (CLARITIN-D 24 HOUR)  mg per tablet Take 1 Tab by mouth daily. Provider, Historical  
OTHER,NON-FORMULARY, Take 1 Cap by mouth daily (with lunch). Indications: Bariatric Multi Formula    Provider, Historical  
magnesium oxide 500 mg tab Take 500 mg by mouth daily (with lunch). Provider, Historical  
ascorbic acid, vitamin C, (VITAMIN C) 500 mg tablet Take 500 mg by mouth daily. Provider, Historical  
mirabegron ER (MYRBETRIQ) 50 mg ER tablet Take 50 mg by mouth nightly. Takes at 2300    Provider, Historical  
rOPINIRole (REQUIP) 0.25 mg tablet Take 0.75 mg by mouth nightly. Takes at 2300    Provider, Historical  
predniSONE (DELTASONE) 5 mg tablet Take 5 mg by mouth daily. Provider, Historical  
gabapentin (NEURONTIN) 300 mg capsule Take 300 mg by mouth three (3) times daily. Provider, Historical  
OLANZapine (ZYPREXA) 7.5 mg tablet Take 7.5 mg by mouth nightly. Provider, Historical  
 
 
Allergies Allergen Reactions  Ceftriaxone Seizures  Demerol [Meperidine] Unknown (comments)  Seafood [Shellfish Containing Products] Unknown (comments) Review of Systems Unable to obtain due to pt condition Physical Exam:  
 
Physical Exam: 
Visit Vitals BP (!) 106/49 Pulse (!) 0 Temp 97.8 °F (36.6 °C) Resp (!) 1 Ht 5' 3\" (1.6 m) Wt 73.9 kg (162 lb 14.4 oz) SpO2 94% BMI 28.86 kg/m² O2 Device: BIPAP Temp (24hrs), Av.4 °F (36.3 °C), Min:96.9 °F (36.1 °C), Max:97.8 °F (36.6 °C) 
  1901 -  0700 In: 9957 [I.V.:3317] Out: -    No intake/output data recorded. General:  Awake, in severe respiratory distress on BiPAP. Head:  Normocephalic, without obvious abnormality, atraumatic. Eyes:  Conjunctivae/corneas clear, sclera anicteric. Neck: Supple, symmetrical, trachea midline, no adenopathy.   
Lungs: Coarse breath sounds in bilateral lung bases, no wheezing. Heart:  Regular rate and rhythm, S1, S2 normal, no murmur, click, rub or gallop. Abdomen: Soft, markedly distended, firm, and tender. Bowel sounds hypoactive. No masses,  No organomegaly. Extremities: Extremities with chronic foot inversion bilaterally. Capillary refill normal.  
Pulses: 2+ and symmetric all extremities. Skin: Skin color mottled, turgor increased. No rashes or lesions Neurologic: No new motor or sensory deficit from baseline (paraplegia). Labs Reviewed: All lab results for the last 24 hours reviewed. Recent Results (from the past 24 hour(s)) EKG, 12 LEAD, INITIAL Collection Time: 01/27/21  5:55 PM  
Result Value Ref Range Ventricular Rate 91 BPM  
 Atrial Rate 91 BPM  
 P-R Interval 214 ms QRS Duration 194 ms Q-T Interval 410 ms QTC Calculation (Bezet) 504 ms Calculated P Axis 66 degrees Calculated R Axis -4 degrees Calculated T Axis 11 degrees Diagnosis Sinus rhythm with 1st degree AV block Left atrial enlargement Right bundle branch block Abnormal ECG Confirmed by Lona Tillman MD, [address] [place] [address] ([id number]) on 1/27/2021 11:48:46 PM 
  
SARS-COV-2 Collection Time: 01/27/21  6:00 PM  
Result Value Ref Range SARS-CoV-2 Please find results under separate order COVID-19 RAPID TEST Collection Time: 01/27/21  6:00 PM  
Result Value Ref Range Specimen source Nasopharyngeal    
 COVID-19 rapid test Not detected NOTD METABOLIC PANEL, COMPREHENSIVE Collection Time: 01/27/21  6:10 PM  
Result Value Ref Range Sodium 112 (LL) 136 - 145 mmol/L Potassium 6.4 (HH) 3.5 - 5.5 mmol/L Chloride 76 (L) 100 - 111 mmol/L  
 CO2 29 21 - 32 mmol/L Anion gap 7 3.0 - 18 mmol/L Glucose 142 (H) 74 - 99 mg/dL BUN 13 7.0 - 18 MG/DL Creatinine 0.61 0.6 - 1.3 MG/DL  
 BUN/Creatinine ratio 21 (H) 12 - 20 GFR est AA >60 >60 ml/min/1.73m2 GFR est non-AA >60 >60 ml/min/1.73m2  Calcium 9.7 8.5 - 10.1 MG/DL Bilirubin, total 0.5 0.2 - 1.0 MG/DL  
 ALT (SGPT) 32 13 - 56 U/L  
 AST (SGOT) 54 (H) 10 - 38 U/L Alk. phosphatase 209 (H) 45 - 117 U/L Protein, total 9.1 (H) 6.4 - 8.2 g/dL Albumin 4.0 3.4 - 5.0 g/dL Globulin 5.1 (H) 2.0 - 4.0 g/dL A-G Ratio 0.8 0.8 - 1.7    
CBC WITH AUTOMATED DIFF Collection Time: 01/27/21  6:10 PM  
Result Value Ref Range WBC 15.6 (H) 4.6 - 13.2 K/uL  
 RBC 5.37 (H) 4.20 - 5.30 M/uL  
 HGB 16.0 12.0 - 16.0 g/dL HCT 46.1 (H) 35.0 - 45.0 % MCV 85.8 74.0 - 97.0 FL  
 MCH 29.8 24.0 - 34.0 PG  
 MCHC 34.7 31.0 - 37.0 g/dL  
 RDW 13.4 11.6 - 14.5 % PLATELET 198 132 - 641 K/uL MPV 8.4 (L) 9.2 - 11.8 FL  
 NEUTROPHILS 94 (H) 40 - 73 % LYMPHOCYTES 2 (L) 21 - 52 % MONOCYTES 4 3 - 10 % EOSINOPHILS 0 0 - 5 % BASOPHILS 0 0 - 2 %  
 ABS. NEUTROPHILS 14.7 (H) 1.8 - 8.0 K/UL  
 ABS. LYMPHOCYTES 0.3 (L) 0.9 - 3.6 K/UL  
 ABS. MONOCYTES 0.6 0.05 - 1.2 K/UL  
 ABS. EOSINOPHILS 0.0 0.0 - 0.4 K/UL  
 ABS. BASOPHILS 0.0 0.0 - 0.1 K/UL  
 DF AUTOMATED CARDIAC PANEL,(CK, CKMB & TROPONIN) Collection Time: 01/27/21  6:10 PM  
Result Value Ref Range CK - MB 21.3 (H) <3.6 ng/ml CK-MB Index 1.7 0.0 - 4.0 % CK 1,219 (H) 26 - 192 U/L Troponin-I, QT 0.02 0.0 - 0.045 NG/ML  
NT-PRO BNP Collection Time: 01/27/21  6:10 PM  
Result Value Ref Range NT pro-BNP 1,609 (H) 0 - 900 PG/ML  
URINALYSIS W/ RFLX MICROSCOPIC Collection Time: 01/27/21  6:15 PM  
Result Value Ref Range Color YELLOW Appearance CLOUDY Specific gravity 1.026 1.005 - 1.030    
 pH (UA) 5.5 5.0 - 8.0 Protein 100 (A) NEG mg/dL Glucose Negative NEG mg/dL Ketone TRACE (A) NEG mg/dL Bilirubin Negative NEG Blood LARGE (A) NEG Urobilinogen 0.2 0.2 - 1.0 EU/dL Nitrites Negative NEG Leukocyte Esterase MODERATE (A) NEG URINE MICROSCOPIC ONLY Collection Time: 01/27/21  6:15 PM  
Result Value Ref Range  WBC 36 to 50 0 - 5 /hpf RBC 11 to 20 0 - 5 /hpf Epithelial cells Negative 0 - 5 /lpf Bacteria 3+ (A) NEG /hpf POC LACTIC ACID Collection Time: 01/27/21  6:18 PM  
Result Value Ref Range Lactic Acid (POC) 3.55 (HH) 0.40 - 2.00 mmol/L  
POC G3 Collection Time: 01/27/21  7:23 PM  
Result Value Ref Range Device: BIPAP    
 FIO2 (POC) 1.0 %  
 pH (POC) 7.19 (LL) 7.35 - 7.45    
 pCO2 (POC) 71.1 (H) 35.0 - 45.0 MMHG  
 pO2 (POC) 325 (H) 80 - 100 MMHG  
 HCO3 (POC) 27.1 (H) 22 - 26 MMOL/L  
 sO2 (POC) 100 (H) 92 - 97 % Base deficit (POC) 1 mmol/L  
 PEEP/CPAP (POC) 6 cmH2O Pressure support 8 cmH2O Allens test (POC) YES Total resp. rate 19 Site RIGHT RADIAL Patient temp. 97.8 Specimen type (POC) ARTERIAL Performed by James Carbone POC LACTIC ACID Collection Time: 01/27/21  9:06 PM  
Result Value Ref Range Lactic Acid (POC) 2.19 (HH) 0.40 - 2.00 mmol/L METABOLIC PANEL, BASIC Collection Time: 01/27/21  9:52 PM  
Result Value Ref Range Sodium 118 (LL) 136 - 145 mmol/L Potassium 5.2 3.5 - 5.5 mmol/L Chloride 84 (L) 100 - 111 mmol/L  
 CO2 30 21 - 32 mmol/L Anion gap 4 3.0 - 18 mmol/L Glucose 131 (H) 74 - 99 mg/dL BUN 15 7.0 - 18 MG/DL Creatinine 0.43 (L) 0.6 - 1.3 MG/DL  
 BUN/Creatinine ratio 35 (H) 12 - 20 GFR est AA >60 >60 ml/min/1.73m2 GFR est non-AA >60 >60 ml/min/1.73m2 Calcium 8.3 (L) 8.5 - 10.1 MG/DL  
POC G3 Collection Time: 01/27/21 10:13 PM  
Result Value Ref Range Device: BIPAP    
 FIO2 (POC) 1.0 %  
 pH (POC) 7.07 (LL) 7.35 - 7.45    
 pCO2 (POC) 100.7 (H) 35.0 - 45.0 MMHG  
 pO2 (POC) 101 (H) 80 - 100 MMHG  
 HCO3 (POC) 29.0 (H) 22 - 26 MMOL/L  
 sO2 (POC) 94 92 - 97 % Base deficit (POC) 1 mmol/L  
 PEEP/CPAP (POC) 8 cmH2O Pressure support 12 cmH2O Allens test (POC) N/A Total resp. rate 20 Site LEFT BRACHIAL Patient temp. 98.3 Specimen type (POC) ARTERIAL Performed by James Carbone Results CT CHEST ABD PELV W CONT (Accession [id number]) (Order [id number]) Allergies    
Not Specified: Ceftriaxone;  Demerol [Meperidine]; Seafood [Shellfish Containing Products] Exam Information Status Exam Begun  Exam Ended Final [99] 1/27/2021 21:28 1/27/2021  9:43 PM [id number]  9:43 PM  
Result Information Status: Final result (Exam End: 1/27/2021 21:43) Provider Status: Open Study Result EXAM: CT CHEST, ABDOMEN AND PELVIS  
  
INDICATION: sepsis, abd distension, respiratory distress 
  
COMPARISON: Chest radiograph 1/27/2021. CT 4/8/2018 
  
TECHNIQUE: Axial CT imaging of the chest, abdomen, and pelvis was performed with 
intravenous contrast. Multiplanar reformats were generated. One or more dose 
reduction techniques were used on this CT: automated exposure control, 
adjustment of the mAs and/or kVp according to patient size, and iterative 
reconstruction techniques. The specific techniques used on this CT exam have 
been documented in the patient's electronic medical record. Digital Imaging and 
Communications in Medicine (DICOM) format image data are available to 
nonaffiliated external healthcare facilities or entities on a secure, media 
free, reciprocally searchable basis with patient authorization for at least a 
12-month period after this study. 
  
________________ 
  
FINDINGS: 
  
LIMITATIONS:  
  > Motion artifact is present which limits evaluation.  
  > Suboptimal exam due to patient body habitus. 
  
CHEST: 
  
LUNGS: Consolidative opacities with air bronchograms in the dependent right 
upper lobe. Consolidation in the apical segment left lower lobe and throughout 
the right lower lobe. 
  
PLEURA: Normal. 
  
AIRWAY: Intraluminal debris is seen throughout both lower lobe bronchi. 
  
MEDIASTINUM: The heart is normal size with aortic valvular calcifications. Atherosclerosis including the coronary arteries. No large pericardial effusion. The great vessels are normal caliber. Markedly distended fluid-filled esophagus, 
please note this places the patient at increased risk of aspiration. 
  
LYMPH NODES: No enlarged lymph nodes. 
  
CHEST WALL: Foci of subcutaneous emphysema in the ventral left axillary soft 
tissues (axial image 11) is of uncertain etiology, correlate with physical exam. 
Markedly elevated right hemidiaphragm, similar to prior studies. 
  
_______________ 
  
ABDOMEN/PELVIS: 
  
LIVER: No enhancing hepatic mass. The portal veins are patent. 
  
BILIARY: Mild extrahepatic biliary ductal dilation likely reservoir effect post 
cholecystectomy. No intrahepatic biliary ductal dilation.   
  
SPLEEN: Normal. 
  
PANCREAS: Normal. 
  
ADRENALS: Normal. 
  
KIDNEYS: Normal. 
  
Critical Result - GI TRACT:  The esophagus is distended with fluid throughout. Postsurgical changes consistent with gastric bypass; hiatal hernia present. Diffusely dilated small bowel. Markedly dilated colon extending to the level of 
the sigmoid colon where there is swirling about the mesentery and a transition 
point (axial image 110, coronal image 56, and sagittal image 67) to decompressed 
fluid-filled colon distally; this is associated with narrowing/nonopacification 
of the mesenteric vein distally. These findings are highly concerning for bowel 
obstruction related to sigmoid volvulus and possible ischemia. There is mucosal 
hyperenhancement and fluid density stool in the rectum. Prior appendectomy. 
  
BLADDER: Small amount of air and Gotti catheter present within the decompressed 
bladder. There is mild mucosal hyperenhancement.  
  
PELVIC ORGANS: No acute abnormality. 
  
VASCULATURE: No arterial aneurysm. Infrarenal IVC filter present 
  
LYMPH NODES: No mesenteric or retroperitoneal lymphadenopathy. 
  
OTHER: No free intraperitoneal air. No ascites. 
  
OSSEOUS STRUCTURES: No acute osseous abnormality. Deformity and both femoral 
heads appear similar to prior study.  The bones are diffusely demineralized. Postsurgical changes at the lumbosacral junction. Multilevel degenerative 
changes throughout the spine. 
  
_______________ 
  
IMPRESSION 
  
1. Diffuse bowel dilation with transition point in the sigmoid colon concerning 
for bowel obstruction related to sigmoid volvulus and possible ischemia. Recommend surgical consultation and correlation with lactate. 2.  Hyperenhancement and fluid density stool in the rectum suggest proctitis. 3.  Consolidative opacities in both lungs likely represent pneumonia, this may 
be secondary to aspiration. Recommend repeat imaging following completion of 
appropriate medical therapy. 4.  Mild mucosal hyperenhancement and bladder wall thickening, correlate for 
cystitis. 5.  Osseous degenerative changes and additional findings, as detailed in the 
body of the report. 
  
Findings discussed with Dr. Tushar Mckeon by Rosa M Cardenas MD, PhD at 10:43 PM on 1/27/2021 Results XR CHEST PORT (Accession [id number]) (Order [id number]) Allergies    
Not Specified: Ceftriaxone;  Demerol [Meperidine]; Seafood [Shellfish Containing Products] Exam Information Status Exam Begun  Exam Ended Final [99] 1/27/2021 18:03 1/27/2021  6:27 PM [id number]  6:27 PM  
Result Information Status: Final result (Exam End: 1/27/2021 18:27) Provider Status: Open Study Result EXAM: PORTABLE  FRONTAL CHEST RADIOGRAPH 
  
CLINICAL INDICATION/HISTORY: Patient unresponsive, history of gastroesophageal 
reflux and seizures 
  
COMPARISON: 8/24/2018 
  
TECHNIQUE: Portable frontal view of the chest 
  
_______________ 
  
FINDINGS: 
  
SUPPORT DEVICES: EKG leads overlie the patient. IVC filter projects over the mid 
abdomen. 
  
HEART AND MEDIASTINUM: Normal heart size and mediastinal contours. 
  
LUNGS: Linear opacities at the right lung base and left midlung likely represent 
atelectasis/scarring. No lobar consolidation. 
  
PLEURAL SPACES:No large pneumothorax.  No large pleural effusion. 
  
BONY THORAX AND SOFT TISSUES: No acute abnormality. The bones are diffusely 
demineralized. Markedly elevated right hemidiaphragm, similar to prior studies. Diffusely dilated colon. 
  
_______________ 
  
IMPRESSION 
  
1. No suspicious consolidation, pleural effusion, or pneumothorax. 2.  Diffuse colonic dilation appears similar to prior studies, differential 
considerations include chronic ileus, obstruction distal mass, and large bowel 
obstruction. Incompletely visualized on this study.

## 2021-01-28 NOTE — PROGRESS NOTES
BEREAVEMENT NOTE  responded to the death of Kayleen Russell, who is a 72 y.o., female, offering Spiritual Care to patient and family, see flow sheets for interventions. Date of Death: 2021 Time of Death: 0002 Pronounced by: Prince Moon MD 
 
Extended Emergency Contact Information Primary Emergency Contact: Tulio Rose Address: [address] [place] [address] Home Phone: [phone] Relation: Spouse Advance Care Plan: On file  Status: Unknown YES      NO  UNKNOWN Life Net   []        [x]    [] [facility] Bank   [] [x] [] Medical Examiner  []        [x]  [] Going to [place]  []        [x] [] Autopsy   []        [x]        [] Sympathy Card  [x]        [] Bereavement Materials  [x]        [] Business Card Provided  []        [x] Organ Donor/Anatomical Gift Preference: N/A  Home 
Name: Unknown - Spouse unable to adequately process in the moment. Informed him hospital will follow up with him tomorrow. Chaplains will continue to follow family and will provide spiritual care as needed. BRIAN JenningsDiv. [address] [facility] [address] [facility],[address] Ph. [phone]

## 2021-01-28 NOTE — PROGRESS NOTES
I talked with her  about her ABG results showing worsening acidosis despite BiPAP. Her  was asking about overriding her wishes and intubating her if she was unable to make decisions anymore. I advised that comfort measures would be recommended as she did not desire intubation. I offered the option of discussing with ethics but he didn't want that just yet. He wanted to see if her bowels could be decompressed with an enema to help her breathe better. I went to discuss this with her nurse when radiology called me to state she had a sigmoid volvulus with likely mesenteric ischemia. I talked with general surgery who did not recommend surgery immediately in light of her acidosis and metabolic derangements. Plan for soap suds enemas and stabilization.

## 2021-02-02 LAB
BACTERIA SPEC CULT: NORMAL
BACTERIA SPEC CULT: NORMAL
SERVICE CMNT-IMP: NORMAL
SERVICE CMNT-IMP: NORMAL

## 2022-04-07 NOTE — ACP (ADVANCE CARE PLANNING)
Patient has Advance Directive scanned into EMR. Primary MPOA is immanuel, Bria Thompson [phone]. Secondary MPOA is Juany Cates [phone]. Patient also signed DDNR form. No

## (undated) DEVICE — SYR 5ML 1/5 GRAD LL NSAF LF --

## (undated) DEVICE — KENDALL RADIOLUCENT FOAM MONITORING ELECTRODE RECTANGULAR SHAPE: Brand: KENDALL

## (undated) DEVICE — SYRINGE 50ML E/T

## (undated) DEVICE — MOUTHPIECE ENDOSCP 20X27MM --

## (undated) DEVICE — WRISTBAND ID AD W2.5XL9.5CM RED VYN ADH CLSR UNI-PRINT

## (undated) DEVICE — SET ADMIN 16ML TBNG L100IN 2 Y INJ SITE IV PIGGY BK DISP

## (undated) DEVICE — TRAP SPEC COLL POLYP POLYSTYR --

## (undated) DEVICE — SPONGE GZ W4XL4IN RAYON POLY 4 PLY NONWOVEN FASTER WICKING

## (undated) DEVICE — CATH IV SAFE STR 22GX1IN BLU -- PROTECTIV PLUS

## (undated) DEVICE — SPONGE GZ W4XL4IN COT 12 PLY TYP VII WVN C FLD DSGN

## (undated) DEVICE — ENDO CARRY-ON PROCEDURE KIT INCLUDES ENZYMATIC SPONGE, GAUZE, BIOHAZARD LABEL, TRAY, LUBRICANT, DIRTY SCOPE LABEL, WATER LABEL, TRAY, DRAWSTRING PAD, AND DEFENDO 4-PIECE KIT.: Brand: ENDO CARRY-ON PROCEDURE KIT

## (undated) DEVICE — DEVON™ KNEE AND BODY STRAP 60" X 3" (1.5 M X 7.6 CM): Brand: DEVON

## (undated) DEVICE — MAJ-1414 SINGLE USE ADPATER BIOPSY VALV: Brand: SINGLE USE ADAPTOR BIOPSY VALVE

## (undated) DEVICE — MEDI-VAC NON-CONDUCTIVE SUCTION TUBING: Brand: CARDINAL HEALTH

## (undated) DEVICE — CANNULA CUSH AD W/ 14FT TBG

## (undated) DEVICE — REM POLYHESIVE ADULT PATIENT RETURN ELECTRODE: Brand: VALLEYLAB

## (undated) DEVICE — SINGLE PORT MANIFOLD: Brand: NEPTUNE 2

## (undated) DEVICE — TRNQT TEXT 1X18IN BLU LF DISP -- CONVERT TO ITEM 362165

## (undated) DEVICE — CATH SUC CTRL PRT TRIFLO 14FR --

## (undated) DEVICE — SOLUTION IV 500ML 0.9% SOD CHL FLX CONT

## (undated) DEVICE — SYR 3ML LL TIP 1/10ML GRAD --

## (undated) DEVICE — NDL FLTR TIP 5 MIC 18GX1.5IN --

## (undated) DEVICE — NDL PRT INJ NSAF BLNT 18GX1.5 --